# Patient Record
Sex: FEMALE | Race: WHITE | NOT HISPANIC OR LATINO | Employment: OTHER | URBAN - METROPOLITAN AREA
[De-identification: names, ages, dates, MRNs, and addresses within clinical notes are randomized per-mention and may not be internally consistent; named-entity substitution may affect disease eponyms.]

---

## 2017-01-12 ENCOUNTER — AMBULATORY - HEALTHEAST (OUTPATIENT)
Dept: VASCULAR SURGERY | Facility: CLINIC | Age: 82
End: 2017-01-12

## 2017-01-12 ENCOUNTER — RECORDS - HEALTHEAST (OUTPATIENT)
Dept: ADMINISTRATIVE | Facility: OTHER | Age: 82
End: 2017-01-12

## 2017-01-12 ENCOUNTER — RECORDS - HEALTHEAST (OUTPATIENT)
Dept: VASCULAR ULTRASOUND | Facility: CLINIC | Age: 82
End: 2017-01-12

## 2017-01-12 DIAGNOSIS — I73.9 PERIPHERAL VASCULAR DISEASE, UNSPECIFIED (H): ICD-10-CM

## 2017-01-12 DIAGNOSIS — I73.9 PAD (PERIPHERAL ARTERY DISEASE) (H): ICD-10-CM

## 2017-01-19 ENCOUNTER — COMMUNICATION - HEALTHEAST (OUTPATIENT)
Dept: INTERNAL MEDICINE | Facility: CLINIC | Age: 82
End: 2017-01-19

## 2017-01-19 DIAGNOSIS — I10 HYPERTENSION: ICD-10-CM

## 2017-02-04 ENCOUNTER — COMMUNICATION - HEALTHEAST (OUTPATIENT)
Dept: INTERNAL MEDICINE | Facility: CLINIC | Age: 82
End: 2017-02-04

## 2017-02-28 ENCOUNTER — OFFICE VISIT - HEALTHEAST (OUTPATIENT)
Dept: INTERNAL MEDICINE | Facility: CLINIC | Age: 82
End: 2017-02-28

## 2017-02-28 DIAGNOSIS — Z00.01 ENCOUNTER FOR GENERAL ADULT MEDICAL EXAMINATION WITH ABNORMAL FINDINGS: ICD-10-CM

## 2017-02-28 DIAGNOSIS — I10 ESSENTIAL HYPERTENSION WITH GOAL BLOOD PRESSURE LESS THAN 140/90: ICD-10-CM

## 2017-02-28 DIAGNOSIS — R26.81 GAIT INSTABILITY: ICD-10-CM

## 2017-02-28 DIAGNOSIS — E55.9 VITAMIN D DEFICIENCY: ICD-10-CM

## 2017-02-28 DIAGNOSIS — G25.81 RLS (RESTLESS LEGS SYNDROME): ICD-10-CM

## 2017-02-28 DIAGNOSIS — I25.10 CORONARY ATHEROSCLEROSIS: ICD-10-CM

## 2017-02-28 DIAGNOSIS — Z78.0 MENOPAUSE: ICD-10-CM

## 2017-02-28 DIAGNOSIS — L98.9 SKIN LESION: ICD-10-CM

## 2017-02-28 DIAGNOSIS — E78.2 MIXED HYPERLIPIDEMIA: ICD-10-CM

## 2017-02-28 LAB
CHOLEST SERPL-MCNC: 154 MG/DL
FASTING STATUS PATIENT QL REPORTED: NO
HDLC SERPL-MCNC: 48 MG/DL
LDLC SERPL CALC-MCNC: 85 MG/DL
TRIGL SERPL-MCNC: 105 MG/DL

## 2017-02-28 ASSESSMENT — MIFFLIN-ST. JEOR: SCORE: 1086.06

## 2017-03-01 ENCOUNTER — COMMUNICATION - HEALTHEAST (OUTPATIENT)
Dept: INTERNAL MEDICINE | Facility: CLINIC | Age: 82
End: 2017-03-01

## 2017-03-03 ENCOUNTER — RECORDS - HEALTHEAST (OUTPATIENT)
Dept: ADMINISTRATIVE | Facility: OTHER | Age: 82
End: 2017-03-03

## 2017-03-06 ENCOUNTER — RECORDS - HEALTHEAST (OUTPATIENT)
Dept: ADMINISTRATIVE | Facility: OTHER | Age: 82
End: 2017-03-06

## 2017-03-06 ENCOUNTER — RECORDS - HEALTHEAST (OUTPATIENT)
Dept: BONE DENSITY | Facility: CLINIC | Age: 82
End: 2017-03-06

## 2017-03-06 DIAGNOSIS — Z78.0 ASYMPTOMATIC MENOPAUSAL STATE: ICD-10-CM

## 2017-03-13 ENCOUNTER — COMMUNICATION - HEALTHEAST (OUTPATIENT)
Dept: INTERNAL MEDICINE | Facility: CLINIC | Age: 82
End: 2017-03-13

## 2017-03-29 ENCOUNTER — COMMUNICATION - HEALTHEAST (OUTPATIENT)
Dept: SCHEDULING | Facility: CLINIC | Age: 82
End: 2017-03-29

## 2017-03-29 DIAGNOSIS — I10 HYPERTENSION: ICD-10-CM

## 2017-03-31 ENCOUNTER — COMMUNICATION - HEALTHEAST (OUTPATIENT)
Dept: SCHEDULING | Facility: CLINIC | Age: 82
End: 2017-03-31

## 2017-03-31 DIAGNOSIS — I10 HYPERTENSION: ICD-10-CM

## 2017-05-03 ENCOUNTER — COMMUNICATION - HEALTHEAST (OUTPATIENT)
Dept: INTERNAL MEDICINE | Facility: CLINIC | Age: 82
End: 2017-05-03

## 2017-05-19 ENCOUNTER — RECORDS - HEALTHEAST (OUTPATIENT)
Dept: ADMINISTRATIVE | Facility: OTHER | Age: 82
End: 2017-05-19

## 2017-05-22 ENCOUNTER — COMMUNICATION - HEALTHEAST (OUTPATIENT)
Dept: INTERNAL MEDICINE | Facility: CLINIC | Age: 82
End: 2017-05-22

## 2017-05-25 ENCOUNTER — OFFICE VISIT - HEALTHEAST (OUTPATIENT)
Dept: INTERNAL MEDICINE | Facility: CLINIC | Age: 82
End: 2017-05-25

## 2017-05-25 DIAGNOSIS — R05.9 COUGH: ICD-10-CM

## 2017-05-25 DIAGNOSIS — J32.9 SINUSITIS: ICD-10-CM

## 2017-05-25 DIAGNOSIS — J40 BRONCHITIS: ICD-10-CM

## 2017-05-25 ASSESSMENT — MIFFLIN-ST. JEOR: SCORE: 1076.99

## 2017-10-25 ENCOUNTER — OFFICE VISIT - HEALTHEAST (OUTPATIENT)
Dept: INTERNAL MEDICINE | Facility: CLINIC | Age: 82
End: 2017-10-25

## 2017-10-25 DIAGNOSIS — I10 ESSENTIAL HYPERTENSION: ICD-10-CM

## 2017-10-25 DIAGNOSIS — I73.9 PAD (PERIPHERAL ARTERY DISEASE) (H): ICD-10-CM

## 2017-10-25 DIAGNOSIS — E78.2 MIXED HYPERLIPIDEMIA: ICD-10-CM

## 2017-10-25 DIAGNOSIS — E55.9 VITAMIN D DEFICIENCY: ICD-10-CM

## 2017-10-25 DIAGNOSIS — R10.84 GENERALIZED ABDOMINAL PAIN: ICD-10-CM

## 2017-10-26 ENCOUNTER — COMMUNICATION - HEALTHEAST (OUTPATIENT)
Dept: INTERNAL MEDICINE | Facility: CLINIC | Age: 82
End: 2017-10-26

## 2017-11-02 ENCOUNTER — COMMUNICATION - HEALTHEAST (OUTPATIENT)
Dept: INTERNAL MEDICINE | Facility: CLINIC | Age: 82
End: 2017-11-02

## 2017-11-02 ENCOUNTER — HOSPITAL ENCOUNTER (OUTPATIENT)
Dept: CT IMAGING | Facility: CLINIC | Age: 82
Discharge: HOME | End: 2017-11-02
Attending: INTERNAL MEDICINE

## 2017-11-02 DIAGNOSIS — R10.84 GENERALIZED ABDOMINAL PAIN: ICD-10-CM

## 2017-12-07 ENCOUNTER — COMMUNICATION - HEALTHEAST (OUTPATIENT)
Dept: INTERNAL MEDICINE | Facility: CLINIC | Age: 82
End: 2017-12-07

## 2018-01-02 ENCOUNTER — COMMUNICATION - HEALTHEAST (OUTPATIENT)
Dept: SCHEDULING | Facility: CLINIC | Age: 83
End: 2018-01-02

## 2018-01-02 DIAGNOSIS — I10 HYPERTENSION: ICD-10-CM

## 2018-01-09 ENCOUNTER — COMMUNICATION - HEALTHEAST (OUTPATIENT)
Dept: INTERNAL MEDICINE | Facility: CLINIC | Age: 83
End: 2018-01-09

## 2018-01-11 ENCOUNTER — RECORDS - HEALTHEAST (OUTPATIENT)
Dept: VASCULAR ULTRASOUND | Facility: CLINIC | Age: 83
End: 2018-01-11

## 2018-01-11 DIAGNOSIS — I73.9 PERIPHERAL VASCULAR DISEASE, UNSPECIFIED (H): ICD-10-CM

## 2018-01-23 ENCOUNTER — COMMUNICATION - HEALTHEAST (OUTPATIENT)
Dept: INTERNAL MEDICINE | Facility: CLINIC | Age: 83
End: 2018-01-23

## 2018-01-23 DIAGNOSIS — I10 HYPERTENSION: ICD-10-CM

## 2018-03-02 ENCOUNTER — COMMUNICATION - HEALTHEAST (OUTPATIENT)
Dept: SCHEDULING | Facility: CLINIC | Age: 83
End: 2018-03-02

## 2018-03-02 DIAGNOSIS — E78.5 HYPERLIPIDEMIA: ICD-10-CM

## 2018-04-10 ENCOUNTER — RECORDS - HEALTHEAST (OUTPATIENT)
Dept: ADMINISTRATIVE | Facility: OTHER | Age: 83
End: 2018-04-10

## 2018-04-16 ENCOUNTER — OFFICE VISIT - HEALTHEAST (OUTPATIENT)
Dept: INTERNAL MEDICINE | Facility: CLINIC | Age: 83
End: 2018-04-16

## 2018-04-16 DIAGNOSIS — I10 ESSENTIAL HYPERTENSION: ICD-10-CM

## 2018-04-16 DIAGNOSIS — E78.2 MIXED HYPERLIPIDEMIA: ICD-10-CM

## 2018-04-16 DIAGNOSIS — R00.2 PALPITATION: ICD-10-CM

## 2018-04-16 DIAGNOSIS — R31.9 HEMATURIA: ICD-10-CM

## 2018-04-16 DIAGNOSIS — E55.9 VITAMIN D DEFICIENCY: ICD-10-CM

## 2018-04-16 LAB
ALBUMIN SERPL-MCNC: 4.2 G/DL (ref 3.5–5)
ALBUMIN UR-MCNC: NEGATIVE MG/DL
ALP SERPL-CCNC: 66 U/L (ref 45–120)
ALT SERPL W P-5'-P-CCNC: 13 U/L (ref 0–45)
ANION GAP SERPL CALCULATED.3IONS-SCNC: 12 MMOL/L (ref 5–18)
APPEARANCE UR: CLEAR
AST SERPL W P-5'-P-CCNC: 18 U/L (ref 0–40)
BACTERIA #/AREA URNS HPF: ABNORMAL HPF
BILIRUB DIRECT SERPL-MCNC: 0.4 MG/DL
BILIRUB SERPL-MCNC: 1.1 MG/DL (ref 0–1)
BILIRUB UR QL STRIP: NEGATIVE
BUN SERPL-MCNC: 12 MG/DL (ref 8–28)
CALCIUM SERPL-MCNC: 10.1 MG/DL (ref 8.5–10.5)
CHLORIDE BLD-SCNC: 102 MMOL/L (ref 98–107)
CHOLEST SERPL-MCNC: 138 MG/DL
CO2 SERPL-SCNC: 26 MMOL/L (ref 22–31)
COLOR UR AUTO: YELLOW
CREAT SERPL-MCNC: 0.88 MG/DL (ref 0.6–1.1)
ERYTHROCYTE [DISTWIDTH] IN BLOOD BY AUTOMATED COUNT: 12.3 % (ref 11–14.5)
FASTING STATUS PATIENT QL REPORTED: NO
GFR SERPL CREATININE-BSD FRML MDRD: >60 ML/MIN/1.73M2
GLUCOSE BLD-MCNC: 83 MG/DL (ref 70–125)
GLUCOSE UR STRIP-MCNC: NEGATIVE MG/DL
HCT VFR BLD AUTO: 42.7 % (ref 35–47)
HDLC SERPL-MCNC: 41 MG/DL
HGB BLD-MCNC: 14.8 G/DL (ref 12–16)
HGB UR QL STRIP: ABNORMAL
HYALINE CASTS #/AREA URNS LPF: ABNORMAL LPF
KETONES UR STRIP-MCNC: NEGATIVE MG/DL
LDLC SERPL CALC-MCNC: 76 MG/DL
LEUKOCYTE ESTERASE UR QL STRIP: NEGATIVE
MCH RBC QN AUTO: 31.6 PG (ref 27–34)
MCHC RBC AUTO-ENTMCNC: 34.6 G/DL (ref 32–36)
MCV RBC AUTO: 91 FL (ref 80–100)
NITRATE UR QL: NEGATIVE
PH UR STRIP: 5.5 [PH] (ref 5–8)
PLATELET # BLD AUTO: 179 THOU/UL (ref 140–440)
PMV BLD AUTO: 7.8 FL (ref 7–10)
POTASSIUM BLD-SCNC: 4.4 MMOL/L (ref 3.5–5)
PROT SERPL-MCNC: 7.2 G/DL (ref 6–8)
RBC # BLD AUTO: 4.68 MILL/UL (ref 3.8–5.4)
RBC #/AREA URNS AUTO: ABNORMAL HPF
SODIUM SERPL-SCNC: 140 MMOL/L (ref 136–145)
SP GR UR STRIP: 1.01 (ref 1–1.03)
SQUAMOUS #/AREA URNS AUTO: ABNORMAL LPF
TRIGL SERPL-MCNC: 106 MG/DL
TSH SERPL DL<=0.005 MIU/L-ACNC: 3.61 UIU/ML (ref 0.3–5)
UROBILINOGEN UR STRIP-ACNC: ABNORMAL
WBC #/AREA URNS AUTO: ABNORMAL HPF
WBC: 8.1 THOU/UL (ref 4–11)

## 2018-04-16 ASSESSMENT — MIFFLIN-ST. JEOR: SCORE: 1072.74

## 2018-04-17 LAB — 25(OH)D3 SERPL-MCNC: 79.9 NG/ML (ref 30–80)

## 2018-04-18 ENCOUNTER — COMMUNICATION - HEALTHEAST (OUTPATIENT)
Dept: INTERNAL MEDICINE | Facility: CLINIC | Age: 83
End: 2018-04-18

## 2018-04-18 LAB
LAB AP CHARGES (HE HISTORICAL CONVERSION): NORMAL
LAB MED GENERAL PATH INTERP (HE HISTORICAL CONVERSION): NORMAL
PATH REPORT.COMMENTS IMP SPEC: NORMAL
PATH REPORT.FINAL DX SPEC: NORMAL
PATH REPORT.MICROSCOPIC SPEC OTHER STN: NORMAL
PATH REPORT.RELEVANT HX SPEC: NORMAL
SPECIMEN DESCRIPTION: NORMAL

## 2018-04-23 ENCOUNTER — HOSPITAL ENCOUNTER (OUTPATIENT)
Dept: CARDIOLOGY | Facility: CLINIC | Age: 83
Discharge: HOME | End: 2018-04-23
Attending: INTERNAL MEDICINE

## 2018-04-23 DIAGNOSIS — R00.2 PALPITATION: ICD-10-CM

## 2018-04-25 ENCOUNTER — COMMUNICATION - HEALTHEAST (OUTPATIENT)
Dept: INTERNAL MEDICINE | Facility: CLINIC | Age: 83
End: 2018-04-25

## 2018-04-25 DIAGNOSIS — I10 HYPERTENSION: ICD-10-CM

## 2018-04-30 ENCOUNTER — OFFICE VISIT - HEALTHEAST (OUTPATIENT)
Dept: INTERNAL MEDICINE | Facility: CLINIC | Age: 83
End: 2018-04-30

## 2018-04-30 DIAGNOSIS — F41.9 ANXIETY: ICD-10-CM

## 2018-04-30 DIAGNOSIS — G47.00 INSOMNIA: ICD-10-CM

## 2018-04-30 DIAGNOSIS — I10 HYPERTENSION: ICD-10-CM

## 2018-04-30 DIAGNOSIS — I48.91 ATRIAL FIBRILLATION (H): ICD-10-CM

## 2018-05-03 ENCOUNTER — HOSPITAL ENCOUNTER (OUTPATIENT)
Dept: CARDIOLOGY | Facility: CLINIC | Age: 83
Discharge: HOME | End: 2018-05-03
Attending: INTERNAL MEDICINE

## 2018-05-03 DIAGNOSIS — I48.91 ATRIAL FIBRILLATION (H): ICD-10-CM

## 2018-05-03 LAB
AORTIC ROOT: 3.3 CM
ASCENDING AORTA: 3.3 CM
BSA FOR ECHO PROCEDURE: 1.74 M2
CV BLOOD PRESSURE: NORMAL MMHG
CV ECHO HEIGHT: 66 IN
CV ECHO WEIGHT: 143 LBS
DOP CALC LVOT AREA: 3.46 CM2
DOP CALC LVOT DIAMETER: 2.1 CM
DOP CALC LVOT PEAK VEL: 105 CM/S
DOP CALC LVOT STROKE VOLUME: 82 CM3
DOP CALCLVOT PEAK VEL VTI: 23.7 CM
EJECTION FRACTION: 56 % (ref 55–75)
FRACTIONAL SHORTENING: 21.2 % (ref 28–44)
INTERVENTRICULAR SEPTUM IN END DIASTOLE: 1.7 CM (ref 0.6–0.9)
IVS/PW RATIO: 1.3
LA AREA 1: 19.9 CM2
LA AREA 2: 19.3 CM2
LEFT ATRIUM LENGTH: 5.42 CM
LEFT ATRIUM SIZE: 3.5 CM
LEFT ATRIUM TO AORTIC ROOT RATIO: 1.06 NO UNITS
LEFT ATRIUM VOLUME INDEX: 34.6 ML/M2
LEFT ATRIUM VOLUME: 60.2 ML
LEFT VENTRICLE CARDIAC INDEX: 3.1 L/MIN/M2
LEFT VENTRICLE CARDIAC OUTPUT: 5.4 L/MIN
LEFT VENTRICLE DIASTOLIC VOLUME INDEX: 42 CM3/M2 (ref 34–74)
LEFT VENTRICLE DIASTOLIC VOLUME: 73 CM3 (ref 46–106)
LEFT VENTRICLE HEART RATE: 66 BPM
LEFT VENTRICLE MASS INDEX: 102.7 G/M2
LEFT VENTRICLE SYSTOLIC VOLUME INDEX: 18.4 CM3/M2 (ref 11–31)
LEFT VENTRICLE SYSTOLIC VOLUME: 32 CM3 (ref 14–42)
LEFT VENTRICULAR INTERNAL DIMENSION IN DIASTOLE: 3.3 CM (ref 3.8–5.2)
LEFT VENTRICULAR INTERNAL DIMENSION IN SYSTOLE: 2.6 CM (ref 2.2–3.5)
LEFT VENTRICULAR MASS: 178.7 G
LEFT VENTRICULAR OUTFLOW TRACT MEAN GRADIENT: 2 MMHG
LEFT VENTRICULAR OUTFLOW TRACT MEAN VELOCITY: 74.1 CM/S
LEFT VENTRICULAR OUTFLOW TRACT PEAK GRADIENT: 4 MMHG
LEFT VENTRICULAR POSTERIOR WALL IN END DIASTOLE: 1.3 CM (ref 0.6–0.9)
LV STROKE VOLUME INDEX: 47.2 ML/M2
MITRAL REGURGITANT VELOCITY TIME INTEGRAL: 206 CM
MR FLOW: 10 CM3
MR MEAN GRADIENT: 91 MMHG
MR MEAN GRADIENT: 91 MMHG
MR MEAN VELOCITY: 445 CM/S
MR MEAN VELOCITY: 445 CM/S
MR PEAK GRADIENT: 139.7 MMHG
MR PISA EROA: 0.1 CM2
MR PISA RADIUS: 0.4 CM
MR PISA VN NYQUIST: 30.8 CM/S
MV AVERAGE E/E' RATIO: 14.7 CM/S
MV DECELERATION TIME: 201 MS
MV E'TISSUE VEL-LAT: 8.48 CM/S
MV E'TISSUE VEL-MED: 6.53 CM/S
MV LATERAL E/E' RATIO: 13
MV MEDIAL E/E' RATIO: 16.8
MV PEAK E VELOCITY: 110 CM/S
MV REGURGITANT VOLUME: 10.8 CC
NUC REST DIASTOLIC VOLUME INDEX: 2288 LBS
NUC REST SYSTOLIC VOLUME INDEX: 66 IN
PISA MR PEAK VEL: 591 CM/S
TRICUSPID REGURGITATION PEAK PRESSURE GRADIENT: 30.7 MMHG
TRICUSPID VALVE ANULAR PLANE SYSTOLIC EXCURSION: 2.5 CM
TRICUSPID VALVE PEAK REGURGITANT VELOCITY: 277 CM/S

## 2018-05-03 ASSESSMENT — MIFFLIN-ST. JEOR: SCORE: 1080.39

## 2018-05-04 ENCOUNTER — COMMUNICATION - HEALTHEAST (OUTPATIENT)
Dept: INTERNAL MEDICINE | Facility: CLINIC | Age: 83
End: 2018-05-04

## 2018-05-09 ENCOUNTER — OFFICE VISIT - HEALTHEAST (OUTPATIENT)
Dept: CARDIOLOGY | Facility: CLINIC | Age: 83
End: 2018-05-09

## 2018-05-09 DIAGNOSIS — I25.10 ATHEROSCLEROSIS OF NATIVE CORONARY ARTERY OF NATIVE HEART WITHOUT ANGINA PECTORIS: ICD-10-CM

## 2018-05-09 DIAGNOSIS — I47.10 PAROXYSMAL SVT (SUPRAVENTRICULAR TACHYCARDIA) (H): ICD-10-CM

## 2018-05-09 DIAGNOSIS — I48.19 PERSISTENT ATRIAL FIBRILLATION (H): ICD-10-CM

## 2018-05-09 DIAGNOSIS — I10 ESSENTIAL HYPERTENSION: ICD-10-CM

## 2018-05-09 ASSESSMENT — MIFFLIN-ST. JEOR: SCORE: 1062.25

## 2018-05-10 LAB
ATRIAL RATE - MUSE: 227 BPM
DIASTOLIC BLOOD PRESSURE - MUSE: NORMAL MMHG
INTERPRETATION ECG - MUSE: NORMAL
P AXIS - MUSE: NORMAL DEGREES
PR INTERVAL - MUSE: NORMAL MS
QRS DURATION - MUSE: 84 MS
QT - MUSE: 410 MS
QTC - MUSE: 429 MS
R AXIS - MUSE: -44 DEGREES
SYSTOLIC BLOOD PRESSURE - MUSE: NORMAL MMHG
T AXIS - MUSE: -4 DEGREES
VENTRICULAR RATE- MUSE: 66 BPM

## 2018-05-15 ENCOUNTER — OFFICE VISIT - HEALTHEAST (OUTPATIENT)
Dept: INTERNAL MEDICINE | Facility: CLINIC | Age: 83
End: 2018-05-15

## 2018-05-15 DIAGNOSIS — I48.19 PERSISTENT ATRIAL FIBRILLATION (H): ICD-10-CM

## 2018-05-15 DIAGNOSIS — I10 ESSENTIAL HYPERTENSION: ICD-10-CM

## 2018-05-15 DIAGNOSIS — I73.9 PAD (PERIPHERAL ARTERY DISEASE) (H): ICD-10-CM

## 2018-05-15 DIAGNOSIS — G47.00 INSOMNIA, UNSPECIFIED TYPE: ICD-10-CM

## 2018-05-16 ENCOUNTER — AMBULATORY - HEALTHEAST (OUTPATIENT)
Dept: CARDIOLOGY | Facility: CLINIC | Age: 83
End: 2018-05-16

## 2018-05-24 ENCOUNTER — ANESTHESIA - HEALTHEAST (OUTPATIENT)
Dept: CARDIOLOGY | Facility: CLINIC | Age: 83
End: 2018-05-24

## 2018-06-06 ENCOUNTER — COMMUNICATION - HEALTHEAST (OUTPATIENT)
Dept: SCHEDULING | Facility: CLINIC | Age: 83
End: 2018-06-06

## 2018-06-06 DIAGNOSIS — E78.5 HYPERLIPIDEMIA: ICD-10-CM

## 2018-06-07 ENCOUNTER — COMMUNICATION - HEALTHEAST (OUTPATIENT)
Dept: INTERNAL MEDICINE | Facility: CLINIC | Age: 83
End: 2018-06-07

## 2018-06-07 ENCOUNTER — OFFICE VISIT - HEALTHEAST (OUTPATIENT)
Dept: CARDIOLOGY | Facility: CLINIC | Age: 83
End: 2018-06-07

## 2018-06-07 DIAGNOSIS — I48.19 PERSISTENT ATRIAL FIBRILLATION (H): ICD-10-CM

## 2018-06-07 DIAGNOSIS — R42 VERTIGO: ICD-10-CM

## 2018-06-07 DIAGNOSIS — E78.5 HYPERLIPIDEMIA: ICD-10-CM

## 2018-06-07 DIAGNOSIS — I10 ESSENTIAL HYPERTENSION: ICD-10-CM

## 2018-06-07 LAB
ATRIAL RATE - MUSE: 50 BPM
DIASTOLIC BLOOD PRESSURE - MUSE: NORMAL MMHG
INTERPRETATION ECG - MUSE: NORMAL
P AXIS - MUSE: 42 DEGREES
PR INTERVAL - MUSE: 180 MS
QRS DURATION - MUSE: 80 MS
QT - MUSE: 444 MS
QTC - MUSE: 404 MS
R AXIS - MUSE: -56 DEGREES
SYSTOLIC BLOOD PRESSURE - MUSE: NORMAL MMHG
T AXIS - MUSE: 36 DEGREES
VENTRICULAR RATE- MUSE: 50 BPM

## 2018-06-07 ASSESSMENT — MIFFLIN-ST. JEOR: SCORE: 1053.18

## 2018-06-14 ENCOUNTER — HOSPITAL ENCOUNTER (OUTPATIENT)
Dept: CARDIOLOGY | Facility: CLINIC | Age: 83
Discharge: HOME | End: 2018-06-14
Attending: NURSE PRACTITIONER

## 2018-06-14 DIAGNOSIS — I48.19 PERSISTENT ATRIAL FIBRILLATION (H): ICD-10-CM

## 2018-06-24 ENCOUNTER — RECORDS - HEALTHEAST (OUTPATIENT)
Dept: ADMINISTRATIVE | Facility: OTHER | Age: 83
End: 2018-06-24

## 2018-06-24 ASSESSMENT — MIFFLIN-ST. JEOR
SCORE: 1041.84
SCORE: 1046.38
SCORE: 1046.38
SCORE: 1041.84

## 2018-06-25 ENCOUNTER — RECORDS - HEALTHEAST (OUTPATIENT)
Dept: ADMINISTRATIVE | Facility: OTHER | Age: 83
End: 2018-06-25

## 2018-06-25 ASSESSMENT — MIFFLIN-ST. JEOR
SCORE: 1046.38
SCORE: 1046.38

## 2018-06-26 ENCOUNTER — SURGERY - HEALTHEAST (OUTPATIENT)
Dept: CARDIOLOGY | Facility: CLINIC | Age: 83
End: 2018-06-26

## 2018-06-27 ENCOUNTER — SURGERY - HEALTHEAST (OUTPATIENT)
Dept: CARDIOLOGY | Facility: CLINIC | Age: 83
End: 2018-06-27

## 2018-06-27 ENCOUNTER — RECORDS - HEALTHEAST (OUTPATIENT)
Dept: ADMINISTRATIVE | Facility: OTHER | Age: 83
End: 2018-06-27

## 2018-06-27 ASSESSMENT — MIFFLIN-ST. JEOR
SCORE: 1023.25
SCORE: 1023.25

## 2018-06-28 ENCOUNTER — RECORDS - HEALTHEAST (OUTPATIENT)
Dept: ADMINISTRATIVE | Facility: OTHER | Age: 83
End: 2018-06-28

## 2018-06-28 ASSESSMENT — MIFFLIN-ST. JEOR
SCORE: 1033.23
SCORE: 1033.23

## 2018-07-12 ENCOUNTER — AMBULATORY - HEALTHEAST (OUTPATIENT)
Dept: CARDIOLOGY | Facility: CLINIC | Age: 83
End: 2018-07-12

## 2018-07-12 DIAGNOSIS — Z95.0 CARDIAC PACEMAKER IN SITU: ICD-10-CM

## 2018-07-12 LAB
HCC DEVICE COMMENTS: NORMAL
HCC DEVICE IMPLANTING PROVIDER: NORMAL
HCC DEVICE MANUFACTURE: NORMAL
HCC DEVICE MODEL: NORMAL
HCC DEVICE SERIAL NUMBER: NORMAL
HCC DEVICE TYPE: NORMAL

## 2018-07-12 ASSESSMENT — MIFFLIN-ST. JEOR: SCORE: 1028.69

## 2018-08-02 ENCOUNTER — AMBULATORY - HEALTHEAST (OUTPATIENT)
Dept: CARDIOLOGY | Facility: CLINIC | Age: 83
End: 2018-08-02

## 2018-08-02 DIAGNOSIS — Z95.0 CARDIAC PACEMAKER IN SITU: ICD-10-CM

## 2018-09-28 ENCOUNTER — AMBULATORY - HEALTHEAST (OUTPATIENT)
Dept: CARDIOLOGY | Facility: CLINIC | Age: 83
End: 2018-09-28

## 2018-09-28 DIAGNOSIS — Z95.0 CARDIAC PACEMAKER IN SITU: ICD-10-CM

## 2018-09-28 ASSESSMENT — MIFFLIN-ST. JEOR: SCORE: 1037.31

## 2018-10-24 ENCOUNTER — OFFICE VISIT - HEALTHEAST (OUTPATIENT)
Dept: INTERNAL MEDICINE | Facility: CLINIC | Age: 83
End: 2018-10-24

## 2018-10-24 DIAGNOSIS — I49.5 SSS (SICK SINUS SYNDROME) (H): ICD-10-CM

## 2018-10-24 DIAGNOSIS — E78.5 HYPERLIPIDEMIA: ICD-10-CM

## 2018-10-24 DIAGNOSIS — I10 HYPERTENSION: ICD-10-CM

## 2018-10-24 DIAGNOSIS — I48.0 PAROXYSMAL ATRIAL FIBRILLATION (H): ICD-10-CM

## 2018-12-31 ENCOUNTER — AMBULATORY - HEALTHEAST (OUTPATIENT)
Dept: CARDIOLOGY | Facility: CLINIC | Age: 83
End: 2018-12-31

## 2018-12-31 DIAGNOSIS — Z95.0 CARDIAC PACEMAKER IN SITU: ICD-10-CM

## 2019-01-25 ENCOUNTER — OFFICE VISIT - HEALTHEAST (OUTPATIENT)
Dept: INTERNAL MEDICINE | Facility: CLINIC | Age: 84
End: 2019-01-25

## 2019-01-25 DIAGNOSIS — I10 HYPERTENSION: ICD-10-CM

## 2019-01-25 DIAGNOSIS — I48.19 PERSISTENT ATRIAL FIBRILLATION (H): ICD-10-CM

## 2019-01-25 DIAGNOSIS — I10 ESSENTIAL HYPERTENSION: ICD-10-CM

## 2019-01-25 DIAGNOSIS — Z95.0 CARDIAC PACEMAKER IN SITU: ICD-10-CM

## 2019-01-25 DIAGNOSIS — I25.10 ATHEROSCLEROSIS OF NATIVE CORONARY ARTERY OF NATIVE HEART WITHOUT ANGINA PECTORIS: ICD-10-CM

## 2019-01-25 ASSESSMENT — MIFFLIN-ST. JEOR: SCORE: 1038.16

## 2019-03-26 ENCOUNTER — COMMUNICATION - HEALTHEAST (OUTPATIENT)
Dept: CARDIOLOGY | Facility: CLINIC | Age: 84
End: 2019-03-26

## 2019-04-02 ENCOUNTER — AMBULATORY - HEALTHEAST (OUTPATIENT)
Dept: CARDIOLOGY | Facility: CLINIC | Age: 84
End: 2019-04-02

## 2019-04-02 DIAGNOSIS — Z95.0 CARDIAC PACEMAKER IN SITU: ICD-10-CM

## 2019-04-25 ENCOUNTER — COMMUNICATION - HEALTHEAST (OUTPATIENT)
Dept: INTERNAL MEDICINE | Facility: CLINIC | Age: 84
End: 2019-04-25

## 2019-04-25 DIAGNOSIS — G47.00 INSOMNIA: ICD-10-CM

## 2019-06-10 ENCOUNTER — COMMUNICATION - HEALTHEAST (OUTPATIENT)
Dept: INTERNAL MEDICINE | Facility: CLINIC | Age: 84
End: 2019-06-10

## 2019-06-10 ENCOUNTER — AMBULATORY - HEALTHEAST (OUTPATIENT)
Dept: INTERNAL MEDICINE | Facility: CLINIC | Age: 84
End: 2019-06-10

## 2019-06-10 ENCOUNTER — OFFICE VISIT - HEALTHEAST (OUTPATIENT)
Dept: INTERNAL MEDICINE | Facility: CLINIC | Age: 84
End: 2019-06-10

## 2019-06-10 DIAGNOSIS — E78.5 HYPERLIPIDEMIA: ICD-10-CM

## 2019-06-10 DIAGNOSIS — R30.0 DYSURIA: ICD-10-CM

## 2019-06-10 DIAGNOSIS — K58.0 IRRITABLE BOWEL SYNDROME WITH DIARRHEA: ICD-10-CM

## 2019-06-10 LAB
ALBUMIN UR-MCNC: NEGATIVE MG/DL
APPEARANCE UR: CLEAR
BACTERIA #/AREA URNS HPF: ABNORMAL HPF
BILIRUB UR QL STRIP: NEGATIVE
COLOR UR AUTO: YELLOW
GLUCOSE UR STRIP-MCNC: NEGATIVE MG/DL
HGB UR QL STRIP: ABNORMAL
KETONES UR STRIP-MCNC: NEGATIVE MG/DL
LEUKOCYTE ESTERASE UR QL STRIP: ABNORMAL
NITRATE UR QL: NEGATIVE
PH UR STRIP: 5.5 [PH] (ref 5–8)
RBC #/AREA URNS AUTO: ABNORMAL HPF
SP GR UR STRIP: <=1.005 (ref 1–1.03)
SQUAMOUS #/AREA URNS AUTO: ABNORMAL LPF
UROBILINOGEN UR STRIP-ACNC: ABNORMAL
WBC #/AREA URNS AUTO: ABNORMAL HPF

## 2019-06-10 ASSESSMENT — MIFFLIN-ST. JEOR: SCORE: 1044.11

## 2019-06-11 LAB — BACTERIA SPEC CULT: NO GROWTH

## 2019-06-28 ENCOUNTER — RECORDS - HEALTHEAST (OUTPATIENT)
Dept: INTERNAL MEDICINE | Facility: CLINIC | Age: 84
End: 2019-06-28

## 2019-07-08 ENCOUNTER — AMBULATORY - HEALTHEAST (OUTPATIENT)
Dept: CARDIOLOGY | Facility: CLINIC | Age: 84
End: 2019-07-08

## 2019-07-08 DIAGNOSIS — Z95.0 CARDIAC PACEMAKER IN SITU: ICD-10-CM

## 2019-09-23 ENCOUNTER — COMMUNICATION - HEALTHEAST (OUTPATIENT)
Dept: INTERNAL MEDICINE | Facility: CLINIC | Age: 84
End: 2019-09-23

## 2019-09-23 DIAGNOSIS — G47.00 INSOMNIA: ICD-10-CM

## 2019-09-27 ENCOUNTER — AMBULATORY - HEALTHEAST (OUTPATIENT)
Dept: CARDIOLOGY | Facility: CLINIC | Age: 84
End: 2019-09-27

## 2019-09-27 DIAGNOSIS — Z95.0 CARDIAC PACEMAKER IN SITU: ICD-10-CM

## 2019-09-27 ASSESSMENT — MIFFLIN-ST. JEOR: SCORE: 1023.7

## 2019-10-02 ENCOUNTER — OFFICE VISIT - HEALTHEAST (OUTPATIENT)
Dept: INTERNAL MEDICINE | Facility: CLINIC | Age: 84
End: 2019-10-02

## 2019-10-02 DIAGNOSIS — R01.1 SYSTOLIC MURMUR: ICD-10-CM

## 2019-10-02 DIAGNOSIS — R06.02 SOB (SHORTNESS OF BREATH): ICD-10-CM

## 2019-10-02 DIAGNOSIS — Z79.899 MEDICATION MANAGEMENT: ICD-10-CM

## 2019-10-02 DIAGNOSIS — R53.83 FATIGUE, UNSPECIFIED TYPE: ICD-10-CM

## 2019-10-02 DIAGNOSIS — I10 ESSENTIAL HYPERTENSION: ICD-10-CM

## 2019-10-02 DIAGNOSIS — Z23 FLU VACCINE NEED: ICD-10-CM

## 2019-10-02 LAB
ANION GAP SERPL CALCULATED.3IONS-SCNC: 7 MMOL/L (ref 5–18)
BUN SERPL-MCNC: 8 MG/DL (ref 8–28)
CALCIUM SERPL-MCNC: 9.4 MG/DL (ref 8.5–10.5)
CHLORIDE BLD-SCNC: 105 MMOL/L (ref 98–107)
CO2 SERPL-SCNC: 29 MMOL/L (ref 22–31)
CREAT SERPL-MCNC: 0.84 MG/DL (ref 0.6–1.1)
ERYTHROCYTE [DISTWIDTH] IN BLOOD BY AUTOMATED COUNT: 10.5 % (ref 11–14.5)
GFR SERPL CREATININE-BSD FRML MDRD: >60 ML/MIN/1.73M2
GLUCOSE BLD-MCNC: 96 MG/DL (ref 70–125)
HCT VFR BLD AUTO: 39.2 % (ref 35–47)
HGB BLD-MCNC: 13.2 G/DL (ref 12–16)
MCH RBC QN AUTO: 32.3 PG (ref 27–34)
MCHC RBC AUTO-ENTMCNC: 33.8 G/DL (ref 32–36)
MCV RBC AUTO: 96 FL (ref 80–100)
PLATELET # BLD AUTO: 194 THOU/UL (ref 140–440)
PMV BLD AUTO: 7.7 FL (ref 7–10)
POTASSIUM BLD-SCNC: 4 MMOL/L (ref 3.5–5)
RBC # BLD AUTO: 4.1 MILL/UL (ref 3.8–5.4)
SODIUM SERPL-SCNC: 141 MMOL/L (ref 136–145)
TSH SERPL DL<=0.005 MIU/L-ACNC: 1.84 UIU/ML (ref 0.3–5)
WBC: 8.3 THOU/UL (ref 4–11)

## 2019-10-02 ASSESSMENT — MIFFLIN-ST. JEOR: SCORE: 1033.34

## 2019-10-03 ENCOUNTER — COMMUNICATION - HEALTHEAST (OUTPATIENT)
Dept: INTERNAL MEDICINE | Facility: CLINIC | Age: 84
End: 2019-10-03

## 2019-10-03 LAB
ATRIAL RATE - MUSE: 63 BPM
DIASTOLIC BLOOD PRESSURE - MUSE: NORMAL
INTERPRETATION ECG - MUSE: NORMAL
P AXIS - MUSE: NORMAL
PR INTERVAL - MUSE: NORMAL
QRS DURATION - MUSE: 86 MS
QT - MUSE: 420 MS
QTC - MUSE: 429 MS
R AXIS - MUSE: -51 DEGREES
SYSTOLIC BLOOD PRESSURE - MUSE: NORMAL
T AXIS - MUSE: 57 DEGREES
VENTRICULAR RATE- MUSE: 63 BPM

## 2019-10-09 ENCOUNTER — AMBULATORY - HEALTHEAST (OUTPATIENT)
Dept: CARDIOLOGY | Facility: CLINIC | Age: 84
End: 2019-10-09

## 2019-10-14 ENCOUNTER — HOSPITAL ENCOUNTER (OUTPATIENT)
Dept: CARDIOLOGY | Facility: CLINIC | Age: 84
Discharge: HOME | End: 2019-10-14
Attending: INTERNAL MEDICINE

## 2019-10-14 DIAGNOSIS — R01.1 SYSTOLIC MURMUR: ICD-10-CM

## 2019-10-14 LAB
AORTIC ROOT: 3.4 CM
AORTIC VALVE MEAN VELOCITY: 168 CM/S
ASCENDING AORTA: 3.4 CM
AV DIMENSIONLESS INDEX VTI: 0.5
AV MEAN GRADIENT: 12 MMHG
AV PEAK GRADIENT: 22.7 MMHG
AV VALVE AREA: 1.6 CM2
AV VELOCITY RATIO: 0.5
BSA FOR ECHO PROCEDURE: 1.68 M2
CV BLOOD PRESSURE: ABNORMAL %
CV ECHO HEIGHT: 65 IN
CV ECHO WEIGHT: 136 LBS
DOP CALC AO PEAK VEL: 238 CM/S
DOP CALC AO VTI: 55.9 CM
DOP CALC LVOT AREA: 3.46 CM2
DOP CALC LVOT DIAMETER: 2.1 CM
DOP CALC LVOT PEAK VEL: 109 CM/S
DOP CALC LVOT STROKE VOLUME: 88.3 CM3
DOP CALC MV VTI: 35.3 CM
DOP CALCLVOT PEAK VEL VTI: 25.5 CM
EJECTION FRACTION: 59 % (ref 55–75)
FRACTIONAL SHORTENING: 46.2 % (ref 28–44)
INTERVENTRICULAR SEPTUM IN END DIASTOLE: 1.4 CM (ref 0.6–0.9)
IVS/PW RATIO: 1.2
LA AREA 1: 17.3 CM2
LA AREA 2: 21.3 CM2
LEFT ATRIUM LENGTH: 5.02 CM
LEFT ATRIUM SIZE: 3.1 CM
LEFT ATRIUM TO AORTIC ROOT RATIO: 0.91 NO UNITS
LEFT ATRIUM VOLUME INDEX: 37.1 ML/M2
LEFT ATRIUM VOLUME: 62.4 ML
LEFT VENTRICLE CARDIAC INDEX: 3.2 L/MIN/M2
LEFT VENTRICLE CARDIAC OUTPUT: 5.3 L/MIN
LEFT VENTRICLE DIASTOLIC VOLUME INDEX: 32.1 CM3/M2 (ref 29–61)
LEFT VENTRICLE DIASTOLIC VOLUME: 54 CM3 (ref 46–106)
LEFT VENTRICLE HEART RATE: 60 BPM
LEFT VENTRICLE MASS INDEX: 107 G/M2
LEFT VENTRICLE SYSTOLIC VOLUME INDEX: 13.1 CM3/M2 (ref 8–24)
LEFT VENTRICLE SYSTOLIC VOLUME: 22 CM3 (ref 14–42)
LEFT VENTRICULAR INTERNAL DIMENSION IN DIASTOLE: 3.9 CM (ref 3.8–5.2)
LEFT VENTRICULAR INTERNAL DIMENSION IN SYSTOLE: 2.1 CM (ref 2.2–3.5)
LEFT VENTRICULAR MASS: 179.7 G
LEFT VENTRICULAR OUTFLOW TRACT MEAN GRADIENT: 2 MMHG
LEFT VENTRICULAR OUTFLOW TRACT MEAN VELOCITY: 70.4 CM/S
LEFT VENTRICULAR OUTFLOW TRACT PEAK GRADIENT: 5 MMHG
LEFT VENTRICULAR POSTERIOR WALL IN END DIASTOLE: 1.2 CM (ref 0.6–0.9)
LV STROKE VOLUME INDEX: 52.5 ML/M2
MITRAL VALVE E/A RATIO: 1
MITRAL VALVE MEAN INFLOW VELOCITY: 63.5 CM/S
MITRAL VALVE PEAK VELOCITY: 119 CM/S
MV AREA VTI: 2.5 CM2
MV AVERAGE E/E' RATIO: 12.3 CM/S
MV DECELERATION TIME: 238 MS
MV E'TISSUE VEL-LAT: 8.91 CM/S
MV E'TISSUE VEL-MED: 6.81 CM/S
MV LATERAL E/E' RATIO: 10.9
MV MEAN GRADIENT: 2 MMHG
MV MEDIAL E/E' RATIO: 14.2
MV PEAK A VELOCITY: 102 CM/S
MV PEAK E VELOCITY: 96.9 CM/S
MV PEAK GRADIENT: 5.7 MMHG
MV VALVE AREA BY CONTINUITY EQUATION: 2.5 CM2
NUC REST DIASTOLIC VOLUME INDEX: 2178 LBS
NUC REST SYSTOLIC VOLUME INDEX: 65 IN
TRICUSPID REGURGITATION PEAK PRESSURE GRADIENT: 25.2 MMHG
TRICUSPID VALVE ANULAR PLANE SYSTOLIC EXCURSION: 2.3 CM
TRICUSPID VALVE PEAK REGURGITANT VELOCITY: 251 CM/S

## 2019-10-14 ASSESSMENT — MIFFLIN-ST. JEOR: SCORE: 1033.34

## 2019-10-16 ENCOUNTER — COMMUNICATION - HEALTHEAST (OUTPATIENT)
Dept: INTERNAL MEDICINE | Facility: CLINIC | Age: 84
End: 2019-10-16

## 2019-10-16 ENCOUNTER — COMMUNICATION - HEALTHEAST (OUTPATIENT)
Dept: FAMILY MEDICINE | Facility: CLINIC | Age: 84
End: 2019-10-16

## 2019-12-06 ENCOUNTER — AMBULATORY - HEALTHEAST (OUTPATIENT)
Dept: CARDIOLOGY | Facility: CLINIC | Age: 84
End: 2019-12-06

## 2019-12-06 DIAGNOSIS — I10 ESSENTIAL HYPERTENSION: ICD-10-CM

## 2019-12-06 DIAGNOSIS — I47.10 PAROXYSMAL SVT (SUPRAVENTRICULAR TACHYCARDIA) (H): ICD-10-CM

## 2019-12-06 DIAGNOSIS — I25.10 ATHEROSCLEROSIS OF NATIVE CORONARY ARTERY OF NATIVE HEART WITHOUT ANGINA PECTORIS: ICD-10-CM

## 2019-12-06 DIAGNOSIS — I48.19 PERSISTENT ATRIAL FIBRILLATION (H): ICD-10-CM

## 2019-12-06 DIAGNOSIS — Z95.0 CARDIAC PACEMAKER IN SITU: ICD-10-CM

## 2019-12-06 DIAGNOSIS — I49.5 SICK SINUS SYNDROME (H): ICD-10-CM

## 2019-12-06 DIAGNOSIS — I49.5 SSS (SICK SINUS SYNDROME) (H): ICD-10-CM

## 2019-12-06 ASSESSMENT — MIFFLIN-ST. JEOR: SCORE: 1037.76

## 2019-12-27 ENCOUNTER — COMMUNICATION - HEALTHEAST (OUTPATIENT)
Dept: INTERNAL MEDICINE | Facility: CLINIC | Age: 84
End: 2019-12-27

## 2019-12-27 DIAGNOSIS — I47.10 PAROXYSMAL SVT (SUPRAVENTRICULAR TACHYCARDIA) (H): ICD-10-CM

## 2020-01-06 ENCOUNTER — COMMUNICATION - HEALTHEAST (OUTPATIENT)
Dept: INTERNAL MEDICINE | Facility: CLINIC | Age: 85
End: 2020-01-06

## 2020-01-06 ENCOUNTER — COMMUNICATION - HEALTHEAST (OUTPATIENT)
Dept: SCHEDULING | Facility: CLINIC | Age: 85
End: 2020-01-06

## 2020-01-06 DIAGNOSIS — I48.91 ATRIAL FIBRILLATION, UNSPECIFIED TYPE (H): ICD-10-CM

## 2020-01-09 ENCOUNTER — OFFICE VISIT - HEALTHEAST (OUTPATIENT)
Dept: INTERNAL MEDICINE | Facility: CLINIC | Age: 85
End: 2020-01-09

## 2020-01-09 DIAGNOSIS — Z95.0 CARDIAC PACEMAKER IN SITU: ICD-10-CM

## 2020-01-09 DIAGNOSIS — G47.00 INSOMNIA: ICD-10-CM

## 2020-01-09 DIAGNOSIS — R14.3 FLATULENCE, ERUCTATION AND GAS PAIN: ICD-10-CM

## 2020-01-09 DIAGNOSIS — I10 ESSENTIAL HYPERTENSION: ICD-10-CM

## 2020-01-09 DIAGNOSIS — E78.5 HYPERLIPIDEMIA: ICD-10-CM

## 2020-01-09 DIAGNOSIS — I35.0 NONRHEUMATIC AORTIC VALVE STENOSIS: ICD-10-CM

## 2020-01-09 DIAGNOSIS — L29.9 ITCHING: ICD-10-CM

## 2020-01-09 DIAGNOSIS — R14.2 FLATULENCE, ERUCTATION AND GAS PAIN: ICD-10-CM

## 2020-01-09 DIAGNOSIS — I47.10 PAROXYSMAL SVT (SUPRAVENTRICULAR TACHYCARDIA) (H): ICD-10-CM

## 2020-01-09 DIAGNOSIS — R14.1 FLATULENCE, ERUCTATION AND GAS PAIN: ICD-10-CM

## 2020-01-09 DIAGNOSIS — I10 HYPERTENSION: ICD-10-CM

## 2020-01-09 DIAGNOSIS — I48.91 ATRIAL FIBRILLATION, UNSPECIFIED TYPE (H): ICD-10-CM

## 2020-01-09 ASSESSMENT — MIFFLIN-ST. JEOR: SCORE: 1034.47

## 2020-01-22 ENCOUNTER — COMMUNICATION - HEALTHEAST (OUTPATIENT)
Dept: GERIATRICS | Facility: CLINIC | Age: 85
End: 2020-01-22

## 2020-01-22 ENCOUNTER — OFFICE VISIT - HEALTHEAST (OUTPATIENT)
Dept: GERIATRICS | Facility: CLINIC | Age: 85
End: 2020-01-22

## 2020-01-22 DIAGNOSIS — G47.00 INSOMNIA, UNSPECIFIED TYPE: ICD-10-CM

## 2020-01-22 DIAGNOSIS — G47.00 INSOMNIA: ICD-10-CM

## 2020-01-22 DIAGNOSIS — F51.01 PRIMARY INSOMNIA: ICD-10-CM

## 2020-01-22 DIAGNOSIS — R53.81 PHYSICAL DECONDITIONING: ICD-10-CM

## 2020-01-22 DIAGNOSIS — D50.0 IRON DEFICIENCY ANEMIA DUE TO CHRONIC BLOOD LOSS: ICD-10-CM

## 2020-01-22 DIAGNOSIS — M25.552 LEFT HIP PAIN: ICD-10-CM

## 2020-01-22 DIAGNOSIS — K68.3 RETROPERITONEAL HEMATOMA: ICD-10-CM

## 2020-01-24 ENCOUNTER — OFFICE VISIT - HEALTHEAST (OUTPATIENT)
Dept: GERIATRICS | Facility: CLINIC | Age: 85
End: 2020-01-24

## 2020-01-24 ENCOUNTER — RECORDS - HEALTHEAST (OUTPATIENT)
Dept: LAB | Facility: CLINIC | Age: 85
End: 2020-01-24

## 2020-01-24 DIAGNOSIS — D62 ACUTE BLOOD LOSS ANEMIA: ICD-10-CM

## 2020-01-24 DIAGNOSIS — I73.9 PAD (PERIPHERAL ARTERY DISEASE) (H): ICD-10-CM

## 2020-01-24 DIAGNOSIS — K68.3 RETROPERITONEAL HEMATOMA: ICD-10-CM

## 2020-01-24 DIAGNOSIS — I25.10 ATHEROSCLEROSIS OF NATIVE CORONARY ARTERY OF NATIVE HEART WITHOUT ANGINA PECTORIS: ICD-10-CM

## 2020-01-24 DIAGNOSIS — I35.0 NONRHEUMATIC AORTIC VALVE STENOSIS: ICD-10-CM

## 2020-01-24 DIAGNOSIS — I48.19 PERSISTENT ATRIAL FIBRILLATION (H): ICD-10-CM

## 2020-01-24 DIAGNOSIS — I10 ESSENTIAL HYPERTENSION: ICD-10-CM

## 2020-01-24 LAB
ANION GAP SERPL CALCULATED.3IONS-SCNC: 10 MMOL/L (ref 5–18)
BUN SERPL-MCNC: 21 MG/DL (ref 8–28)
CALCIUM SERPL-MCNC: 8.8 MG/DL (ref 8.5–10.5)
CHLORIDE BLD-SCNC: 109 MMOL/L (ref 98–107)
CO2 SERPL-SCNC: 23 MMOL/L (ref 22–31)
CREAT SERPL-MCNC: 0.86 MG/DL (ref 0.6–1.1)
ERYTHROCYTE [DISTWIDTH] IN BLOOD BY AUTOMATED COUNT: 13.5 % (ref 11–14.5)
GFR SERPL CREATININE-BSD FRML MDRD: >60 ML/MIN/1.73M2
GLUCOSE BLD-MCNC: 76 MG/DL (ref 70–125)
HCT VFR BLD AUTO: 28.6 % (ref 35–47)
HGB BLD-MCNC: 9.1 G/DL (ref 12–16)
MCH RBC QN AUTO: 30.5 PG (ref 27–34)
MCHC RBC AUTO-ENTMCNC: 31.8 G/DL (ref 32–36)
MCV RBC AUTO: 96 FL (ref 80–100)
PLATELET # BLD AUTO: 183 THOU/UL (ref 140–440)
PMV BLD AUTO: 11.3 FL (ref 8.5–12.5)
POTASSIUM BLD-SCNC: 3.4 MMOL/L (ref 3.5–5)
RBC # BLD AUTO: 2.98 MILL/UL (ref 3.8–5.4)
SODIUM SERPL-SCNC: 142 MMOL/L (ref 136–145)
WBC: 9.2 THOU/UL (ref 4–11)

## 2020-01-27 ENCOUNTER — OFFICE VISIT - HEALTHEAST (OUTPATIENT)
Dept: GERIATRICS | Facility: CLINIC | Age: 85
End: 2020-01-27

## 2020-01-27 DIAGNOSIS — Z95.0 CARDIAC PACEMAKER IN SITU: ICD-10-CM

## 2020-01-27 DIAGNOSIS — I10 ESSENTIAL HYPERTENSION: ICD-10-CM

## 2020-01-27 DIAGNOSIS — E78.00 PURE HYPERCHOLESTEROLEMIA: ICD-10-CM

## 2020-01-27 DIAGNOSIS — K68.3 RETROPERITONEAL HEMATOMA: ICD-10-CM

## 2020-01-27 DIAGNOSIS — I48.19 PERSISTENT ATRIAL FIBRILLATION (H): ICD-10-CM

## 2020-01-27 DIAGNOSIS — I25.10 ATHEROSCLEROSIS OF NATIVE CORONARY ARTERY OF NATIVE HEART WITHOUT ANGINA PECTORIS: ICD-10-CM

## 2020-01-27 DIAGNOSIS — I49.5 SSS (SICK SINUS SYNDROME) (H): ICD-10-CM

## 2020-01-27 DIAGNOSIS — F51.01 PRIMARY INSOMNIA: ICD-10-CM

## 2020-01-27 ASSESSMENT — MIFFLIN-ST. JEOR: SCORE: 1062.71

## 2020-01-28 ENCOUNTER — COMMUNICATION - HEALTHEAST (OUTPATIENT)
Dept: CARDIOLOGY | Facility: CLINIC | Age: 85
End: 2020-01-28

## 2020-01-28 ENCOUNTER — AMBULATORY - HEALTHEAST (OUTPATIENT)
Dept: CARDIOLOGY | Facility: CLINIC | Age: 85
End: 2020-01-28

## 2020-01-28 DIAGNOSIS — Z95.0 CARDIAC PACEMAKER IN SITU: ICD-10-CM

## 2020-01-28 DIAGNOSIS — I49.5 SICK SINUS SYNDROME (H): ICD-10-CM

## 2020-01-29 ENCOUNTER — COMMUNICATION - HEALTHEAST (OUTPATIENT)
Dept: GERIATRICS | Facility: CLINIC | Age: 85
End: 2020-01-29

## 2020-01-29 ENCOUNTER — AMBULATORY - HEALTHEAST (OUTPATIENT)
Dept: GERIATRICS | Facility: CLINIC | Age: 85
End: 2020-01-29

## 2020-01-31 ENCOUNTER — AMBULATORY - HEALTHEAST (OUTPATIENT)
Dept: CARDIOLOGY | Facility: CLINIC | Age: 85
End: 2020-01-31

## 2020-01-31 ENCOUNTER — OFFICE VISIT - HEALTHEAST (OUTPATIENT)
Dept: CARDIOLOGY | Facility: CLINIC | Age: 85
End: 2020-01-31

## 2020-01-31 DIAGNOSIS — I47.10 PAROXYSMAL SVT (SUPRAVENTRICULAR TACHYCARDIA) (H): ICD-10-CM

## 2020-01-31 DIAGNOSIS — I49.5 SICK SINUS SYNDROME (H): ICD-10-CM

## 2020-01-31 DIAGNOSIS — E78.2 MIXED HYPERLIPIDEMIA: ICD-10-CM

## 2020-01-31 DIAGNOSIS — Z95.0 CARDIAC PACEMAKER IN SITU: ICD-10-CM

## 2020-01-31 DIAGNOSIS — I49.5 SSS (SICK SINUS SYNDROME) (H): ICD-10-CM

## 2020-01-31 DIAGNOSIS — I25.10 ATHEROSCLEROSIS OF NATIVE CORONARY ARTERY OF NATIVE HEART WITHOUT ANGINA PECTORIS: ICD-10-CM

## 2020-01-31 DIAGNOSIS — I10 ESSENTIAL HYPERTENSION: ICD-10-CM

## 2020-01-31 DIAGNOSIS — E78.00 PURE HYPERCHOLESTEROLEMIA: ICD-10-CM

## 2020-01-31 DIAGNOSIS — R60.9 EDEMA, UNSPECIFIED TYPE: ICD-10-CM

## 2020-01-31 DIAGNOSIS — I73.9 PAD (PERIPHERAL ARTERY DISEASE) (H): ICD-10-CM

## 2020-01-31 ASSESSMENT — MIFFLIN-ST. JEOR: SCORE: 1050.92

## 2020-02-01 ENCOUNTER — AMBULATORY - HEALTHEAST (OUTPATIENT)
Dept: CARDIOLOGY | Facility: CLINIC | Age: 85
End: 2020-02-01

## 2020-02-01 DIAGNOSIS — I49.5 SICK SINUS SYNDROME (H): ICD-10-CM

## 2020-02-01 DIAGNOSIS — Z95.0 CARDIAC PACEMAKER IN SITU: ICD-10-CM

## 2020-02-04 ENCOUNTER — OFFICE VISIT - HEALTHEAST (OUTPATIENT)
Dept: INTERNAL MEDICINE | Facility: CLINIC | Age: 85
End: 2020-02-04

## 2020-02-04 DIAGNOSIS — D62 ANEMIA DUE TO BLOOD LOSS, ACUTE: ICD-10-CM

## 2020-02-04 DIAGNOSIS — K68.3 RETROPERITONEAL HEMATOMA: ICD-10-CM

## 2020-02-04 DIAGNOSIS — I10 ESSENTIAL HYPERTENSION: ICD-10-CM

## 2020-02-04 DIAGNOSIS — I48.19 PERSISTENT ATRIAL FIBRILLATION (H): ICD-10-CM

## 2020-02-04 LAB
ERYTHROCYTE [DISTWIDTH] IN BLOOD BY AUTOMATED COUNT: 12.3 % (ref 11–14.5)
HCC DEVICE COMMENTS: NORMAL
HCC DEVICE COMMENTS: NORMAL
HCC DEVICE IMPLANTING PROVIDER: NORMAL
HCC DEVICE IMPLANTING PROVIDER: NORMAL
HCC DEVICE MANUFACTURE: NORMAL
HCC DEVICE MANUFACTURE: NORMAL
HCC DEVICE MODEL: NORMAL
HCC DEVICE MODEL: NORMAL
HCC DEVICE SERIAL NUMBER: NORMAL
HCC DEVICE SERIAL NUMBER: NORMAL
HCC DEVICE TYPE: NORMAL
HCC DEVICE TYPE: NORMAL
HCT VFR BLD AUTO: 29.9 % (ref 35–47)
HGB BLD-MCNC: 10 G/DL (ref 12–16)
MCH RBC QN AUTO: 31.1 PG (ref 27–34)
MCHC RBC AUTO-ENTMCNC: 33.3 G/DL (ref 32–36)
MCV RBC AUTO: 93 FL (ref 80–100)
PLATELET # BLD AUTO: 288 THOU/UL (ref 140–440)
PMV BLD AUTO: 7 FL (ref 7–10)
RBC # BLD AUTO: 3.2 MILL/UL (ref 3.8–5.4)
WBC: 10 THOU/UL (ref 4–11)

## 2020-02-04 ASSESSMENT — MIFFLIN-ST. JEOR: SCORE: 1048.93

## 2020-02-05 ENCOUNTER — COMMUNICATION - HEALTHEAST (OUTPATIENT)
Dept: INTERNAL MEDICINE | Facility: CLINIC | Age: 85
End: 2020-02-05

## 2020-02-08 ENCOUNTER — AMBULATORY - HEALTHEAST (OUTPATIENT)
Dept: CARDIOLOGY | Facility: CLINIC | Age: 85
End: 2020-02-08

## 2020-02-08 DIAGNOSIS — I49.5 SICK SINUS SYNDROME (H): ICD-10-CM

## 2020-02-08 DIAGNOSIS — Z95.0 CARDIAC PACEMAKER IN SITU: ICD-10-CM

## 2020-03-02 ENCOUNTER — OFFICE VISIT - HEALTHEAST (OUTPATIENT)
Dept: INTERNAL MEDICINE | Facility: CLINIC | Age: 85
End: 2020-03-02

## 2020-03-02 DIAGNOSIS — K68.3 RETROPERITONEAL HEMATOMA: ICD-10-CM

## 2020-03-02 DIAGNOSIS — I48.19 PERSISTENT ATRIAL FIBRILLATION (H): ICD-10-CM

## 2020-03-02 DIAGNOSIS — M54.50 ACUTE BILATERAL LOW BACK PAIN WITHOUT SCIATICA: ICD-10-CM

## 2020-03-02 ASSESSMENT — MIFFLIN-ST. JEOR: SCORE: 1029.94

## 2020-03-12 ENCOUNTER — OFFICE VISIT - HEALTHEAST (OUTPATIENT)
Dept: PHYSICAL THERAPY | Facility: REHABILITATION | Age: 85
End: 2020-03-12

## 2020-03-12 DIAGNOSIS — M53.86 DECREASED ROM OF LUMBAR SPINE: ICD-10-CM

## 2020-03-12 DIAGNOSIS — R19.8 ABDOMINAL WEAKNESS: ICD-10-CM

## 2020-03-12 DIAGNOSIS — M54.50 ACUTE MIDLINE LOW BACK PAIN WITHOUT SCIATICA: ICD-10-CM

## 2020-03-27 ENCOUNTER — COMMUNICATION - HEALTHEAST (OUTPATIENT)
Dept: CARDIOLOGY | Facility: CLINIC | Age: 85
End: 2020-03-27

## 2020-03-27 DIAGNOSIS — R60.9 EDEMA, UNSPECIFIED TYPE: ICD-10-CM

## 2020-04-09 ENCOUNTER — COMMUNICATION - HEALTHEAST (OUTPATIENT)
Dept: PHYSICAL THERAPY | Facility: REHABILITATION | Age: 85
End: 2020-04-09

## 2020-04-29 ENCOUNTER — COMMUNICATION - HEALTHEAST (OUTPATIENT)
Dept: PHYSICAL THERAPY | Facility: REHABILITATION | Age: 85
End: 2020-04-29

## 2020-04-30 ENCOUNTER — COMMUNICATION - HEALTHEAST (OUTPATIENT)
Dept: CARDIOLOGY | Facility: CLINIC | Age: 85
End: 2020-04-30

## 2020-04-30 ENCOUNTER — AMBULATORY - HEALTHEAST (OUTPATIENT)
Dept: CARDIOLOGY | Facility: CLINIC | Age: 85
End: 2020-04-30

## 2020-04-30 DIAGNOSIS — Z95.0 CARDIAC PACEMAKER IN SITU: ICD-10-CM

## 2020-04-30 DIAGNOSIS — I49.5 SICK SINUS SYNDROME (H): ICD-10-CM

## 2020-06-01 ENCOUNTER — AMBULATORY - HEALTHEAST (OUTPATIENT)
Dept: CARDIOLOGY | Facility: CLINIC | Age: 85
End: 2020-06-01

## 2020-06-01 DIAGNOSIS — I49.5 SICK SINUS SYNDROME (H): ICD-10-CM

## 2020-06-01 DIAGNOSIS — Z95.0 CARDIAC PACEMAKER IN SITU: ICD-10-CM

## 2020-07-13 ENCOUNTER — COMMUNICATION - HEALTHEAST (OUTPATIENT)
Dept: INTERNAL MEDICINE | Facility: CLINIC | Age: 85
End: 2020-07-13

## 2020-07-13 DIAGNOSIS — G47.00 INSOMNIA, UNSPECIFIED TYPE: ICD-10-CM

## 2020-08-04 ENCOUNTER — AMBULATORY - HEALTHEAST (OUTPATIENT)
Dept: CARDIOLOGY | Facility: CLINIC | Age: 85
End: 2020-08-04

## 2020-08-04 DIAGNOSIS — I49.5 SICK SINUS SYNDROME (H): ICD-10-CM

## 2020-08-04 DIAGNOSIS — Z95.0 CARDIAC PACEMAKER IN SITU: ICD-10-CM

## 2020-08-25 ENCOUNTER — OFFICE VISIT - HEALTHEAST (OUTPATIENT)
Dept: INTERNAL MEDICINE | Facility: CLINIC | Age: 85
End: 2020-08-25

## 2020-08-25 DIAGNOSIS — D64.9 ANEMIA, UNSPECIFIED TYPE: ICD-10-CM

## 2020-08-25 DIAGNOSIS — R63.4 WEIGHT LOSS: ICD-10-CM

## 2020-08-25 DIAGNOSIS — R10.9 ABDOMINAL DISCOMFORT: ICD-10-CM

## 2020-08-25 DIAGNOSIS — E78.5 HYPERLIPIDEMIA: ICD-10-CM

## 2020-08-25 DIAGNOSIS — I48.91 ATRIAL FIBRILLATION, UNSPECIFIED TYPE (H): ICD-10-CM

## 2020-08-25 DIAGNOSIS — I10 HYPERTENSION: ICD-10-CM

## 2020-08-25 DIAGNOSIS — R60.9 EDEMA, UNSPECIFIED TYPE: ICD-10-CM

## 2020-08-25 DIAGNOSIS — G47.00 INSOMNIA, UNSPECIFIED TYPE: ICD-10-CM

## 2020-08-25 DIAGNOSIS — K63.89 MASS OF CECUM: ICD-10-CM

## 2020-08-25 LAB
ALBUMIN SERPL-MCNC: 4.2 G/DL (ref 3.5–5)
ALP SERPL-CCNC: 53 U/L (ref 45–120)
ALT SERPL W P-5'-P-CCNC: 11 U/L (ref 0–45)
ANION GAP SERPL CALCULATED.3IONS-SCNC: 11 MMOL/L (ref 5–18)
AST SERPL W P-5'-P-CCNC: 16 U/L (ref 0–40)
BILIRUB SERPL-MCNC: 0.7 MG/DL (ref 0–1)
BUN SERPL-MCNC: 16 MG/DL (ref 8–28)
CALCIUM SERPL-MCNC: 9.4 MG/DL (ref 8.5–10.5)
CHLORIDE BLD-SCNC: 106 MMOL/L (ref 98–107)
CO2 SERPL-SCNC: 23 MMOL/L (ref 22–31)
CREAT SERPL-MCNC: 1.09 MG/DL (ref 0.6–1.1)
ERYTHROCYTE [DISTWIDTH] IN BLOOD BY AUTOMATED COUNT: 13.6 % (ref 11–14.5)
FERRITIN SERPL-MCNC: 9 NG/ML (ref 10–130)
GFR SERPL CREATININE-BSD FRML MDRD: 47 ML/MIN/1.73M2
GLUCOSE BLD-MCNC: 99 MG/DL (ref 70–125)
HCT VFR BLD AUTO: 30.5 % (ref 35–47)
HGB BLD-MCNC: 9.8 G/DL (ref 12–16)
MCH RBC QN AUTO: 27.6 PG (ref 27–34)
MCHC RBC AUTO-ENTMCNC: 32.3 G/DL (ref 32–36)
MCV RBC AUTO: 85 FL (ref 80–100)
PLATELET # BLD AUTO: 218 THOU/UL (ref 140–440)
PMV BLD AUTO: 7.9 FL (ref 7–10)
POTASSIUM BLD-SCNC: 4 MMOL/L (ref 3.5–5)
PROT SERPL-MCNC: 7 G/DL (ref 6–8)
RBC # BLD AUTO: 3.57 MILL/UL (ref 3.8–5.4)
SODIUM SERPL-SCNC: 140 MMOL/L (ref 136–145)
TSH SERPL DL<=0.005 MIU/L-ACNC: 2.2 UIU/ML (ref 0.3–5)
VIT B12 SERPL-MCNC: 547 PG/ML (ref 213–816)
WBC: 7.8 THOU/UL (ref 4–11)

## 2020-08-25 ASSESSMENT — MIFFLIN-ST. JEOR: SCORE: 1005.56

## 2020-09-04 ENCOUNTER — HOSPITAL ENCOUNTER (OUTPATIENT)
Dept: CT IMAGING | Facility: CLINIC | Age: 85
Discharge: HOME | End: 2020-09-04

## 2020-09-04 DIAGNOSIS — R63.4 WEIGHT LOSS: ICD-10-CM

## 2020-09-04 DIAGNOSIS — D64.9 ANEMIA, UNSPECIFIED TYPE: ICD-10-CM

## 2020-09-04 DIAGNOSIS — R10.9 ABDOMINAL DISCOMFORT: ICD-10-CM

## 2020-09-14 ENCOUNTER — RECORDS - HEALTHEAST (OUTPATIENT)
Dept: ADMINISTRATIVE | Facility: OTHER | Age: 85
End: 2020-09-14

## 2020-09-15 ENCOUNTER — AMBULATORY - HEALTHEAST (OUTPATIENT)
Dept: CARDIOLOGY | Facility: CLINIC | Age: 85
End: 2020-09-15

## 2020-09-15 DIAGNOSIS — I49.5 SICK SINUS SYNDROME (H): ICD-10-CM

## 2020-09-15 DIAGNOSIS — Z95.0 CARDIAC PACEMAKER IN SITU: ICD-10-CM

## 2020-09-29 ENCOUNTER — COMMUNICATION - HEALTHEAST (OUTPATIENT)
Dept: NURSING | Facility: CLINIC | Age: 85
End: 2020-09-29

## 2020-09-29 ENCOUNTER — OFFICE VISIT - HEALTHEAST (OUTPATIENT)
Dept: INTERNAL MEDICINE | Facility: CLINIC | Age: 85
End: 2020-09-29

## 2020-09-29 DIAGNOSIS — D50.0 IRON DEFICIENCY ANEMIA DUE TO CHRONIC BLOOD LOSS: ICD-10-CM

## 2020-09-29 DIAGNOSIS — R63.4 WEIGHT LOSS: ICD-10-CM

## 2020-09-29 DIAGNOSIS — C18.9 MALIGNANT NEOPLASM OF COLON, UNSPECIFIED PART OF COLON (H): ICD-10-CM

## 2020-09-29 ASSESSMENT — MIFFLIN-ST. JEOR: SCORE: 996.48

## 2020-10-06 ENCOUNTER — COMMUNICATION - HEALTHEAST (OUTPATIENT)
Dept: CARDIOLOGY | Facility: CLINIC | Age: 85
End: 2020-10-06

## 2020-10-06 DIAGNOSIS — R60.9 EDEMA, UNSPECIFIED TYPE: ICD-10-CM

## 2020-10-09 ENCOUNTER — COMMUNICATION - HEALTHEAST (OUTPATIENT)
Dept: NURSING | Facility: CLINIC | Age: 85
End: 2020-10-09

## 2020-10-09 ASSESSMENT — ACTIVITIES OF DAILY LIVING (ADL): DEPENDENT_IADLS:: CLEANING;MONEY MANAGEMENT;TRANSPORTATION

## 2020-10-16 ENCOUNTER — COMMUNICATION - HEALTHEAST (OUTPATIENT)
Dept: NURSING | Facility: CLINIC | Age: 85
End: 2020-10-16

## 2020-10-16 DIAGNOSIS — C18.9 MALIGNANT NEOPLASM OF COLON, UNSPECIFIED PART OF COLON (H): ICD-10-CM

## 2020-10-16 ASSESSMENT — ACTIVITIES OF DAILY LIVING (ADL): DEPENDENT_IADLS:: CLEANING;MONEY MANAGEMENT;TRANSPORTATION

## 2020-10-19 ENCOUNTER — COMMUNICATION - HEALTHEAST (OUTPATIENT)
Dept: ONCOLOGY | Facility: HOSPITAL | Age: 85
End: 2020-10-19

## 2020-10-26 ENCOUNTER — COMMUNICATION - HEALTHEAST (OUTPATIENT)
Dept: CARE COORDINATION | Facility: CLINIC | Age: 85
End: 2020-10-26

## 2020-10-27 ENCOUNTER — COMMUNICATION - HEALTHEAST (OUTPATIENT)
Dept: INTERNAL MEDICINE | Facility: CLINIC | Age: 85
End: 2020-10-27

## 2020-10-27 DIAGNOSIS — E78.5 HYPERLIPIDEMIA: ICD-10-CM

## 2020-10-30 ENCOUNTER — OFFICE VISIT - HEALTHEAST (OUTPATIENT)
Dept: ONCOLOGY | Facility: CLINIC | Age: 85
End: 2020-10-30

## 2020-10-30 DIAGNOSIS — R93.3 ABNORMAL COMPUTED TOMOGRAPHY OF CECUM AND TERMINAL ILEUM: ICD-10-CM

## 2020-10-30 ASSESSMENT — MIFFLIN-ST. JEOR: SCORE: 1016.44

## 2020-11-03 ENCOUNTER — COMMUNICATION - HEALTHEAST (OUTPATIENT)
Dept: INTERNAL MEDICINE | Facility: CLINIC | Age: 85
End: 2020-11-03

## 2020-11-03 DIAGNOSIS — I10 HYPERTENSION: ICD-10-CM

## 2020-11-12 ENCOUNTER — COMMUNICATION - HEALTHEAST (OUTPATIENT)
Dept: INTERNAL MEDICINE | Facility: CLINIC | Age: 85
End: 2020-11-12

## 2020-11-12 ENCOUNTER — COMMUNICATION - HEALTHEAST (OUTPATIENT)
Dept: NURSING | Facility: CLINIC | Age: 85
End: 2020-11-12

## 2020-11-12 DIAGNOSIS — I10 HYPERTENSION: ICD-10-CM

## 2020-11-13 ENCOUNTER — OFFICE VISIT - HEALTHEAST (OUTPATIENT)
Dept: INTERNAL MEDICINE | Facility: CLINIC | Age: 85
End: 2020-11-13

## 2020-11-13 DIAGNOSIS — Z95.0 CARDIAC PACEMAKER IN SITU: ICD-10-CM

## 2020-11-13 DIAGNOSIS — R93.3 ABNORMAL COMPUTED TOMOGRAPHY OF CECUM AND TERMINAL ILEUM: ICD-10-CM

## 2020-11-13 DIAGNOSIS — D50.9 IRON DEFICIENCY ANEMIA, UNSPECIFIED IRON DEFICIENCY ANEMIA TYPE: ICD-10-CM

## 2020-11-13 LAB
ERYTHROCYTE [DISTWIDTH] IN BLOOD BY AUTOMATED COUNT: 16.2 % (ref 11–14.5)
FERRITIN SERPL-MCNC: 13 NG/ML (ref 10–130)
HCT VFR BLD AUTO: 31.2 % (ref 35–47)
HGB BLD-MCNC: 10.1 G/DL (ref 12–16)
MCH RBC QN AUTO: 27.4 PG (ref 27–34)
MCHC RBC AUTO-ENTMCNC: 32.2 G/DL (ref 32–36)
MCV RBC AUTO: 85 FL (ref 80–100)
PLATELET # BLD AUTO: 210 THOU/UL (ref 140–440)
PMV BLD AUTO: 7.7 FL (ref 7–10)
RBC # BLD AUTO: 3.67 MILL/UL (ref 3.8–5.4)
WBC: 7.5 THOU/UL (ref 4–11)

## 2020-11-13 ASSESSMENT — MIFFLIN-ST. JEOR: SCORE: 1025.96

## 2020-11-17 ENCOUNTER — RECORDS - HEALTHEAST (OUTPATIENT)
Dept: ADMINISTRATIVE | Facility: OTHER | Age: 85
End: 2020-11-17

## 2020-11-17 ENCOUNTER — AMBULATORY - HEALTHEAST (OUTPATIENT)
Dept: SURGERY | Facility: CLINIC | Age: 85
End: 2020-11-17

## 2020-11-17 DIAGNOSIS — Z11.59 ENCOUNTER FOR SCREENING FOR OTHER VIRAL DISEASES: ICD-10-CM

## 2020-12-03 ENCOUNTER — COMMUNICATION - HEALTHEAST (OUTPATIENT)
Dept: NURSING | Facility: CLINIC | Age: 85
End: 2020-12-03

## 2020-12-04 ENCOUNTER — COMMUNICATION - HEALTHEAST (OUTPATIENT)
Dept: CARE COORDINATION | Facility: CLINIC | Age: 85
End: 2020-12-04

## 2020-12-11 ENCOUNTER — COMMUNICATION - HEALTHEAST (OUTPATIENT)
Dept: CARE COORDINATION | Facility: CLINIC | Age: 85
End: 2020-12-11

## 2020-12-11 ASSESSMENT — ACTIVITIES OF DAILY LIVING (ADL): DEPENDENT_IADLS:: CLEANING;MONEY MANAGEMENT;TRANSPORTATION

## 2020-12-16 ENCOUNTER — COMMUNICATION - HEALTHEAST (OUTPATIENT)
Dept: NURSING | Facility: CLINIC | Age: 85
End: 2020-12-16

## 2020-12-16 ENCOUNTER — COMMUNICATION - HEALTHEAST (OUTPATIENT)
Dept: INTERNAL MEDICINE | Facility: CLINIC | Age: 85
End: 2020-12-16

## 2020-12-28 ENCOUNTER — COMMUNICATION - HEALTHEAST (OUTPATIENT)
Dept: NURSING | Facility: CLINIC | Age: 85
End: 2020-12-28

## 2021-01-07 ENCOUNTER — SURGERY - HEALTHEAST (OUTPATIENT)
Dept: SURGERY | Facility: CLINIC | Age: 86
End: 2021-01-07
Payer: MEDICARE

## 2021-01-12 ENCOUNTER — AMBULATORY - HEALTHEAST (OUTPATIENT)
Dept: CARDIOLOGY | Facility: CLINIC | Age: 86
End: 2021-01-12

## 2021-01-12 ENCOUNTER — COMMUNICATION - HEALTHEAST (OUTPATIENT)
Dept: CARDIOLOGY | Facility: CLINIC | Age: 86
End: 2021-01-12

## 2021-01-12 DIAGNOSIS — Z95.0 CARDIAC PACEMAKER IN SITU: ICD-10-CM

## 2021-01-12 DIAGNOSIS — I49.5 SICK SINUS SYNDROME (H): ICD-10-CM

## 2021-01-18 ENCOUNTER — COMMUNICATION - HEALTHEAST (OUTPATIENT)
Dept: INTERNAL MEDICINE | Facility: CLINIC | Age: 86
End: 2021-01-18

## 2021-01-18 DIAGNOSIS — G47.00 INSOMNIA, UNSPECIFIED TYPE: ICD-10-CM

## 2021-01-20 ENCOUNTER — COMMUNICATION - HEALTHEAST (OUTPATIENT)
Dept: NURSING | Facility: CLINIC | Age: 86
End: 2021-01-20

## 2021-01-21 ENCOUNTER — COMMUNICATION - HEALTHEAST (OUTPATIENT)
Dept: CARE COORDINATION | Facility: CLINIC | Age: 86
End: 2021-01-21

## 2021-01-21 ASSESSMENT — ACTIVITIES OF DAILY LIVING (ADL): DEPENDENT_IADLS:: CLEANING;MONEY MANAGEMENT;TRANSPORTATION

## 2021-01-27 ENCOUNTER — COMMUNICATION - HEALTHEAST (OUTPATIENT)
Dept: SCHEDULING | Facility: CLINIC | Age: 86
End: 2021-01-27

## 2021-01-27 DIAGNOSIS — Z20.822 SUSPECTED COVID-19 VIRUS INFECTION: ICD-10-CM

## 2021-01-28 ENCOUNTER — COMMUNICATION - HEALTHEAST (OUTPATIENT)
Dept: INTERNAL MEDICINE | Facility: CLINIC | Age: 86
End: 2021-01-28

## 2021-01-28 ENCOUNTER — TRANSFERRED RECORDS (OUTPATIENT)
Dept: HEALTH INFORMATION MANAGEMENT | Facility: CLINIC | Age: 86
End: 2021-01-28

## 2021-01-28 ENCOUNTER — AMBULATORY - HEALTHEAST (OUTPATIENT)
Dept: FAMILY MEDICINE | Facility: CLINIC | Age: 86
End: 2021-01-28

## 2021-01-28 ENCOUNTER — AMBULATORY - HEALTHEAST (OUTPATIENT)
Dept: CARDIOLOGY | Facility: CLINIC | Age: 86
End: 2021-01-28

## 2021-01-28 ENCOUNTER — COMMUNICATION - HEALTHEAST (OUTPATIENT)
Dept: SCHEDULING | Facility: CLINIC | Age: 86
End: 2021-01-28

## 2021-01-28 DIAGNOSIS — Z20.822 SUSPECTED COVID-19 VIRUS INFECTION: ICD-10-CM

## 2021-01-28 DIAGNOSIS — Z95.0 CARDIAC PACEMAKER IN SITU: ICD-10-CM

## 2021-01-28 DIAGNOSIS — I10 ESSENTIAL HYPERTENSION: ICD-10-CM

## 2021-01-28 LAB
HCC DEVICE COMMENTS: NORMAL
HCC DEVICE IMPLANTING PROVIDER: NORMAL
HCC DEVICE MANUFACTURE: NORMAL
HCC DEVICE MODEL: NORMAL
HCC DEVICE SERIAL NUMBER: NORMAL
HCC DEVICE TYPE: NORMAL
TSH SERPL-ACNC: 2.06 UIU/ML (ref 0.3–5)

## 2021-01-29 ENCOUNTER — COMMUNICATION - HEALTHEAST (OUTPATIENT)
Dept: NURSING | Facility: CLINIC | Age: 86
End: 2021-01-29

## 2021-01-30 LAB — INR PPP: 1.23 (ref 0.9–1.1)

## 2021-02-01 ENCOUNTER — NURSING HOME VISIT (OUTPATIENT)
Dept: GERIATRICS | Facility: CLINIC | Age: 86
End: 2021-02-01
Payer: MEDICARE

## 2021-02-01 VITALS
SYSTOLIC BLOOD PRESSURE: 162 MMHG | BODY MASS INDEX: 21.83 KG/M2 | HEART RATE: 82 BPM | TEMPERATURE: 98.7 F | RESPIRATION RATE: 16 BRPM | DIASTOLIC BLOOD PRESSURE: 84 MMHG | OXYGEN SATURATION: 93 % | WEIGHT: 127.87 LBS | HEIGHT: 64 IN

## 2021-02-01 DIAGNOSIS — I48.20 CHRONIC ATRIAL FIBRILLATION (H): ICD-10-CM

## 2021-02-01 DIAGNOSIS — R53.81 DEBILITY: ICD-10-CM

## 2021-02-01 DIAGNOSIS — R55 SYNCOPE, UNSPECIFIED SYNCOPE TYPE: ICD-10-CM

## 2021-02-01 DIAGNOSIS — I10 ESSENTIAL HYPERTENSION: ICD-10-CM

## 2021-02-01 DIAGNOSIS — G47.00 INSOMNIA, UNSPECIFIED TYPE: ICD-10-CM

## 2021-02-01 DIAGNOSIS — U07.1 2019 NOVEL CORONAVIRUS DISEASE (COVID-19): Primary | ICD-10-CM

## 2021-02-01 DIAGNOSIS — F41.1 GAD (GENERALIZED ANXIETY DISORDER): ICD-10-CM

## 2021-02-01 DIAGNOSIS — Z95.0 CARDIAC PACEMAKER IN SITU: ICD-10-CM

## 2021-02-01 DIAGNOSIS — I35.0 AORTIC VALVE STENOSIS, ETIOLOGY OF CARDIAC VALVE DISEASE UNSPECIFIED: ICD-10-CM

## 2021-02-01 DIAGNOSIS — J47.9 BRONCHIECTASIS WITHOUT COMPLICATION (H): ICD-10-CM

## 2021-02-01 DIAGNOSIS — I95.1 ORTHOSTATIC HYPOTENSION: ICD-10-CM

## 2021-02-01 DIAGNOSIS — G25.81 RESTLESS LEGS SYNDROME (RLS): ICD-10-CM

## 2021-02-01 DIAGNOSIS — K52.9 CHRONIC DIARRHEA: ICD-10-CM

## 2021-02-01 PROBLEM — T14.8XXA HEMATOMA: Status: ACTIVE | Noted: 2021-02-01

## 2021-02-01 PROCEDURE — 99309 SBSQ NF CARE MODERATE MDM 30: CPT | Performed by: NURSE PRACTITIONER

## 2021-02-01 RX ORDER — ZOLPIDEM TARTRATE 10 MG/1
10 TABLET ORAL
COMMUNITY
End: 2021-10-08

## 2021-02-01 RX ORDER — METOPROLOL SUCCINATE 25 MG/1
25 TABLET, EXTENDED RELEASE ORAL DAILY
COMMUNITY
End: 2021-09-10

## 2021-02-01 RX ORDER — ATORVASTATIN CALCIUM 20 MG/1
20 TABLET, FILM COATED ORAL AT BEDTIME
COMMUNITY
End: 2021-09-10

## 2021-02-01 RX ORDER — MECLIZINE HCL 12.5 MG 12.5 MG/1
25 TABLET ORAL 3 TIMES DAILY PRN
Status: ON HOLD | COMMUNITY
End: 2021-02-05

## 2021-02-01 RX ORDER — ACETAMINOPHEN 500 MG
1000 TABLET ORAL EVERY 8 HOURS PRN
COMMUNITY

## 2021-02-01 RX ORDER — DOXEPIN HYDROCHLORIDE 75 MG/1
75 CAPSULE ORAL AT BEDTIME
COMMUNITY
End: 2021-02-01

## 2021-02-01 RX ORDER — MULTIVITAMIN WITH IRON
1 TABLET ORAL DAILY
COMMUNITY
End: 2022-11-01

## 2021-02-01 RX ORDER — NITROGLYCERIN 0.3 MG/1
0.3 TABLET SUBLINGUAL EVERY 5 MIN PRN
COMMUNITY
End: 2021-10-08

## 2021-02-01 ASSESSMENT — MIFFLIN-ST. JEOR: SCORE: 985

## 2021-02-01 NOTE — LETTER
2/1/2021        RE: Deloris Larson  [address] [place] [address]        [facility]  PRIMARY CARE PROVIDER AND CLINIC:  Physician No Ref-Primary, No address on file  Chief Complaint   Patient presents with     [facility] F/U     [place] Medical Record Number:  [id number]  Place of Service where encounter took place:  [facility] ([facility]) [[id number]]    Deloris Larson  is a 89 year old  (3/24/1930), admitted to the above facility from  [facility] . Hospital stay 1/28/21 through 1/30/21..  Admitted to this facility for  rehab, medical management and nursing care.    HPI:    HPI information obtained from: facility chart records, facility staff, patient report and [facility] chart review.     Brief Summary of Hospital Course: Mrs. Larson is a young 89 y/o living independently with family support with a h/o HTN, A-fib, SSS-PPM, mild aortic stenosis, chronic diarrhea, SHARON, insomnia and distant retroperitoneal hematoma/bleed whom had a syncope episode, fell on her Left hip and was presented to hospital.  In hospital found to have JANES with COVID positive 1/28.  Was started on PRN Meclizine and now has transitioned to the TCU for ongoing cares and PT/OT.     Of note: She doctor's with [facility], thus EMR under care everywhere.  She is at the TCU mostly for debility and due to most of the family/support is also sick and getting tested for COVID, thus no support for several days.     Updates on Status Since Skilled nursing Admission: In meeting Mrs. Larson today for admission, she reports she feels well, admits she does not think she needs to be here.  She does still endorse some intermittent mild light headedness with activity and bending down, endorses ongoing Left hip/buttocks pain but mild, ongoing non-productive cough but no sputum.  Also thinks she is more tired than usual, but not much.  She has no other complaints.     CODE  STATUS/ADVANCE DIRECTIVES DISCUSSION: Full  Patient's living condition: lives alone with family support    ALLERGIES: Diphenhydramine  PAST MEDICAL HISTORY:  has no past medical history on file.  PAST SURGICAL HISTORY:   has no past surgical history on file.  FAMILY HISTORY: family history is not on file.  SOCIAL HISTORY:       Post Discharge Medication Reconciliation Status: discharge medications reconciled and changed, per note/orders    Current Outpatient Medications   Medication Sig Dispense Refill     acetaminophen (TYLENOL) 500 MG tablet Take 1,000 mg by mouth 3 times daily       alum hydroxide-mag carbonate (GENACTON)  MG/15ML SUSP suspension Take 15 mLs by mouth 4 times daily as needed for heartburn        apixaban ANTICOAGULANT (ELIQUIS) 2.5 MG tablet Take 2.5 mg by mouth 2 times daily       atorvastatin (LIPITOR) 20 MG tablet Take 20 mg by mouth daily       Iron Carbonyl-Vitamin C-FOS 30-10-25 MG CHEW Take 125 mg by mouth daily       meclizine (ANTIVERT) 12.5 MG tablet Take 25 mg by mouth 3 times daily as needed for dizziness       metoprolol succinate ER (TOPROL-XL) 50 MG 24 hr tablet Take 50 mg by mouth daily       nitroGLYcerin (NITROSTAT) 0.3 MG sublingual tablet Place 0.3 mg under the tongue every 5 minutes as needed for chest pain For chest pain place 1 tablet under the tongue every 5 minutes for 3 doses. If symptoms persist 5 minutes after 1st dose call 911.       Probiotic Product (PROBIOTIC PEARLS ADVANTAGE PO) Take 15 mg by mouth 2 times daily        vitamin (B COMPLEX-C) tablet Take 1 tablet by mouth daily       vitamin D3 (CHOLECALCIFEROL) 1.25 MG (31081 UT) capsule Take 50,000 Units by mouth every 7 days       zolpidem (AMBIEN) 10 MG tablet Take 10 mg by mouth nightly as needed for sleep Holding Ambien while at TCU.        ROS:  7 point ROS done including, light headedness/dizziness, fever/chills, pain, Resp, CV, GI, and  and is negative other than noted in HPI.      Vitals:  BP (!)  "162/84   Pulse 82   Temp 98.7  F (37.1  C)   Resp 16   Ht 1.626 m (5' 4\")   Wt 58 kg (127 lb 13.9 oz)   SpO2 93%   BMI 21.95 kg/m       Exam:  EXAM LIMITED DUE TO [facility] social distancing recommendations:  GENERAL APPEARANCE:  Elderly thin female sitting up in chair.  NAD, non-toxic.  ENT:  Mouth and oropharynx normal, moist mucous membranes.   RESP:  Lungs CTA.  Regular relaxed breathing effort.  No cough.   CV: 3/6 systolic murmur heard thought out/S2.   Regular rhythm and rate.  No generalized edema.   ABDOMEN:   Flat, soft, non-tender with active bowel sounds.  No guarding, rigidity, or rebound tenderness.  EXTREMITIES:  No lower extremity edema, no calf tenderness.   PSYCH: Alert and orientated, pleasant and cooperative.  Suspect mild memory impairment.   JOINTS: No bruising, swelling or s/s of trauma on Left hip/buttocks area.     Lab/Diagnostic data:  I have personally reviewed labs, which are in facility or EMR chart.     ASSESSMENT/PLAN:  2019 novel coronavirus disease (COVID-19)  Syncope, unspecified syncope type  Orthostatic hypotension  Bronchiectasis without complication (H)  COVID positive 1/28, suspect etiology of syncope/orthostatic hypotension, was noted to have JANES in hospital, resolved.  Afebrile, no respiratory SOB/WHITE, but does note ongoing non-productive cough.  Feels she may be more fatigued than usual, but slight.    Is already up and ambulating with a walker.   Reports her sons/daughters are all ill as well, all getting tested today, thus nobody to help her at home.   -We switched PRN Meclizine to 25 mg's TID PRN for now.   -See HTN below.   --No changes, continue to clinically monitor.     Essential hypertension  Chronic atrial fibrillation (H)  Cardiac pacemaker in situ  Aortic valve stenosis, etiology of cardiac valve disease unspecified, mild  SBP's are erratic, ranging 116-162, HR 70-80, does endorse mild light headedness at times, especially bending down.   No current s/s of " "clinical CHF.   -Furosemide and Ramipril were dc'd in hospital, we continue to hold.   -Continues Toprol and Statin.   -Continues BID Apixaban.     --Will get repeat Orthostatic's in a few days.     Chronic diarrhea  Reports stools as loose and baseline, endorses intermittent mild cramping that comes/goes, also baseline.   -No changes, continue to clinically monitor.     Restless legs syndrome (RLS)  SHARON (generalized anxiety disorder)  Insomnia, unspecified type  Endorses bad insomnia, reports she took something a year ago and had hallucinations, does not remember what it was (we see Benadryl on allergy list) query if it was that.  Reports she has been taking Ambien for some time with good results.  Reports she knows she should not be on it, reports she has to \"Argue\" with the doctors every time she needs a refill.    -We will hold Ambien while at TCU.   -We DC'd newly started Doxepin.     --She wants to try nothing for now, if has insomnia issues, will first try mod dose of Melatonin, and if that does not work, low dose Trazodone.     Debility  -PT/OT to eval and treat.     Orders written by provider at facility  -As noted above.     Electronically signed by:  NIVIA Grimaldo CNP                     Sincerely,        NIVIA Grimaldo CNP    "

## 2021-02-01 NOTE — PROGRESS NOTES
[facility]  PRIMARY CARE PROVIDER AND CLINIC:  Physician No Ref-Primary, No address on file  Chief Complaint   Patient presents with     [facility] F/U     [place] Medical Record Number:  [id number]  Place of Service where encounter took place:  [facility] ([facility]) [[id number]]    Deloris Larson  is a 89 year old  (3/24/1930), admitted to the above facility from  [facility] . Hospital stay 1/28/21 through 1/30/21..  Admitted to this facility for  rehab, medical management and nursing care.    HPI:    HPI information obtained from: facility chart records, facility staff, patient report and [facility] chart review.     Brief Summary of Hospital Course: Mrs. Larson is a young 89 y/o living independently with family support with a h/o HTN, A-fib, SSS-PPM, mild aortic stenosis, chronic diarrhea, SHARON, insomnia and distant retroperitoneal hematoma/bleed whom had a syncope episode, fell on her Left hip and was presented to hospital.  In hospital found to have JANES with COVID positive 1/28.  Was started on PRN Meclizine and now has transitioned to the TCU for ongoing cares and PT/OT.     Of note: She doctor's with [facility], thus EMR under care everywhere.  She is at the TCU mostly for debility and due to most of the family/support is also sick and getting tested for COVID, thus no support for several days.     Updates on Status Since Skilled nursing Admission: In meeting Mrs. Larson today for admission, she reports she feels well, admits she does not think she needs to be here.  She does still endorse some intermittent mild light headedness with activity and bending down, endorses ongoing Left hip/buttocks pain but mild, ongoing non-productive cough but no sputum.  Also thinks she is more tired than usual, but not much.  She has no other complaints.     CODE STATUS/ADVANCE DIRECTIVES DISCUSSION: Full  Patient's living condition: lives alone  "with family support    ALLERGIES: Diphenhydramine  PAST MEDICAL HISTORY:  has no past medical history on file.  PAST SURGICAL HISTORY:   has no past surgical history on file.  FAMILY HISTORY: family history is not on file.  SOCIAL HISTORY:       Post Discharge Medication Reconciliation Status: discharge medications reconciled and changed, per note/orders    Current Outpatient Medications   Medication Sig Dispense Refill     acetaminophen (TYLENOL) 500 MG tablet Take 1,000 mg by mouth 3 times daily       alum hydroxide-mag carbonate (GENACTON)  MG/15ML SUSP suspension Take 15 mLs by mouth 4 times daily as needed for heartburn        apixaban ANTICOAGULANT (ELIQUIS) 2.5 MG tablet Take 2.5 mg by mouth 2 times daily       atorvastatin (LIPITOR) 20 MG tablet Take 20 mg by mouth daily       Iron Carbonyl-Vitamin C-FOS 30-10-25 MG CHEW Take 125 mg by mouth daily       meclizine (ANTIVERT) 12.5 MG tablet Take 25 mg by mouth 3 times daily as needed for dizziness       metoprolol succinate ER (TOPROL-XL) 50 MG 24 hr tablet Take 50 mg by mouth daily       nitroGLYcerin (NITROSTAT) 0.3 MG sublingual tablet Place 0.3 mg under the tongue every 5 minutes as needed for chest pain For chest pain place 1 tablet under the tongue every 5 minutes for 3 doses. If symptoms persist 5 minutes after 1st dose call 911.       Probiotic Product (PROBIOTIC PEARLS ADVANTAGE PO) Take 15 mg by mouth 2 times daily        vitamin (B COMPLEX-C) tablet Take 1 tablet by mouth daily       vitamin D3 (CHOLECALCIFEROL) 1.25 MG (28746 UT) capsule Take 50,000 Units by mouth every 7 days       zolpidem (AMBIEN) 10 MG tablet Take 10 mg by mouth nightly as needed for sleep Holding Ambien while at U.        ROS:  7 point ROS done including, light headedness/dizziness, fever/chills, pain, Resp, CV, GI, and  and is negative other than noted in HPI.      Vitals:  BP (!) 162/84   Pulse 82   Temp 98.7  F (37.1  C)   Resp 16   Ht 1.626 m (5' 4\")   Wt 58 " kg (127 lb 13.9 oz)   SpO2 93%   BMI 21.95 kg/m       Exam:  EXAM LIMITED DUE TO [facility] social distancing recommendations:  GENERAL APPEARANCE:  Elderly thin female sitting up in chair.  NAD, non-toxic.  ENT:  Mouth and oropharynx normal, moist mucous membranes.   RESP:  Lungs CTA.  Regular relaxed breathing effort.  No cough.   CV: 3/6 systolic murmur heard thought out/S2.   Regular rhythm and rate.  No generalized edema.   ABDOMEN:   Flat, soft, non-tender with active bowel sounds.  No guarding, rigidity, or rebound tenderness.  EXTREMITIES:  No lower extremity edema, no calf tenderness.   PSYCH: Alert and orientated, pleasant and cooperative.  Suspect mild memory impairment.   JOINTS: No bruising, swelling or s/s of trauma on Left hip/buttocks area.     Lab/Diagnostic data:  I have personally reviewed labs, which are in facility or EMR chart.     ASSESSMENT/PLAN:  2019 novel coronavirus disease (COVID-19)  Syncope, unspecified syncope type  Orthostatic hypotension  Bronchiectasis without complication (H)  COVID positive 1/28, suspect etiology of syncope/orthostatic hypotension, was noted to have JANES in hospital, resolved.  Afebrile, no respiratory SOB/WHITE, but does note ongoing non-productive cough.  Feels she may be more fatigued than usual, but slight.    Is already up and ambulating with a walker.   Reports her sons/daughters are all ill as well, all getting tested today, thus nobody to help her at home.   -We switched PRN Meclizine to 25 mg's TID PRN for now.   -See HTN below.   --No changes, continue to clinically monitor.     Essential hypertension  Chronic atrial fibrillation (H)  Cardiac pacemaker in situ  Aortic valve stenosis, etiology of cardiac valve disease unspecified, mild  SBP's are erratic, ranging 116-162, HR 70-80, does endorse mild light headedness at times, especially bending down.   No current s/s of clinical CHF.   -Furosemide and Ramipril were dc'd in hospital, we continue to hold.  "  -Continues Toprol and Statin.   -Continues BID Apixaban.     --Will get repeat Orthostatic's in a few days.     Chronic diarrhea  Reports stools as loose and baseline, endorses intermittent mild cramping that comes/goes, also baseline.   -No changes, continue to clinically monitor.     Restless legs syndrome (RLS)  SHARON (generalized anxiety disorder)  Insomnia, unspecified type  Endorses bad insomnia, reports she took something a year ago and had hallucinations, does not remember what it was (we see Benadryl on allergy list) query if it was that.  Reports she has been taking Ambien for some time with good results.  Reports she knows she should not be on it, reports she has to \"Argue\" with the doctors every time she needs a refill.    -We will hold Ambien while at TCU.   -We DC'd newly started Doxepin.     --She wants to try nothing for now, if has insomnia issues, will first try mod dose of Melatonin, and if that does not work, low dose Trazodone.     Debility  -PT/OT to eval and treat.     Orders written by provider at facility  -As noted above.     Electronically signed by:  NIVIA Grimaldo CNP               "

## 2021-02-02 ENCOUNTER — COMMUNICATION - HEALTHEAST (OUTPATIENT)
Dept: CARE COORDINATION | Facility: CLINIC | Age: 86
End: 2021-02-02

## 2021-02-02 ASSESSMENT — ACTIVITIES OF DAILY LIVING (ADL): DEPENDENT_IADLS:: CLEANING;MONEY MANAGEMENT;TRANSPORTATION

## 2021-02-03 ENCOUNTER — RECORDS - HEALTHEAST (OUTPATIENT)
Dept: LAB | Facility: CLINIC | Age: 86
End: 2021-02-03

## 2021-02-04 ENCOUNTER — NURSING HOME VISIT (OUTPATIENT)
Dept: GERIATRICS | Facility: CLINIC | Age: 86
End: 2021-02-04
Payer: MEDICARE

## 2021-02-04 ENCOUNTER — TRANSFERRED RECORDS (OUTPATIENT)
Dept: HEALTH INFORMATION MANAGEMENT | Facility: CLINIC | Age: 86
End: 2021-02-04

## 2021-02-04 ENCOUNTER — RECORDS - HEALTHEAST (OUTPATIENT)
Dept: LAB | Facility: CLINIC | Age: 86
End: 2021-02-04

## 2021-02-04 VITALS
BODY MASS INDEX: 21.83 KG/M2 | HEART RATE: 100 BPM | OXYGEN SATURATION: 93 % | RESPIRATION RATE: 18 BRPM | TEMPERATURE: 102.5 F | HEIGHT: 64 IN | WEIGHT: 127.87 LBS | SYSTOLIC BLOOD PRESSURE: 139 MMHG | DIASTOLIC BLOOD PRESSURE: 70 MMHG

## 2021-02-04 DIAGNOSIS — R53.81 DEBILITY: ICD-10-CM

## 2021-02-04 DIAGNOSIS — J18.9 PNEUMONIA OF RIGHT LOWER LOBE DUE TO INFECTIOUS ORGANISM: ICD-10-CM

## 2021-02-04 DIAGNOSIS — Z95.0 CARDIAC PACEMAKER IN SITU: ICD-10-CM

## 2021-02-04 DIAGNOSIS — I48.20 CHRONIC ATRIAL FIBRILLATION (H): ICD-10-CM

## 2021-02-04 DIAGNOSIS — I35.0 AORTIC VALVE STENOSIS, ETIOLOGY OF CARDIAC VALVE DISEASE UNSPECIFIED: ICD-10-CM

## 2021-02-04 DIAGNOSIS — F41.1 GAD (GENERALIZED ANXIETY DISORDER): ICD-10-CM

## 2021-02-04 DIAGNOSIS — R53.83 OTHER FATIGUE: ICD-10-CM

## 2021-02-04 DIAGNOSIS — G47.00 INSOMNIA, UNSPECIFIED TYPE: ICD-10-CM

## 2021-02-04 DIAGNOSIS — I10 ESSENTIAL HYPERTENSION: ICD-10-CM

## 2021-02-04 DIAGNOSIS — R42 LIGHT HEADED: ICD-10-CM

## 2021-02-04 DIAGNOSIS — U07.1 2019 NOVEL CORONAVIRUS DISEASE (COVID-19): ICD-10-CM

## 2021-02-04 DIAGNOSIS — R50.9 FEVER, UNSPECIFIED FEVER CAUSE: Primary | ICD-10-CM

## 2021-02-04 LAB
ALBUMIN UR-MCNC: ABNORMAL MG/DL
APPEARANCE UR: CLEAR
BACTERIA #/AREA URNS HPF: ABNORMAL HPF
BILIRUB UR QL STRIP: NEGATIVE
COLOR UR AUTO: YELLOW
GLUCOSE UR STRIP-MCNC: NEGATIVE MG/DL
HGB UR QL STRIP: NEGATIVE
KETONES UR STRIP-MCNC: NEGATIVE MG/DL
LEUKOCYTE ESTERASE UR QL STRIP: NEGATIVE
NITRATE UR QL: NEGATIVE
PH UR STRIP: 6.5 [PH] (ref 4.5–8)
RBC #/AREA URNS AUTO: ABNORMAL HPF
SP GR UR STRIP: 1.01 (ref 1–1.03)
SQUAMOUS #/AREA URNS AUTO: ABNORMAL LPF
TRANS CELLS #/AREA URNS HPF: ABNORMAL LPF
UROBILINOGEN UR STRIP-ACNC: ABNORMAL
WBC #/AREA URNS AUTO: ABNORMAL HPF

## 2021-02-04 PROCEDURE — 99309 SBSQ NF CARE MODERATE MDM 30: CPT | Performed by: NURSE PRACTITIONER

## 2021-02-04 ASSESSMENT — MIFFLIN-ST. JEOR: SCORE: 985.01

## 2021-02-04 NOTE — LETTER
"    2/4/2021        RE: Deloris Larson  [address] [place] [address]        [facility]  [place] Medical Record Number:  [id number]  Place of Service where encounter took place:  [facility] ([facility] [[id number]]  Chief Complaint   Patient presents with     Nursing Home Acute     HPI:    Deloris Larson  is a 89 year old (3/24/1930), who is being seen today for an episodic care visit.  HPI information obtained from: facility chart records, facility staff, patient report and [facility] chart review.    Past Medical and Surgical History reviewed in [facility] today.    Called to Mrs. Larson's room today due to RN reports ongoing fevers.  In meeting Mrs. Larson she reports yesterday she felt \"Awful\" noting excessive fatigue, no appetite, hard to stay awake and unwell.  Today she notes the fevers, but thinks she is actually feeling better.  She is up in WC, non-toxic, awake.  She does note cough with sputum, reports she has not looked at the sputum.  Denies any SOB/WHITE, still endorses some intermittent light headedness.     MEDICATIONS:  Current Outpatient Medications   Medication Sig Dispense Refill     amoxicillin-clavulanate (AUGMENTIN) 875-125 MG tablet Take 1 tablet by mouth 2 times daily for 5 days 10 tablet 0     acetaminophen (TYLENOL) 500 MG tablet Take 1,000 mg by mouth 3 times daily       alum hydroxide-mag carbonate (GENACTON)  MG/15ML SUSP suspension Take 15 mLs by mouth 4 times daily as needed for heartburn        apixaban ANTICOAGULANT (ELIQUIS) 2.5 MG tablet Take 2.5 mg by mouth 2 times daily       atorvastatin (LIPITOR) 20 MG tablet Take 20 mg by mouth daily       Iron Carbonyl-Vitamin C-FOS 30-10-25 MG CHEW Take 125 mg by mouth daily       meclizine (ANTIVERT) 12.5 MG tablet Take 25 mg by mouth 3 times daily as needed for dizziness       metoprolol succinate ER (TOPROL-XL) 50 MG 24 hr tablet Take 50 mg by mouth daily       nitroGLYcerin (NITROSTAT) " "0.3 MG sublingual tablet Place 0.3 mg under the tongue every 5 minutes as needed for chest pain For chest pain place 1 tablet under the tongue every 5 minutes for 3 doses. If symptoms persist 5 minutes after 1st dose call 911.       Probiotic Product (PROBIOTIC PEARLS ADVANTAGE PO) Take 15 mg by mouth 2 times daily        vitamin (B COMPLEX-C) tablet Take 1 tablet by mouth daily       vitamin D3 (CHOLECALCIFEROL) 1.25 MG (89264 UT) capsule Take 50,000 Units by mouth every 7 days       zolpidem (AMBIEN) 10 MG tablet Take 10 mg by mouth nightly as needed for sleep Holding Ambien while at TCU.        REVIEW OF SYSTEMS:  7 point ROS done including, light headedness/dizziness, fever/chills, pain, Resp, CV, GI, and  and is negative other than noted in HPI.      Objective:  /70   Pulse 100   Temp 102.5  F (39.2  C)   Resp 18   Ht 1.626 m (5' 4\")   Wt 58 kg (127 lb 13.9 oz)   SpO2 93%   BMI 21.95 kg/m       Exam:  EXAM LIMITED DUE TO [facility] social distancing recommendations:  GENERAL APPEARANCE:  Elderly thin female sitting up in chair.  NAD, non-toxic.  ENT:  Mouth and oropharynx normal, moist mucous membranes.   RESP:  Lungs mild/scant coarseness in RLL otherwise CTA.  Regular relaxed breathing effort.  No cough.   CV: 3/6 systolic murmur heard thought out/S2.   Regular rhythm and rate.  No generalized edema.   ABDOMEN:   Flat, soft, non-tender with active bowel sounds.  No guarding, rigidity, or rebound tenderness.  EXTREMITIES:  No lower extremity edema, no calf tenderness.   PSYCH: Alert and orientated, pleasant and cooperative.  Suspect mild memory impairment.  Stable/unchanged.    Labs:   I have personally reviewed images, which are in facility or EMR chart.     ASSESSMENT/PLAN:  Fever, unspecified fever cause  2019 novel coronavirus disease (COVID-19)  Pneumonia of right lower lobe due to infectious organism  Light headed  Other fatigue  Mrs. Larson was found to be COVID positive 1/28 after " syncope/fall.  Came to LTC and endorsed some mild light headedness/dizziness, but had no complaints, afebrile, doing well on RA.     Starting yesterday 2/3 she reports significant fatigue, could not stay awake, no appetite, decreased PO intake, felt unwell.  Today reports feeling much better, but now having Fevers which just started today 2/4 with a Tmax of 102.5 F, hemodynamically stable.  Appears wake/alert today.      She endorses new cough, productive, has not looked at sputum to describe.  Mild/scant coarseness on RLL on exam.   CXR noted minimal infiltrate on RLL.   On self read, I do see minimal infiltrate, which I would not expect to cause 102+ fevers, but as COVID test was positive 6-7 days ago, why now fevers is unclear.   Due to exam and reports of sputum, will elect to treat as Pneumonia.      -Also getting a UA/UC for completeness.   -CBC/BMP tomorrow 2/5.   -Starting Augmentin 875 BID x 7 days.     Essential hypertension  Chronic atrial fibrillation (H)  Cardiac pacemaker in situ  Aortic valve stenosis, etiology of cardiac valve disease unspecified, mild  No current s/s of clinical CHF.   SBP's still erratic, ranging 130-160's.   -We continue to hold PTA Ramipril and Furosemide.   -Continues Toprol.   -Continues BID Apixaban.     SHARON (generalized anxiety disorder)  Insomnia, unspecified type  Doing well.   -We continue to hold PTA Ambien.     Debility  -Continues with PT/OT.     Orders written by provider at facility  -As noted above.     Electronically signed by:  NIVIA Grimaldo CNP           Sincerely,        NIVIA Grimaldo CNP

## 2021-02-04 NOTE — PROGRESS NOTES
"[facility]  [facility] Medical Record Number:  [id number]  Place of Service where encounter took place:  [facility] ([facility]) [[id number]]  Chief Complaint   Patient presents with     Nursing Home Acute     HPI:    Deloris Larson  is a 89 year old (3/24/1930), who is being seen today for an episodic care visit.  HPI information obtained from: facility chart records, facility staff, patient report and [facility] chart review.    Past Medical and Surgical History reviewed in [facility] today.    Called to Mrs. Larson's room today due to RN reports ongoing fevers.  In meeting Mrs. Larson she reports yesterday she felt \"Awful\" noting excessive fatigue, no appetite, hard to stay awake and unwell.  Today she notes the fevers, but thinks she is actually feeling better.  She is up in WC, non-toxic, awake.  She does note cough with sputum, reports she has not looked at the sputum.  Denies any SOB/WHITE, still endorses some intermittent light headedness.     MEDICATIONS:  Current Outpatient Medications   Medication Sig Dispense Refill     amoxicillin-clavulanate (AUGMENTIN) 875-125 MG tablet Take 1 tablet by mouth 2 times daily for 5 days 10 tablet 0     acetaminophen (TYLENOL) 500 MG tablet Take 1,000 mg by mouth 3 times daily       alum hydroxide-mag carbonate (GENACTON)  MG/15ML SUSP suspension Take 15 mLs by mouth 4 times daily as needed for heartburn        apixaban ANTICOAGULANT (ELIQUIS) 2.5 MG tablet Take 2.5 mg by mouth 2 times daily       atorvastatin (LIPITOR) 20 MG tablet Take 20 mg by mouth daily       Iron Carbonyl-Vitamin C-FOS 30-10-25 MG CHEW Take 125 mg by mouth daily       meclizine (ANTIVERT) 12.5 MG tablet Take 25 mg by mouth 3 times daily as needed for dizziness       metoprolol succinate ER (TOPROL-XL) 50 MG 24 hr tablet Take 50 mg by mouth daily       nitroGLYcerin (NITROSTAT) 0.3 MG sublingual tablet Place 0.3 mg under the tongue every 5 minutes as needed for " "chest pain For chest pain place 1 tablet under the tongue every 5 minutes for 3 doses. If symptoms persist 5 minutes after 1st dose call 911.       Probiotic Product (PROBIOTIC PEARLS ADVANTAGE PO) Take 15 mg by mouth 2 times daily        vitamin (B COMPLEX-C) tablet Take 1 tablet by mouth daily       vitamin D3 (CHOLECALCIFEROL) 1.25 MG (94468 UT) capsule Take 50,000 Units by mouth every 7 days       zolpidem (AMBIEN) 10 MG tablet Take 10 mg by mouth nightly as needed for sleep Holding Ambien while at TCU.        REVIEW OF SYSTEMS:  7 point ROS done including, light headedness/dizziness, fever/chills, pain, Resp, CV, GI, and  and is negative other than noted in HPI.      Objective:  /70   Pulse 100   Temp 102.5  F (39.2  C)   Resp 18   Ht 1.626 m (5' 4\")   Wt 58 kg (127 lb 13.9 oz)   SpO2 93%   BMI 21.95 kg/m       Exam:  EXAM LIMITED DUE TO [facility] social distancing recommendations:  GENERAL APPEARANCE:  Elderly thin female sitting up in chair.  NAD, non-toxic.  ENT:  Mouth and oropharynx normal, moist mucous membranes.   RESP:  Lungs mild/scant coarseness in RLL otherwise CTA.  Regular relaxed breathing effort.  No cough.   CV: 3/6 systolic murmur heard thought out/S2.   Regular rhythm and rate.  No generalized edema.   ABDOMEN:   Flat, soft, non-tender with active bowel sounds.  No guarding, rigidity, or rebound tenderness.  EXTREMITIES:  No lower extremity edema, no calf tenderness.   PSYCH: Alert and orientated, pleasant and cooperative.  Suspect mild memory impairment.  Stable/unchanged.    Labs:   I have personally reviewed images, which are in facility or EMR chart.     ASSESSMENT/PLAN:  Fever, unspecified fever cause  2019 novel coronavirus disease (COVID-19)  Pneumonia of right lower lobe due to infectious organism  Light headed  Other fatigue  Mrs. Larson was found to be COVID positive 1/28 after syncope/fall.  Came to LTC and endorsed some mild light headedness/dizziness, but had no " complaints, afebrile, doing well on RA.     Starting yesterday 2/3 she reports significant fatigue, could not stay awake, no appetite, decreased PO intake, felt unwell.  Today reports feeling much better, but now having Fevers which just started today 2/4 with a Tmax of 102.5 F, hemodynamically stable.  Appears wake/alert today.      She endorses new cough, productive, has not looked at sputum to describe.  Mild/scant coarseness on RLL on exam.   CXR noted minimal infiltrate on RLL.   On self read, I do see minimal infiltrate, which I would not expect to cause 102+ fevers, but as COVID test was positive 6-7 days ago, why now fevers is unclear.   Due to exam and reports of sputum, will elect to treat as Pneumonia.      -Also getting a UA/UC for completeness.   -CBC/BMP tomorrow 2/5.   -Starting Augmentin 875 BID x 7 days.     Essential hypertension  Chronic atrial fibrillation (H)  Cardiac pacemaker in situ  Aortic valve stenosis, etiology of cardiac valve disease unspecified, mild  No current s/s of clinical CHF.   SBP's still erratic, ranging 130-160's.   -We continue to hold PTA Ramipril and Furosemide.   -Continues Toprol.   -Continues BID Apixaban.     SHARON (generalized anxiety disorder)  Insomnia, unspecified type  Doing well.   -We continue to hold PTA Ambien.     Debility  -Continues with PT/OT.     Orders written by provider at facility  -As noted above.     Electronically signed by:  NIVIA Grimaldo CNP

## 2021-02-05 ENCOUNTER — APPOINTMENT (OUTPATIENT)
Dept: GENERAL RADIOLOGY | Facility: CLINIC | Age: 86
End: 2021-02-05
Attending: EMERGENCY MEDICINE
Payer: MEDICARE

## 2021-02-05 ENCOUNTER — NURSING HOME VISIT (OUTPATIENT)
Dept: GERIATRICS | Facility: CLINIC | Age: 86
End: 2021-02-05
Payer: MEDICARE

## 2021-02-05 ENCOUNTER — HOSPITAL ENCOUNTER (OUTPATIENT)
Facility: CLINIC | Age: 86
Setting detail: OBSERVATION
Discharge: ANOTHER HOSPITAL | End: 2021-02-06
Attending: EMERGENCY MEDICINE | Admitting: HOSPITALIST
Payer: MEDICARE

## 2021-02-05 VITALS
RESPIRATION RATE: 21 BRPM | OXYGEN SATURATION: 95 % | DIASTOLIC BLOOD PRESSURE: 54 MMHG | WEIGHT: 127.87 LBS | HEART RATE: 88 BPM | HEIGHT: 64 IN | SYSTOLIC BLOOD PRESSURE: 89 MMHG | BODY MASS INDEX: 21.83 KG/M2 | TEMPERATURE: 97.8 F

## 2021-02-05 DIAGNOSIS — R42 LIGHT HEADED: ICD-10-CM

## 2021-02-05 DIAGNOSIS — R53.81 DEBILITY: ICD-10-CM

## 2021-02-05 DIAGNOSIS — R53.83 OTHER FATIGUE: ICD-10-CM

## 2021-02-05 DIAGNOSIS — Z95.0 CARDIAC PACEMAKER IN SITU: ICD-10-CM

## 2021-02-05 DIAGNOSIS — R50.9 FEVER, UNSPECIFIED FEVER CAUSE: ICD-10-CM

## 2021-02-05 DIAGNOSIS — I10 ESSENTIAL HYPERTENSION: ICD-10-CM

## 2021-02-05 DIAGNOSIS — U07.1 2019 NOVEL CORONAVIRUS DISEASE (COVID-19): ICD-10-CM

## 2021-02-05 DIAGNOSIS — I95.9 HYPOTENSION, UNSPECIFIED HYPOTENSION TYPE: ICD-10-CM

## 2021-02-05 DIAGNOSIS — I35.0 AORTIC VALVE STENOSIS, ETIOLOGY OF CARDIAC VALVE DISEASE UNSPECIFIED: ICD-10-CM

## 2021-02-05 DIAGNOSIS — J96.01 ACUTE RESPIRATORY FAILURE WITH HYPOXIA (H): ICD-10-CM

## 2021-02-05 DIAGNOSIS — J18.9 PNEUMONIA OF RIGHT LOWER LOBE DUE TO INFECTIOUS ORGANISM: ICD-10-CM

## 2021-02-05 DIAGNOSIS — F41.1 GAD (GENERALIZED ANXIETY DISORDER): ICD-10-CM

## 2021-02-05 DIAGNOSIS — G47.00 INSOMNIA, UNSPECIFIED TYPE: ICD-10-CM

## 2021-02-05 DIAGNOSIS — R79.89 ELEVATED TROPONIN: ICD-10-CM

## 2021-02-05 DIAGNOSIS — R55 SYNCOPE, UNSPECIFIED SYNCOPE TYPE: ICD-10-CM

## 2021-02-05 DIAGNOSIS — I48.20 CHRONIC ATRIAL FIBRILLATION (H): ICD-10-CM

## 2021-02-05 LAB
ALBUMIN SERPL-MCNC: 2.7 G/DL (ref 3.4–5)
ALP SERPL-CCNC: 52 U/L (ref 40–150)
ALT SERPL W P-5'-P-CCNC: 37 U/L (ref 0–50)
ANION GAP SERPL CALCULATED.3IONS-SCNC: 6 MMOL/L (ref 5–18)
ANION GAP SERPL CALCULATED.3IONS-SCNC: 7 MMOL/L (ref 3–14)
AST SERPL W P-5'-P-CCNC: 44 U/L (ref 0–45)
BASE EXCESS BLDV CALC-SCNC: 2 MMOL/L
BASOPHILS # BLD AUTO: 0 10E9/L (ref 0–0.2)
BASOPHILS NFR BLD AUTO: 0.2 %
BILIRUB SERPL-MCNC: 0.5 MG/DL (ref 0.2–1.3)
BUN SERPL-MCNC: 15 MG/DL (ref 8–28)
BUN SERPL-MCNC: 17 MG/DL (ref 7–30)
CALCIUM SERPL-MCNC: 8.2 MG/DL (ref 8.5–10.5)
CALCIUM SERPL-MCNC: 8.4 MG/DL (ref 8.5–10.1)
CHLORIDE BLD-SCNC: 106 MMOL/L (ref 98–107)
CHLORIDE SERPL-SCNC: 105 MMOL/L (ref 94–109)
CO2 SERPL-SCNC: 24 MMOL/L (ref 20–32)
CO2 SERPL-SCNC: 24 MMOL/L (ref 22–31)
CREAT SERPL-MCNC: 1.01 MG/DL (ref 0.6–1.1)
CREAT SERPL-MCNC: 1.01 MG/DL (ref 0.6–1.1)
CREAT SERPL-MCNC: 1.09 MG/DL (ref 0.52–1.04)
DIFFERENTIAL METHOD BLD: ABNORMAL
EOSINOPHIL # BLD AUTO: 0 10E9/L (ref 0–0.7)
EOSINOPHIL NFR BLD AUTO: 0 %
ERYTHROCYTE [DISTWIDTH] IN BLOOD BY AUTOMATED COUNT: 13.9 % (ref 10–15)
ERYTHROCYTE [DISTWIDTH] IN BLOOD BY AUTOMATED COUNT: 13.9 % (ref 11–14.5)
GFR SERPL CREATININE-BSD FRML MDRD: 44 ML/MIN/{1.73_M2}
GFR SERPL CREATININE-BSD FRML MDRD: 51 ML/MIN/1.73M2
GFR SERPL CREATININE-BSD FRML MDRD: 51 ML/MIN/1.73M2
GLUCOSE BLD-MCNC: 94 MG/DL (ref 70–125)
GLUCOSE SERPL-MCNC: 93 MG/DL (ref 70–99)
GLUCOSE SERPL-MCNC: 94 MG/DL (ref 70–125)
HCO3 BLDV-SCNC: 26 MMOL/L (ref 21–28)
HCT VFR BLD AUTO: 25.7 % (ref 35–47)
HCT VFR BLD AUTO: 25.8 % (ref 35–47)
HGB BLD-MCNC: 8.2 G/DL (ref 11.7–15.7)
HGB BLD-MCNC: 8.4 G/DL (ref 12–16)
IMM GRANULOCYTES # BLD: 0 10E9/L (ref 0–0.4)
IMM GRANULOCYTES NFR BLD: 0.6 %
LACTATE BLD-SCNC: 0.9 MMOL/L (ref 0.7–2)
LYMPHOCYTES # BLD AUTO: 0.6 10E9/L (ref 0.8–5.3)
LYMPHOCYTES NFR BLD AUTO: 12.1 %
MAGNESIUM SERPL-MCNC: 2.1 MG/DL (ref 1.6–2.3)
MCH RBC QN AUTO: 29.6 PG (ref 27–34)
MCH RBC QN AUTO: 30.1 PG (ref 26.5–33)
MCHC RBC AUTO-ENTMCNC: 31.9 G/DL (ref 31.5–36.5)
MCHC RBC AUTO-ENTMCNC: 32.6 G/DL (ref 32–36)
MCV RBC AUTO: 91 FL (ref 80–100)
MCV RBC AUTO: 95 FL (ref 78–100)
MONOCYTES # BLD AUTO: 0.4 10E9/L (ref 0–1.3)
MONOCYTES NFR BLD AUTO: 7.3 %
NEUTROPHILS # BLD AUTO: 4.2 10E9/L (ref 1.6–8.3)
NEUTROPHILS NFR BLD AUTO: 79.8 %
NRBC # BLD AUTO: 0 10*3/UL
NRBC BLD AUTO-RTO: 0 /100
O2/TOTAL GAS SETTING VFR VENT: ABNORMAL %
PCO2 BLDV: 38 MM HG (ref 40–50)
PH BLDV: 7.45 PH (ref 7.32–7.43)
PLATELET # BLD AUTO: 128 THOU/UL (ref 140–440)
PLATELET # BLD AUTO: 134 10E9/L (ref 150–450)
PMV BLD AUTO: 12.1 FL (ref 8.5–12.5)
PO2 BLDV: 31 MM HG (ref 25–47)
POTASSIUM BLD-SCNC: 4.2 MMOL/L (ref 3.5–5)
POTASSIUM SERPL-SCNC: 3.7 MMOL/L (ref 3.4–5.3)
POTASSIUM SERPL-SCNC: 4.2 MMOL/L (ref 3.5–5)
PROCALCITONIN SERPL-MCNC: 0.13 NG/ML
PROT SERPL-MCNC: 6.3 G/DL (ref 6.8–8.8)
RBC # BLD AUTO: 2.72 10E12/L (ref 3.8–5.2)
RBC # BLD AUTO: 2.84 MILL/UL (ref 3.8–5.4)
SODIUM SERPL-SCNC: 136 MMOL/L (ref 133–144)
SODIUM SERPL-SCNC: 136 MMOL/L (ref 136–145)
TROPONIN I SERPL-MCNC: 0.06 UG/L (ref 0–0.04)
WBC # BLD AUTO: 5.3 10E9/L (ref 4–11)
WBC: 5.2 THOU/UL (ref 4–11)

## 2021-02-05 PROCEDURE — 99310 SBSQ NF CARE HIGH MDM 45: CPT | Performed by: NURSE PRACTITIONER

## 2021-02-05 PROCEDURE — 258N000003 HC RX IP 258 OP 636: Performed by: PHYSICIAN ASSISTANT

## 2021-02-05 PROCEDURE — G0378 HOSPITAL OBSERVATION PER HR: HCPCS

## 2021-02-05 PROCEDURE — 82803 BLOOD GASES ANY COMBINATION: CPT | Performed by: EMERGENCY MEDICINE

## 2021-02-05 PROCEDURE — 84484 ASSAY OF TROPONIN QUANT: CPT | Performed by: EMERGENCY MEDICINE

## 2021-02-05 PROCEDURE — 83735 ASSAY OF MAGNESIUM: CPT | Performed by: EMERGENCY MEDICINE

## 2021-02-05 PROCEDURE — 85025 COMPLETE CBC W/AUTO DIFF WBC: CPT | Performed by: EMERGENCY MEDICINE

## 2021-02-05 PROCEDURE — 36415 COLL VENOUS BLD VENIPUNCTURE: CPT | Performed by: EMERGENCY MEDICINE

## 2021-02-05 PROCEDURE — 80053 COMPREHEN METABOLIC PANEL: CPT | Performed by: EMERGENCY MEDICINE

## 2021-02-05 PROCEDURE — 83605 ASSAY OF LACTIC ACID: CPT | Performed by: EMERGENCY MEDICINE

## 2021-02-05 PROCEDURE — 99284 EMERGENCY DEPT VISIT MOD MDM: CPT

## 2021-02-05 PROCEDURE — 250N000013 HC RX MED GY IP 250 OP 250 PS 637: Performed by: PHYSICIAN ASSISTANT

## 2021-02-05 PROCEDURE — 71045 X-RAY EXAM CHEST 1 VIEW: CPT

## 2021-02-05 PROCEDURE — 87040 BLOOD CULTURE FOR BACTERIA: CPT | Performed by: EMERGENCY MEDICINE

## 2021-02-05 PROCEDURE — 99220 PR INITIAL OBSERVATION CARE,LEVEL III: CPT | Performed by: PHYSICIAN ASSISTANT

## 2021-02-05 PROCEDURE — 84145 PROCALCITONIN (PCT): CPT | Performed by: PHYSICIAN ASSISTANT

## 2021-02-05 RX ORDER — SODIUM CHLORIDE, SODIUM LACTATE, POTASSIUM CHLORIDE, CALCIUM CHLORIDE 600; 310; 30; 20 MG/100ML; MG/100ML; MG/100ML; MG/100ML
INJECTION, SOLUTION INTRAVENOUS ONCE
Status: COMPLETED | OUTPATIENT
Start: 2021-02-05 | End: 2021-02-06

## 2021-02-05 RX ORDER — ACETAMINOPHEN 325 MG/1
650 TABLET ORAL EVERY 4 HOURS PRN
Status: DISCONTINUED | OUTPATIENT
Start: 2021-02-05 | End: 2021-02-06 | Stop reason: HOSPADM

## 2021-02-05 RX ORDER — NALOXONE HYDROCHLORIDE 0.4 MG/ML
0.4 INJECTION, SOLUTION INTRAMUSCULAR; INTRAVENOUS; SUBCUTANEOUS
Status: DISCONTINUED | OUTPATIENT
Start: 2021-02-05 | End: 2021-02-06 | Stop reason: HOSPADM

## 2021-02-05 RX ORDER — LIDOCAINE 4 G/G
3 PATCH TOPICAL
Status: DISCONTINUED | OUTPATIENT
Start: 2021-02-05 | End: 2021-02-06 | Stop reason: HOSPADM

## 2021-02-05 RX ORDER — GUAIFENESIN/DEXTROMETHORPHAN 100-10MG/5
5 SYRUP ORAL EVERY 4 HOURS PRN
Status: DISCONTINUED | OUTPATIENT
Start: 2021-02-05 | End: 2021-02-06 | Stop reason: HOSPADM

## 2021-02-05 RX ORDER — NALOXONE HYDROCHLORIDE 0.4 MG/ML
0.2 INJECTION, SOLUTION INTRAMUSCULAR; INTRAVENOUS; SUBCUTANEOUS
Status: DISCONTINUED | OUTPATIENT
Start: 2021-02-05 | End: 2021-02-06 | Stop reason: HOSPADM

## 2021-02-05 RX ORDER — MECLIZINE HCL 25MG 25 MG/1
25 TABLET, CHEWABLE ORAL 3 TIMES DAILY PRN
COMMUNITY
End: 2022-08-17

## 2021-02-05 RX ORDER — ACETAMINOPHEN 650 MG/1
650 SUPPOSITORY RECTAL EVERY 4 HOURS PRN
Status: DISCONTINUED | OUTPATIENT
Start: 2021-02-05 | End: 2021-02-06 | Stop reason: HOSPADM

## 2021-02-05 RX ORDER — ONDANSETRON 4 MG/1
4 TABLET, ORALLY DISINTEGRATING ORAL EVERY 6 HOURS PRN
Status: DISCONTINUED | OUTPATIENT
Start: 2021-02-05 | End: 2021-02-06 | Stop reason: HOSPADM

## 2021-02-05 RX ORDER — ONDANSETRON 2 MG/ML
4 INJECTION INTRAMUSCULAR; INTRAVENOUS EVERY 6 HOURS PRN
Status: DISCONTINUED | OUTPATIENT
Start: 2021-02-05 | End: 2021-02-06 | Stop reason: HOSPADM

## 2021-02-05 RX ADMIN — LIDOCAINE 3 PATCH: 560 PATCH PERCUTANEOUS; TOPICAL; TRANSDERMAL at 18:32

## 2021-02-05 RX ADMIN — SODIUM CHLORIDE, POTASSIUM CHLORIDE, SODIUM LACTATE AND CALCIUM CHLORIDE: 600; 310; 30; 20 INJECTION, SOLUTION INTRAVENOUS at 18:32

## 2021-02-05 RX ADMIN — ACETAMINOPHEN 650 MG: 325 TABLET, FILM COATED ORAL at 20:10

## 2021-02-05 ASSESSMENT — ENCOUNTER SYMPTOMS: COUGH: 1

## 2021-02-05 ASSESSMENT — MIFFLIN-ST. JEOR: SCORE: 985.01

## 2021-02-05 NOTE — ED PROVIDER NOTES
History     Chief Complaint:  Hypoxia and Hypotension      HPI   Deloris Larson is a 89 year old female with history of hypertension, hyperlipidemia, atrial fibrillation, Coronary Artery Disease, SSS, COVID-19, unstable angina, amongst others, anticoagulated on Eliquis, who presents for evaluation of hypoxia and hypotension via EMS. Per chart review, the patient admitted to [facility] on 1/28 after a fall with syncope and subsequently tested positive for COVID-19 at time of admission. She had been found to be experiencing orthostatic hypotension and syncope secondary to decreased PO intake. She had an intermittent cough but was vitally stable on room air without need for oxygen supplementation. The patient was prescribed doxepin 75 mg nightly and meclizine as needed, with discharge on 1/30 to a transitional care unit. The patient had been formally independent living prior to her hospital admission. Last night, she was found to be again experiencing hypotension as well as hypoxia which prompted her evaluation today.     Review of Systems   Respiratory: Positive for cough.    All other systems reviewed and are negative.      Allergies:  Diphenhydramine    Medications:  alum hydroxide-mag carbonate   amoxicillin-clavulanate   Eliquis   atorvastatin  meclizine   metoprolol succinate ER  Nitroglycerin    Ambien     Past Medical History:    Acute respiratory failure with hypoxia  Orthostatic hypotension  Pneumonia   Debility   2019 novel coronavirus disease (COVID-19)  Syncope   Skin cancer   Bronchiectasis   Hypertension  Chronic atrial fibrillation   Aortic valve stenosis  Hematoma, Left iliopsoas muscle  Chronic diarrhea   Restless legs syndrome  Anxiety   Hyperlipidemia    peripheral artery disease   Intermittent claudication  Coronary Artery Disease   SSS  Chronic diarrhea   Lumbar spondylosis   Diverticulosis   SVT  Vertigo   Sinus bradycardia   retroperitoneal hematoma  Migraine headaches    Syncope   Hematuria   Unstable angina   Poliomyelitis   insomnia     Past Surgical History:    PTCA  Choleystectomy  Tubal ligation   Appendectomy  Hysterectomy   Breast augmentation  Colonoscopy  Coronary angioplasty   Cardioversion   Hernia repair   Capsulotomy   Mastectomy   Oophorectomy   Varicose vein surgery   cataracts    Family History:    Father: heart disease  Maternal grandmother: PE  Mother: PE  Son: CABG  Grandson: stomach cancer    Social History:  The patient was accompanied to the ED by EMS.  Patient has a daughter.     Physical Exam     Patient Vitals for the past 24 hrs:   BP Temp Temp src Pulse Resp SpO2   02/05/21 1345 125/62 -- -- 81 13 98 %   02/05/21 1330 129/75 -- -- 73 19 100 %   02/05/21 1315 127/67 -- -- 75 16 99 %   02/05/21 1300 130/71 -- -- 80 19 98 %   02/05/21 1255 -- -- -- -- -- 98 %   02/05/21 1245 120/73 -- -- 81 23 92 %   02/05/21 1230 123/70 -- -- 80 20 93 %   02/05/21 1215 116/64 -- -- 81 17 92 %   02/05/21 1200 121/69 -- -- 86 17 94 %   02/05/21 1145 113/59 -- -- 85 16 91 %   02/05/21 1130 110/64 -- -- 85 15 99 %   02/05/21 1115 108/65 -- -- 84 13 91 %   02/05/21 1105 117/70 98.5  F (36.9  C) Oral 81 16 94 %     Physical Exam  General: Patient is alert and cooperative.  HENT:  Normal appearance.   Eyes: EOMI. Normal conjunctiva.  Neck:  Normal range of motion and appearance.   Cardiovascular:  Normal rate, regular rhythm and normal heart sounds.   Pulmonary/Chest:  Effort normal. No wheezing or crackles.  Abdominal: Soft. No distension or tenderness.     Musculoskeletal: Normal range of motion. No edema or tenderness.   Neurological: oriented, normal strength, sensation, and coordination.   Skin: Warm and dry. No rash or bruising.   Psychiatric: Normal mood and affect. Normal behavior and judgement.      Emergency Department Course     Imaging:    XR Chest Port 1 View  Mild interval improvement of the previously seen right upper   lobar airspace disease. Improved aeration  within the right lung base   is also noted. The left lung is grossly clear. No definite pleural   effusion. Stable heart size. Dual-lead left-sided cardiac conduction   device.   ROMERO OROPEZA MD  Reading per radiology      Laboratory:    CBC: WBC 5.3, HGB 8.2 (L),  (L)  CMP: GFR 44 (L), Calcium 8.4 (L), Albumin 2.7 (L), Protein Total 6.3 (L), o/w WNL (Creatinine 1.09 (H))    Troponin (Collected 1209): 0.057 (H)     Lactic Acid (Resulted: 1217): 0.9     Magnesium: 2.1     Blood Gas (Collected 1209): pH 7.45 (H), PCO2 38 (L), PO2 31, Bicarbonate 26, Base Excess Venous 2.0, FIO2 RMA      Blood Culture x2: Pending     Emergency Department Course:    Reviewed:    I reviewed the patient's nursing notes, vitals, past medical records, Care Everywhere.     Assessments:    1125 I performed an exam of the patient as documented above.     Consults:     1306 I spoke with Dr. Turner of the hospitalist service from [facility] regarding patient's presentation, findings, and plan of care.     1335 I spoke with Dr. Turner of the hospitalist service from [facility] regarding patient's presentation, findings, and plan of care. Recommended admission to [facility] as patient is not hypoxic.     1347 I spoke with SHIN Burgess PA-C of the hospitalist service from [facility] regarding patient's presentation, findings, and plan of care.     1401 I spoke with LUZ ELENA Stewart PA-C under Dr. Adame of the hospitalist service from [facility] regarding patient's presentation, findings, and plan of care.     Disposition:  The patient was admitted under the care of LUZ ELENA Stewart PA-C under Dr. Adame.    Impression & Plan      Covid-19  Deloris Larson was evaluated during a global COVID-19 pandemic, which necessitated consideration that the patient might be at risk for infection with the SARS-CoV-2 virus that causes COVID-19.   Applicable protocols for evaluation were followed during the patient's care.   COVID-19 was  considered as part of the patient's evaluation. The plan for testing is:  a test was obtained at a previous visit and reviewed & considered today.    Medical Decision Making:  Deloris Larson is a 89 year old female who presents to the emergency department today for evaluation of hypoxia and hypotension from transitional care.  Recent medical history is notable for diagnosis with COVID-19 at time of admission to [facility] on January 28 due to a fall.  She had no symptoms at that time.  See HPI for further details.  She does admit to feeling some generalized fatigue and occasional cough.  She is afebrile and hemodynamically stable on arrival.  Oxygen saturations are 90% when sleeping.  Evaluation has included chest x-ray showing questionable mild right upper lobar airspace disease.  She has a normal white blood cell count.  Troponin is minimally elevated 0.057.  Blood cultures were drawn and test results were reviewed with her.  Given her age reported history of hypoxia and hypotension and elevated troponin which is of uncertain clinical significance, I believe admission for serial cardiac biomarkers and close hemodynamic and oxygen saturation monitoring is prudent.  I did speak with the admission service at [facility].  Based upon her current clinical condition, they do not advise transfer there as she is not a current candidate for IV remdesivir or other aggressive interventions.  She is going to be admitted to our hospital service.    Diagnosis:    ICD-10-CM    1. 2019 novel coronavirus disease (COVID-19)  U07.1 Blood culture     Blood culture ONE site   2. Elevated troponin  R77.8      Scribe Disclosure:  I, Orla Severson, am serving as a scribe at 11:15 AM on 2/5/2021 to document services personally performed by Win Krueger MD based on my observations and the provider's statements to me.     [facility] EMERGENCY DEPT         Win Krueger MD  02/05/21 [id number]

## 2021-02-05 NOTE — LETTER
2/5/2021        RE: Deloris Larson  [address]  [place] [address]        [facility]  [facility] Record Number:  [id number]  Place of Service where encounter took place:  [facility] ([facility] [[id number]]  Chief Complaint   Patient presents with     Nursing Home Acute       HPI:    Deloris Larson  is a 89 year old (3/24/1930), who is being seen today for an episodic care visit.  HPI information obtained from: facility chart records, facility staff, patient report and [facility] chart review.     Past Medical and Surgical History reviewed in [facility] today.    Met with Mrs. Larson urgently this AM due to RN reporting she is requiring new oxygen and SBP 74/40.    Mrs. Larson was recently in hospital due to syncope and fall with Left side pain and L iliopsoas muscle hematoma, was found to be COVID positive.  EMR notes did not require steroids or Remdisivir but did go to [facility] then came to use at the TCU here.     Initially Mrs. Larson did well, afebrile, no respiratory issues, energetic, but in the last 3-4 days she has declined significantly.  Fevers with T max of 102.5 yesterday 2/4.  Last night per the RN she became hypoxic 85% on RA, now 90's on 2L's and now hypotensive 74/40 and 89/54.  Is eating/drinking some.     I did get a UA/UC which was negative, Did get a CXR that noted minimal RLL infiltrate possible; not appreciated on my review, but we did start Augmentin empirically and she has only received 2 doses.     In meeting her today, she endorses she is feeling worse, fatigued, no appetite, light headed/dizzy.  She notes a newer low back pain which she did not note since the fall.  Did have a dark stool yesterday but is on Iron and noted dark stools as common for her.  She reports she does not note SOB/WHITE specifically but still has a cough with some sputum.        MEDICATIONS:  Current Outpatient Medications   Medication Sig Dispense Refill  "    acetaminophen (TYLENOL) 500 MG tablet Take 1,000 mg by mouth 3 times daily       alum hydroxide-mag carbonate (GENACTON)  MG/15ML SUSP suspension Take 15 mLs by mouth 4 times daily as needed for heartburn        amoxicillin-clavulanate (AUGMENTIN) 875-125 MG tablet Take 1 tablet by mouth 2 times daily for 5 days 10 tablet 0     apixaban ANTICOAGULANT (ELIQUIS) 2.5 MG tablet Take 2.5 mg by mouth 2 times daily       atorvastatin (LIPITOR) 20 MG tablet Take 20 mg by mouth daily       Iron Carbonyl-Vitamin C-FOS 30-10-25 MG CHEW Take 125 mg by mouth daily       meclizine (ANTIVERT) 12.5 MG tablet Take 25 mg by mouth 3 times daily as needed for dizziness       metoprolol succinate ER (TOPROL-XL) 50 MG 24 hr tablet Take 50 mg by mouth daily       nitroGLYcerin (NITROSTAT) 0.3 MG sublingual tablet Place 0.3 mg under the tongue every 5 minutes as needed for chest pain For chest pain place 1 tablet under the tongue every 5 minutes for 3 doses. If symptoms persist 5 minutes after 1st dose call 911.       Probiotic Product (PROBIOTIC PEARLS ADVANTAGE PO) Take 15 mg by mouth 2 times daily        vitamin (B COMPLEX-C) tablet Take 1 tablet by mouth daily       vitamin D3 (CHOLECALCIFEROL) 1.25 MG (15092 UT) capsule Take 50,000 Units by mouth every 7 days       zolpidem (AMBIEN) 10 MG tablet Take 10 mg by mouth nightly as needed for sleep Holding Ambien while at TCU.        REVIEW OF SYSTEMS:  7 point ROS done including, light headedness/dizziness, fever/chills, pain, Resp, CV, GI, and  and is negative other than noted in HPI.      Objective:  BP (!) 89/54   Pulse 88   Temp 97.8  F (36.6  C)   Resp 21   Ht 1.626 m (5' 4\")   Wt 58 kg (127 lb 13.9 oz)   SpO2 95%   BMI 21.95 kg/m     Repeat BP: 74/40, HR 78    Exam:  EXAM LIMITED DUE TO [facility] social distancing recommendations:  GENERAL APPEARANCE:  Elderly thin female resting in bed.  NAD, non-toxic.  Appears fatigued.   ENT:  Mouth and oropharynx normal, slightly " dry, mucous membranes.   RESP:  Lungs diminished more now in RLL, otherwise CTA.  Regular relaxed breathing effort.  No cough.   CV: 3/6 systolic murmur heard thought out/S2.   Regular rhythm and rate.  No generalized edema.   ABDOMEN:   Flat, soft, non-tender with active bowel sounds.  No guarding, rigidity, or rebound tenderness.  EXTREMITIES:  No lower extremity edema, no calf tenderness.   PSYCH: Alert and orientated, pleasant and cooperative.  Suspect mild memory impairment.  Stable/unchanged.    Labs:   Labs done while at [facility] done by [facility] lab.   I have personally reviewed labs, which are in facility or EMR chart.   I have personally reviewed images, which are in facility or EMR chart.     ASSESSMENT/PLAN:  2019 novel coronavirus disease (COVID-19)  Fever, unspecified fever cause  Acute respiratory failure with hypoxia (H)  Pneumonia of right lower lobe due to infectious organism  Hypotension, unspecified hypotension type  Light headed  Other fatigue  COVID positive 1/28.      Mrs. Larson was recently in hospital due to syncope and fall with Left side pain and L iliopsoas muscle hematoma, was found to be COVID positive.  EMR notes did not require steroids or Remdisivir but did go to [facility] then came to use at the TCU here on 1/31.     Initially Mrs. Larson did well, afebrile, no respiratory issues, energetic, but in the last 24-48 hours she has declined significantly.  Fevers with T max of 102.5 yesterday 2/4.  Last night per the RN she became hypoxic 85% on RA, now 90's on 2L's and now hypotensive 74/40 and 89/54.  Is eating/drinking some.     We are empirically treating a possible RLL pneumonia with Augmentin, has received two doses, but this is a soft call.   UA/UC negative.     Other concerns:  Newer low back pain in setting of recent syncope fall.   RN's report a large dark stool yesterday, soft.    Unclear if this is significant or not.   Unclear if this is just all  COVID or if there is another etiology.     Due to worsening COVID symptoms with hypoxia, fatigue and now hypotension and ongoing fevers, query if she would benefit from Remdesivir and possible steroids now.   She is on PTA Apixaban.     Plan:  -Send to [facility] for eval and treat.      Syncope, unspecified syncope type  Essential hypertension  Chronic atrial fibrillation (H)  Cardiac pacemaker in situ  Aortic valve stenosis, etiology of cardiac valve disease unspecified, mild  Initial hospital was due to syncope and fall.   Was found to have mild aortic stenosis.    Has been normotensive until last 24 hours.   -Plan as noted above.     SHARON (generalized anxiety disorder)  Insomnia, unspecified type  Doing well.   -We have been holding her PTA Ambien and her newly started Doxepin.     Debility  -Was working with PT/OT.     Orders written by provider at facility  -Sent to [facility] for eval and treat.     High MDM, unstable acute illness requiring urgent intervention.     Electronically signed by:  NIVIA Grimaldo CNP             Sincerely,        NIVIA Grimaldo CNP

## 2021-02-05 NOTE — PLAN OF CARE
ROOM # 226    Living Situation (if not independent, order SW consult): Lives in a home by herself. Son lives in basement, but doesn't come upstairs.   Facility name:   : [place] (daughter) [phone]    Activity level at baseline: walker and cane   Activity level on admit: 1 assist with walker and gait belt       Patient registered to observation; given Patient Bill of Rights; given the opportunity to ask questions about observation status and their plan of care.  Patient has been oriented to the observation room, bathroom and call light is in place.    Discussed discharge goals and expectations with patient/family.

## 2021-02-05 NOTE — ED NOTES
Bed: ED12  Expected date: 2/5/21  Expected time: 10:55 AM  Means of arrival: Ambulance  Comments:  A [phone]

## 2021-02-05 NOTE — ED NOTES
[facility]  ED Nurse Handoff Report    Deloris Larson is a 89 year old female   ED Chief complaint: Covid Concern  . ED Diagnosis:   Final diagnoses:   2019 novel coronavirus disease (COVID-19)   Elevated troponin     Allergies:   Allergies   Allergen Reactions     Diphenhydramine        Code Status: Full Code  Activity level - Baseline/Home:  Independent. Activity Level - Current:   Assist X 1. Lift room needed: No. Bariatric: No   Needed: No   Isolation: Yes. Infection: Not Applicable  COVID r/o and special precautions.     Vital Signs:   Vitals:    02/05/21 1255 02/05/21 1300 02/05/21 1315 02/05/21 1330   BP:  130/71 127/67 129/75   Pulse:  80 75 73   Resp:  19 16 19   Temp:       TempSrc:       SpO2: 98% 98% 99% 100%       Cardiac Rhythm:  ,      Pain level:    Patient confused: No. Patient Falls Risk: Yes.   Elimination Status: Has voided   Patient Report - Initial Complaint: Hypoxia. Focused Assessment: O2 saturations above 90% on RA, on 1 L for comfort   Tests Performed: EKG, blood work, blood cultures, chest xray. Abnormal Results:   Labs Ordered and Resulted from Time of ED Arrival Up to the Time of Departure from the ED   CBC WITH PLATELETS DIFFERENTIAL - Abnormal; Notable for the following components:       Result Value    RBC Count 2.72 (*)     Hemoglobin 8.2 (*)     Hematocrit 25.7 (*)     Platelet Count 134 (*)     Absolute Lymphocytes 0.6 (*)     All other components within normal limits   COMPREHENSIVE METABOLIC PANEL - Abnormal; Notable for the following components:    Creatinine 1.09 (*)     GFR Estimate 44 (*)     GFR Estimate If Black 51 (*)     Calcium 8.4 (*)     Albumin 2.7 (*)     Protein Total 6.3 (*)     All other components within normal limits   BLOOD GAS VENOUS - Abnormal; Notable for the following components:    Ph Venous 7.45 (*)     PCO2 Venous 38 (*)     All other components within normal limits   TROPONIN I - Abnormal; Notable for the following components:     Troponin I ES 0.057 (*)     All other components within normal limits   MAGNESIUM   LACTIC ACID WHOLE BLOOD   VITAL SIGNS   PULSE OXIMETRY NURSING   CARDIAC CONTINUOUS MONITORING   PERIPHERAL IV CATHETER   NOTIFY PHYSICIAN   BLOOD CULTURE   BLOOD CULTURE     XR Chest Port 1 View   Final Result        .   Treatments provided: Supplemental O2  Family Comments: NA  OBS brochure/video discussed/provided to patient:  Yes  ED Medications:   Medications   sodium chloride (PF) 0.9% PF flush 3 mL (has no administration in time range)   sodium chloride (PF) 0.9% PF flush 3 mL (has no administration in time range)     Drips infusing:  No  For the majority of the shift, the patient's behavior Green. Interventions performed were NA.    Sepsis treatment initiated: No     Patient tested for COVID 19 prior to admission: NO- known covid positive    ED Nurse Name/Phone Number: Elsie Neil RN,   1:47 PM    RECEIVING UNIT ED HANDOFF REVIEW    Above ED Nurse Handoff Report was reviewed: Yes  Reviewed by: Maidson Gandhi RN on February 5, 2021 at 4:42 PM

## 2021-02-05 NOTE — ED NOTES
ECG:  ECG taken at 1516, ECG read at 1525  Atrial fibrillation with occasional ventricular paced complexes  Left anterior fascicular block   Nonspecific ST abnormality  Abnormal ECG  Rate 86 bpm. NY interval * ms. QRS duration 84 ms. QT/QTc 378/452 ms. P-R-T axes * -57 -51.

## 2021-02-05 NOTE — H&P
[facility] Hospitalist Admission Note  Name: Deloris Larson    MRN: [id number]  YOB: 1930    Age: 89 year old  Date of admission: 2/5/2021  [facility]      Date of Admission:  2/5/2021    Assessment & Plan   Deloris Larson is a 89 year old female with PMHx of atrial fibrillation, SSS s/p PPM, paroxysmal SVT, hyperlipidemia, hypertension, PAD, CAD s/p RIC, lumbar spondylosis, recent admission at [facility] for syncopal event found to test positive for COVID 19 infection, who brought to [facility] ED for evaluation from skilled nursing facility regarding concerns for decompensated vital signs.     In the ED patient presented vitally stable, no hypoxia.  Lab work was notable for renal insufficiency, VBG on RA with pH 7.45/pCO2 38. CBC with anemia, thrombocytopenia. Tn I 0.057. CXR noted right upper lobar airspace disease, seen on prior but noted to be mildly improved. Blood cultures were drawn.    Discussed with Dr. Krueger in the ED, full chart review including lab work, imaging, and vital signs were reviewed. She was admitted to observation for further monitoring     Active Hospital Problems     #Presyncopal Episodes  Sent to ED from SNF given reports of new hypoxia, hypotension.   Patient reports ongoing presyncopal episodes with activity on further interview. No associated CP or neurologic symptoms.  Evaluated at [facility] for syncopal episode 1/28 thought to be related to dehydration, orthostatic hypot.  -check amb O2 may be desaturating, qualify for home O2 until recovered from viral infection  -if not associated with hypoxia, could be related to orthostatic BP drop, hypoperfusion or anemia  -Hold PTA MP for now  -TTE updated 2019, mild aortic stenosis. will hold off on repeating for now  -her PO intake has been continuously low since ~1/28 and she appears dehydrated on physical exam. Will give 1 Liter LR.     #COVID-19 infection  #Viral Pneumonia secondary to  COVID-19 infection  Presented from SNF with reports of fever, hypoxia, hypotension. No dyspnea or hypoxia on admission, slight cough.   Per documentation intially tested positive via screening test 1/28 during admission at outside hospital for syncope, fall.   Initial chest imaging - X-ray- showed minimal RLL infiltrate. CT chest/ab/pelvis without contrast (1/28) revealed pul HTN, bronchiectasis, bronchial wall thickening, and mucous plugging.  D dimer 0.53 on prior admission on 1/29. No PE study pursued.   There is not evidence of concurrent bacterial superinfection. No evidence of bacterial superinfection, ARDS, or myocarditis.     PLAN:  - continue care on obs floor with continuous pulse oximetry under contact and droplet precautions, minimized lab draws, and care consolidation  - Oxygen support with nasal cannula at as needed, titrate to keep SPO2 > 90% and no higher than 96%  - Avoid nebulizers in favor of MDIs; no inhalers needed or wheezing.  - antivirals, steroids, are currently not indicated.    - procal ordered, monitor fever curve and CBC. Hold off on abx for now  - She was started on empiric Augmentin 2/4 at [facility]. Will not continue at this time  - Age adjusted prior D dimer with VTE unlikely in the absence of new symptoms or hypoxia would hold off on formal CT PE study. Continue previous Eliquis for DVT ppx  - remains systemically weak and will likely again require ongoing SNF care at discharge. Currently using WC and walker at times    #Abnormal CT findings of terminal ileum, cecum  #Anemia   No recent concerns of hematochezia, albeit has been having darker stools since started iron supplementation.  -She had a CT in September with imaging findings suspicious for colonic malignancy.   Colonoscopy recommended by GI in September 2020 after abnormal imaging findings and ELBA, weight loss.    Patient reluctant to proceed with further evaluation. Had been referred to Oncology by PCP.   -Plan currently per  patient is to monitor her symptoms and continue to follow with PCP.  -May need palliative involvement if remains with no plan for further work up or treatment  -Hgb 8.9 on admission, recent baseline near 9-10  -she is dehydrated appearing on admission so suspected dilutional drop with rehydration   -repeat hgb with am labs  -monitor for bleeding    #Left Hip pain  Improving.   S/p syncope, fall on 1/28 diagnosed with Left side pain and L iliopsoas muscle hematoma.  Prior imaging negative for fracture. Ambulating PTA with walker and WC use.  -continue multimodal pain control, prn low dose oxy available  -topical therapies with lidoderm patch  -patient would not like scheduled APAP, ordered Prn at her preference    #Detectable Tn I  No angina or ischemic ECG changes. Suspect type II or demand related in acute illness. No further work up at this time.    Chronic Medical Problems:    #Atrial Fibrillation  #Paroxysmal SVT  #SSS s/p PPM   Rate controlled. Appears dehydrated on exam.  - PPM interrogated 1/28, burden remains 100%, ventricular rates controlled. No ventricular arrhythmias detected  - hold PTA MP for now given reported hypotension     #Renal Insufficiency  Baseline Cr appears to be 0.8-1.1.   Prior ramipril, lasix held at discharge from prior hospitalization due to JANES and dehydration.      DVT Prophylaxis: resume prior eliquis for DVT ppx   Code Status: DNR / DNI   Expected discharge: Tomorrow, recommended to back to SNF/[facility]. SW consulted.     Crista Stewart PA-C    Primary Care Physician   Dr. Salas, [facility]    Chief Complaint   Fever, hypoxia, hypotension    History is obtained from the patient, chart review.    History of Present Illness   Deloris Larson is a 89 year old female who presents from skilled nursing facility with staff reports of fever, hypoxia, low blood pressure.    Mrs. Larson was recently discharged from the hospital to SNF (1/30/2021) were she was evaluated for syncope,  noted to be covid positive. Her syncope was thought to be related to dehydration and decrease PO intake secondary to infection, as well as orthostatic hypotension. She was discharged to SNF.    On interview, since discharge from the hospital to the SNF she had ongoing malaise and low appetite. She continued to have episodes of lightheadedness similar to her prior syncopal event but without LOC. These are noticed with activity such as going to the bathroom, and resolved with rest. They come on suddenly and she describes them as attacks. Not associated with vision changes, nausea, vomiting, or headache.    She was seen by the provider at the transitional care facility prior to referral to the ED on 2/5 for concern regarding persistent fever, new hypoxia and hypotension occurring the evening of 2/4. It is unclear whether her blood pressure medications were appropriately held for these concerns.  Ms. Larson reported that she was unaware of these concerns regarding changes in her vital signs and that has been quite fatigued, sleeping most the day. She feels her fevers have been improving but still experiences chills. Notes cough has been non productive. Denies dyspnea, orthopnea, weight gain. No recent falls or new additions to medications. Her lasix and Ramipril remain on hold.   Prior hospitalization, SNF, patient was living independently. She is hopeful to return home as soon as she is able.    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      Prior to Admission Medications   Prior to Admission Medications   Prescriptions Last Dose Informant Patient Reported? Taking?   Iron Carbonyl-Vitamin C-FOS 30-10-25 MG CHEW   Yes No   Sig: Take 125 mg by mouth daily   Probiotic Product (PROBIOTIC PEARLS ADVANTAGE PO)   Yes No   Sig: Take 15 mg by mouth 2 times daily    acetaminophen (TYLENOL) 500 MG tablet   Yes No   Sig: Take 1,000 mg by mouth 3 times daily   alum hydroxide-mag carbonate (GENACTON)  MG/15ML SUSP suspension   Yes No   Sig: Take 15 mLs by mouth 4 times daily as needed for heartburn    amoxicillin-clavulanate (AUGMENTIN) 875-125 MG tablet   No No   Sig: Take 1 tablet by mouth 2 times daily for 5 days   apixaban ANTICOAGULANT (ELIQUIS) 2.5 MG tablet   Yes No   Sig: Take 2.5 mg by mouth 2 times daily   atorvastatin (LIPITOR) 20 MG tablet   Yes No   Sig: Take 20 mg by mouth daily   meclizine (ANTIVERT) 12.5 MG tablet   Yes No   Sig: Take 25 mg by mouth 3 times daily as needed for dizziness   metoprolol succinate ER (TOPROL-XL) 50 MG 24 hr tablet   Yes No   Sig: Take 50 mg by mouth daily   nitroGLYcerin (NITROSTAT) 0.3 MG sublingual tablet   Yes No   Sig: Place 0.3 mg under the tongue every 5 minutes as needed for chest pain For chest pain place 1 tablet under the tongue every 5 minutes for 3 doses. If symptoms persist 5 minutes after 1st dose call 911.   vitamin (B COMPLEX-C) tablet   Yes No   Sig: Take 1 tablet by mouth daily   vitamin D3 (CHOLECALCIFEROL) 1.25 MG (68658 UT) capsule   Yes No   Sig: Take 50,000 Units by mouth every 7 days   zolpidem (AMBIEN) 10 MG tablet   Yes No   Sig: Take 10 mg by mouth nightly as needed for sleep Holding Ambien while at  TCU.       Facility-Administered Medications: None     Allergies   Allergies   Allergen Reactions     Diphenhydramine        Social History   Prior to hospitalization resided independently in home. She is .  Former smoker, she is not smoked for 45 years. She drinks 1 glass of wine each evening.       Family History   Heart disease - Father.  PE, mother and maternal grandmother.     Review of Systems   The 10 point Review of Systems is negative other than noted in the HPI or here.     Physical Exam   Temp: 98.5  F (36.9  C) Temp src: Oral BP: 122/64 Pulse: 81   Resp: 23 SpO2: 98 % O2 Device: Nasal cannula Oxygen Delivery: 1 LPM  Vital Signs with Ranges  Temp:  [97.8  F (36.6  C)-98.5  F (36.9  C)] 98.5  F (36.9  C)  Pulse:  [73-88] 81  Resp:  [13-23] 23  BP: ()/(54-75) 122/64  SpO2:  [91 %-100 %] 98 %  0 lbs 0 oz    Constitutional: Awake, alert,  no apparent distress.  Eyes: Conjunctiva and pupils examined and normal.  HEENT: Dry mucous membranes, normal dentition.  Respiratory: No abnormal work of breathing. Clear to auscultation bilaterally, no crackles or wheezing.  Cardiovascular: Regular rate and irregular rhythm, normal S1 and S2, and no murmur noted.  GI: Soft, non-distended, non-tender, bowel sounds present. No rebound tenderness or guarding.  Lymph/Hematologic: No anterior cervical or supraclavicular adenopathy.  Skin: No rashes, no cyanosis, no edema.  Musculoskeletal: No deformities noted.  No erythema or tenderness. Moving all extremities.   Neurologic: No focal deficits noted. Speech is clear. Coordination and strength grossly normal.   Psychiatric: Appropriate affect.    Data   Data reviewed today:      EKG: atrial fibrillation with occasional V paced complexes. Ventricular rate 86.  Imaging:   Recent Results (from the past 24 hour(s))   XR Chest Port 1 View    Narrative    XR CHEST PORT 1 VW 2/5/2021 11:54 AM    HISTORY: Dyspnea/SOB; covid    COMPARISON: Chest x-ray 02/04/2021    FINDINGS:  Mild interval improvement of the previously seen right upper  lobar airspace disease. Improved aeration within the right lung base  is also noted. The left lung is grossly clear. No definite pleural  effusion. Stable heart size. Dual-lead left-sided cardiac conduction  device.    ROMERO OROPEZA MD       Recent Labs   Lab 02/05/21  1209   WBC 5.3   HGB 8.2*   MCV 95   *      POTASSIUM 3.7   CHLORIDE 105   CO2 24   BUN 17   CR 1.09*   ANIONGAP 7   RAMA 8.4*   GLC 93   ALBUMIN 2.7*   PROTTOTAL 6.3*   BILITOTAL 0.5   ALKPHOS 52   ALT 37   AST 44   TROPI 0.057*       Crista Stewart PA-C on 2/5/2021 at 2:19 PM

## 2021-02-05 NOTE — PROGRESS NOTES
[facility]  [place] Medical Record Number:  [id number]  Place of Service where encounter took place:  [facility] [[id number]]  Chief Complaint   Patient presents with     Nursing Home Acute       HPI:    Deloris Larson  is a 89 year old (3/24/1930), who is being seen today for an episodic care visit.  HPI information obtained from: facility chart records, facility staff, patient report and [facility] chart review.     Past Medical and Surgical History reviewed in [facility] today.    Met with Mrs. Larson urgently this AM due to RN reporting she is requiring new oxygen and SBP 74/40.    Mrs. Larson was recently in hospital due to syncope and fall with Left side pain and L iliopsoas muscle hematoma, was found to be COVID positive.  EMR notes did not require steroids or Remdisivir but did go to [facility] then came to use at the TCU here.     Initially Mrs. Larson did well, afebrile, no respiratory issues, energetic, but in the last 3-4 days she has declined significantly.  Fevers with T max of 102.5 yesterday 2/4.  Last night per the RN she became hypoxic 85% on RA, now 90's on 2L's and now hypotensive 74/40 and 89/54.  Is eating/drinking some.     I did get a UA/UC which was negative, Did get a CXR that noted minimal RLL infiltrate possible; not appreciated on my review, but we did start Augmentin empirically and she has only received 2 doses.     In meeting her today, she endorses she is feeling worse, fatigued, no appetite, light headed/dizzy.  She notes a newer low back pain which she did not note since the fall.  Did have a dark stool yesterday but is on Iron and noted dark stools as common for her.  She reports she does not note SOB/WHITE specifically but still has a cough with some sputum.        MEDICATIONS:  Current Outpatient Medications   Medication Sig Dispense Refill     acetaminophen (TYLENOL) 500 MG tablet Take 1,000 mg by mouth 3 times daily        "alum hydroxide-mag carbonate (GENACTON)  MG/15ML SUSP suspension Take 15 mLs by mouth 4 times daily as needed for heartburn        amoxicillin-clavulanate (AUGMENTIN) 875-125 MG tablet Take 1 tablet by mouth 2 times daily for 5 days 10 tablet 0     apixaban ANTICOAGULANT (ELIQUIS) 2.5 MG tablet Take 2.5 mg by mouth 2 times daily       atorvastatin (LIPITOR) 20 MG tablet Take 20 mg by mouth daily       Iron Carbonyl-Vitamin C-FOS 30-10-25 MG CHEW Take 125 mg by mouth daily       meclizine (ANTIVERT) 12.5 MG tablet Take 25 mg by mouth 3 times daily as needed for dizziness       metoprolol succinate ER (TOPROL-XL) 50 MG 24 hr tablet Take 50 mg by mouth daily       nitroGLYcerin (NITROSTAT) 0.3 MG sublingual tablet Place 0.3 mg under the tongue every 5 minutes as needed for chest pain For chest pain place 1 tablet under the tongue every 5 minutes for 3 doses. If symptoms persist 5 minutes after 1st dose call 911.       Probiotic Product (PROBIOTIC PEARLS ADVANTAGE PO) Take 15 mg by mouth 2 times daily        vitamin (B COMPLEX-C) tablet Take 1 tablet by mouth daily       vitamin D3 (CHOLECALCIFEROL) 1.25 MG (46237 UT) capsule Take 50,000 Units by mouth every 7 days       zolpidem (AMBIEN) 10 MG tablet Take 10 mg by mouth nightly as needed for sleep Holding Ambien while at TCU.        REVIEW OF SYSTEMS:  7 point ROS done including, light headedness/dizziness, fever/chills, pain, Resp, CV, GI, and  and is negative other than noted in HPI.      Objective:  BP (!) 89/54   Pulse 88   Temp 97.8  F (36.6  C)   Resp 21   Ht 1.626 m (5' 4\")   Wt 58 kg (127 lb 13.9 oz)   SpO2 95%   BMI 21.95 kg/m     Repeat BP: 74/40, HR 78    Exam:  EXAM LIMITED DUE TO [facility] social distancing recommendations:  GENERAL APPEARANCE:  Elderly thin female resting in bed.  NAD, non-toxic.  Appears fatigued.   ENT:  Mouth and oropharynx normal, slightly dry, mucous membranes.   RESP:  Lungs diminished more now in RLL, otherwise CTA. "  Regular relaxed breathing effort.  No cough.   CV: 3/6 systolic murmur heard thought out/S2.   Regular rhythm and rate.  No generalized edema.   ABDOMEN:   Flat, soft, non-tender with active bowel sounds.  No guarding, rigidity, or rebound tenderness.  EXTREMITIES:  No lower extremity edema, no calf tenderness.   PSYCH: Alert and orientated, pleasant and cooperative.  Suspect mild memory impairment.  Stable/unchanged.    Labs:   Labs done while at [facility] done by [facility] lab.   I have personally reviewed labs, which are in facility or EMR chart.   I have personally reviewed images, which are in facility or EMR chart.     ASSESSMENT/PLAN:  2019 novel coronavirus disease (COVID-19)  Fever, unspecified fever cause  Acute respiratory failure with hypoxia (H)  Pneumonia of right lower lobe due to infectious organism  Hypotension, unspecified hypotension type  Light headed  Other fatigue  COVID positive 1/28.      Mrs. Larson was recently in hospital due to syncope and fall with Left side pain and L iliopsoas muscle hematoma, was found to be COVID positive.  EMR notes did not require steroids or Remdisivir but did go to [facility] then came to use at the TCU here on 1/31.     Initially Mrs. Larson did well, afebrile, no respiratory issues, energetic, but in the last 24-48 hours she has declined significantly.  Fevers with T max of 102.5 yesterday 2/4.  Last night per the RN she became hypoxic 85% on RA, now 90's on 2L's and now hypotensive 74/40 and 89/54.  Is eating/drinking some.     We are empirically treating a possible RLL pneumonia with Augmentin, has received two doses, but this is a soft call.   UA/UC negative.     Other concerns:  Newer low back pain in setting of recent syncope fall.   RN's report a large dark stool yesterday, soft.    Unclear if this is significant or not.   Unclear if this is just all COVID or if there is another etiology.     Due to worsening COVID symptoms with  hypoxia, fatigue and now hypotension and ongoing fevers, query if she would benefit from Remdesivir and possible steroids now.   She is on PTA Apixaban.     Plan:  -Send to [facility] for eval and treat.      Syncope, unspecified syncope type  Essential hypertension  Chronic atrial fibrillation (H)  Cardiac pacemaker in situ  Aortic valve stenosis, etiology of cardiac valve disease unspecified, mild  Initial hospital was due to syncope and fall.   Was found to have mild aortic stenosis.    Has been normotensive until last 24 hours.   -Plan as noted above.     SHARON (generalized anxiety disorder)  Insomnia, unspecified type  Doing well.   -We have been holding her PTA Ambien and her newly started Doxepin.     Debility  -Was working with PT/OT.     Orders written by provider at facility  -Sent to [facility] for eval and treat.     High MDM, unstable acute illness requiring urgent intervention.     Electronically signed by:  NIVIA Grimaldo CNP

## 2021-02-05 NOTE — ED TRIAGE NOTES
Patient tested positive for Covid on January 28th. Per EMS nursing staff state that last night patient was hypoxic and hypotensive. EMS state that patient has has been vitally stable. Patient has no complaints. ABC intact without need for intervention at this time.

## 2021-02-06 VITALS
RESPIRATION RATE: 14 BRPM | BODY MASS INDEX: 21.51 KG/M2 | HEART RATE: 90 BPM | TEMPERATURE: 99.2 F | DIASTOLIC BLOOD PRESSURE: 58 MMHG | SYSTOLIC BLOOD PRESSURE: 101 MMHG | WEIGHT: 126 LBS | HEIGHT: 64 IN | OXYGEN SATURATION: 92 %

## 2021-02-06 LAB
ANION GAP SERPL CALCULATED.3IONS-SCNC: 7 MMOL/L (ref 3–14)
BUN SERPL-MCNC: 18 MG/DL (ref 7–30)
CALCIUM SERPL-MCNC: 8 MG/DL (ref 8.5–10.1)
CHLORIDE SERPL-SCNC: 105 MMOL/L (ref 94–109)
CO2 SERPL-SCNC: 26 MMOL/L (ref 20–32)
CREAT SERPL-MCNC: 0.96 MG/DL (ref 0.52–1.04)
ERYTHROCYTE [DISTWIDTH] IN BLOOD BY AUTOMATED COUNT: 13.7 % (ref 10–15)
GFR SERPL CREATININE-BSD FRML MDRD: 52 ML/MIN/{1.73_M2}
GLUCOSE SERPL-MCNC: 78 MG/DL (ref 70–99)
HCT VFR BLD AUTO: 26.7 % (ref 35–47)
HGB BLD-MCNC: 8.4 G/DL (ref 11.7–15.7)
MCH RBC QN AUTO: 30 PG (ref 26.5–33)
MCHC RBC AUTO-ENTMCNC: 31.5 G/DL (ref 31.5–36.5)
MCV RBC AUTO: 95 FL (ref 78–100)
PLATELET # BLD AUTO: 138 10E9/L (ref 150–450)
POTASSIUM SERPL-SCNC: 3.7 MMOL/L (ref 3.4–5.3)
RBC # BLD AUTO: 2.8 10E12/L (ref 3.8–5.2)
SODIUM SERPL-SCNC: 138 MMOL/L (ref 133–144)
WBC # BLD AUTO: 4.9 10E9/L (ref 4–11)

## 2021-02-06 PROCEDURE — 99217 PR OBSERVATION CARE DISCHARGE: CPT | Performed by: PHYSICIAN ASSISTANT

## 2021-02-06 PROCEDURE — 36415 COLL VENOUS BLD VENIPUNCTURE: CPT | Performed by: PHYSICIAN ASSISTANT

## 2021-02-06 PROCEDURE — G0378 HOSPITAL OBSERVATION PER HR: HCPCS

## 2021-02-06 PROCEDURE — 80048 BASIC METABOLIC PNL TOTAL CA: CPT | Performed by: PHYSICIAN ASSISTANT

## 2021-02-06 PROCEDURE — 85027 COMPLETE CBC AUTOMATED: CPT | Performed by: PHYSICIAN ASSISTANT

## 2021-02-06 PROCEDURE — 250N000013 HC RX MED GY IP 250 OP 250 PS 637: Performed by: PHYSICIAN ASSISTANT

## 2021-02-06 RX ORDER — GUAIFENESIN/DEXTROMETHORPHAN 100-10MG/5
5 SYRUP ORAL EVERY 4 HOURS PRN
DISCHARGE
Start: 2021-02-06 | End: 2021-10-08

## 2021-02-06 RX ADMIN — ACETAMINOPHEN 650 MG: 325 TABLET, FILM COATED ORAL at 09:18

## 2021-02-06 NOTE — PROGRESS NOTES
"Patient's After Visit Summary was reviewed with patient.   Patient verbalized understanding of After Visit Summary, recommended follow up and was given an opportunity to ask questions.   Discharge medications sent home with patient/family: YES, No, All orders sent to TCU   Discharged with HE via stretcher. Patient medically cleared to discharge. All belongings sent with patient. HE will transport patient back to the TCU.   /58 (BP Location: Left arm)   Pulse 90   Temp 99.2  F (37.3  C) (Oral)   Resp 14   Ht 1.626 m (5' 4\")   Wt 57.2 kg (126 lb)   SpO2 92%   BMI 21.63 kg/m    OBSERVATION patient END time: 1315       "

## 2021-02-06 NOTE — DISCHARGE SUMMARY
[facility] Outpatient / Observation Unit  Discharge Summary        Deloris Larson MRN# [id number]   YOB: 1930 Age: 89 year old     Date of Admission:  2/5/2021  Date of Discharge:  2/6/2021  Admitting Physician:  Michael Adame MD  Discharge Physician: Lyndsey Ng PA-C, MAKSIM  Discharging Service: Hospitalist      Primary Provider: Kassidy Salas  Primary Care Physician Phone Number: [phone]         Primary Discharge Diagnoses:    Deloris Larson is a 89 year old female with PMHx of atrial fibrillation, SSS s/p PPM, paroxysmal SVT, hyperlipidemia, hypertension, PAD, CAD s/p RIC, lumbar spondylosis, recent admission at [facility] for syncopal event found to test positive for COVID 19 infection on 1/28/21, who was brought to [facility] ED for evaluation from [facility]) regarding concerns for decompensated vital signs. In the ED patient presented vitally stable, no hypoxia. Lab work was notable for renal insufficiency, VBG on RA with pH 7.45/pCO2 38. CBC with anemia, thrombocytopenia. Tn I 0.057. CXR noted right upper lobar airspace disease, seen on prior but noted to be mildly improved. Procalcitonin was 0.13. Blood cultures were drawn and have remained negative thus far. IV steroids and antivirals not indicated this admission. The patient was given gentle IVFs overnight with improvement of her symptoms. Orthostatic hypotension was resolved by time of discharge and patient was able to maintain her oxygen saturation on RA.      Active Hospital Problems      #Presyncopal Episodes  Patient reports ongoing presyncopal episodes with activity on further interview. No associated CP or neurologic symptoms. Evaluated at [facility] for syncopal episode 1/28 thought to be related to dehydration, orthostatic hypotension due to her current COVID-19 infection. Symptomatically improved with IVF this admission and improvement her orthostatic vitals.   -patient encouraged to  continue PO hydration while recovering from her COVID infection.      #COVID-19 infection  #Viral Pneumonia secondary to COVID-19 infection  Presented from SNF with reports of fever, hypoxia, hypotension. No dyspnea or hypoxia on admission, slight cough.   Per documentation intially tested positive via screening test 1/28 during admission at [facility] for syncope, fall.   Initial chest imaging - X-ray- showed minimal RLL infiltrate. CT chest/ab/pelvis without contrast (1/28) revealed pul HTN, bronchiectasis, bronchial wall thickening, and mucous plugging.  D dimer 0.53 on prior admission on 1/29. No PE study pursued.   There is not evidence of concurrent bacterial superinfection. No evidence of bacterial superinfection, ARDS, or myocarditis. Augmentin discontinued. Antivirals and steroids not currently indicated. Continue to encourage IS, and continue Mucinex to help clear secretions.   - Age adjusted prior D dimer with VTE unlikely in the absence of new symptoms or hypoxia would hold off on formal CT PE study. Continue previous Eliquis for DVT ppx  - remains systemically weak and will benefit from continued therapy at TCU prior to discharge back home (lives in a house with her son)    #Abnormal CT findings of terminal ileum, cecum  #Anemia   -She had a CT in September with imaging findings concerning for possible for colonic malignancy.   Colonoscopy recommended by GI in September 2020 after abnormal imaging findings and ELBA, weight loss. Patient also saw Oncology who recommended c-scope but patient ultimately declined.   Repeat abdominal CT on 1/28/21 at [facility] did not report any continued abnormality noted at the TI/cecum. Possible it was artifact or inflammation noted in September.   - rec patient follow up with PCP to discuss and consider repeat CT as needed     #Left Hip pain  Improving.   S/p syncope, fall on 1/28 diagnosed with Left side pain and L iliopsoas muscle hematoma.  Prior imaging  negative for fracture. Ambulating PTA with walker and WC use.  -continue multimodal pain control, prn low dose oxy available     #Detectable Tn I  No angina or ischemic ECG changes. Suspect type II or demand related in acute illness. No further work up at this time.    Chronic Medical Problems:     #Atrial Fibrillation  #Paroxysmal SVT  #SSS s/p PPM   Rate controlled. Appears dehydrated on exam.  - PPM interrogated 1/28, burden remains 100%, ventricular rates controlled. No ventricular arrhythmias detected  - hold PTA MP for now given reported hypotension      #Renal Insufficiency  Baseline Cr appears to be 0.8-1.1.   Prior ramipril, lasix held at discharge from prior hospitalization due to JANES and dehydration.        Secondary Discharge Diagnoses:   No past medical history on file.           Code Status:      DNR / DNI        Brief Hospital Summary:        Reason for your hospital stay      You were registered to the observation unit for hypoxia and hypotension   due to current COVID-19 infection. You were monitored overnight and given   IV fluids with improvement of your symptoms. By time of discharge your   oxygen saturation remained >90% on RA and you were clinically starting to   feel improvement. You were deemed safe for return back to your TCU.         Please refer to initial admission history and physical for further details.   Briefly, Deloris Larson was admitted on 2/5/2021 for concerns of worsening COVID-19 symptoms with hypoxia and hypotension noted at her TCU. Pt was registered to the Observation Unit for continued supportive therapy.    Pt was resuscitated with IV fluids and continued on supportive measures including anti-emetics and pain control as needed. Labs were reviewed and significant results addressed.  On the day of discharge, symptoms were resolving and pt was able to tolerate PO intake. Vitals were stable, without evidence of orthostasis. Medications were reviewed and adjustments made as  necessary. Pt is instructed to follow up as below.            Significant Lab During Hospitalization:      Recent Labs   Lab 02/05/21  1209   PHV 7.45*   PO2V 31   PCO2V 38*   HCO3V 26     Recent Labs   Lab 02/06/21  0702 02/05/21  1209   WBC 4.9 5.3   HGB 8.4* 8.2*   HCT 26.7* 25.7*   MCV 95 95   * 134*          Lab Results   Component Value Date     02/06/2021     02/05/2021    Lab Results   Component Value Date    CHLORIDE 105 02/06/2021    CHLORIDE 105 02/05/2021    Lab Results   Component Value Date    BUN 18 02/06/2021    BUN 17 02/05/2021      Lab Results   Component Value Date    POTASSIUM 3.7 02/06/2021    POTASSIUM 3.7 02/05/2021    Lab Results   Component Value Date    CO2 26 02/06/2021    CO2 24 02/05/2021    Lab Results   Component Value Date    CR 0.96 02/06/2021    CR 1.09 02/05/2021        Recent Labs   Lab 02/05/21  1254 02/05/21  1206   CULT No growth after 14 hours No growth after 14 hours     Recent Labs   Lab 02/06/21  0702 02/05/21  1209    136   POTASSIUM 3.7 3.7   CHLORIDE 105 105   CO2 26 24   ANIONGAP 7 7   GLC 78 93   BUN 18 17   CR 0.96 1.09*   GFRESTIMATED 52* 44*   GFRESTBLACK 60* 51*   RAMA 8.0* 8.4*   MAG  --  2.1   PROTTOTAL  --  6.3*   ALBUMIN  --  2.7*   BILITOTAL  --  0.5   ALKPHOS  --  52   AST  --  44   ALT  --  37     No results for input(s): NTBNPI, NTBNP in the last 168 hours.  No results for input(s): DD in the last 168 hours.  No results for input(s): SED, CRP in the last 168 hours.  No results for input(s): INR in the last 168 hours.  Recent Labs   Lab 02/05/21  1209   LACT 0.9     No results for input(s): TSH in the last 168 hours.  Recent Labs   Lab 02/05/21  1209   TROPI 0.057*     No results for input(s): COLOR, APPEARANCE, URINEGLC, URINEBILI, URINEKETONE, SG, UBLD, URINEPH, PROTEIN, UROBILINOGEN, NITRITE, LEUKEST, RBCU, WBCU in the last 168 hours.             Significant Imaging During Hospitalization:      Recent Results (from the past 48  hour(s))   XR Chest Port 1 View    Narrative    XR CHEST PORT 1 VW 2/5/2021 11:54 AM    HISTORY: Dyspnea/SOB; covid    COMPARISON: Chest x-ray 02/04/2021    FINDINGS: Mild interval improvement of the previously seen right upper  lobar airspace disease. Improved aeration within the right lung base  is also noted. The left lung is grossly clear. No definite pleural  effusion. Stable heart size. Dual-lead left-sided cardiac conduction  device.    ROMERO OROPEZA MD              Pending Results:        Unresulted Labs Ordered in the Past 30 Days of this Admission     Date and Time Order Name Status Description    2/5/2021 1300 Blood culture ONE site Preliminary     2/5/2021 1120 Blood culture Preliminary               Consultations This Hospital Stay:      No consultations were requested during this admission         Discharge Instructions and Follow-Up:      Follow-up Appointments     Follow Up and recommended labs and tests      Follow up with PCP within 1-2 weeks           Pt instructed to follow up with PCP in 7 days and with Consultant if indicated.         Discharge Disposition:      Discharged to home         Discharge Medications:        Current Discharge Medication List      START taking these medications    Details   guaiFENesin-dextromethorphan (ROBITUSSIN DM) 100-10 MG/5ML syrup Take 5 mLs by mouth every 4 hours as needed for cough  Qty:      Associated Diagnoses: 2019 novel coronavirus disease (COVID-19)         CONTINUE these medications which have NOT CHANGED    Details   acetaminophen (TYLENOL) 500 MG tablet Take 1,000 mg by mouth 3 times daily      alum hydroxide-mag carbonate (GENACTON)  MG/15ML SUSP suspension Take 30 mLs by mouth 4 times daily as needed for heartburn       apixaban ANTICOAGULANT (ELIQUIS) 2.5 MG tablet Take 2.5 mg by mouth 2 times daily      atorvastatin (LIPITOR) 20 MG tablet Take 20 mg by mouth daily      cholecalciferol (VITAMIN D3) 125 mcg (5000 units) capsule Take 125  "mcg by mouth four times a week Mon, Wed, Fri, Sat      ferrous fumarate 65 mg, [place]. FE,-Vitamin C 125 mg (VITRON C)  MG TABS tablet Take 1 tablet by mouth daily      meclizine 25 MG CHEW Take 25 mg by mouth 3 times daily as needed for dizziness      metoprolol succinate ER (TOPROL-XL) 50 MG 24 hr tablet Take 50 mg by mouth daily      nitroGLYcerin (NITROSTAT) 0.3 MG sublingual tablet Place 0.3 mg under the tongue every 5 minutes as needed for chest pain For chest pain place 1 tablet under the tongue every 5 minutes for 3 doses. If symptoms persist 5 minutes after 1st dose call 911.      Probiotic Product (PROBIOTIC ACIDOPHILUS BEADS) CAPS Take 1 capsule by mouth 2 times daily      vitamin (B COMPLEX-C) tablet Take 1 tablet by mouth daily      zolpidem (AMBIEN) 10 MG tablet Take 10 mg by mouth nightly as needed for sleep Holding Ambien while at TCU.          STOP taking these medications       amoxicillin-clavulanate (AUGMENTIN) 875-125 MG tablet Comments:   Reason for Stopping:                   Allergies:         Allergies   Allergen Reactions     Diphenhydramine            Condition and Physical on Discharge:      Discharge condition: Stable   Vitals: Blood pressure 135/72, pulse 87, temperature 100.9  F (38.3  C), temperature source Oral, resp. rate 18, height 1.626 m (5' 4\"), weight 57.2 kg (126 lb), SpO2 93 %.  126 lbs 0 oz      GENERAL:  Comfortable.  PSYCH: pleasant, oriented, No acute distress.  HEENT:  PERRLA. Normal conjunctiva, normal hearing, nasal mucosa and Oropharynx are normal.  NECK:  Supple, no neck vein distention, adenopathy or bruits, normal thyroid.  HEART:  Normal S1, S2 with no murmur, no pericardial rub, gallops or S3 or S4.  LUNGS:  Clear to auscultation, normal Respiratory effort. No wheezing, rales or ronchi.  ABDOMEN:  Soft, no hepatosplenomegaly, normal bowel sounds. Non-tender, non distended.   EXTREMITIES:  No pedal edema, +2 pulses bilateral and equal.  SKIN:  Dry to touch, No " rash, wound or ulcerations.  NEUROLOGIC:  CN 2-12 intact, BL 5/5 symmetric upper and lower extremity strength, sensation is intact with no focal deficits.

## 2021-02-06 NOTE — PLAN OF CARE
"PRIMARY DIAGNOSIS: SYNCOPE/COVID 19  OUTPATIENT/OBSERVATION GOALS TO BE MET BEFORE DISCHARGE:  1. Orthostatic performed: Yes:          Lying Orthostatic BP: 140/73         Sitting Orthostatic BP: 153/83         Standing Orthostatic BP: 118/63     2. Diagnostic testing complete & at baseline neurologic testing: Yes    3. Cleared by consultants (if involved): N/A    4. Tolerating adequate PO diet and medications: Yes    5. Return to near baseline physical activity or neurologic status: Yes    BP (!) 142/68 (BP Location: Left arm)   Pulse 69   Temp 99  F (37.2  C) (Oral)   Resp 20   Ht 1.626 m (5' 4\")   Wt 57.2 kg (126 lb)   SpO2 98%   BMI 21.63 kg/m      Discharge Planner Nurse   Safe discharge environment identified: Yes  Barriers to discharge: Yes       Entered by: Shwetha Kennedy 02/06/2021 6:42 AM     Please review provider order for any additional goals.   Nurse to notify provider when observation goals have been met and patient is ready for discharge.    Patient alert and oriented x4. Minimal pain in lower back and bilateral hips, scheduled lidocaine patches x3 - patient states patches helped pain significantly and would like to continue using them upon discharge. Unable to wean to room air - 1 L O2 via NC on and off throughout night; no reports of shortness of breath. Denies lightheadedness, dizziness, or chest pain. Up with assist of 1, gait belt, and walker. Tolerating regular diet. IV SL, 1 bag LR infused. T max 101.4 F, PRN tylenol administered with results. Purewick in place overnight d/t patient's orthostatic hypotension. SW consulted. Will continue with plan of care.   "

## 2021-02-06 NOTE — CONSULTS
Patient comes from [facility] TCU. SW called to confirm bed hold. [facility] and arranged for [facility] stretcher ride at 1pm. SW filled out PCA form and gave to floor. SW updated TCU and bedside nursing.             TERRIE Pascual, [facility]  [phone]  [address]   [facility]. [id number]  [facility]

## 2021-02-06 NOTE — PROVIDER NOTIFICATION
MARIE Logan, notified of patient's orthostatic blood pressure results. No new orders at this time.

## 2021-02-06 NOTE — PHARMACY-ADMISSION MEDICATION HISTORY
Admission medication history interview status for this patient is complete. See [facility] admission navigator for allergy information, prior to admission medications and immunization status.     Medication history interview done via telephone during Covid-19 pandemic, indicate source(s): paper MAR from [facility]    Changes made to PTA medication list:  Added: none  Deleted: none  Changed: Vitamin D, Meclizine prn formulation, Probiotic formulation    Prior to Admission medications    Medication Sig Last Dose Taking? Auth Provider   acetaminophen (TYLENOL) 500 MG tablet Take 1,000 mg by mouth 3 times daily 2/5/2021 at am Yes Reported, Patient   alum hydroxide-mag carbonate (GENACTON)  MG/15ML SUSP suspension Take 30 mLs by mouth 4 times daily as needed for heartburn   Yes Reported, Patient   amoxicillin-clavulanate (AUGMENTIN) 875-125 MG tablet Take 1 tablet by mouth 2 times daily for 5 days 2/5/2021 at am Yes Roberto Carlos Yeboah, APRN CNP   apixaban ANTICOAGULANT (ELIQUIS) 2.5 MG tablet Take 2.5 mg by mouth 2 times daily 2/5/2021 at am Yes Reported, Patient   atorvastatin (LIPITOR) 20 MG tablet Take 20 mg by mouth daily 2/5/2021 at am Yes Reported, Patient   cholecalciferol (VITAMIN D3) 125 mcg (5000 units) capsule Take 125 mcg by mouth four times a week Mon, Wed, Fri, Sat 2/5/2021 at am Yes Unknown, Entered By History   ferrous fumarate 65 mg, [place]. FE,-Vitamin C 125 mg (VITRON C)  MG TABS tablet Take 1 tablet by mouth daily 2/5/2021 at am Yes Unknown, Entered By History   meclizine 25 MG CHEW Take 25 mg by mouth 3 times daily as needed for dizziness  Yes Unknown, Entered By History   metoprolol succinate ER (TOPROL-XL) 50 MG 24 hr tablet Take 50 mg by mouth daily 2/5/2021 at am Yes Reported, Patient   nitroGLYcerin (NITROSTAT) 0.3 MG sublingual tablet Place 0.3 mg under the tongue every 5 minutes as needed for chest pain For chest pain place 1 tablet under the tongue every 5  minutes for 3 doses. If symptoms persist 5 minutes after 1st dose call 911.  Yes Reported, Patient   Probiotic Product (PROBIOTIC ACIDOPHILUS BEADS) CAPS Take 1 capsule by mouth 2 times daily 2/5/2021 at am Yes Unknown, Entered By History   vitamin (B COMPLEX-C) tablet Take 1 tablet by mouth daily 2/5/2021 at am Yes Reported, Patient   zolpidem (AMBIEN) 10 MG tablet Take 10 mg by mouth nightly as needed for sleep Holding Ambien while at [facility].   Yes Reported, Patient

## 2021-02-06 NOTE — PROGRESS NOTES
"Patient is alert and oriented x4. Patient did have a temp this morning (Tylenol given) Temp at discharge was 99.2.  New set of orthostatics obtained and are now negative. Lung sounds clear in all lobes and patient is on RA. Patient does have a wet productive cough. Denies SOB. Active bowel sounds with LBM yesterday.  Denies any pain, urgency, and frequency when voiding. SL. SBA with a walker and gait belt when up. Patient tolerating diet. Patient complaining of some (L) thigh/hip pain and was being treated with Tylenol and Lidocaine patches. Plan: Discharge back to TCU.     /58 (BP Location: Left arm)   Pulse 90   Temp 99.2  F (37.3  C) (Oral)   Resp 14   Ht 1.626 m (5' 4\")   Wt 57.2 kg (126 lb)   SpO2 92%   BMI 21.63 kg/m      "

## 2021-02-06 NOTE — PLAN OF CARE
"PRIMARY DIAGNOSIS: SYNCOPE/COVID 19  OUTPATIENT/OBSERVATION GOALS TO BE MET BEFORE DISCHARGE:  1. Orthostatic performed: Yes:          Lying Orthostatic BP: 140/73         Sitting Orthostatic BP: 153/83         Standing Orthostatic BP: 118/63     2. Diagnostic testing complete & at baseline neurologic testing: Yes    3. Cleared by consultants (if involved): N/A    4. Tolerating adequate PO diet and medications: Yes    5. Return to near baseline physical activity or neurologic status: Yes    BP (!) 154/87 (BP Location: Left arm)   Pulse 92   Temp 101.4  F (38.6  C) (Oral)   Resp 18   Ht 1.626 m (5' 4\")   Wt 57.2 kg (126 lb)   SpO2 93%   BMI 21.63 kg/m      Discharge Planner Nurse   Safe discharge environment identified: Yes  Barriers to discharge: Yes       Entered by: Shwetha Kennedy 02/05/2021 8:20 PM     Please review provider order for any additional goals.   Nurse to notify provider when observation goals have been met and patient is ready for discharge.    Patient alert and oriented x4. Minimal pain in lower back and bilateral hips, lidocaine patches x3 in place. Weaned to room air, no reports of shortness of breath. Denies lightheadedness, dizziness, or chest pain. Up with assist of 1, gait belt, and walker. Tolerating regular diet. IVF infusing at 100 mL/hr. T max 101.4 F, PRN tylenol administered. SW consulted. Will continue with plan of care.   "

## 2021-02-06 NOTE — PLAN OF CARE
"PRIMARY DIAGNOSIS: SYNCOPE/COVID 19  OUTPATIENT/OBSERVATION GOALS TO BE MET BEFORE DISCHARGE:  1. Orthostatic performed: Yes:          Lying Orthostatic BP: 140/73         Sitting Orthostatic BP: 153/83         Standing Orthostatic BP: 118/63     2. Diagnostic testing complete & at baseline neurologic testing: Yes    3. Cleared by consultants (if involved): N/A    4. Tolerating adequate PO diet and medications: Yes    5. Return to near baseline physical activity or neurologic status: Yes    /56 (BP Location: Left arm)   Pulse 79   Temp 97.5  F (36.4  C) (Oral)   Resp 18   Ht 1.626 m (5' 4\")   Wt 57.2 kg (126 lb)   SpO2 96%   BMI 21.63 kg/m      Discharge Planner Nurse   Safe discharge environment identified: Yes  Barriers to discharge: Yes       Entered by: Shwetha Kennedy 02/06/2021 12:39 AM     Please review provider order for any additional goals.   Nurse to notify provider when observation goals have been met and patient is ready for discharge.    Patient alert and oriented x4. Minimal pain in lower back and bilateral hips, lidocaine patches x3 in place. Unable to wean to room air - 1 L O2 via NC; no reports of shortness of breath. Denies lightheadedness, dizziness, or chest pain. Up with assist of 1, gait belt, and walker. Tolerating regular diet. IVF infusing at 100 mL/hr. T max 101.4 F, PRN tylenol administered and decreased to 99.4. SW consulted. Will continue with plan of care.   "

## 2021-02-08 ENCOUNTER — RECORDS - HEALTHEAST (OUTPATIENT)
Dept: LAB | Facility: CLINIC | Age: 86
End: 2021-02-08

## 2021-02-08 ENCOUNTER — NURSING HOME VISIT (OUTPATIENT)
Dept: GERIATRICS | Facility: CLINIC | Age: 86
End: 2021-02-08
Payer: MEDICARE

## 2021-02-08 ENCOUNTER — COMMUNICATION - HEALTHEAST (OUTPATIENT)
Dept: CARE COORDINATION | Facility: CLINIC | Age: 86
End: 2021-02-08

## 2021-02-08 VITALS
SYSTOLIC BLOOD PRESSURE: 112 MMHG | RESPIRATION RATE: 18 BRPM | HEART RATE: 72 BPM | DIASTOLIC BLOOD PRESSURE: 70 MMHG | OXYGEN SATURATION: 98 % | WEIGHT: 126 LBS | HEIGHT: 64 IN | BODY MASS INDEX: 21.51 KG/M2 | TEMPERATURE: 98.1 F

## 2021-02-08 DIAGNOSIS — I35.0 AORTIC VALVE STENOSIS, ETIOLOGY OF CARDIAC VALVE DISEASE UNSPECIFIED: ICD-10-CM

## 2021-02-08 DIAGNOSIS — U07.1 2019 NOVEL CORONAVIRUS DISEASE (COVID-19): Primary | ICD-10-CM

## 2021-02-08 DIAGNOSIS — J12.9 VIRAL PNEUMONIA: ICD-10-CM

## 2021-02-08 DIAGNOSIS — I95.1 ORTHOSTATIC HYPOTENSION: ICD-10-CM

## 2021-02-08 DIAGNOSIS — K52.9 CHRONIC DIARRHEA: ICD-10-CM

## 2021-02-08 DIAGNOSIS — Z95.0 CARDIAC PACEMAKER IN SITU: ICD-10-CM

## 2021-02-08 DIAGNOSIS — R53.81 DEBILITY: ICD-10-CM

## 2021-02-08 DIAGNOSIS — R50.9 FEVER, UNSPECIFIED FEVER CAUSE: ICD-10-CM

## 2021-02-08 DIAGNOSIS — I10 ESSENTIAL HYPERTENSION: ICD-10-CM

## 2021-02-08 DIAGNOSIS — I24.89 DEMAND ISCHEMIA (H): ICD-10-CM

## 2021-02-08 DIAGNOSIS — I48.20 CHRONIC ATRIAL FIBRILLATION (H): ICD-10-CM

## 2021-02-08 DIAGNOSIS — J96.01 ACUTE RESPIRATORY FAILURE WITH HYPOXIA (H): ICD-10-CM

## 2021-02-08 DIAGNOSIS — E86.0 DEHYDRATION: ICD-10-CM

## 2021-02-08 LAB — INTERPRETATION ECG - MUSE: NORMAL

## 2021-02-08 PROCEDURE — 99309 SBSQ NF CARE MODERATE MDM 30: CPT | Performed by: NURSE PRACTITIONER

## 2021-02-08 ASSESSMENT — MIFFLIN-ST. JEOR: SCORE: 976.53

## 2021-02-08 NOTE — PROGRESS NOTES
[facility]  [place] Medical Record Number:  [id number]  Place of Service where encounter took place:  [facility] ([facility]) [[id number]]  Chief Complaint   Patient presents with     Nursing Home Acute     HPI:    Deloris Larson  is a 89 year old (3/24/1930), who is being seen today for an episodic care visit.  HPI information obtained from: facility chart records, facility staff, patient report and [facility] chart review.     Past Medical and Surgical History reviewed in [facility] today.    Mrs. Larson was admitted last week due to being found to have COVID on 1/28 at [facility], could not stay there, thus came to our TCU for management.  Initially she was asymptomatic, but then she developed fevers, hypoxia and lethargy and PO intake declined.  She became lightheaded, hypotensive and started requiring Oxygen.  Thus she was sent out to the ER for eval and treat and consideration of steroids/Remdesivir.      In hospital they noted she was dehydrated, given IVF's; they noted increased Troponin, demand ischemia.  They noted the RLL pneumonia was likely viral, dc'd the Augmentin.  Did not get steroids/Remdesivir.  Due to improvement, she now returns to the TCU for ongoing cares and PT/OT.     In meeting Mrs. Larson she reports she feels better, and she looks clinically better than when she left, but does not look as good as she did on initial TCU admission.  She reports she still feels tired, still mild intermittent light headedness, she is not sure if she is SOB or not, does endorse ongoing productive cough.  No other complaints.     MEDICATIONS:  Current Outpatient Medications   Medication Sig Dispense Refill     acetaminophen (TYLENOL) 500 MG tablet Take 1,000 mg by mouth 3 times daily       alum hydroxide-mag carbonate (GENACTON)  MG/15ML SUSP suspension Take 30 mLs by mouth 4 times daily as needed for heartburn        apixaban ANTICOAGULANT (ELIQUIS) 2.5 MG tablet Take 2.5  "mg by mouth 2 times daily       atorvastatin (LIPITOR) 20 MG tablet Take 20 mg by mouth daily       cholecalciferol (VITAMIN D3) 125 mcg (5000 units) capsule Take 125 mcg by mouth four times a week Mon, Wed, Fri, Sat       ferrous fumarate 65 mg, [place]. FE,-Vitamin C 125 mg (VITRON C)  MG TABS tablet Take 1 tablet by mouth daily       guaiFENesin-dextromethorphan (ROBITUSSIN DM) 100-10 MG/5ML syrup Take 5 mLs by mouth every 4 hours as needed for cough       meclizine 25 MG CHEW Take 25 mg by mouth 3 times daily as needed for dizziness       metoprolol succinate ER (TOPROL-XL) 50 MG 24 hr tablet Take 25 mg by mouth daily       nitroGLYcerin (NITROSTAT) 0.3 MG sublingual tablet Place 0.3 mg under the tongue every 5 minutes as needed for chest pain For chest pain place 1 tablet under the tongue every 5 minutes for 3 doses. If symptoms persist 5 minutes after 1st dose call 911.       Probiotic Product (PROBIOTIC ACIDOPHILUS BEADS) CAPS Take 1 capsule by mouth 2 times daily       vitamin (B COMPLEX-C) tablet Take 1 tablet by mouth daily       zolpidem (AMBIEN) 10 MG tablet Take 10 mg by mouth nightly as needed for sleep Holding Ambien while at U.        REVIEW OF SYSTEMS:  7 point ROS done including, light headedness/dizziness, fever/chills, pain, Resp, CV, GI, and  and is negative other than noted in HPI.      Objective:  /70   Pulse 72   Temp 98.1  F (36.7  C)   Resp 18   Ht 1.626 m (5' 4\")   Wt 57.2 kg (126 lb)   SpO2 98%   BMI 21.63 kg/m       Exam:  EXAM LIMITED DUE TO [facility] social distancing recommendations:  GENERAL APPEARANCE:  Elderly thin female resting in bed.  NAD, non-toxic.  Appears fatigued.   ENT:  Mouth and oropharynx normal, slightly dry, mucous membranes.   RESP:  Lungs diminished in RLL, otherwise CTA.  Regular relaxed breathing effort.  No cough.   CV: 3/6 systolic murmur heard thought out/S2.   Regular rhythm and rate.  No generalized edema.    ABDOMEN:   Flat, soft, non-tender " with active bowel sounds.  No guarding, rigidity, or rebound tenderness.  EXTREMITIES:  No lower extremity edema, no calf tenderness.   PSYCH: Alert and orientated, pleasant and cooperative.  Suspect mild memory impairment.  Stable/unchanged.    Labs:   I have personally reviewed labs, which are in facility or EMR chart.     ASSESSMENT/PLAN:  2019 novel coronavirus disease (COVID-19)  Fever, unspecified fever cause  Acute respiratory failure with hypoxia (H)  Viral pneumonia  Dehydration  Demand ischemia (H)  Mrs. Larson came to us from [place] with COVID, initially was asymptomatic; started out asymptomatic, but then after a few days developed fevers, hypotension, lethargy and hypoxemia.  Sent to hospital and now returned, they gave IVF's, no steroids, Remdesivir.  They noted she did well on RA but when she returned to TCU on 2/6 they noted SaO2 of 82% on RA?  Thus she was started on 2L NC.  She does not know if she had/has SOB issues, but does note ongoing productive cough.  Does have RLL diminished lungs, hospital noted viral pneumonia in setting of COVID, started Robitussin DM.    -Oxygen 1-2 L NC to keep SaO2 >90%.   -Wean off if able.   -Continues Robitussin DM PRN.   -Continues on PTA Apixaban.     Essential hypertension  Chronic atrial fibrillation (H)  Cardiac pacemaker in situ  Aortic valve stenosis, etiology of cardiac valve disease unspecified, mild  Noted SBP's are still soft, she did have increased troponin in hospital; does have newer aortic stenosis, mild.  We have been holding her PTA Furosemide and Ramipril.  SBP's still ranging , HR 70-90.   -Continue to hold PTA Furosemide and Ramipril.   -Will lower Toprol down to 25 mg's q daily.   -Continues BID Apixaban.   -Continues Statin.      Chronic diarrhea  Reports no changes.   -No changes, continue to clinically monitor.     Debility  -Continues with PT/OT.     Orders written by provider at facility  -As noted above.     Electronically signed  by:  NIVIA Grimaldo CNP

## 2021-02-08 NOTE — LETTER
2/8/2021        RE: Deloris Larson  [address] [place] [address]        [facility]  [place] Medical Record Number:  [id number]  Place of Service where encounter took place:  [facility] [[id number]]  Chief Complaint   Patient presents with     Nursing Home Acute     HPI:    Deloris Larson  is a 89 year old (3/24/1930), who is being seen today for an episodic care visit.  HPI information obtained from: facility chart records, facility staff, patient report and [facility] chart review.     Past Medical and Surgical History reviewed in [facility] today.    Mrs. Larson was admitted last week due to being found to have COVID on 1/28 at [facility], could not stay there, thus came to our TCU for management.  Initially she was asymptomatic, but then she developed fevers, hypoxia and lethargy and PO intake declined.  She became lightheaded, hypotensive and started requiring Oxygen.  Thus she was sent out to the ER for eval and treat and consideration of steroids/Remdesivir.      In hospital they noted she was dehydrated, given IVF's; they noted increased Troponin, demand ischemia.  They noted the RLL pneumonia was likely viral, dc'd the Augmentin.  Did not get steroids/Remdesivir.  Due to improvement, she now returns to the TCU for ongoing cares and PT/OT.     In meeting Mrs. Larson she reports she feels better, and she looks clinically better than when she left, but does not look as good as she did on initial TCU admission.  She reports she still feels tired, still mild intermittent light headedness, she is not sure if she is SOB or not, does endorse ongoing productive cough.  No other complaints.     MEDICATIONS:  Current Outpatient Medications   Medication Sig Dispense Refill     acetaminophen (TYLENOL) 500 MG tablet Take 1,000 mg by mouth 3 times daily       alum hydroxide-mag carbonate (GENACTON)  MG/15ML SUSP suspension Take 30 mLs by mouth 4 times daily as  "needed for heartburn        apixaban ANTICOAGULANT (ELIQUIS) 2.5 MG tablet Take 2.5 mg by mouth 2 times daily       atorvastatin (LIPITOR) 20 MG tablet Take 20 mg by mouth daily       cholecalciferol (VITAMIN D3) 125 mcg (5000 units) capsule Take 125 mcg by mouth four times a week Mon, Wed, Fri, Sat       ferrous fumarate 65 mg, [place]. FE,-Vitamin C 125 mg (VITRON C)  MG TABS tablet Take 1 tablet by mouth daily       guaiFENesin-dextromethorphan (ROBITUSSIN DM) 100-10 MG/5ML syrup Take 5 mLs by mouth every 4 hours as needed for cough       meclizine 25 MG CHEW Take 25 mg by mouth 3 times daily as needed for dizziness       metoprolol succinate ER (TOPROL-XL) 50 MG 24 hr tablet Take 25 mg by mouth daily       nitroGLYcerin (NITROSTAT) 0.3 MG sublingual tablet Place 0.3 mg under the tongue every 5 minutes as needed for chest pain For chest pain place 1 tablet under the tongue every 5 minutes for 3 doses. If symptoms persist 5 minutes after 1st dose call 911.       Probiotic Product (PROBIOTIC ACIDOPHILUS BEADS) CAPS Take 1 capsule by mouth 2 times daily       vitamin (B COMPLEX-C) tablet Take 1 tablet by mouth daily       zolpidem (AMBIEN) 10 MG tablet Take 10 mg by mouth nightly as needed for sleep Holding Ambien while at TCU.        REVIEW OF SYSTEMS:  7 point ROS done including, light headedness/dizziness, fever/chills, pain, Resp, CV, GI, and  and is negative other than noted in HPI.      Objective:  /70   Pulse 72   Temp 98.1  F (36.7  C)   Resp 18   Ht 1.626 m (5' 4\")   Wt 57.2 kg (126 lb)   SpO2 98%   BMI 21.63 kg/m       Exam:  EXAM LIMITED DUE TO [facility] social distancing recommendations:  GENERAL APPEARANCE:  Elderly thin female resting in bed.  NAD, non-toxic.  Appears fatigued.   ENT:  Mouth and oropharynx normal, slightly dry, mucous membranes.   RESP:  Lungs diminished in RLL, otherwise CTA.  Regular relaxed breathing effort.  No cough.   CV: 3/6 systolic murmur heard thought " out/S2.   Regular rhythm and rate.  No generalized edema.    ABDOMEN:   Flat, soft, non-tender with active bowel sounds.  No guarding, rigidity, or rebound tenderness.  EXTREMITIES:  No lower extremity edema, no calf tenderness.   PSYCH: Alert and orientated, pleasant and cooperative.  Suspect mild memory impairment.  Stable/unchanged.    Labs:   I have personally reviewed labs, which are in facility or EMR chart.     ASSESSMENT/PLAN:  2019 novel coronavirus disease (COVID-19)  Fever, unspecified fever cause  Acute respiratory failure with hypoxia (H)  Viral pneumonia  Dehydration  Demand ischemia (H)  Mrs. Larson came to us from [facility] with COVID, initially was asymptomatic; started out asymptomatic, but then after a few days developed fevers, hypotension, lethargy and hypoxemia.  Sent to hospital and now returned, they gave IVF's, no steroids, Remdesivir.  They noted she did well on RA but when she returned to TCU on 2/6 they noted SaO2 of 82% on RA?  Thus she was started on 2L NC.  She does not know if she had/has SOB issues, but does note ongoing productive cough.  Does have RLL diminished lungs, hospital noted viral pneumonia in setting of COVID, started Robitussin DM.    -Oxygen 1-2 L NC to keep SaO2 >90%.   -Wean off if able.   -Continues Robitussin DM PRN.   -Continues on PTA Apixaban.     Essential hypertension  Chronic atrial fibrillation (H)  Cardiac pacemaker in situ  Aortic valve stenosis, etiology of cardiac valve disease unspecified, mild  Noted SBP's are still soft, she did have increased troponin in hospital; does have newer aortic stenosis, mild.  We have been holding her PTA Furosemide and Ramipril.  SBP's still ranging , HR 70-90.   -Continue to hold PTA Furosemide and Ramipril.   -Will lower Toprol down to 25 mg's q daily.   -Continues BID Apixaban.   -Continues Statin.      Chronic diarrhea  Reports no changes.   -No changes, continue to clinically monitor.     Debility  -Continues with  PT/OT.     Orders written by provider at facility  -As noted above.     Electronically signed by:  NIVIA Grimaldo CNP           Sincerely,        NIVIA Grimaldo CNP

## 2021-02-09 ENCOUNTER — AMBULATORY - HEALTHEAST (OUTPATIENT)
Dept: OTHER | Facility: CLINIC | Age: 86
End: 2021-02-09

## 2021-02-09 ENCOUNTER — DOCUMENTATION ONLY (OUTPATIENT)
Dept: OTHER | Facility: CLINIC | Age: 86
End: 2021-02-09

## 2021-02-09 ENCOUNTER — NURSING HOME VISIT (OUTPATIENT)
Dept: GERIATRICS | Facility: CLINIC | Age: 86
End: 2021-02-09
Payer: MEDICARE

## 2021-02-09 VITALS
BODY MASS INDEX: 22.09 KG/M2 | TEMPERATURE: 98.7 F | RESPIRATION RATE: 16 BRPM | HEIGHT: 64 IN | DIASTOLIC BLOOD PRESSURE: 57 MMHG | WEIGHT: 129.4 LBS | SYSTOLIC BLOOD PRESSURE: 97 MMHG | HEART RATE: 63 BPM | OXYGEN SATURATION: 92 %

## 2021-02-09 DIAGNOSIS — J96.01 ACUTE RESPIRATORY FAILURE WITH HYPOXIA (H): ICD-10-CM

## 2021-02-09 DIAGNOSIS — G47.00 INSOMNIA, UNSPECIFIED TYPE: ICD-10-CM

## 2021-02-09 DIAGNOSIS — I95.1 ORTHOSTATIC HYPOTENSION: ICD-10-CM

## 2021-02-09 DIAGNOSIS — D64.9 ANEMIA, UNSPECIFIED TYPE: ICD-10-CM

## 2021-02-09 DIAGNOSIS — I10 ESSENTIAL HYPERTENSION: ICD-10-CM

## 2021-02-09 DIAGNOSIS — I25.10 CORONARY ARTERY DISEASE INVOLVING NATIVE CORONARY ARTERY OF NATIVE HEART WITHOUT ANGINA PECTORIS: ICD-10-CM

## 2021-02-09 DIAGNOSIS — D69.6 THROMBOCYTOPENIA (H): ICD-10-CM

## 2021-02-09 DIAGNOSIS — I48.20 CHRONIC ATRIAL FIBRILLATION (H): ICD-10-CM

## 2021-02-09 DIAGNOSIS — K86.89 PANCREATIC MASS: ICD-10-CM

## 2021-02-09 DIAGNOSIS — U07.1 2019 NOVEL CORONAVIRUS DISEASE (COVID-19): Primary | ICD-10-CM

## 2021-02-09 DIAGNOSIS — R91.8 PULMONARY NODULES: ICD-10-CM

## 2021-02-09 DIAGNOSIS — Z95.0 CARDIAC PACEMAKER IN SITU: ICD-10-CM

## 2021-02-09 PROCEDURE — 99305 1ST NF CARE MODERATE MDM 35: CPT | Performed by: INTERNAL MEDICINE

## 2021-02-09 ASSESSMENT — MIFFLIN-ST. JEOR: SCORE: 991.95

## 2021-02-09 NOTE — LETTER
2/9/2021        RE: Deloris Larson  [address] [place] [address]        [facility]  PHYSICIAN NOTE    PRIMARY CARE PROVIDER AND CLINIC:  Kassidy Salas MD, [facility] [address] [facility] [place]    Chief Complaint   Patient presents with     Hospital F/U     [place] Medical Record Number:  [id number]  Place of Service where encounter took place:  [facility] ([facility]) [[id number]]    Deloris Larson is a 89 year old (3/24/1930), admitted to the above facility from [facility] 1/28/21-1/30/21 and then [facility] 2/5/21-2/6/21. Admitted to this facility for  rehab, medical management and nursing care.     HPI:    HPI information obtained from: facility chart records, facility staff, patient report and [facility] chart review.     Brief summary of hospital course: Mrs. Deloris Larson has h/o afib on Eliquis, CAD, pacemaker for SSS, anemia and insomnia admitted from home with a fall and weakness/dehydration with orthostasis and found to have COVID-19 infection. Pacemaker interrogation unremarkable per notes. She did have mild cough but was stable on room air and as such did not get treatment with Remdesivir or steroids. She had mild JANES and was hydrated with holding of her Ramipril and Lasix. Her Ambien was also switched to Doxepin for insomnia and given orders for PRN Meclizine. [facility] However, while at [facility], seemed to get weaker and also become hypotensive, hypoxic and Tmax 102.5 and was transferred back to the hospital. However, while there she seemed to do ok, supposedly didn't need O2 and thus came back the following day to [facility] again after some gentle IVF.     Updates on status since skilled nursing admission: Though not needing O2 per hospital notes, she continues to need it here at [facility]. Deloris Freeman is seen in her room today and welcomes the visit. Feels she is still fatigued but  "definitely better than she was a week ago. Reflects, \"I was really sick last week\". Feels while she is still needing O2, breathing and cough improving. Eating better as well. Working with therapy. They did try to wean O2 last night but sats dropped into 80s on RA. Has insight to ask about future COVID-19 vaccine series and how to obtain it. No longer feels hips/legs sore from initial fall at home. No acute nursing concerns; they plan to try and give her a bath tonight if she feels up for it.    CODE STATUS/ADVANCE DIRECTIVES DISCUSSION:   DNR / DNI  Patient's living condition: lives alone    ALLERGIES: Diphenhydramine    Past Medical History:   Diagnosis Date     Anemia      Atrial fibrillation (H)      CAD (coronary artery disease)      Clinical diagnosis of COVID-19     2021     Former smoker      History of skin cancer      Insomnia      Restless legs syndrome (RLS)      SSS (sick sinus syndrome) (H)     S/p pacemaker      Past Surgical History:   Procedure Laterality Date     APPENDECTOMY       CHOLECYSTECTOMY       COLONOSCOPY  2012     HERNIA REPAIR Right      HYSTERECTOMY       IMPLANT PACEMAKER       TUBAL LIGATION       Family History   Problem Relation Age of Onset     Pulmonary Embolism Mother      Heart Disease Father      CABG Son      Social History     Tobacco Use     Smoking status: Former Smoker     Packs/day: 1.50     Years: 25.00     Pack years: 37.50     Start date: 1949     Quit date: 1974     Years since quittin.1     Smokeless tobacco: Never Used   Substance Use Topics     Alcohol use: Yes     Drug use: None        Post-discharge medication reconciliation status: Reviewed and updated in [facility] according to facility MAR    Current Outpatient Medications   Medication Sig Dispense Refill     acetaminophen (TYLENOL) 500 MG tablet Take 1,000 mg by mouth 3 times daily       alum hydroxide-mag carbonate (GENACTON)  MG/15ML SUSP suspension Take 30 mLs by mouth 4 times daily as " "needed for heartburn        apixaban ANTICOAGULANT (ELIQUIS) 2.5 MG tablet Take 2.5 mg by mouth 2 times daily       atorvastatin (LIPITOR) 20 MG tablet Take 20 mg by mouth daily       cholecalciferol (VITAMIN D3) 125 mcg (5000 units) capsule Take 125 mcg by mouth four times a week Mon, Wed, Fri, Sat       ferrous fumarate 65 mg, [place]. FE,-Vitamin C 125 mg (VITRON C)  MG TABS tablet Take 1 tablet by mouth daily       guaiFENesin-dextromethorphan (ROBITUSSIN DM) 100-10 MG/5ML syrup Take 5 mLs by mouth every 4 hours as needed for cough       meclizine 25 MG CHEW Take 25 mg by mouth 3 times daily as needed for dizziness       metoprolol succinate ER (TOPROL-XL) 50 MG 24 hr tablet Take 25 mg by mouth daily       nitroGLYcerin (NITROSTAT) 0.3 MG sublingual tablet Place 0.3 mg under the tongue every 5 minutes as needed for chest pain For chest pain place 1 tablet under the tongue every 5 minutes for 3 doses. If symptoms persist 5 minutes after 1st dose call 911.       Probiotic Product (PROBIOTIC ACIDOPHILUS BEADS) CAPS Take 1 capsule by mouth 2 times daily       vitamin (B COMPLEX-C) tablet Take 1 tablet by mouth daily       zolpidem (AMBIEN) 10 MG tablet Take 10 mg by mouth nightly as needed for sleep Holding Ambien while at [facility].          ROS: 10 point ROS neg other than the symptoms noted above in the HPI.    Exam:  BP 97/57   Pulse 63   Temp 98.7  F (37.1  C)   Resp 16   Ht 1.626 m (5' 4\")   Wt 58.7 kg (129 lb 6.4 oz)   SpO2 92%   BMI 22.21 kg/m    Alert, pleasant, casually dressed, appears robust for age  No scleral icterus  Moist oral mucosa  Breathing non-labored on nasal canula O2, no cough  Abdomen thin  No lower extremity edema  Normal speech, no tremor, asks good questions  Mood euthymic  Skin not pale    Lab/Diagnostic data:  TB gold negative at [facility]    Lab Results   Component Value Date    WBC 4.9 02/06/2021     Lab Results   Component Value Date    HGB 8.4 02/06/2021     Lab Results "   Component Value Date    HCT 26.7 02/06/2021     Lab Results   Component Value Date    MCV 95 02/06/2021     Lab Results   Component Value Date     02/06/2021       Last Comprehensive Metabolic Panel:  Sodium   Date Value Ref Range Status   02/06/2021 138 133 - 144 mmol/L Final     Potassium   Date Value Ref Range Status   02/06/2021 3.7 3.4 - 5.3 mmol/L Final     Chloride   Date Value Ref Range Status   02/06/2021 105 94 - 109 mmol/L Final     Carbon Dioxide   Date Value Ref Range Status   02/06/2021 26 20 - 32 mmol/L Final     Anion Gap   Date Value Ref Range Status   02/06/2021 7 3 - 14 mmol/L Final     Glucose   Date Value Ref Range Status   02/06/2021 78 70 - 99 mg/dL Final     Urea Nitrogen   Date Value Ref Range Status   02/06/2021 18 7 - 30 mg/dL Final     Creatinine   Date Value Ref Range Status   02/06/2021 0.96 0.52 - 1.04 mg/dL Final     GFR Estimate   Date Value Ref Range Status   02/06/2021 52 (L) >60 mL/min/[1.73_m2] Final     Comment:     Non  GFR Calc  Starting 12/18/2018, serum creatinine based estimated GFR (eGFR) will be   calculated using the Chronic Kidney Disease Epidemiology Collaboration   (CKD-EPI) equation.       Calcium   Date Value Ref Range Status   02/06/2021 8.0 (L) 8.5 - 10.1 mg/dL Final     Lab Results   Component Value Date    AST 44 02/05/2021     Lab Results   Component Value Date    ALT 37 02/05/2021     Lab Results   Component Value Date    BILITOTAL 0.5 02/05/2021     Lab Results   Component Value Date    ALBUMIN 2.7 02/05/2021     Lab Results   Component Value Date    PROTTOTAL 6.3 02/05/2021      Lab Results   Component Value Date    ALKPHOS 52 02/05/2021       CT at [facility]:  1. Trace free fluid in the pelvis. 2. Multiple pulmonary nodules measuring up to 0.4 cm. See guidelines below. 3. Mild diffuse bronchiectasis with bronchial wall thickening and small foci of mucus plugging. 4. Pulmonary artery hypertension. 5. Severe coronary artery  calcification. 6. A stable 0.6 cm cyst in the pancreas. See guidelines below. REFERENCE: Guidelines for Management of Incidental Pulmonary Nodules Detected on CT Images: From the Fleischner Society 2017. Guidelines apply to incidental nodules in patients who are 35 years or older. Guidelines do not apply to lung cancer screening, patients with immunosuppression, or patients with known primary cancer. MULTIPLE NODULES Nodule size <6 mm Low-risk patients: No follow-up needed. High-risk patients: Optional follow-up at 12 months. REFERENCE: International Consensus Guidelines for Management of IPMN and MCN of the Pancreas. Pancreatology 12 (2012) 183-197. Asymptomatic patient without high-risk stigmata of malignancy (obstructive jaundice with cystic lesion in head of pancreas, enhancing solid component within cyst, or main pancreatic duct greater than 10 mm), and without worrisome features (cyst greater than 3 cm, thickened/enhancing cyst walls, main pancreatic duct 5-9 mm, mural nodule, or abrupt change in caliber of pancreatic duct with distal pancreatic atrophy). Size of largest cyst: Less than 1 cm: CT or MRI with contrast in 2-3 years. Consider Gastroenterology consultation for all categories of pancreatic cysts. BOWEL: Severe diverticulosis of the colon. No acute inflammatory change. No obstruction.      ASSESSMENT/PLAN:  2019 novel coronavirus disease (COVID-19)  Acute respiratory failure with hypoxia (H)  Improving slowly, still needing nasal canula O2 - wean as able  Therapies as able to build strength  She did not get treatment with Remdesivir or steroids during either hospital stays  Given trend towards improvement, will not initiate steroids today either  Note, is chronically anticoagulated (see below) d/t afib    Orthostatic hypotension  Essential hypertension  Had orthostasis initially first hospital stay and also hypotensive for second one  Her home doses of Ramipril and Lasix continue to remain on hold  and BP acceptable without hypertension developing  May have some underlying CKD3 too based on labs; had mild JANES each hospital stay improving with IVF  Continue to hold Ramipril and Lasix at this time  Only once has used the PRN Meclizine; suspect would be able to discontinue by TCU discharge if continues to be the case    Chronic atrial fibrillation (H)  Cardiac pacemaker in situ  Coronary artery disease involving native coronary artery of native heart without angina pectoris  Rate controlled and anticoagulated with Eliquis  Has h/o CAD and mild troponin bump in setting of acute illness while hospitalized but had no signs/sx of ACS  Remains on Lipitor    Anemia, unspecified type  Thrombocytopenia (H)  Acute on chronic; no signs/sx of blood loss  Is on iron supplement  May be d/t acute illness and phlebotomy  Had been seen by GI a few months ago as outpatient for area in colon on CT concerning for possible malignancy but she was hesitant to undergo colonoscopy; see report of recent CT above that thankfully does not show anything in the colon acutely worrisome but rather has significant diverticulosis   Follow CBC as needed clinically    Insomnia, unspecified type  Chronic; her PRN Ambien is on hold here at TCU given age/frailty and she does not c/o insomnia  Agree to hold off on Doxepin as well given age/frailty  Melatonin or Trazodone could be an option if needed plus sleep hygiene  F/u with PCP    Pulmonary nodules  Pancreatic nodule  All can be followed up non-emergently with serial imaging based on goals of care as an outpatient especially as is former smoker       Electronically signed by:  Gi Yeboah DO        Sincerely,        Gi Yeboah DO

## 2021-02-10 NOTE — PROGRESS NOTES
"[place] [facility]  PHYSICIAN NOTE    PRIMARY CARE PROVIDER AND CLINIC:  Kassidy Salas MD, [facility] [address] [facility] / [place], [place]    Chief Complaint   Patient presents with     Hospital F/U     [place] Medical Record Number:  [id number]  Place of Service where encounter took place:  [facility] ([facility]) [[id number]]    Deloris Larson is a 89 year old (3/24/1930), admitted to the above facility from [facility] 1/28/21-1/30/21 and then [facility] 2/5/21-2/6/21. Admitted to this facility for  rehab, medical management and nursing care.     HPI:    HPI information obtained from: facility chart records, facility staff, patient report and [facility] chart review.     Brief summary of hospital course: Mrs. Deloris Larson has h/o afib on Eliquis, CAD, pacemaker for SSS, anemia and insomnia admitted from home with a fall and weakness/dehydration with orthostasis and found to have COVID-19 infection. Pacemaker interrogation unremarkable per notes. She did have mild cough but was stable on room air and as such did not get treatment with Remdesivir or steroids. She had mild JANES and was hydrated with holding of her Ramipril and Lasix. Her Ambien was also switched to Doxepin for insomnia and given orders for PRN Meclizine. Transitioned to [facility] TCU. However, while at [facility], seemed to get weaker and also become hypotensive, hypoxic and Tmax 102.5 and was transferred back to the hospital. However, while there she seemed to do ok, supposedly didn't need O2 and thus came back the following day to [facility] again after some gentle IVF.     Updates on status since skilled nursing admission: Though not needing O2 per hospital notes, she continues to need it here at TCU. Deloris Freeman is seen in her room today and welcomes the visit. Feels she is still fatigued but definitely better than she was a week ago. Reflects, \"I was really sick last week\". Feels " while she is still needing O2, breathing and cough improving. Eating better as well. Working with therapy. They did try to wean O2 last night but sats dropped into 80s on RA. Has insight to ask about future COVID-19 vaccine series and how to obtain it. No longer feels hips/legs sore from initial fall at home. No acute nursing concerns; they plan to try and give her a bath tonight if she feels up for it.    CODE STATUS/ADVANCE DIRECTIVES DISCUSSION:   DNR / DNI  Patient's living condition: lives alone    ALLERGIES: Diphenhydramine    Past Medical History:   Diagnosis Date     Anemia      Atrial fibrillation (H)      CAD (coronary artery disease)      Clinical diagnosis of COVID-19     2021     Former smoker      History of skin cancer      Insomnia      Restless legs syndrome (RLS)      SSS (sick sinus syndrome) (H)     S/p pacemaker      Past Surgical History:   Procedure Laterality Date     APPENDECTOMY       CHOLECYSTECTOMY       COLONOSCOPY  2012     HERNIA REPAIR Right      HYSTERECTOMY       IMPLANT PACEMAKER       TUBAL LIGATION       Family History   Problem Relation Age of Onset     Pulmonary Embolism Mother      Heart Disease Father      CABG Son      Social History     Tobacco Use     Smoking status: Former Smoker     Packs/day: 1.50     Years: 25.00     Pack years: 37.50     Start date: 1949     Quit date: 1974     Years since quittin.1     Smokeless tobacco: Never Used   Substance Use Topics     Alcohol use: Yes     Drug use: None        Post-discharge medication reconciliation status: Reviewed and updated in [facility] according to facility MAR    Current Outpatient Medications   Medication Sig Dispense Refill     acetaminophen (TYLENOL) 500 MG tablet Take 1,000 mg by mouth 3 times daily       alum hydroxide-mag carbonate (GENACTON)  MG/15ML SUSP suspension Take 30 mLs by mouth 4 times daily as needed for heartburn        apixaban ANTICOAGULANT (ELIQUIS) 2.5 MG tablet Take 2.5 mg by  "mouth 2 times daily       atorvastatin (LIPITOR) 20 MG tablet Take 20 mg by mouth daily       cholecalciferol (VITAMIN D3) 125 mcg (5000 units) capsule Take 125 mcg by mouth four times a week Mon, Wed, Fri, Sat       ferrous fumarate 65 mg, [place]. FE,-Vitamin C 125 mg (VITRON C)  MG TABS tablet Take 1 tablet by mouth daily       guaiFENesin-dextromethorphan (ROBITUSSIN DM) 100-10 MG/5ML syrup Take 5 mLs by mouth every 4 hours as needed for cough       meclizine 25 MG CHEW Take 25 mg by mouth 3 times daily as needed for dizziness       metoprolol succinate ER (TOPROL-XL) 50 MG 24 hr tablet Take 25 mg by mouth daily       nitroGLYcerin (NITROSTAT) 0.3 MG sublingual tablet Place 0.3 mg under the tongue every 5 minutes as needed for chest pain For chest pain place 1 tablet under the tongue every 5 minutes for 3 doses. If symptoms persist 5 minutes after 1st dose call 911.       Probiotic Product (PROBIOTIC ACIDOPHILUS BEADS) CAPS Take 1 capsule by mouth 2 times daily       vitamin (B COMPLEX-C) tablet Take 1 tablet by mouth daily       zolpidem (AMBIEN) 10 MG tablet Take 10 mg by mouth nightly as needed for sleep Holding Ambien while at [facility].          ROS: 10 point ROS neg other than the symptoms noted above in the HPI.    Exam:  BP 97/57   Pulse 63   Temp 98.7  F (37.1  C)   Resp 16   Ht 1.626 m (5' 4\")   Wt 58.7 kg (129 lb 6.4 oz)   SpO2 92%   BMI 22.21 kg/m    Alert, pleasant, casually dressed, appears robust for age  No scleral icterus  Moist oral mucosa  Breathing non-labored on nasal canula O2, no cough  Abdomen thin  No lower extremity edema  Normal speech, no tremor, asks good questions  Mood euthymic  Skin not pale    Lab/Diagnostic data:  TB gold negative at [facility]    Lab Results   Component Value Date    WBC 4.9 02/06/2021     Lab Results   Component Value Date    HGB 8.4 02/06/2021     Lab Results   Component Value Date    HCT 26.7 02/06/2021     Lab Results   Component Value Date    MCV 95 " 02/06/2021     Lab Results   Component Value Date     02/06/2021       Last Comprehensive Metabolic Panel:  Sodium   Date Value Ref Range Status   02/06/2021 138 133 - 144 mmol/L Final     Potassium   Date Value Ref Range Status   02/06/2021 3.7 3.4 - 5.3 mmol/L Final     Chloride   Date Value Ref Range Status   02/06/2021 105 94 - 109 mmol/L Final     Carbon Dioxide   Date Value Ref Range Status   02/06/2021 26 20 - 32 mmol/L Final     Anion Gap   Date Value Ref Range Status   02/06/2021 7 3 - 14 mmol/L Final     Glucose   Date Value Ref Range Status   02/06/2021 78 70 - 99 mg/dL Final     Urea Nitrogen   Date Value Ref Range Status   02/06/2021 18 7 - 30 mg/dL Final     Creatinine   Date Value Ref Range Status   02/06/2021 0.96 0.52 - 1.04 mg/dL Final     GFR Estimate   Date Value Ref Range Status   02/06/2021 52 (L) >60 mL/min/[1.73_m2] Final     Comment:     Non  GFR Calc  Starting 12/18/2018, serum creatinine based estimated GFR (eGFR) will be   calculated using the Chronic Kidney Disease Epidemiology Collaboration   (CKD-EPI) equation.       Calcium   Date Value Ref Range Status   02/06/2021 8.0 (L) 8.5 - 10.1 mg/dL Final     Lab Results   Component Value Date    AST 44 02/05/2021     Lab Results   Component Value Date    ALT 37 02/05/2021     Lab Results   Component Value Date    BILITOTAL 0.5 02/05/2021     Lab Results   Component Value Date    ALBUMIN 2.7 02/05/2021     Lab Results   Component Value Date    PROTTOTAL 6.3 02/05/2021      Lab Results   Component Value Date    ALKPHOS 52 02/05/2021       CT at [facility]:  1. Trace free fluid in the pelvis. 2. Multiple pulmonary nodules measuring up to 0.4 cm. See guidelines below. 3. Mild diffuse bronchiectasis with bronchial wall thickening and small foci of mucus plugging. 4. Pulmonary artery hypertension. 5. Severe coronary artery calcification. 6. A stable 0.6 cm cyst in the pancreas. See guidelines below. REFERENCE: Guidelines for  Management of Incidental Pulmonary Nodules Detected on CT Images: From the Fleischner Society 2017. Guidelines apply to incidental nodules in patients who are 35 years or older. Guidelines do not apply to lung cancer screening, patients with immunosuppression, or patients with known primary cancer. MULTIPLE NODULES Nodule size <6 mm Low-risk patients: No follow-up needed. High-risk patients: Optional follow-up at 12 months. REFERENCE: International Consensus Guidelines for Management of IPMN and MCN of the Pancreas. Pancreatology 12 (2012) 183-197. Asymptomatic patient without high-risk stigmata of malignancy (obstructive jaundice with cystic lesion in head of pancreas, enhancing solid component within cyst, or main pancreatic duct greater than 10 mm), and without worrisome features (cyst greater than 3 cm, thickened/enhancing cyst walls, main pancreatic duct 5-9 mm, mural nodule, or abrupt change in caliber of pancreatic duct with distal pancreatic atrophy). Size of largest cyst: Less than 1 cm: CT or MRI with contrast in 2-3 years. Consider Gastroenterology consultation for all categories of pancreatic cysts. BOWEL: Severe diverticulosis of the colon. No acute inflammatory change. No obstruction.      ASSESSMENT/PLAN:  2019 novel coronavirus disease (COVID-19)  Acute respiratory failure with hypoxia (H)  Improving slowly, still needing nasal canula O2 - wean as able  Therapies as able to build strength  She did not get treatment with Remdesivir or steroids during either hospital stays  Given trend towards improvement, will not initiate steroids today either  Note, is chronically anticoagulated (see below) d/t afib    Orthostatic hypotension  Essential hypertension  Had orthostasis initially first hospital stay and also hypotensive for second one  Her home doses of Ramipril and Lasix continue to remain on hold and BP acceptable without hypertension developing  May have some underlying CKD3 too based on labs; had  mild JANES each hospital stay improving with IVF  Continue to hold Ramipril and Lasix at this time  Only once has used the PRN Meclizine; suspect would be able to discontinue by TCU discharge if continues to be the case    Chronic atrial fibrillation (H)  Cardiac pacemaker in situ  Coronary artery disease involving native coronary artery of native heart without angina pectoris  Rate controlled and anticoagulated with Eliquis  Has h/o CAD and mild troponin bump in setting of acute illness while hospitalized but had no signs/sx of ACS  Remains on Lipitor    Anemia, unspecified type  Thrombocytopenia (H)  Acute on chronic; no signs/sx of blood loss  Is on iron supplement  May be d/t acute illness and phlebotomy  Had been seen by GI a few months ago as outpatient for area in colon on CT concerning for possible malignancy but she was hesitant to undergo colonoscopy; see report of recent CT above that thankfully does not show anything in the colon acutely worrisome but rather has significant diverticulosis   Follow CBC as needed clinically    Insomnia, unspecified type  Chronic; her PRN Ambien is on hold here at TCU given age/frailty and she does not c/o insomnia  Agree to hold off on Doxepin as well given age/frailty  Melatonin or Trazodone could be an option if needed plus sleep hygiene  F/u with PCP    Pulmonary nodules  Pancreatic nodule  All can be followed up non-emergently with serial imaging based on goals of care as an outpatient especially as is former smoker       Electronically signed by:  Gi Yeboah DO

## 2021-02-11 LAB
BACTERIA SPEC CULT: NO GROWTH
BACTERIA SPEC CULT: NO GROWTH
SPECIMEN SOURCE: NORMAL
SPECIMEN SOURCE: NORMAL

## 2021-02-12 LAB
GAMMA INTERFERON BACKGROUND BLD IA-ACNC: 0.06 IU/ML
M TB IFN-G BLD-IMP: NEGATIVE
MITOGEN IGNF BCKGRD COR BLD-ACNC: -0.01 IU/ML
MITOGEN IGNF BCKGRD COR BLD-ACNC: 0.01 IU/ML
QTF INTERPRETATION: NORMAL
QTF MITOGEN - NIL: 1.61 IU/ML

## 2021-02-13 SDOH — HEALTH STABILITY: MENTAL HEALTH: HOW OFTEN DO YOU HAVE 6 OR MORE DRINKS ON ONE OCCASION?: NOT ASKED

## 2021-02-13 SDOH — HEALTH STABILITY: MENTAL HEALTH: HOW MANY STANDARD DRINKS CONTAINING ALCOHOL DO YOU HAVE ON A TYPICAL DAY?: NOT ASKED

## 2021-02-13 SDOH — HEALTH STABILITY: MENTAL HEALTH: HOW OFTEN DO YOU HAVE A DRINK CONTAINING ALCOHOL?: NOT ASKED

## 2021-02-14 PROBLEM — T14.8XXA HEMATOMA: Status: RESOLVED | Noted: 2021-02-01 | Resolved: 2021-02-14

## 2021-02-14 PROBLEM — R91.8 PULMONARY NODULES: Status: ACTIVE | Noted: 2021-02-14

## 2021-02-14 PROBLEM — R53.83 OTHER FATIGUE: Status: RESOLVED | Noted: 2021-02-04 | Resolved: 2021-02-14

## 2021-02-14 PROBLEM — E86.0 DEHYDRATION: Status: RESOLVED | Noted: 2021-02-08 | Resolved: 2021-02-14

## 2021-02-14 PROBLEM — K86.89 PANCREATIC MASS: Status: ACTIVE | Noted: 2021-02-14

## 2021-02-14 PROBLEM — J18.9 PNEUMONIA OF RIGHT LOWER LOBE DUE TO INFECTIOUS ORGANISM: Status: RESOLVED | Noted: 2021-02-04 | Resolved: 2021-02-14

## 2021-02-14 PROBLEM — R50.9 FEVER, UNSPECIFIED FEVER CAUSE: Status: RESOLVED | Noted: 2021-02-04 | Resolved: 2021-02-14

## 2021-02-14 PROBLEM — J12.9 VIRAL PNEUMONIA: Status: RESOLVED | Noted: 2021-02-08 | Resolved: 2021-02-14

## 2021-02-14 PROBLEM — R42 LIGHT HEADED: Status: RESOLVED | Noted: 2021-02-04 | Resolved: 2021-02-14

## 2021-02-15 ENCOUNTER — NURSING HOME VISIT (OUTPATIENT)
Dept: GERIATRICS | Facility: CLINIC | Age: 86
End: 2021-02-15
Payer: MEDICARE

## 2021-02-15 VITALS
WEIGHT: 129.4 LBS | TEMPERATURE: 98.5 F | OXYGEN SATURATION: 97 % | SYSTOLIC BLOOD PRESSURE: 154 MMHG | DIASTOLIC BLOOD PRESSURE: 80 MMHG | HEART RATE: 67 BPM | BODY MASS INDEX: 22.09 KG/M2 | HEIGHT: 64 IN

## 2021-02-15 DIAGNOSIS — I35.0 AORTIC VALVE STENOSIS, ETIOLOGY OF CARDIAC VALVE DISEASE UNSPECIFIED: ICD-10-CM

## 2021-02-15 DIAGNOSIS — I48.20 CHRONIC ATRIAL FIBRILLATION (H): Primary | ICD-10-CM

## 2021-02-15 DIAGNOSIS — I10 ESSENTIAL HYPERTENSION: ICD-10-CM

## 2021-02-15 DIAGNOSIS — Z95.0 CARDIAC PACEMAKER IN SITU: ICD-10-CM

## 2021-02-15 DIAGNOSIS — U07.1 2019 NOVEL CORONAVIRUS DISEASE (COVID-19): ICD-10-CM

## 2021-02-15 DIAGNOSIS — G47.00 INSOMNIA, UNSPECIFIED TYPE: ICD-10-CM

## 2021-02-15 DIAGNOSIS — R53.83 FATIGUE, UNSPECIFIED TYPE: ICD-10-CM

## 2021-02-15 DIAGNOSIS — R53.81 DEBILITY: ICD-10-CM

## 2021-02-15 PROCEDURE — 99309 SBSQ NF CARE MODERATE MDM 30: CPT | Performed by: NURSE PRACTITIONER

## 2021-02-15 ASSESSMENT — MIFFLIN-ST. JEOR: SCORE: 991.95

## 2021-02-15 NOTE — PROGRESS NOTES
[facility] [facility]  [place] Medical Record Number:  [id number]  Place of Service where encounter took place:  [facility] ([facility]) [[id number]]  Chief Complaint   Patient presents with     Nursing Home Acute     HPI:    Deloris Larson  is a 89 year old (3/24/1930), who is being seen today for an episodic care visit.  HPI information obtained from: facility chart records, facility staff, patient report and [facility] chart review.     Past Medical and Surgical History reviewed in [facility] today.    Met with Mrs. Larson today for follow up.  Mrs. Larson reports she is feeling much better.  Still endorses some mild fatigue but less than before but not baseline/gone completely.  Does endorse better appetite and eating/drinking better.  Denies any SOB/WHITE, no other complaints.     MEDICATIONS:  Current Outpatient Medications   Medication Sig Dispense Refill     acetaminophen (TYLENOL) 500 MG tablet Take 1,000 mg by mouth 3 times daily       alum hydroxide-mag carbonate (GENACTON)  MG/15ML SUSP suspension Take 30 mLs by mouth 4 times daily as needed for heartburn        apixaban ANTICOAGULANT (ELIQUIS) 2.5 MG tablet Take 2.5 mg by mouth 2 times daily       atorvastatin (LIPITOR) 20 MG tablet Take 20 mg by mouth At Bedtime        cholecalciferol (VITAMIN D3) 125 mcg (5000 units) capsule Take 125 mcg by mouth four times a week Mon, Wed, Fri, Sat       ferrous fumarate 65 mg, [place]. FE,-Vitamin C 125 mg (VITRON C)  MG TABS tablet Take 1 tablet by mouth daily       guaiFENesin-dextromethorphan (ROBITUSSIN DM) 100-10 MG/5ML syrup Take 5 mLs by mouth every 4 hours as needed for cough       meclizine 25 MG CHEW Take 25 mg by mouth 3 times daily as needed for dizziness       metoprolol succinate ER (TOPROL-XL) 25 MG 24 hr tablet Take 25 mg by mouth daily        nitroGLYcerin (NITROSTAT) 0.3 MG sublingual tablet Place 0.3 mg under the tongue every 5 minutes as needed for chest pain  "For chest pain place 1 tablet under the tongue every 5 minutes for 3 doses. If symptoms persist 5 minutes after 1st dose call 911.       Probiotic Product (PROBIOTIC ACIDOPHILUS BEADS) CAPS Take 1 capsule by mouth 2 times daily       vitamin (B COMPLEX-C) tablet Take 1 tablet by mouth daily       zolpidem (AMBIEN) 10 MG tablet Take 10 mg by mouth nightly as needed for sleep Holding Ambien while at TCU.        REVIEW OF SYSTEMS:  7 point ROS done including, light headedness/dizziness, fever/chills, pain, Resp, CV, GI, and  and is negative other than noted in HPI.      Objective:  BP (!) 154/80   Pulse 67   Temp 98.5  F (36.9  C)   Ht 1.626 m (5' 4\")   Wt 58.7 kg (129 lb 6.4 oz)   SpO2 97%   BMI 22.21 kg/m       Exam:  EXAM LIMITED DUE TO [facility] social distancing recommendations:  GENERAL APPEARANCE:  Elderly thin female resting in bed.  NAD, non-toxic.  Appears much improved.   ENT:  Mouth and oropharynx normal, moist, mucous membranes.   RESP:  Lungs are CTA today. Regular relaxed breathing effort.  No cough.   CV: 3/6 systolic murmur heard thought out/S2.   Regular rhythm and rate.  No generalized edema.    ABDOMEN:   Flat, soft, non-tender with active bowel sounds.  No guarding, rigidity, or rebound tenderness.  EXTREMITIES:  No lower extremity edema, no calf tenderness.   PSYCH: Alert and orientated, pleasant and cooperative.  Suspect mild memory impairment.  Stable/unchanged.    Labs:   None recent.     ASSESSMENT/PLAN:  2019 novel coronavirus disease (COVID-19)  Fatigue, unspecified type  Was COVID positive 1/28, initially did well but then declined, went to hospital.  Treated with IVF's and returned, did not get steroids/Remdesivir.    Was requiring oxygen.   Now she is much improved, less fatigue but not fully resolved; is eating/drinking much better; reports feeling better.   Has not been requiring oxygen, denies any SOB/WHITE.   -Continues chronic Apixaban.   -DC'd Oxygen.     Essential " hypertension  Chronic atrial fibrillation (H)  Cardiac pacemaker in situ  Aortic valve stenosis, etiology of cardiac valve disease unspecified, mild  Was on Furosemide and Ramipril in past, have been on hold due to COVID and decreased PO intake lately.   SBP's ranging 122-161, HR's 67-93.   -Continues on reduced Toprol at 25 mg's q daily.   -Continue to hold Furosemide/Ramipril.   --May need to change as PO intake/overall status improves.     -Continues Apixaban 2.5 mg's BID.   -Continues Statin.     Insomnia, unspecified type  Reports she is still sleeping well most nights.   -We continue to hold home Ambien.     Debility  -Continues with PT/OT.     Orders written by provider at facility  -DC'd Oxygen.     Electronically signed by:  NIVIA Grimaldo CNP

## 2021-02-15 NOTE — LETTER
2/15/2021        RE: Deloris Larson  [address]  [place] [address]        [place] [facility]  [place] Medical Record Number:  [id number]  Place of Service where encounter took place:  [facility] ([facility]) [[id number]]  Chief Complaint   Patient presents with     Nursing Home Acute     HPI:    Deloris Larson  is a 89 year old (3/24/1930), who is being seen today for an episodic care visit.  HPI information obtained from: facility chart records, facility staff, patient report and [facility] chart review.     Past Medical and Surgical History reviewed in [facility] today.    Met with Mrs. Larson today for follow up.  Mrs. Larson reports she is feeling much better.  Still endorses some mild fatigue but less than before but not baseline/gone completely.  Does endorse better appetite and eating/drinking better.  Denies any SOB/WHITE, no other complaints.     MEDICATIONS:  Current Outpatient Medications   Medication Sig Dispense Refill     acetaminophen (TYLENOL) 500 MG tablet Take 1,000 mg by mouth 3 times daily       alum hydroxide-mag carbonate (GENACTON)  MG/15ML SUSP suspension Take 30 mLs by mouth 4 times daily as needed for heartburn        apixaban ANTICOAGULANT (ELIQUIS) 2.5 MG tablet Take 2.5 mg by mouth 2 times daily       atorvastatin (LIPITOR) 20 MG tablet Take 20 mg by mouth At Bedtime        cholecalciferol (VITAMIN D3) 125 mcg (5000 units) capsule Take 125 mcg by mouth four times a week Mon, Wed, Fri, Sat       ferrous fumarate 65 mg, [place]. FE,-Vitamin C 125 mg (VITRON C)  MG TABS tablet Take 1 tablet by mouth daily       guaiFENesin-dextromethorphan (ROBITUSSIN DM) 100-10 MG/5ML syrup Take 5 mLs by mouth every 4 hours as needed for cough       meclizine 25 MG CHEW Take 25 mg by mouth 3 times daily as needed for dizziness       metoprolol succinate ER (TOPROL-XL) 25 MG 24 hr tablet Take 25 mg by mouth daily        nitroGLYcerin (NITROSTAT) 0.3 MG  "sublingual tablet Place 0.3 mg under the tongue every 5 minutes as needed for chest pain For chest pain place 1 tablet under the tongue every 5 minutes for 3 doses. If symptoms persist 5 minutes after 1st dose call 911.       Probiotic Product (PROBIOTIC ACIDOPHILUS BEADS) CAPS Take 1 capsule by mouth 2 times daily       vitamin (B COMPLEX-C) tablet Take 1 tablet by mouth daily       zolpidem (AMBIEN) 10 MG tablet Take 10 mg by mouth nightly as needed for sleep Holding Ambien while at TCU.        REVIEW OF SYSTEMS:  7 point ROS done including, light headedness/dizziness, fever/chills, pain, Resp, CV, GI, and  and is negative other than noted in HPI.      Objective:  BP (!) 154/80   Pulse 67   Temp 98.5  F (36.9  C)   Ht 1.626 m (5' 4\")   Wt 58.7 kg (129 lb 6.4 oz)   SpO2 97%   BMI 22.21 kg/m       Exam:  EXAM LIMITED DUE TO [facility] social distancing recommendations:  GENERAL APPEARANCE:  Elderly thin female resting in bed.  NAD, non-toxic.  Appears much improved.   ENT:  Mouth and oropharynx normal, moist, mucous membranes.   RESP:  Lungs are CTA today. Regular relaxed breathing effort.  No cough.   CV: 3/6 systolic murmur heard thought out/S2.   Regular rhythm and rate.  No generalized edema.    ABDOMEN:   Flat, soft, non-tender with active bowel sounds.  No guarding, rigidity, or rebound tenderness.  EXTREMITIES:  No lower extremity edema, no calf tenderness.   PSYCH: Alert and orientated, pleasant and cooperative.  Suspect mild memory impairment.  Stable/unchanged.    Labs:   None recent.     ASSESSMENT/PLAN:  2019 novel coronavirus disease (COVID-19)  Fatigue, unspecified type  Was COVID positive 1/28, initially did well but then declined, went to hospital.  Treated with IVF's and returned, did not get steroids/Remdesivir.    Was requiring oxygen.   Now she is much improved, less fatigue but not fully resolved; is eating/drinking much better; reports feeling better.   Has not been requiring oxygen, denies " any SOB/WHITE.   -Continues chronic Apixaban.   -DC'd Oxygen.     Essential hypertension  Chronic atrial fibrillation (H)  Cardiac pacemaker in situ  Aortic valve stenosis, etiology of cardiac valve disease unspecified, mild  Was on Furosemide and Ramipril in past, have been on hold due to COVID and decreased PO intake lately.   SBP's ranging 122-161, HR's 67-93.   -Continues on reduced Toprol at 25 mg's q daily.   -Continue to hold Furosemide/Ramipril.   --May need to change as PO intake/overall status improves.     -Continues Apixaban 2.5 mg's BID.   -Continues Statin.     Insomnia, unspecified type  Reports she is still sleeping well most nights.   -We continue to hold home Ambien.     Debility  -Continues with PT/OT.     Orders written by provider at facility  -DC'd Oxygen.     Electronically signed by:  NIVIA Grimaldo CNP             Sincerely,        NIVIA Grimaldo CNP

## 2021-02-16 ENCOUNTER — HOME CARE/HOSPICE - HEALTHEAST (OUTPATIENT)
Dept: HOME HEALTH SERVICES | Facility: HOME HEALTH | Age: 86
End: 2021-02-16

## 2021-02-17 ENCOUNTER — COMMUNICATION - HEALTHEAST (OUTPATIENT)
Dept: CARE COORDINATION | Facility: CLINIC | Age: 86
End: 2021-02-17

## 2021-02-18 ENCOUNTER — DISCHARGE SUMMARY NURSING HOME (OUTPATIENT)
Dept: GERIATRICS | Facility: CLINIC | Age: 86
End: 2021-02-18
Payer: MEDICARE

## 2021-02-18 VITALS
SYSTOLIC BLOOD PRESSURE: 140 MMHG | OXYGEN SATURATION: 93 % | HEART RATE: 64 BPM | RESPIRATION RATE: 18 BRPM | DIASTOLIC BLOOD PRESSURE: 82 MMHG | BODY MASS INDEX: 21.99 KG/M2 | HEIGHT: 64 IN | TEMPERATURE: 98.5 F | WEIGHT: 128.8 LBS

## 2021-02-18 DIAGNOSIS — I95.1 ORTHOSTATIC HYPOTENSION: ICD-10-CM

## 2021-02-18 DIAGNOSIS — U07.1 2019 NOVEL CORONAVIRUS DISEASE (COVID-19): Primary | ICD-10-CM

## 2021-02-18 DIAGNOSIS — J96.01 ACUTE RESPIRATORY FAILURE WITH HYPOXIA (H): ICD-10-CM
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
## 2021-02-18 DIAGNOSIS — I48.20 CHRONIC ATRIAL FIBRILLATION (H): ICD-10-CM

## 2021-02-18 DIAGNOSIS — Z95.0 CARDIAC PACEMAKER IN SITU: ICD-10-CM

## 2021-02-18 DIAGNOSIS — G47.00 INSOMNIA, UNSPECIFIED TYPE: ICD-10-CM

## 2021-02-18 DIAGNOSIS — I10 ESSENTIAL HYPERTENSION: ICD-10-CM

## 2021-02-18 DIAGNOSIS — E63.9 POOR NUTRITION: ICD-10-CM

## 2021-02-18 DIAGNOSIS — R55 SYNCOPE, UNSPECIFIED SYNCOPE TYPE: ICD-10-CM

## 2021-02-18 DIAGNOSIS — R53.83 FATIGUE, UNSPECIFIED TYPE: ICD-10-CM

## 2021-02-18 DIAGNOSIS — R53.81 DEBILITY: ICD-10-CM

## 2021-02-18 DIAGNOSIS — I35.0 AORTIC VALVE STENOSIS, ETIOLOGY OF CARDIAC VALVE DISEASE UNSPECIFIED: ICD-10-CM

## 2021-02-18 PROCEDURE — 99316 NF DSCHRG MGMT 30 MIN+: CPT | Performed by: NURSE PRACTITIONER

## 2021-02-18 ASSESSMENT — MIFFLIN-ST. JEOR: SCORE: 989.23

## 2021-02-18 NOTE — PROGRESS NOTES
[place] [facility] DISCHARGE SUMMARY  PATIENT'S NAME: Deloris Larson  YOB: 1930  MEDICAL RECORD NUMBER:  [id number]  Place of Service where encounter took place:  [facility] ([facility]) [[id number]]    PRIMARY CARE PROVIDER AND CLINIC RESPONSIBLE AFTER TRANSFER:   Kassidy Salas MD, [facility] [address] [facility] [address]     Transferring providers: NIVIA Grimaldo CNP, Gi Yeboah MD  Recent Hospitalization/ED:  [facility]  stay 1/28/21 to 1/30/21.  Date of SNF Admission: January/30/2021  Date of SNF (anticipated) Discharge: February/19/2021  Discharged to: previous independent home with family    Cognitive Scores: SLUMS: 20/30  Physical Function: 150 ft with 2ww independently.   DME: Walker    CODE STATUS/ADVANCE DIRECTIVES DISCUSSION:  DNR / DNI   ALLERGIES: Diphenhydramine    DISCHARGE DIAGNOSIS/NURSING FACILITY COURSE:   Mrs. Larson is a young 89 y/o living independently with family support with a h/o HTN, A-fib, SSS-PPM, mild aortic stenosis, chronic diarrhea, SHARON, insomnia and distant retroperitoneal hematoma/bleed whom had a syncope episode, fell on her Left hip and was presented to hospital.  In hospital found to have JANES with COVID positive 1/28.  Was started on PRN Meclizine and transitioned to the TCU for ongoing cares and PT/OT.  One of the reasons she came to the TCU rather than home is other family members were positive and ill.     Initially at the TCU Mrs. Larson did well but after a couple days she had fevers, more lethargy and minimal PO intake, declined and started requiring Oxygen.  We elected to send her to the hospital to see if they would consider Remdesivir and possible steroids.  Hospital gave her IVF's and she returned without Remdesivir/steroids.  After returning she did appear improved and has recovered since with every day looking better.  She has been weaned off oxygen, does still have some mild  fatigue, but eating/drinking well and back to being ambulatory and doing well.     Thus Mrs. Larson will return home with family support on 19 Feb.  In meeting Mrs. Larson today for discharge she endorses the above, still some more fatigue than usual, but much improved.  No other complaints.  Reports the prior Left sided pain from her fall is much better.  Is ready for home.      2019 novel coronavirus disease (COVID-19)  Acute respiratory failure with hypoxia (H)  Fatigue, unspecified type  Poor nutrition  Syncope, unspecified syncope type  Orthostatic hypotension  Fall, subsequent encounter  Was in hospital twice for her COVID which was positive 1/28.  First hospital due to JANES, dehydration, orthostatic with a fall.  Came to TCU improved but then sent back to hospital due to fevers, hypoxia and decline requiring oxygen.  Was given IVF's and returned to TCU.  Has improved greatly since.   -Plan as noted below.   -Does continue PRN Meclizine but has noted used lately.   -Continue to clinically monitor.     Essential hypertension  Chronic atrial fibrillation (H)  Aortic valve stenosis, etiology of cardiac valve disease unspecified, mild  Cardiac pacemaker in situ  SBP's are ranging 130-140 lately, were lower prior; HR 60-80's.   No current s/s of clinical CHF.   -Hospital stopped her prior Furosemide, Ramipril due to above issues, we have continued to hold those; resuming left to PCP discretion.   -We continue her on a lower dose of Toprol as well, 25 mg's q daily, increasing left to PCP discretion.    -Does continue Apixaban and Statin.     Insomnia, unspecified type  Hospital stopped her Ambien and started Doxepin.   We stopped both due to age and falls.   She had fatigue here thus avoided sleep med's.   -DC'd Doxepin.   -We held and recommended against her Ambien, will leave resuming to PCP discretion.      Debility  -Will DC with home care.   -See below for F2F.     Past Medical History:  has a past medical  history of Anemia, Atrial fibrillation (H), CAD (coronary artery disease), Clinical diagnosis of COVID-19, Former smoker, History of skin cancer, Insomnia, Restless legs syndrome (RLS), and SSS (sick sinus syndrome) (H).    Discharge Medications:  Current Outpatient Medications   Medication Sig Dispense Refill     acetaminophen (TYLENOL) 500 MG tablet Take 1,000 mg by mouth every 8 hours as needed       alum hydroxide-mag carbonate (GENACTON)  MG/15ML SUSP suspension Take 30 mLs by mouth 4 times daily as needed for heartburn        apixaban ANTICOAGULANT (ELIQUIS) 2.5 MG tablet Take 2.5 mg by mouth 2 times daily       atorvastatin (LIPITOR) 20 MG tablet Take 20 mg by mouth At Bedtime        cholecalciferol (VITAMIN D3) 125 mcg (5000 units) capsule Take 125 mcg by mouth four times a week Mon, Wed, Fri, Sat       ferrous fumarate 65 mg, [place]. FE,-Vitamin C 125 mg (VITRON C)  MG TABS tablet Take 1 tablet by mouth daily       guaiFENesin-dextromethorphan (ROBITUSSIN DM) 100-10 MG/5ML syrup Take 5 mLs by mouth every 4 hours as needed for cough       meclizine 25 MG CHEW Take 25 mg by mouth 3 times daily as needed for dizziness       metoprolol succinate ER (TOPROL-XL) 25 MG 24 hr tablet Take 25 mg by mouth daily        nitroGLYcerin (NITROSTAT) 0.3 MG sublingual tablet Place 0.3 mg under the tongue every 5 minutes as needed for chest pain For chest pain place 1 tablet under the tongue every 5 minutes for 3 doses. If symptoms persist 5 minutes after 1st dose call 911.       Probiotic Product (PROBIOTIC ACIDOPHILUS BEADS) CAPS Take 1 capsule by mouth 2 times daily       vitamin (B COMPLEX-C) tablet Take 1 tablet by mouth daily       zolpidem (AMBIEN) 10 MG tablet Take 10 mg by mouth nightly as needed for sleep Holding Ambien while at [facility].        Medication Changes/Rationale:   -As noted above.     Controlled medications sent with patient:   not applicable/none     ROS:   7 point ROS done including, light  "headedness/dizziness, fever/chills, pain, Resp, CV, GI, and  and is negative other than noted in HPI.      Physical Exam:   Vitals: BP (!) 140/82   Pulse 64   Temp 98.5  F (36.9  C)   Resp 18   Ht 1.626 m (5' 4\")   Wt 58.4 kg (128 lb 12.8 oz)   SpO2 93%   BMI 22.11 kg/m    BMI= Body mass index is 22.11 kg/m .     EXAM LIMITED DUE TO [facility] social distancing recommendations:  GENERAL APPEARANCE:  Elderly thin female sitting on side of bed.  NAD, non-toxic.  Overall much improved.   ENT:  Mouth and oropharynx normal, moist, mucous membranes.   RESP:  Lungs are CTA.  Regular relaxed breathing effort.  No cough.   CV: 3/6 systolic murmur heard thought out/S2.   Regular rhythm and rate.  No generalized edema.    ABDOMEN:   Flat, soft, non-tender with active bowel sounds.  No guarding, rigidity, or rebound tenderness.  EXTREMITIES:  No lower extremity edema, no calf tenderness.   PSYCH: Alert and orientated, pleasant and cooperative.  Suspect mild memory impairment.  Stable/unchanged.      SNF labs: Labs done while at Skilled Nursing Facility done by [facility] lab.     DISCHARGE PLAN:    Follow up labs: No labs orders/due    Medical Follow Up:      Follow up with primary care provider in 1-2 weeks      Discharge Services: Home Care:  Occupational Therapy, Physical Therapy, Registered Nurse, Home Health Aide and From:  [facility] care.     Discharge Instructions Verbalized to Patient at Discharge:     Instructed patient and daughter to arrange/attend above appointments.     TOTAL DISCHARGE TIME:   Greater than 30 minutes  Electronically signed by:  NIVIA Grimaldo CNP       Documentation of Face to Face and Certification for Home Health Services    I certify that patient: Deloris Larson is under my care and that I, or a nurse practitioner or physician's assistant working with me, had a face-to-face encounter that meets the physician face-to-face encounter requirements with this patient on: 18 Feb " 21.    This encounter with the patient was in whole, or in part, for the following medical condition, which is the primary reason for home health care: Recent COVID with dehydration/syncope and falls, debility.    I certify that, based on my findings, the following services are medically necessary home health services: Nursing, Occupational Therapy, Physical Therapy and HHA. .    My clinical findings support the need for the above services because: Nurse is needed: To provide assessment and oversight required in the home to assure adherence to the medical plan due to: Recent COVID with dehydration/syncope and falls, debility..., Occupational Therapy Services are needed to assess and treat cognitive ability and address ADL safety due to impairment in Recent COVID with dehydration/syncope and falls, debility.. and Physical Therapy Services are needed to assess and treat the following functional impairments: Recent COVID with dehydration/syncope and falls, debility..    Further, I certify that my clinical findings support that this patient is homebound (i.e. absences from home require considerable and taxing effort and are for medical reasons or Spiritism services or infrequently or of short duration when for other reasons) because: Requires assistance of another person or specialized equipment to access medical services because patient: Requires supervision of another for safe transfer...    Based on the above findings. I certify that this patient is confined to the home and needs intermittent skilled nursing care, physical therapy and/or speech therapy.  The patient is under my care, and I have initiated the establishment of the plan of care.  This patient will be followed by a physician who will periodically review the plan of care.  Physician/Provider to provide follow up care: Kassidy Salas    Attending hospital physician (the Medicare certified PONCHO provider):   Electronically signed by  NIVIA Dorsey,  CNP  [facility]    Physician Signature: See electronic signature associated with these discharge orders.  Date: 2/18/2021

## 2021-02-18 NOTE — LETTER
2/18/2021        RE: Deloris Larson  [address] [place] [address]        [place] [facility] DISCHARGE SUMMARY  PATIENT'S NAME: Deloris Larson  YOB: 1930  MEDICAL RECORD NUMBER:  [id number]  Place of Service where encounter took place:  [facility] [[id number]]    PRIMARY CARE PROVIDER AND CLINIC RESPONSIBLE AFTER TRANSFER:   Kassidy Salas MD, [facility] CL[facility]IC [address] [address] [facility] [address]      Transferring providers: NIVIA Grimaldo CNP, Gi Yeboah MD  Recent Hospitalization/ED:  [facility]  stay 1/28/21 to 1/30/21.  Date of SNF Admission: January/30/2021  Date of SNF (anticipated) Discharge: February/19/2021  Discharged to: previous independent home with family    Cognitive Scores: SLUMS: 20/30  Physical Function: 150 ft with 2ww independently.   DME: Walker    CODE STATUS/ADVANCE DIRECTIVES DISCUSSION:  DNR / DNI   ALLERGIES: Diphenhydramine    DISCHARGE DIAGNOSIS/NURSING FACILITY COURSE:   Mrs. Larson is a young 89 y/o living independently with family support with a h/o HTN, A-fib, SSS-PPM, mild aortic stenosis, chronic diarrhea, SHARON, insomnia and distant retroperitoneal hematoma/bleed whom had a syncope episode, fell on her Left hip and was presented to hospital.  In hospital found to have JANES with COVID positive 1/28.  Was started on PRN Meclizine and transitioned to the TCU for ongoing cares and PT/OT.  One of the reasons she came to the TCU rather than home is other family members were positive and ill.     Initially at the TCU Mrs. Larson did well but after a couple days she had fevers, more lethargy and minimal PO intake, declined and started requiring Oxygen.  We elected to send her to the hospital to see if they would consider Remdesivir and possible steroids.  Hospital gave her IVF's and she returned without Remdesivir/steroids.  After returning she did appear improved and has recovered since with  every day looking better.  She has been weaned off oxygen, does still have some mild fatigue, but eating/drinking well and back to being ambulatory and doing well.     Thus Mrs. Larson will return home with family support on 19 Feb.  In meeting Mrs. Larson today for discharge she endorses the above, still some more fatigue than usual, but much improved.  No other complaints.  Reports the prior Left sided pain from her fall is much better.  Is ready for home.      2019 novel coronavirus disease (COVID-19)  Acute respiratory failure with hypoxia (H)  Fatigue, unspecified type  Poor nutrition  Syncope, unspecified syncope type  Orthostatic hypotension  Fall, subsequent encounter  Was in hospital twice for her COVID which was positive 1/28.  First hospital due to JANES, dehydration, orthostatic with a fall.  Came to TCU improved but then sent back to hospital due to fevers, hypoxia and decline requiring oxygen.  Was given IVF's and returned to TCU.  Has improved greatly since.   -Plan as noted below.   -Does continue PRN Meclizine but has noted used lately.   -Continue to clinically monitor.     Essential hypertension  Chronic atrial fibrillation (H)  Aortic valve stenosis, etiology of cardiac valve disease unspecified, mild  Cardiac pacemaker in situ  SBP's are ranging 130-140 lately, were lower prior; HR 60-80's.   No current s/s of clinical CHF.   -Hospital stopped her prior Furosemide, Ramipril due to above issues, we have continued to hold those; resuming left to PCP discretion.   -We continue her on a lower dose of Toprol as well, 25 mg's q daily, increasing left to PCP discretion.    -Does continue Apixaban and Statin.     Insomnia, unspecified type  Hospital stopped her Ambien and started Doxepin.   We stopped both due to age and falls.   She had fatigue here thus avoided sleep med's.   -DC'd Doxepin.   -We held and recommended against her Ambien, will leave resuming to PCP discretion.      Debility  -Will DC  with home care.   -See below for F2F.     Past Medical History:  has a past medical history of Anemia, Atrial fibrillation (H), CAD (coronary artery disease), Clinical diagnosis of COVID-19, Former smoker, History of skin cancer, Insomnia, Restless legs syndrome (RLS), and SSS (sick sinus syndrome) (H).    Discharge Medications:  Current Outpatient Medications   Medication Sig Dispense Refill     acetaminophen (TYLENOL) 500 MG tablet Take 1,000 mg by mouth every 8 hours as needed       alum hydroxide-mag carbonate (GENACTON)  MG/15ML SUSP suspension Take 30 mLs by mouth 4 times daily as needed for heartburn        apixaban ANTICOAGULANT (ELIQUIS) 2.5 MG tablet Take 2.5 mg by mouth 2 times daily       atorvastatin (LIPITOR) 20 MG tablet Take 20 mg by mouth At Bedtime        cholecalciferol (VITAMIN D3) 125 mcg (5000 units) capsule Take 125 mcg by mouth four times a week Mon, Wed, Fri, Sat       ferrous fumarate 65 mg, [place]. FE,-Vitamin C 125 mg (VITRON C)  MG TABS tablet Take 1 tablet by mouth daily       guaiFENesin-dextromethorphan (ROBITUSSIN DM) 100-10 MG/5ML syrup Take 5 mLs by mouth every 4 hours as needed for cough       meclizine 25 MG CHEW Take 25 mg by mouth 3 times daily as needed for dizziness       metoprolol succinate ER (TOPROL-XL) 25 MG 24 hr tablet Take 25 mg by mouth daily        nitroGLYcerin (NITROSTAT) 0.3 MG sublingual tablet Place 0.3 mg under the tongue every 5 minutes as needed for chest pain For chest pain place 1 tablet under the tongue every 5 minutes for 3 doses. If symptoms persist 5 minutes after 1st dose call 911.       Probiotic Product (PROBIOTIC ACIDOPHILUS BEADS) CAPS Take 1 capsule by mouth 2 times daily       vitamin (B COMPLEX-C) tablet Take 1 tablet by mouth daily       zolpidem (AMBIEN) 10 MG tablet Take 10 mg by mouth nightly as needed for sleep Holding Ambien while at [facility].        Medication Changes/Rationale:   -As noted above.     Controlled medications sent  "with patient:   not applicable/none     ROS:   7 point ROS done including, light headedness/dizziness, fever/chills, pain, Resp, CV, GI, and  and is negative other than noted in HPI.      Physical Exam:   Vitals: BP (!) 140/82   Pulse 64   Temp 98.5  F (36.9  C)   Resp 18   Ht 1.626 m (5' 4\")   Wt 58.4 kg (128 lb 12.8 oz)   SpO2 93%   BMI 22.11 kg/m    BMI= Body mass index is 22.11 kg/m .     EXAM LIMITED DUE TO [facility] social distancing recommendations:  GENERAL APPEARANCE:  Elderly thin female sitting on side of bed.  NAD, non-toxic.  Overall much improved.   ENT:  Mouth and oropharynx normal, moist, mucous membranes.   RESP:  Lungs are CTA.  Regular relaxed breathing effort.  No cough.   CV: 3/6 systolic murmur heard thought out/S2.   Regular rhythm and rate.  No generalized edema.    ABDOMEN:   Flat, soft, non-tender with active bowel sounds.  No guarding, rigidity, or rebound tenderness.  EXTREMITIES:  No lower extremity edema, no calf tenderness.   PSYCH: Alert and orientated, pleasant and cooperative.  Suspect mild memory impairment.  Stable/unchanged.      SNF labs: Labs done while at Skilled Nursing Facility done by [facility] lab.     DISCHARGE PLAN:    Follow up labs: No labs orders/due    Medical Follow Up:      Follow up with primary care provider in 1-2 weeks      Discharge Services: Home Care:  Occupational Therapy, Physical Therapy, Registered Nurse, Home Health Aide and From:  [facility].     Discharge Instructions Verbalized to Patient at Discharge:     Instructed patient and daughter to arrange/attend above appointments.     TOTAL DISCHARGE TIME:   Greater than 30 minutes  Electronically signed by:  NIVIA Grimaldo CNP       Documentation of Face to Face and Certification for Home Health Services    I certify that patient: Deloris Larson is under my care and that I, or a nurse practitioner or physician's assistant working with me, had a face-to-face encounter that meets " the physician face-to-face encounter requirements with this patient on: 18 Feb 21.    This encounter with the patient was in whole, or in part, for the following medical condition, which is the primary reason for home health care: Recent COVID with dehydration/syncope and falls, debility.    I certify that, based on my findings, the following services are medically necessary home health services: Nursing, Occupational Therapy, Physical Therapy and HHA. .    My clinical findings support the need for the above services because: Nurse is needed: To provide assessment and oversight required in the home to assure adherence to the medical plan due to: Recent COVID with dehydration/syncope and falls, debility..., Occupational Therapy Services are needed to assess and treat cognitive ability and address ADL safety due to impairment in Recent COVID with dehydration/syncope and falls, debility.. and Physical Therapy Services are needed to assess and treat the following functional impairments: Recent COVID with dehydration/syncope and falls, debility..    Further, I certify that my clinical findings support that this patient is homebound (i.e. absences from home require considerable and taxing effort and are for medical reasons or Caodaism services or infrequently or of short duration when for other reasons) because: Requires assistance of another person or specialized equipment to access medical services because patient: Requires supervision of another for safe transfer...    Based on the above findings. I certify that this patient is confined to the home and needs intermittent skilled nursing care, physical therapy and/or speech therapy.  The patient is under my care, and I have initiated the establishment of the plan of care.  This patient will be followed by a physician who will periodically review the plan of care.  Physician/Provider to provide follow up care: Kassidy Salas    Attending hospital physician (the  Medicare certified PECOS provider):   Electronically signed by  NIVIA Dorsey, CNP  [facility]    Physician Signature: See electronic signature associated with these discharge orders.  Date: 2/18/2021        Sincerely,        NIVIA Grimaldo CNP

## 2021-02-23 ENCOUNTER — COMMUNICATION - HEALTHEAST (OUTPATIENT)
Dept: NURSING | Facility: CLINIC | Age: 86
End: 2021-02-23

## 2021-02-24 ENCOUNTER — COMMUNICATION - HEALTHEAST (OUTPATIENT)
Dept: INTERNAL MEDICINE | Facility: CLINIC | Age: 86
End: 2021-02-24

## 2021-02-25 ENCOUNTER — OFFICE VISIT - HEALTHEAST (OUTPATIENT)
Dept: INTERNAL MEDICINE | Facility: CLINIC | Age: 86
End: 2021-02-25

## 2021-02-25 ENCOUNTER — AMBULATORY - HEALTHEAST (OUTPATIENT)
Dept: NURSING | Facility: CLINIC | Age: 86
End: 2021-02-25

## 2021-02-25 DIAGNOSIS — Z09 HOSPITAL DISCHARGE FOLLOW-UP: ICD-10-CM

## 2021-02-25 DIAGNOSIS — M62.81 GENERALIZED MUSCLE WEAKNESS: ICD-10-CM

## 2021-02-25 DIAGNOSIS — E55.9 VITAMIN D DEFICIENCY: ICD-10-CM

## 2021-02-25 DIAGNOSIS — I10 ESSENTIAL HYPERTENSION: ICD-10-CM

## 2021-02-25 DIAGNOSIS — F51.01 PRIMARY INSOMNIA: ICD-10-CM

## 2021-02-25 DIAGNOSIS — D64.9 ANEMIA, UNSPECIFIED TYPE: ICD-10-CM

## 2021-02-25 LAB
ALBUMIN SERPL-MCNC: 3.6 G/DL (ref 3.5–5)
ALP SERPL-CCNC: 69 U/L (ref 45–120)
ALT SERPL W P-5'-P-CCNC: 14 U/L (ref 0–45)
ANION GAP SERPL CALCULATED.3IONS-SCNC: 10 MMOL/L (ref 5–18)
AST SERPL W P-5'-P-CCNC: 16 U/L (ref 0–40)
BILIRUB SERPL-MCNC: 0.5 MG/DL (ref 0–1)
BUN SERPL-MCNC: 20 MG/DL (ref 8–28)
CALCIUM SERPL-MCNC: 9.2 MG/DL (ref 8.5–10.5)
CHLORIDE BLD-SCNC: 109 MMOL/L (ref 98–107)
CO2 SERPL-SCNC: 22 MMOL/L (ref 22–31)
CREAT SERPL-MCNC: 1.38 MG/DL (ref 0.6–1.1)
ERYTHROCYTE [DISTWIDTH] IN BLOOD BY AUTOMATED COUNT: 15.4 % (ref 11–14.5)
FERRITIN SERPL-MCNC: 34 NG/ML (ref 10–130)
GFR SERPL CREATININE-BSD FRML MDRD: 36 ML/MIN/1.73M2
GLUCOSE BLD-MCNC: 93 MG/DL (ref 70–125)
HCT VFR BLD AUTO: 30.3 % (ref 35–47)
HGB BLD-MCNC: 9.4 G/DL (ref 12–16)
MCH RBC QN AUTO: 29.8 PG (ref 27–34)
MCHC RBC AUTO-ENTMCNC: 31 G/DL (ref 32–36)
MCV RBC AUTO: 96 FL (ref 80–100)
PLATELET # BLD AUTO: 201 THOU/UL (ref 140–440)
PMV BLD AUTO: 10.1 FL (ref 7–10)
POTASSIUM BLD-SCNC: 5.3 MMOL/L (ref 3.5–5)
PROT SERPL-MCNC: 6.8 G/DL (ref 6–8)
RBC # BLD AUTO: 3.15 MILL/UL (ref 3.8–5.4)
SODIUM SERPL-SCNC: 141 MMOL/L (ref 136–145)
VIT B12 SERPL-MCNC: 1204 PG/ML (ref 213–816)
WBC: 8.4 THOU/UL (ref 4–11)

## 2021-02-25 ASSESSMENT — MIFFLIN-ST. JEOR: SCORE: 973.23

## 2021-02-26 ENCOUNTER — COMMUNICATION - HEALTHEAST (OUTPATIENT)
Dept: CARE COORDINATION | Facility: CLINIC | Age: 86
End: 2021-02-26

## 2021-02-26 ENCOUNTER — COMMUNICATION - HEALTHEAST (OUTPATIENT)
Dept: INTERNAL MEDICINE | Facility: CLINIC | Age: 86
End: 2021-02-26

## 2021-02-26 LAB — 25(OH)D3 SERPL-MCNC: 76 NG/ML (ref 30–80)

## 2021-02-26 ASSESSMENT — ACTIVITIES OF DAILY LIVING (ADL): DEPENDENT_IADLS:: CLEANING;MONEY MANAGEMENT;TRANSPORTATION

## 2021-03-04 ENCOUNTER — COMMUNICATION - HEALTHEAST (OUTPATIENT)
Dept: NURSING | Facility: CLINIC | Age: 86
End: 2021-03-04

## 2021-03-08 ENCOUNTER — COMMUNICATION - HEALTHEAST (OUTPATIENT)
Dept: CARE COORDINATION | Facility: CLINIC | Age: 86
End: 2021-03-08

## 2021-03-08 ASSESSMENT — ACTIVITIES OF DAILY LIVING (ADL): DEPENDENT_IADLS:: CLEANING;MONEY MANAGEMENT;TRANSPORTATION

## 2021-03-10 ENCOUNTER — COMMUNICATION - HEALTHEAST (OUTPATIENT)
Dept: INTERNAL MEDICINE | Facility: CLINIC | Age: 86
End: 2021-03-10

## 2021-03-11 ENCOUNTER — COMMUNICATION - HEALTHEAST (OUTPATIENT)
Dept: NURSING | Facility: CLINIC | Age: 86
End: 2021-03-11

## 2021-03-12 ENCOUNTER — OFFICE VISIT - HEALTHEAST (OUTPATIENT)
Dept: INTERNAL MEDICINE | Facility: CLINIC | Age: 86
End: 2021-03-12

## 2021-03-12 DIAGNOSIS — K68.3 RETROPERITONEAL HEMATOMA: ICD-10-CM

## 2021-03-12 DIAGNOSIS — I10 HYPERTENSION: ICD-10-CM

## 2021-03-12 ASSESSMENT — MIFFLIN-ST. JEOR: SCORE: 991.66

## 2021-03-18 ENCOUNTER — AMBULATORY - HEALTHEAST (OUTPATIENT)
Dept: NURSING | Facility: CLINIC | Age: 86
End: 2021-03-18

## 2021-04-06 ENCOUNTER — COMMUNICATION - HEALTHEAST (OUTPATIENT)
Dept: NURSING | Facility: CLINIC | Age: 86
End: 2021-04-06

## 2021-04-14 ENCOUNTER — COMMUNICATION - HEALTHEAST (OUTPATIENT)
Dept: NURSING | Facility: CLINIC | Age: 86
End: 2021-04-14

## 2021-04-20 ENCOUNTER — COMMUNICATION - HEALTHEAST (OUTPATIENT)
Dept: CARE COORDINATION | Facility: CLINIC | Age: 86
End: 2021-04-20

## 2021-04-27 ENCOUNTER — AMBULATORY - HEALTHEAST (OUTPATIENT)
Dept: CARDIOLOGY | Facility: CLINIC | Age: 86
End: 2021-04-27

## 2021-04-27 DIAGNOSIS — I49.5 SSS (SICK SINUS SYNDROME) (H): ICD-10-CM

## 2021-04-27 DIAGNOSIS — Z95.0 CARDIAC PACEMAKER IN SITU: ICD-10-CM

## 2021-05-05 ENCOUNTER — RECORDS - HEALTHEAST (OUTPATIENT)
Dept: ADMINISTRATIVE | Facility: OTHER | Age: 86
End: 2021-05-05

## 2021-05-05 ENCOUNTER — AMBULATORY - HEALTHEAST (OUTPATIENT)
Dept: CARDIOLOGY | Facility: CLINIC | Age: 86
End: 2021-05-05

## 2021-05-07 ENCOUNTER — AMBULATORY - HEALTHEAST (OUTPATIENT)
Dept: CARDIOLOGY | Facility: CLINIC | Age: 86
End: 2021-05-07

## 2021-05-07 DIAGNOSIS — I48.20 CHRONIC ATRIAL FIBRILLATION (H): ICD-10-CM

## 2021-05-07 DIAGNOSIS — I48.19 PERSISTENT ATRIAL FIBRILLATION (H): ICD-10-CM

## 2021-05-07 DIAGNOSIS — U07.1 2019 NOVEL CORONAVIRUS DISEASE (COVID-19): ICD-10-CM

## 2021-05-07 DIAGNOSIS — I25.10 ATHEROSCLEROSIS OF NATIVE CORONARY ARTERY OF NATIVE HEART WITHOUT ANGINA PECTORIS: ICD-10-CM

## 2021-05-07 DIAGNOSIS — Z95.0 CARDIAC PACEMAKER IN SITU: ICD-10-CM

## 2021-05-07 DIAGNOSIS — I49.5 SSS (SICK SINUS SYNDROME) (H): ICD-10-CM

## 2021-05-07 DIAGNOSIS — I10 ESSENTIAL HYPERTENSION: ICD-10-CM

## 2021-05-07 DIAGNOSIS — R55 SYNCOPE, UNSPECIFIED SYNCOPE TYPE: ICD-10-CM

## 2021-05-07 ASSESSMENT — MIFFLIN-ST. JEOR: SCORE: 994.21

## 2021-05-12 ENCOUNTER — COMMUNICATION - HEALTHEAST (OUTPATIENT)
Dept: NURSING | Facility: CLINIC | Age: 86
End: 2021-05-12

## 2021-05-20 ENCOUNTER — RECORDS - HEALTHEAST (OUTPATIENT)
Dept: ADMINISTRATIVE | Facility: OTHER | Age: 86
End: 2021-05-20

## 2021-05-20 ENCOUNTER — COMMUNICATION - HEALTHEAST (OUTPATIENT)
Dept: CARE COORDINATION | Facility: CLINIC | Age: 86
End: 2021-05-20

## 2021-05-24 ENCOUNTER — OFFICE VISIT - HEALTHEAST (OUTPATIENT)
Dept: INTERNAL MEDICINE | Facility: CLINIC | Age: 86
End: 2021-05-24

## 2021-05-24 DIAGNOSIS — R79.9 ABNORMAL FINDING OF BLOOD CHEMISTRY, UNSPECIFIED: ICD-10-CM

## 2021-05-24 DIAGNOSIS — I35.0 NONRHEUMATIC AORTIC VALVE STENOSIS: ICD-10-CM

## 2021-05-24 DIAGNOSIS — E55.9 VITAMIN D DEFICIENCY: ICD-10-CM

## 2021-05-24 DIAGNOSIS — U07.1 CLINICAL DIAGNOSIS OF COVID-19: ICD-10-CM

## 2021-05-24 DIAGNOSIS — Z95.0 CARDIAC PACEMAKER IN SITU: ICD-10-CM

## 2021-05-24 DIAGNOSIS — G25.81 RLS (RESTLESS LEGS SYNDROME): ICD-10-CM

## 2021-05-24 DIAGNOSIS — L85.3 DRY SKIN: ICD-10-CM

## 2021-05-24 DIAGNOSIS — I10 ESSENTIAL HYPERTENSION: ICD-10-CM

## 2021-05-24 DIAGNOSIS — L29.9 PRURITIC DISORDER: ICD-10-CM

## 2021-05-24 DIAGNOSIS — I48.19 PERSISTENT ATRIAL FIBRILLATION (H): ICD-10-CM

## 2021-05-24 DIAGNOSIS — D64.89 ANEMIA DUE TO OTHER CAUSE, NOT CLASSIFIED: ICD-10-CM

## 2021-05-24 DIAGNOSIS — G47.09 OTHER INSOMNIA: ICD-10-CM

## 2021-05-24 LAB
ALBUMIN SERPL-MCNC: 4 G/DL (ref 3.5–5)
ALP SERPL-CCNC: 62 U/L (ref 45–120)
ALT SERPL W P-5'-P-CCNC: 9 U/L (ref 0–45)
ANION GAP SERPL CALCULATED.3IONS-SCNC: 5 MMOL/L (ref 5–18)
AST SERPL W P-5'-P-CCNC: 16 U/L (ref 0–40)
BASOPHILS # BLD AUTO: 0.1 THOU/UL (ref 0–0.2)
BASOPHILS NFR BLD AUTO: 1 % (ref 0–2)
BILIRUB DIRECT SERPL-MCNC: 0.2 MG/DL
BILIRUB SERPL-MCNC: 0.5 MG/DL (ref 0–1)
BUN SERPL-MCNC: 16 MG/DL (ref 8–28)
CALCIUM SERPL-MCNC: 9.4 MG/DL (ref 8.5–10.5)
CHLORIDE BLD-SCNC: 102 MMOL/L (ref 98–107)
CO2 SERPL-SCNC: 26 MMOL/L (ref 22–31)
CREAT SERPL-MCNC: 0.97 MG/DL (ref 0.6–1.1)
EOSINOPHIL # BLD AUTO: 0.2 THOU/UL (ref 0–0.4)
EOSINOPHIL NFR BLD AUTO: 3 % (ref 0–6)
ERYTHROCYTE [DISTWIDTH] IN BLOOD BY AUTOMATED COUNT: 13.3 % (ref 11–14.5)
FERRITIN SERPL-MCNC: 24 NG/ML (ref 10–130)
GFR SERPL CREATININE-BSD FRML MDRD: 54 ML/MIN/1.73M2
GLUCOSE BLD-MCNC: 101 MG/DL (ref 70–125)
HCT VFR BLD AUTO: 32.4 % (ref 35–47)
HGB BLD-MCNC: 10.2 G/DL (ref 12–16)
IMM GRANULOCYTES # BLD: 0 THOU/UL
IMM GRANULOCYTES NFR BLD: 0 %
IRON SATN MFR SERPL: 66 % (ref 20–50)
IRON SERPL-MCNC: 221 UG/DL (ref 42–175)
LYMPHOCYTES # BLD AUTO: 1.4 THOU/UL (ref 0.8–4.4)
LYMPHOCYTES NFR BLD AUTO: 19 % (ref 20–40)
MCH RBC QN AUTO: 30.5 PG (ref 27–34)
MCHC RBC AUTO-ENTMCNC: 31.5 G/DL (ref 32–36)
MCV RBC AUTO: 97 FL (ref 80–100)
MONOCYTES # BLD AUTO: 0.6 THOU/UL (ref 0–0.9)
MONOCYTES NFR BLD AUTO: 8 % (ref 2–10)
NEUTROPHILS # BLD AUTO: 5.1 THOU/UL (ref 2–7.7)
NEUTROPHILS NFR BLD AUTO: 69 % (ref 50–70)
PLATELET # BLD AUTO: 177 THOU/UL (ref 140–440)
PMV BLD AUTO: 9.7 FL (ref 7–10)
POTASSIUM BLD-SCNC: 3.6 MMOL/L (ref 3.5–5)
PROT SERPL-MCNC: 6.3 G/DL (ref 6–8)
RBC # BLD AUTO: 3.34 MILL/UL (ref 3.8–5.4)
SODIUM SERPL-SCNC: 133 MMOL/L (ref 136–145)
TIBC SERPL-MCNC: 335 UG/DL (ref 313–563)
TRANSFERRIN SERPL-MCNC: 268 MG/DL (ref 212–360)
WBC: 7.4 THOU/UL (ref 4–11)

## 2021-05-24 ASSESSMENT — MIFFLIN-ST. JEOR: SCORE: 1003.28

## 2021-05-25 ENCOUNTER — RECORDS - HEALTHEAST (OUTPATIENT)
Dept: ADMINISTRATIVE | Facility: CLINIC | Age: 86
End: 2021-05-25

## 2021-05-26 ENCOUNTER — RECORDS - HEALTHEAST (OUTPATIENT)
Dept: ADMINISTRATIVE | Facility: CLINIC | Age: 86
End: 2021-05-26

## 2021-05-26 ENCOUNTER — COMMUNICATION - HEALTHEAST (OUTPATIENT)
Dept: NURSING | Facility: CLINIC | Age: 86
End: 2021-05-26

## 2021-05-27 ENCOUNTER — RECORDS - HEALTHEAST (OUTPATIENT)
Dept: ADMINISTRATIVE | Facility: CLINIC | Age: 86
End: 2021-05-27

## 2021-05-27 NOTE — TELEPHONE ENCOUNTER
" Patient called device clinic with concerns that she could feel/see the \"wires\" from her pacemaker and wants someone to look at it.     After further questioning, the patient describes feeling where the leads enter the header at top of generator under the skin. Skin is intact, no drainage, no redness, no swelling. Reviewed with patient that this is a normal finding, especially on thinner patients and that as long as there is no skin breakdown, pain, or signs of infection that she does not need to be concerns. She is relieved to hear this and advised on what s/s at the site to call for as mentioned above. Denies any other questions.     Neetu Quijano RN    "

## 2021-05-28 ENCOUNTER — RECORDS - HEALTHEAST (OUTPATIENT)
Dept: ADMINISTRATIVE | Facility: CLINIC | Age: 86
End: 2021-05-28

## 2021-05-29 ENCOUNTER — RECORDS - HEALTHEAST (OUTPATIENT)
Dept: ADMINISTRATIVE | Facility: CLINIC | Age: 86
End: 2021-05-29

## 2021-05-29 NOTE — TELEPHONE ENCOUNTER
----- Message from Win Moreno, DO sent at 6/10/2019  4:38 PM CDT -----  Please call Deloris Freeman and let her know there are no signs of bladder infection, but will await confirmation which should be back in 1-2 days.  I will let her know if anything grows on the urine culture.   There were a few red blood cells on the test.  I don't think there is anything we need to do about that now, but if she still has discomfort with urination, we can look further at what could be causing this.

## 2021-05-29 NOTE — PATIENT INSTRUCTIONS - HE
Bowel problems-- would recommend cutting atorvastatin in half for 2 weeks to see if any better (if not, then resume full dose)    Consider adding citrucil daily to help with bowel regulation      Check urine test

## 2021-05-29 NOTE — PROGRESS NOTES
[facility] Clinic Note    Deloris Larson   89 y.o. female    Date of Visit: 6/10/2019  Chief Complaint   Patient presents with     Abdominal Pain       ASSESSMENT/PLAN  1. Dysuria  Urinalysis-UC if Indicated    Culture, Urine   2. Hyperlipidemia  atorvastatin (LIPITOR) 40 MG tablet   3. Irritable bowel syndrome with diarrhea  L.acidophilus-Bifido.longum (PROBIOTIC PEARLS) 15 mg (1 billion cell) CpDR     ---------------------------------------------    1. Check UA-- negative.  She does have microscopic hematuria and distant tobacco use history.  Tenderness is more on the R pubic bone than pelvic area.  Await culture result- treat if positive    2. Trial cutting in half to see if bowel symptoms improve.  Still strong indication for this medication otherwise    3. I think she has IBS-- recommended cutting atorvastatin as above.  Add citrucil.  Reassurance provided that since she does not have red flags that there is unlikely to be anything serious causing this. She offers she has been under a lot of stress since her Yazdanism folded.  She now is with [facility] in about 1 month (around 7/10/2019) for Recheck.      SUBJECTIVE    Deloris Larson is here for abdominal pain.     She has a history of hernia surgery several years ago.     She has RLQ pain, recalls history of hernia pain.  In the las few weeks it has involved the groin and vaginal area.     She has been having some explosive bowel movements, mixed stool and liquid.  This usually happens in the morning after the first thing she eats.  She wonders if it could be related to the medication.  This may have started since the pacemaker.     She usually only has the one BM in the morning.  No sign of blood or melena.  BMs don't wake her up from sleep.     Her stomach is sensitive to some foods.      Atovastatin is new since the pacemaker (around the time symptoms started).  She takes probioptics for the stomach ache.        ROS:   Per HPI,  all other systems negative     Medications, allergies, and problem list were reviewed and updated    Patient Active Problem List   Diagnosis     Mixed hyperlipidemia     Essential hypertension     Insomnia     Migraine Headache     Coronary Artery Disease     Paroxysmal SVT (supraventricular tachycardia) (H)     Intermittent claudication (H)     Malignant neoplasm of skin     PAD (peripheral artery disease) (H)     Vitamin D deficiency     RLS (restless legs syndrome)     Persistent atrial fibrillation (H)     Sinus bradycardia     Vertigo     Anxiety     SSS (sick sinus syndrome) (H)     Cardiac pacemaker in situ, dual chamber     Past Medical History:   Diagnosis Date     Coronary artery disease      Hyperlipidemia      Hypertension      PAD (peripheral artery disease) (H) 10/19/2015     Paroxysmal SVT (supraventricular tachycardia) (H)     Created by Conversion      Persistent atrial fibrillation (H) 5/9/2018    New diagnosis April 16 18th and found incidentally on physical exam during yearly exam in primary clinic [id number] score of 5-new Eliquis start     RLS (restless legs syndrome) 2/28/2017     Current Outpatient Medications   Medication Sig Dispense Refill     apixaban (ELIQUIS) 2.5 mg Tab tablet Take 1 tablet (2.5 mg total) by mouth 2 (two) times a day. 180 tablet 5     atorvastatin (LIPITOR) 40 MG tablet Take 0.5 tablets (20 mg total) by mouth daily. 90 tablet 3     b complex vitamins tablet Take 1 tablet by mouth daily.       cholecalciferol, vitamin D3, 5,000 unit Tab Take 1 tablet by mouth. Take 4 days a week. MON, Wed, Fri, Sat       ibuprofen (ADVIL,MOTRIN) 200 MG tablet Take 200 mg by mouth every 6 (six) hours as needed for pain.       meclizine (ANTIVERT) 12.5 mg tablet Take 1 tablet (12.5 mg total) by mouth 3 (three) times a day as needed (take 1-2 tablets three times daily as needed).  12     metoprolol succinate (TOPROL-XL) 50 MG 24 hr tablet Take 1 tablet (50 mg total) by mouth daily. 90  "tablet 5     nitroglycerin (NITROSTAT) 0.3 MG SL tablet Place 1 tablet (0.3 mg total) under the tongue every 5 (five) minutes as needed for chest pain. 90 tablet 12     ramipril (ALTACE) 10 MG capsule TAKE 1 CAPSULE BY MOUTH TWICE DAILY (EVERY 12 HOURS) 180 capsule 3     simethicone (MYLICON) 125 MG chewable tablet Chew 125 mg every 6 (six) hours as needed for flatulence.       triamcinolone (KENALOG) 0.1 % cream Apply topically 2 (two) times a day as needed.  2     zolpidem (AMBIEN) 10 mg tablet Take 1 tablet (10 mg total) by mouth at bedtime as needed for sleep. 30 tablet 3     L.acidophilus-Bifido.longum (PROBIOTIC PEARLS) 15 mg (1 billion cell) CpDR Take 2 capsules by mouth as needed.  0     No current facility-administered medications for this visit.      Allergies   Allergen Reactions     Sleeping [Diphenhydramine Hcl]      halluactions        EXAM  Vitals:    06/10/19 1513   BP: 116/80   Patient Site: Left Arm   Patient Position: Sitting   Cuff Size: Adult Regular   Pulse: 79   SpO2: 98%   Weight: 138 lb 8 oz (62.8 kg)   Height: 5' 5\" (1.651 m)         GEN: alert no distress  Abd: soft, no masses, only tenderness was bordering R upper pubic bone.  No low abdomen or groin bulge when standing and valsalva maneuver performed.    GYN: exam not performed.     This visit lasted a total of 25 minutes.  Over 50% of the time was spent counseling regarding he management of abdominal pain.         Win Moreno,   Internal Medicine  [facility]    "

## 2021-05-30 ENCOUNTER — RECORDS - HEALTHEAST (OUTPATIENT)
Dept: ADMINISTRATIVE | Facility: CLINIC | Age: 86
End: 2021-05-30

## 2021-05-30 VITALS — HEIGHT: 66 IN | BODY MASS INDEX: 23.46 KG/M2 | WEIGHT: 146 LBS

## 2021-05-31 ENCOUNTER — RECORDS - HEALTHEAST (OUTPATIENT)
Dept: ADMINISTRATIVE | Facility: CLINIC | Age: 86
End: 2021-05-31

## 2021-05-31 VITALS — BODY MASS INDEX: 23.21 KG/M2 | WEIGHT: 141.6 LBS

## 2021-05-31 VITALS — BODY MASS INDEX: 23.14 KG/M2 | HEIGHT: 66 IN | WEIGHT: 144 LBS

## 2021-06-01 ENCOUNTER — RECORDS - HEALTHEAST (OUTPATIENT)
Dept: ADMINISTRATIVE | Facility: CLINIC | Age: 86
End: 2021-06-01

## 2021-06-01 VITALS — BODY MASS INDEX: 22.28 KG/M2 | WEIGHT: 133.9 LBS

## 2021-06-01 VITALS — HEIGHT: 66 IN | BODY MASS INDEX: 22.98 KG/M2 | WEIGHT: 143 LBS

## 2021-06-01 VITALS
BODY MASS INDEX: 22.65 KG/M2 | BODY MASS INDEX: 22.67 KG/M2 | HEIGHT: 65 IN | WEIGHT: 136.1 LBS | WEIGHT: 136.1 LBS | WEIGHT: 136.1 LBS | HEIGHT: 65 IN | WEIGHT: 136.1 LBS | BODY MASS INDEX: 22.65 KG/M2 | WEIGHT: 136.1 LBS | WEIGHT: 136.1 LBS | BODY MASS INDEX: 22.65 KG/M2 | BODY MASS INDEX: 22.65 KG/M2 | BODY MASS INDEX: 22.67 KG/M2

## 2021-06-01 VITALS
BODY MASS INDEX: 22.8 KG/M2 | BODY MASS INDEX: 22.8 KG/M2 | BODY MASS INDEX: 22.8 KG/M2 | BODY MASS INDEX: 22.96 KG/M2 | BODY MASS INDEX: 22.8 KG/M2 | BODY MASS INDEX: 22.96 KG/M2 | WEIGHT: 138 LBS | WEIGHT: 139 LBS | HEIGHT: 65 IN | BODY MASS INDEX: 22.8 KG/M2 | WEIGHT: 138 LBS | BODY MASS INDEX: 23.13 KG/M2 | HEIGHT: 65 IN | WEIGHT: 138 LBS | BODY MASS INDEX: 22.8 KG/M2 | WEIGHT: 138 LBS | HEIGHT: 65 IN | BODY MASS INDEX: 23.13 KG/M2 | HEIGHT: 65 IN | WEIGHT: 139 LBS | HEIGHT: 65 IN | BODY MASS INDEX: 23.13 KG/M2 | BODY MASS INDEX: 22.8 KG/M2 | BODY MASS INDEX: 22.8 KG/M2 | BODY MASS INDEX: 22.96 KG/M2 | BODY MASS INDEX: 23.13 KG/M2 | HEIGHT: 65 IN | WEIGHT: 139 LBS | HEIGHT: 65 IN | BODY MASS INDEX: 22.96 KG/M2 | HEIGHT: 65 IN | WEIGHT: 139 LBS

## 2021-06-01 VITALS — HEIGHT: 66 IN | WEIGHT: 141.31 LBS | BODY MASS INDEX: 22.71 KG/M2

## 2021-06-01 VITALS
WEIGHT: 139 LBS | WEIGHT: 139 LBS | BODY MASS INDEX: 23.13 KG/M2 | WEIGHT: 139 LBS | WEIGHT: 139 LBS | BODY MASS INDEX: 23.13 KG/M2 | BODY MASS INDEX: 23.13 KG/M2 | BODY MASS INDEX: 23.13 KG/M2

## 2021-06-01 VITALS — BODY MASS INDEX: 22.27 KG/M2 | WEIGHT: 138 LBS

## 2021-06-01 VITALS — BODY MASS INDEX: 22.34 KG/M2 | HEIGHT: 66 IN | WEIGHT: 139 LBS

## 2021-06-01 VITALS — BODY MASS INDEX: 22.51 KG/M2 | WEIGHT: 135.1 LBS | HEIGHT: 65 IN

## 2021-06-01 VITALS — BODY MASS INDEX: 22.02 KG/M2 | WEIGHT: 137 LBS | HEIGHT: 66 IN

## 2021-06-01 VITALS
WEIGHT: 133.9 LBS | BODY MASS INDEX: 22.28 KG/M2 | WEIGHT: 133.9 LBS | BODY MASS INDEX: 22.28 KG/M2 | WEIGHT: 133.9 LBS | BODY MASS INDEX: 22.28 KG/M2

## 2021-06-01 VITALS — BODY MASS INDEX: 23.08 KG/M2 | WEIGHT: 143 LBS

## 2021-06-01 NOTE — TELEPHONE ENCOUNTER
Controlled Substance Refill Request  Medication:   Requested Prescriptions     Pending Prescriptions Disp Refills     zolpidem (AMBIEN) 10 mg tablet [Pharmacy Med Name: ZOLPIDEM TARTRATE 10MG**## 10 TAB] 30 tablet 3     Sig: TAKE 1 TABLET (10 MG TOTAL) BY MOUTH AT BEDTIME AS NEEDED FOR SLEEP.     Date Last Fill: 4/29/19  [facility]: [facility]   Submit electronically to pharmacy  Controlled Substance Agreement on File:   Encounter-Level CSA Scan Date - 05/15/2018:    Scan on 5/18/2018  9:58 AM           Encounter-Level CSA Scan Date - 02/28/2017:    Scan on 3/2/2017  7:57 AM       Last office visit: Last office visit pertaining to requested medication was 6/10/19.

## 2021-06-01 NOTE — PROGRESS NOTES
[facility] Clinic Note    Deloris Larson   89 y.o. female    Date of Visit: 10/2/2019  Chief Complaint   Patient presents with     Follow-up     meds, pacemaker adjusted 2 beats 9/27/19, since then she has been dizzy and pressure on the chest       ASSESSMENT/PLAN  1. SOB (shortness of breath)  HM2(CBC w/o Differential)    Basic Metabolic Panel    CANCELED: XR Chest 2 Views   2. Systolic murmur  Echo Complete   3. Fatigue, unspecified type  Thyroid Stimulating Hormone (TSH)    Electrocardiogram Perform - Clinic   4. Essential hypertension  amLODIPine (NORVASC) 5 MG tablet   5. Medication management  CANCELED: Drug Abuse 1+, Urine     ---------------------------------------------    Deloris Larson is an 89-year-old woman with 1 year history of increasing fatigue, and more acute onset of palpitations, lightheadedness and shortness of breath with exertion, which started shortly after she had adjustment of her pacemaker 5 days ago.  I will alert Dr. Alvarez and team about this.  I am not sure I fully understand what adjustment was made, or how likely this would be to cause the present symptoms.  They seem fairly consistent in the sense that they occur with exertion, not with rest.  She does not have signs or symptoms of congestive heart failure.    Due to the presence of a murmur which has not been documented in the past, which is midsystolic and at the left lower sternal border, I recommended repeat echocardiogram.    We will check TSH related to the fatigue.    Because of elevated blood pressure, I am adding on amlodipine 5 mg daily.  She is at the maximum dose of her ACE inhibitor at present time.    She does take a sleeping medication in the form of zolpidem, I do not think that this requires a urine drug screen as part of the controlled substance agreement.    Return in about 2 weeks (around 10/16/2019) for Recheck.      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    She has had low energy coming on over the last year.  She wonders if she should just attribute this to old age.  She was then to the cardiology clinic 5 days ago, mention the low energy, and they made some setting change on her pacemaker.  As a side note, she mentions that she has a skin condition, and was told that she should have the skin over her pacemaker pocket examined at each visit.    The setting change did not help with fatigue, from what she can tell, but since then, when she goes up the stairs she has been getting short of breath, lightheaded (has not fainted), and palpitations (describes as \"a lot of action\" but no pain).  Other than not being able to walk up the stairs more than 2-3 stairs at a time, she has been unable to mop or use the vacuum  because it is too strenuous for her.    She had a coronary angiogram in June 2018, this was in the context of a hospitalization, and had patent stents in the mid and distal RCA, 50% lesion in the LAD.  No interventions performed at that time.    She lives with her son but lives upstairs in an apartment.         ROS:   Per HPI, all other systems negative     Medications, allergies, and problem list were reviewed and updated    Patient Active Problem List   Diagnosis     Mixed hyperlipidemia     Essential hypertension     Insomnia     Migraine Headache     Coronary Artery Disease     Paroxysmal SVT (supraventricular tachycardia) (H)     Intermittent claudication (H)     Malignant neoplasm of skin     PAD (peripheral artery disease) (H)     Vitamin D deficiency     RLS (restless legs syndrome)     Persistent atrial fibrillation     Sinus bradycardia     Vertigo     Anxiety     SSS (sick sinus syndrome) (H)     Cardiac pacemaker in situ, dual chamber     Past Medical " History:   Diagnosis Date     Coronary artery disease      Hyperlipidemia      Hypertension      PAD (peripheral artery disease) (H) 10/19/2015     Paroxysmal SVT (supraventricular tachycardia) (H)     Created by Conversion      Persistent atrial fibrillation 5/9/2018    New diagnosis April 16 18th and found incidentally on physical exam during yearly exam in primary clinic [id number] score of 5-new Eliquis start     RLS (restless legs syndrome) 2/28/2017     Current Outpatient Medications   Medication Sig Dispense Refill     apixaban (ELIQUIS) 2.5 mg Tab tablet Take 1 tablet (2.5 mg total) by mouth 2 (two) times a day. 180 tablet 5     atorvastatin (LIPITOR) 40 MG tablet Take 0.5 tablets (20 mg total) by mouth daily. 90 tablet 3     b complex vitamins tablet Take 1 tablet by mouth daily.       cholecalciferol, vitamin D3, 5,000 unit Tab Take 1 tablet by mouth. Take 4 days a week. MON, Wed, Fri, Sat       ibuprofen (ADVIL,MOTRIN) 200 MG tablet Take 200 mg by mouth every 6 (six) hours as needed for pain.       L.acidophilus-Bifido.longum (PROBIOTIC PEARLS) 15 mg (1 billion cell) CpDR Take 2 capsules by mouth as needed.  0     meclizine (ANTIVERT) 12.5 mg tablet Take 1 tablet (12.5 mg total) by mouth 3 (three) times a day as needed (take 1-2 tablets three times daily as needed).  12     metoprolol succinate (TOPROL-XL) 50 MG 24 hr tablet Take 1 tablet (50 mg total) by mouth daily. 90 tablet 5     nitroglycerin (NITROSTAT) 0.3 MG SL tablet Place 1 tablet (0.3 mg total) under the tongue every 5 (five) minutes as needed for chest pain. 90 tablet 12     ramipril (ALTACE) 10 MG capsule TAKE 1 CAPSULE BY MOUTH TWICE DAILY (EVERY 12 HOURS) 180 capsule 3     simethicone (MYLICON) 125 MG chewable tablet Chew 125 mg every 6 (six) hours as needed for flatulence.       triamcinolone (KENALOG) 0.1 % cream Apply topically 2 (two) times a day as needed.  2     zolpidem (AMBIEN) 10 mg tablet TAKE 1 TABLET (10 MG TOTAL) BY MOUTH AT  "BEDTIME AS NEEDED FOR SLEEP. 30 tablet 3     amLODIPine (NORVASC) 5 MG tablet Take 1 tablet (5 mg total) by mouth daily. 30 tablet 11     No current facility-administered medications for this visit.      Allergies   Allergen Reactions     Sleeping [Diphenhydramine Hcl]      halluactions        EXAM  Vitals:    10/02/19 1056 10/02/19 1101   BP: (!) 156/104 170/82   Patient Site: Left Arm    Patient Position: Sitting    Cuff Size: Adult Regular    Pulse: 64    SpO2: 98%    Weight: 136 lb 2 oz (61.7 kg)    Height: 5' 5\" (1.651 m)          General: Alert, no distress  ENT: Sclera nonicteric, oral mucosa moist  Psychiatric: Pleasant affect  Heart: Regular rate and rhythm, there is a mid peaking systolic murmur heard best at the left lower sternal border and apex, with squeaking character  Lungs: Clear to auscultation bilaterally  Skin: No rash over pacemaker site  Neurologic: No gross abnormalities, she needs a little help getting down from the table, mildly forgetful with some word finding difficulty  Endocrine: No goiter    Results reviewed: Labs, echocardiogram ordered today, discharge summary reviewed from June hospitalization, treated with pacemaker for tachycardia/bradycardia syndrome, angiogram also reviewed, this was summarized above.    Data points: 5    Win Moreno, DO  Internal Medicine  [facility]    "

## 2021-06-01 NOTE — PROGRESS NOTES
Phone call to patient with Dr. Alvarez's plan. Will have  call back to schedule 3 month f/u with [facility].    Yessenia Chao RN BSN  [facility]

## 2021-06-01 NOTE — PROGRESS NOTES
Device Report:  Type: In office annual pacemaker check.  Presenting Rhythm: Atrial paced, ventricular sensed (intermittent AS-VS, NSR).  Lead/Battery status: Stable.  Arrhythmias: 7 mode switches, all <1min, short runs of AT. 4 high ventricular arrhythmias; on 9/4/19 at 13:16 she had a 26bt run of NSVT up to 300ms, she denies symptoms. Last ECHO 5/3/18 EF 56%, history of CAD. One other EGM and previous events per notes are all SVT.   Comments: Normal device function. MV increased from 9 to 10 to assist with heart rate variability with activity. Routing NSVT to Dr. Alvarez for review. JML        Cor angio with only mild disease  Preserved EF  Lots of GI distress and weight loss  Plan  Follow up with me < 3 months in device clinic

## 2021-06-02 ENCOUNTER — RECORDS - HEALTHEAST (OUTPATIENT)
Dept: ADMINISTRATIVE | Facility: CLINIC | Age: 86
End: 2021-06-02

## 2021-06-02 VITALS — WEIGHT: 137 LBS | BODY MASS INDEX: 22.82 KG/M2 | HEIGHT: 65 IN

## 2021-06-02 VITALS — BODY MASS INDEX: 22.86 KG/M2 | HEIGHT: 65 IN | WEIGHT: 137.19 LBS

## 2021-06-02 VITALS — BODY MASS INDEX: 22.7 KG/M2 | WEIGHT: 136.44 LBS

## 2021-06-02 NOTE — TELEPHONE ENCOUNTER
----- Message from Win Moreno DO sent at 10/3/2019  6:59 AM CDT -----  Please call Deloris and let her know the tests we did in clinic were normal.  I'd recommend she call Dr. Alvarez's office Friday if she does not hear from them by the end of Thursday.

## 2021-06-02 NOTE — TELEPHONE ENCOUNTER
----- Message from Win Moreno, DO sent at 10/16/2019  6:53 AM CDT -----  Please call Deloris to let her know I will send her the full report of the echocardiogram, but her heart is in good working order based on the echocardiogram.  I sent the information we discussed on to Dr. Alvarez's team.

## 2021-06-02 NOTE — PROGRESS NOTES
---------------------------------------------     Deloris Larson is an 89-year-old woman with 1 year history of increasing fatigue, and more acute onset of palpitations, lightheadedness and shortness of breath with exertion, which started shortly after she had adjustment of her pacemaker 5 days ago.  I will alert Dr. Alvarez and team about this.  I am not sure I fully understand what adjustment was made, or how likely this would be to cause the present symptoms.  They seem fairly consistent in the sense that they occur with exertion, not with rest.  She does not have signs or symptoms of congestive heart failure.    Her comprehensive set of blood work was normal October 2  Schedule her to see me in pacemaker clinic within 2 months

## 2021-06-02 NOTE — TELEPHONE ENCOUNTER
Patient Returning Call  Reason for call:  Test results  Information relayed to patient:  Yes, relayed the message from Dr Moreno  Patient has additional questions:  No  If YES, what are your questions/concerns:    Okay to leave a detailed message?: No call back needed

## 2021-06-03 VITALS
SYSTOLIC BLOOD PRESSURE: 170 MMHG | DIASTOLIC BLOOD PRESSURE: 82 MMHG | OXYGEN SATURATION: 98 % | BODY MASS INDEX: 22.68 KG/M2 | HEIGHT: 65 IN | HEART RATE: 64 BPM | WEIGHT: 136.13 LBS

## 2021-06-03 VITALS — BODY MASS INDEX: 22.68 KG/M2 | WEIGHT: 136.13 LBS | HEIGHT: 65 IN

## 2021-06-03 VITALS
RESPIRATION RATE: 16 BRPM | HEIGHT: 65 IN | BODY MASS INDEX: 22.33 KG/M2 | HEART RATE: 66 BPM | SYSTOLIC BLOOD PRESSURE: 148 MMHG | DIASTOLIC BLOOD PRESSURE: 80 MMHG | WEIGHT: 134 LBS

## 2021-06-03 VITALS — BODY MASS INDEX: 23.07 KG/M2 | WEIGHT: 138.5 LBS | HEIGHT: 65 IN

## 2021-06-04 VITALS
DIASTOLIC BLOOD PRESSURE: 72 MMHG | SYSTOLIC BLOOD PRESSURE: 144 MMHG | BODY MASS INDEX: 22.84 KG/M2 | OXYGEN SATURATION: 98 % | WEIGHT: 137.1 LBS | HEIGHT: 65 IN | RESPIRATION RATE: 16 BRPM | HEART RATE: 60 BPM

## 2021-06-04 NOTE — PROGRESS NOTES
In clinic device check with Device RN and Dr. Alvarez.  Please see link for full device report.  Patient was informed of results and next follow up during today's visit.

## 2021-06-04 NOTE — TELEPHONE ENCOUNTER
Medication Question or Clarification  Who is calling: Patient is out of supply.  She is in agreement to talk to scheduling to make medication check as per charting in 10/1p per provider.  Please advise  What medication are you calling about? (include dose and sig)   metoprolol succinate (TOPROL-XL) 50 MG 24 hr tablet  50 mg, Oral, DAILY  EditCancel Reorder       Summary: Take 1 tablet (50 mg total) by mouth daily., Starting 10/24/2018, Until         Who prescribed the medication?: Win Moreno, DO  What is your question/concern?: Need supply now please.   Pharmacy: [facility]  Okay to leave a detailed message?: Yes [phone]    Site CMT - Please call the pharmacy to obtain any additional needed information.

## 2021-06-04 NOTE — PROGRESS NOTES
[facility] Heart Care Note    Assessment:    1.  Tachybradycardia syndrome  Tachycardias manifest as atrial fibrillation with elevated ventricular response  SVT  Bradycardia with sick sinus syndrome, sinus pauses  [employer] dual-chamber pacemaker June 27, 2018  Preserved left ventricular function; echocardiogram October 16, 2019 ejection fraction 59%  Chronic anticoagulation with Eliquis   coronary angiography 6-; no significant/severeCAD    Plan:    Your clinical evaluation today is excellent  Echocardiogram October 16, 2019 showed your heart was strong   Comprehensive laboratory evaluation October 2, 2019 was good including normal thyroid, kidney and CBC  The pacemaker  evaluation today shows the heart rhythm has been well controlled with the pacemaker  Lead function satisfactory  Battery should last another 7.5 years  Continue to have device check through transtelephonic monitoring every 3 months  Return in 1 year for comprehensive cardiac arrhythmia device assessment          Subjective:    I had the opportunity to see.Deloris Larson , who is a 89 y.o. female with a known history of tachybradycardia syndrome and pacemaker  She complains of fatigue just seems tired  No chest pain  No excessive breathlessness no orthopnea PND or pedal edema  Balance is not very good  Lives in a private home her son lives in the basement apartment and is quite helpful  No particular discomfort  at the pacemaker site  Previously, she did have a lizette of nonsustained ventricular tachycardia-26 beats at about 200 bpm.  But current interrogation shows no recurrence of ventricular arrhythmia          Problem List:  Patient Active Problem List   Diagnosis     Mixed hyperlipidemia     Essential hypertension     Insomnia     Migraine Headache     Coronary Artery Disease     Paroxysmal SVT (supraventricular tachycardia) (H)     Intermittent claudication (H)     Malignant neoplasm of skin     PAD (peripheral artery disease)  (H)     Vitamin D deficiency     RLS (restless legs syndrome)     Persistent atrial fibrillation     Sinus bradycardia     Vertigo     Anxiety     SSS (sick sinus syndrome) (H)     Cardiac pacemaker in situ, dual chamber     Medical History:  Past Medical History:   Diagnosis Date     Coronary artery disease      Hyperlipidemia      Hypertension      PAD (peripheral artery disease) (H) 10/19/2015     Paroxysmal SVT (supraventricular tachycardia) (H)     Created by Conversion      Persistent atrial fibrillation 5/9/2018    New diagnosis April 16 18th and found incidentally on physical exam during yearly exam in primary clinic [id number] score of 5-new Eliquis start     RLS (restless legs syndrome) 2/28/2017     Surgical History:  Past Surgical History:   Procedure Laterality Date     CARDIOVERSION  05/24/2018     CHOLECYSTECTOMY       CORONARY ANGIOPLASTY       CV CORONARY ANGIOGRAM N/A 6/26/2018    Procedure: Coronary Angiogram;  Surgeon: Joby Vargas MD;  Location: [facility] Cath Lab;  Service:      EP PACEMAKER INSERT N/A 6/27/2018    Procedure: EP Pacemaker Insertion;  Surgeon: Osito Alvarez MD;  Location: [facility] Cath Lab;  Service:      INGUINAL HERNIA REPAIR Right     Indirect- Dr. Pedersen     TX APPENDECTOMY      Description: Appendectomy;  Recorded: 11/13/2008;     TX MASTECTOMY, SIMPLE, COMPLETE      Description: Breast Surgery Simple Mastectomy;  Recorded: 08/25/2009;     TX REMOVAL OF OVARY(S)      Description: Oophorectomy;  Recorded: 08/25/2009;     TX REVISE SECONDARY VARICOSITY      Description: Varicose Vein Ligation;  Recorded: 11/13/2008;     TX TOTAL ABDOM HYSTERECTOMY      Description: Hysterectomy;  Recorded: 11/13/2008;     TUBAL LIGATION       Social History:  Social History     Socioeconomic History     Marital status: Single     Spouse name: Not on file     Number of children: Not on file     Years of education: Not on file     Highest education level: Not on file    Occupational History     Not on file   Social Needs     Financial resource strain: Not on file     Food insecurity:     Worry: Not on file     Inability: Not on file     Transportation needs:     Medical: Not on file     Non-medical: Not on file   Tobacco Use     Smoking status: Former Smoker     Packs/day: 1.50     Years: 25.00     Pack years: 37.50     Last attempt to quit: 1974     Years since quittin.9     Smokeless tobacco: Never Used   Substance and Sexual Activity     Alcohol use: No     Drug use: No     Sexual activity: Not on file   Lifestyle     Physical activity:     Days per week: Not on file     Minutes per session: Not on file     Stress: Not on file   Relationships     Social connections:     Talks on phone: Not on file     Gets together: Not on file     Attends Mormonism service: Not on file     Active member of club or organization: Not on file     Attends meetings of clubs or organizations: Not on file     Relationship status: Not on file     Intimate partner violence:     Fear of current or ex partner: Not on file     Emotionally abused: Not on file     Physically abused: Not on file     Forced sexual activity: Not on file   Other Topics Concern     Not on file   Social History Narrative    She lives alone in a house. She has 3 children and 8 grandchildren and multiple great grandchildren. She is retired. 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 She does not smoke cigarettes and rarely drinks alcohol.       Review of Systems:      General: WNL  Eyes: WNL  Ears/Nose/Throat: WNL  Lungs: WNL  Heart: Irregular Heartbeat  Stomach: WNL  Bladder: WNL  Muscle/Joints: WNL  Skin: WNL  Nervous System: Dizziness  Mental Health: WNL     Blood: WNL        Family History:  Family History   Adopted: Yes   Family history unknown: Yes         Allergies:  Allergies   Allergen Reactions     Sleeping [Diphenhydramine Hcl]      halluactions         Medications:  Current Outpatient Medications   Medication Sig Dispense Refill     apixaban (ELIQUIS) 2.5 mg Tab tablet Take 1 tablet (2.5 mg total) by mouth 2 (two) times a day. 180 tablet 5     atorvastatin (LIPITOR) 40 MG tablet Take 0.5 tablets (20 mg total) by mouth daily. 90 tablet 3     b complex vitamins tablet Take 1 tablet by mouth daily.       cholecalciferol, vitamin D3, 5,000 unit Tab Take 1 tablet by mouth. Take 4 days a week. MON, Wed, Fri, Sat       ibuprofen (ADVIL,MOTRIN) 200 MG tablet Take 200 mg by mouth every 6 (six) hours as needed for pain.       L.acidophilus-Bifido.longum (PROBIOTIC PEARLS) 15 mg (1 billion cell) CpDR Take 2 capsules by mouth as needed.  0     metoprolol succinate (TOPROL-XL) 50 MG 24 hr tablet Take 1 tablet (50 mg total) by mouth daily. 90 tablet 5     nitroglycerin (NITROSTAT) 0.3 MG SL tablet Place 1 tablet (0.3 mg total) under the tongue every 5 (five) minutes as needed for chest pain. 90 tablet 12     ramipril (ALTACE) 10 MG capsule TAKE 1 CAPSULE BY MOUTH TWICE DAILY (EVERY 12 HOURS) 180 capsule 3     simethicone (MYLICON) 125 MG chewable tablet Chew 125 mg every 6 (six) hours as needed for flatulence.       triamcinolone (KENALOG) 0.1 % cream Apply topically 2 (two) times a day as needed.  2     zolpidem (AMBIEN) 10 mg tablet TAKE 1 TABLET (10 MG TOTAL) BY MOUTH AT BEDTIME AS NEEDED FOR SLEEP. 30 tablet 3     amLODIPine (NORVASC) 5 MG tablet Take 1 tablet (5 mg total) by mouth daily. 30 tablet 11     meclizine (ANTIVERT) 12.5 mg tablet Take 1 tablet (12.5 mg total) by mouth 3 (three) times a day as needed (take 1-2 tablets three times daily as needed).  12     No current facility-administered medications for this visit.          Surgical site  Pacemaker is well-healed to left subclavicular site; not much tissue overlies the device    Device interrogation  Intrinsic rhythm is sinus bradycardia in the 50s  87% atrial pacing  9% ventricular pacing  Minimal atrial  "arrhythmias none lasting more than 10 seconds  Episode of nonsustained ventricular tachycardia September 4, 2019-26 beats at 200 bpm  Lead function satisfactory  Battery good for another 7.5 years    Objective:   Vital signs:  /72 (Patient Site: Left Arm, Patient Position: Sitting, Cuff Size: Adult Regular)   Pulse 60   Resp 16   Ht 5' 5\" (1.651 m)   Wt 137 lb 1.6 oz (62.2 kg)   SpO2 98%   BMI 22.81 kg/m    Pressure 142 sitting, 1/1/1940 standing  Heart rate is 70 and regular without ectopy    Physical Exam:      GENERAL APPEARANCE: Alert, cooperative and in no acute distress.  HEENT: No scleral icterus. No Xanthelasma. Oral mucous membranes pink and moist.  NECK: JVP normal cm. No Hepatojugular reflux. Thyroid not  Palpable  CHEST: clear to auscultation and percussion  CARDIOVASCULAR: S1, S2 soft JAKE    Brachial, radial  pulses are intact and symmetric.   No carotid bruits noted.  ABDOMEN: Non tender. BS+. No bruits.  EXTREMITIES: No cyanosis, clubbing or edema.    Lab Results:  LIPIDS:  Lab Results   Component Value Date    CHOL 138 04/16/2018    CHOL 154 02/28/2017    CHOL 147 10/19/2015     Lab Results   Component Value Date    HDL 41 (L) 04/16/2018    HDL 48 (L) 02/28/2017    HDL 57 10/19/2015     Lab Results   Component Value Date    LDLCALC 76 04/16/2018    LDLCALC 85 02/28/2017    LDLCALC 74 10/19/2015     Lab Results   Component Value Date    TRIG 106 04/16/2018    TRIG 105 02/28/2017    TRIG 79 10/19/2015     No components found for: CHOLHDL    BMP:  Lab Results   Component Value Date    CREATININE 0.84 10/02/2019    BUN 8 10/02/2019     10/02/2019    K 4.0 10/02/2019     10/02/2019    CO2 29 10/02/2019         This note has been dictated using voice recognition software. Any grammatical or context distortions are unintentional and inherent to the software.  Osito Alvarez MD  [facility] HEART [facility]  [phone]            "

## 2021-06-04 NOTE — TELEPHONE ENCOUNTER
Who is calling:    Patient  Reason for Call:    Patient is needing a 12 day supply of below medication to last until her appointment on 1/9/19 with PCP.  Date of last appointment with primary care: 10/2/19  Okay to leave a detailed message: Yes

## 2021-06-04 NOTE — PATIENT INSTRUCTIONS - HE
Deloris Larson    Thank you for coming to the [facility] today for a cardiac evaluation  It was my pleasure to see you today  A good contact for any questions would be Carrie Vegas  RN @ [phone]    Your clinical evaluation today is excellent  Echocardiogram October 16, 2019 showed your heart was strong   Comprehensive laboratory evaluation October 2, 2019 was good including normal thyroid, kidney and CBC  The pacemaker  evaluation today shows the heart rhythm has been well controlled with the pacemaker  Lead function satisfactory  Battery should last another 7.5 years  Continue to have device check through transtelephonic monitoring every 3 months  Return in 1 year for comprehensive cardiac arrhythmia device assessment

## 2021-06-05 NOTE — PROGRESS NOTES
[facility] Clinic Note    Deloris Freeman [facility]   89 y.o. female    Date of Visit: 1/9/2020  Chief Complaint   Patient presents with     Follow-up     meds and refills, new CSA for Zolpidem       ASSESSMENT/PLAN  1. Hypertension  ramipril (ALTACE) 10 MG capsule   2. Essential hypertension  amLODIPine (NORVASC) 5 MG tablet   3. Atrial fibrillation, unspecified type (H)  apixaban (ELIQUIS) 2.5 mg Tab tablet   4. Paroxysmal SVT (supraventricular tachycardia) (H)  metoprolol succinate (TOPROL-XL) 50 MG 24 hr tablet   5. Cardiac pacemaker in situ, dual chamber     6. Hyperlipidemia  atorvastatin (LIPITOR) 20 MG tablet   7. Itching  triamcinolone (KENALOG) 0.1 % cream   8. Flatulence, eructation and gas pain  simethicone (MYLICON) 125 MG chewable tablet   9. Insomnia  zolpidem (AMBIEN) 10 mg tablet   10. Nonrheumatic aortic valve stenosis       ---------------------------------------------    1-2.  Blood pressure slightly elevated, when I checked it it was 142/64, which is slightly above the goal.  She is on maximum dose ramipril, amlodipine was added at the last visit which she seems to tolerate.  I suggested getting some ambulatory blood pressures and check back with us.  If persistently high, consider further increase in metoprolol (she has a pacemaker) or amlodipine    3.  Atrial fibrillation, on metoprolol, Eliquis    4.  See above, rate controlled    5.  Pacemaker functioning properly, visited Dr. Alvarez last month    6.  Refill atorvastatin, she was taking half of a 40 mg tablet, so I lowered this to 20 mg tablet.    7.  Refill triamcinolone with larger bottle.    8.  Simethicone is purchased over-the-counter, prescription was not sent in.    9.  Refill Ambien for insomnia, CSA updated.    10.  New diagnosis of mild aortic valve stenosis on echocardiogram    Return in about 4 months (around 5/9/2020) for Recheck, Blood pressure.      SUBJECTIVE    Deloris Freeman is an 89-year-old woman with history of pacemaker  placement for tachybradycardia syndrome who presents for follow-up from October visit.  At that time, she was experiencing increasing fatigue, palpitations, lightheadedness, and dyspnea with exertion shortly after an adjustment was made to her pacemaker.  I alerted Dr. Alvarez of this.  She met with Dr. Alvarez, and the pacemaker is functioning well.  Echocardiogram was ordered because of murmur which was midsystolic and otherwise uncharacterized by echocardiogram.  She has mild aortic stenosis with mild regurgitation, accounting for the murmur.  The mild aortic stenosis is new.  Ejection fraction was preserved.    Because of elevated blood pressure, added amlodipine 5 mg daily last visit.    She is here mainly to avoid prescription medicine crises.  I think she had 2 refills where the medicine was transmitted without refills, so she ran into the same problem twice where the pharmacy had to contact us, and there were delays.  She would like to find a way to prevent this issue from happening.    ROS:   Per HPI, all other systems negative     Medications, allergies, and problem list were reviewed and updated    Patient Active Problem List   Diagnosis     Mixed hyperlipidemia     Essential hypertension     Insomnia     Migraine Headache     Coronary Artery Disease     Paroxysmal SVT (supraventricular tachycardia) (H)     Intermittent claudication (H)     Malignant neoplasm of skin     PAD (peripheral artery disease) (H)     Vitamin D deficiency     RLS (restless legs syndrome)     Persistent atrial fibrillation     Sinus bradycardia     Vertigo     Anxiety     SSS (sick sinus syndrome) (H)     Cardiac pacemaker in situ, dual chamber     Nonrheumatic aortic valve stenosis     Past Medical History:   Diagnosis Date     Coronary artery disease      Hyperlipidemia      Hypertension      PAD (peripheral artery disease) (H) 10/19/2015     Paroxysmal SVT (supraventricular tachycardia) (H)     Created by Conversion       Persistent atrial fibrillation 5/9/2018    New diagnosis April 16 18th and found incidentally on physical exam during yearly exam in primary clinic [id number] score of 5-new Eliquis start     RLS (restless legs syndrome) 2/28/2017     Current Outpatient Medications   Medication Sig Dispense Refill     amLODIPine (NORVASC) 5 MG tablet Take 1 tablet (5 mg total) by mouth daily. 90 tablet 3     apixaban (ELIQUIS) 2.5 mg Tab tablet Take 1 tablet (2.5 mg total) by mouth 2 (two) times a day. 180 tablet 3     atorvastatin (LIPITOR) 20 MG tablet Take 1 tablet (20 mg total) by mouth daily. 90 tablet 3     b complex vitamins tablet Take 1 tablet by mouth daily.       cholecalciferol, vitamin D3, 5,000 unit Tab Take 1 tablet by mouth. Take 4 days a week. MON, Wed, Fri, Sat       ibuprofen (ADVIL,MOTRIN) 200 MG tablet Take 200 mg by mouth every 6 (six) hours as needed for pain.       L.acidophilus-Bifido.longum (PROBIOTIC PEARLS) 15 mg (1 billion cell) CpDR Take 2 capsules by mouth as needed.  0     metoprolol succinate (TOPROL-XL) 50 MG 24 hr tablet Take 1 tablet (50 mg total) by mouth daily. 90 tablet 3     nitroglycerin (NITROSTAT) 0.3 MG SL tablet Place 1 tablet (0.3 mg total) under the tongue every 5 (five) minutes as needed for chest pain. 90 tablet 12     ramipril (ALTACE) 10 MG capsule TAKE 1 CAPSULE BY MOUTH TWICE DAILY (EVERY 12 HOURS) 180 capsule 3     simethicone (MYLICON) 125 MG chewable tablet Chew 1 tablet (125 mg total) every 6 (six) hours as needed for flatulence.  0     triamcinolone (KENALOG) 0.1 % cream Apply topically 2 (two) times a day as needed. 80 g 5     zolpidem (AMBIEN) 10 mg tablet Take 1 tablet (10 mg total) by mouth at bedtime as needed for sleep. 90 tablet 3     No current facility-administered medications for this visit.      Allergies   Allergen Reactions     Sleeping [Diphenhydramine Hcl]      halluactions        EXAM  Vitals:    01/09/20 1201 01/09/20 1205   BP: 156/72 142/68   Patient Site:  "Left Arm    Patient Position: Sitting    Cuff Size: Adult Regular    Pulse: 60    SpO2: 98%    Weight: 136 lb 6 oz (61.9 kg)    Height: 5' 5\" (1.651 m)          General: Alert, pleasant, no distress  Psychiatric: Pleasant affect    Results reviewed: Reviewed pacemaker check from early December.  Device was working appropriately.        Win Moreno,   Internal Medicine  [facility]    "

## 2021-06-05 NOTE — PROGRESS NOTES
[facility]    Facility:   [facility] [[id number]]   Code Status: POLST AVAILABLE      CHIEF COMPLAINT/REASON FOR VISIT:  Chief Complaint   Patient presents with     Problem Visit     retroperitoneal bleed       HISTORY:      HPI: Deloris Freeman is a 89 y.o. female who was admitted to [facility] from 1/19/20-1/21/20 for retroperitoneal hematoma.  She presented to the ED with left flank pain and was found to has a spontaneous retroperitoneal bleed taking Eliquis for chronic anticoagulation for management of atrial fibrillation.  She had an acute drop in her hemoglobin to 9.1 during her hospitalization with baseline hemoglobin 13.  Her Eliquis was held for three days and her pain was treated effectively with lidocaine patch, Tylenol, and meloxicam 7-day course.  Deloris Freeman  has a past medical history of Coronary artery disease, Hyperlipidemia, Hypertension, PAD (peripheral artery disease) (H) (10/19/2015), Paroxysmal SVT (supraventricular tachycardia) (H), Persistent atrial fibrillation (5/9/2018), and RLS (restless legs syndrome) (2/28/2017).  She is currently at [facility] s/p hospitalization for acute rehabilitation.      Today Ms. Larson is being evaluated for retroperitoneal bleed.  She denied any concerns at this visit.  She said that her left flank pain has resolved with lidocaine patch and meloxicam for pain management.  Her Eliquis was resumed yesterday without any concerns at this time.  She was agreeable to continued monitoring of hemoglobin to monitor anemia related to acute blood loss.  The patient denied changes in mood or appetite, sleep disturbances, lightheadedness, dizziness, breathing difficulty, chest pain, palpitations, constipation, urinary symptoms, numbness or tingling in extremities, and pain.  Her two daughters were present at this visit and denied any concerns at this time.        Past Medical History:   Diagnosis Date     Coronary  artery disease      Hyperlipidemia      Hypertension      PAD (peripheral artery disease) (H) 10/19/2015     Paroxysmal SVT (supraventricular tachycardia) (H)     Created by Conversion      Persistent atrial fibrillation 2018    New diagnosis  and found incidentally on physical exam during yearly exam in primary clinic [id number] score of 5-new Eliquis start     RLS (restless legs syndrome) 2017             Family History   Adopted: Yes   Family history unknown: Yes     Social History     Socioeconomic History     Marital status: Single     Spouse name: None     Number of children: None     Years of education: None     Highest education level: None   Occupational History     None   Social Needs     Financial resource strain: None     Food insecurity:     Worry: None     Inability: None     Transportation needs:     Medical: None     Non-medical: None   Tobacco Use     Smoking status: Former Smoker     Packs/day: 1.50     Years: 25.00     Pack years: 37.50     Last attempt to quit: 1974     Years since quittin.1     Smokeless tobacco: Never Used   Substance and Sexual Activity     Alcohol use: No     Drug use: No     Sexual activity: None   Lifestyle     Physical activity:     Days per week: None     Minutes per session: None     Stress: None   Relationships     Social connections:     Talks on phone: None     Gets together: None     Attends Evangelical service: None     Active member of club or organization: None     Attends meetings of clubs or organizations: None     Relationship status: None     Intimate partner violence:     Fear of current or ex partner: None     Emotionally abused: None     Physically abused: None     Forced sexual activity: None   Other Topics Concern     None   Social History Narrative    She lives alone in a house. She has 3 children and 8 grandchildren and multiple great grandchildren. She is retired. 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 She does not smoke cigarettes and rarely drinks alcohol.       REVIEW OF SYSTEM:  Pertinent items are noted in HPI.  A 12 point comprehensive review of systems was negative except as noted.    PHYSICAL EXAM:     General Appearance:    Alert, cooperative, no distress, appears stated age   Head:    Normocephalic, without obvious abnormality, atraumatic   Eyes:    PERRL, conjunctiva/corneas clear, both eyes   Ears:    Normal external ear canals, both ears   Nose:   Nares normal, septum midline, mucosa normal, no drainage    or sinus tenderness   Throat:   Lips, mucosa, and tongue normal; teeth and gums normal   Neck:   Supple, symmetrical, trachea midline, no adenopathy;     thyroid:  no enlargement/tenderness/nodules; no carotid    bruit or JVD   Back:     Symmetric, no curvature, ROM normal, no CVA tenderness   Lungs:     Clear to auscultation bilaterally, respirations unlabored   Chest Wall:    No tenderness or deformity    Heart:    Regular rate and rhythm, S1 and S2 normal, no murmur, rub   or gallop   Abdomen:     Soft, non-tender, bowel sounds active all four quadrants,     no masses, no organomegaly   Extremities:   Extremities normal, atraumatic, no cyanosis or trace BLE nonpitting edema   Pulses:   2+ and symmetric all extremities   Skin:   Skin color, texture, turgor normal, no rashes or lesions   Neurologic:   Oriented to person, place, time, and situation; exhibits logical thought processes; generalized weakness; ambulatory with walker           LABS:     Check CBC and BMP on 1/24/20.    ASSESSMENT:      ICD-10-CM    1. Retroperitoneal hematoma K66.1    2. Iron deficiency anemia due to chronic blood loss D50.0    3. Left hip pain M25.552    4. Physical deconditioning R53.81    5. Primary insomnia F51.01        PLAN:      Check CBC and BMP on 1/24/20 to continue to monitor acute blood loss with retroperitoneal bleed.  Re-ordered Ambien for primary insomnia through next week.  Plan for  acute rehabilitation for one week per patient report.  Otherwise continue current care plan for all other chronic medical conditions, as they are stable. Encouraged patient to engage in healthy lifestyle behaviors such as engaging in social activities, exercising (PT/OT), eating well, and following care plan. Follow up for routine check-up, or sooner if needed. Will continue to monitor patient and work with nursing staff collaboratively to work toward positive patient outcomes.     Electronically signed by: Yessenia Cazares CNP

## 2021-06-05 NOTE — PROGRESS NOTES
"[facility]      Facility:    [facility] [[id number]]    Code Status: DNR   [facility] 1/19 through 1/21/2020      Chief Complaint/Reason for Visit:  Chief Complaint   Patient presents with     H & P     Retroperitoneal hematoma: Left       HPI:   Deloris Freeman is a 89 y.o. female with known hypertension, persistent atrial fibrillation, coronary artery disease, restless leg syndrome, peripheral vascular disease, sick sinus syndrome (s/p pacemaker), aortic stenosis, migraine, insomnia.    On the day of admission, she got up out of bed to go to the bathroom.  She had sudden onset of left hip pain, incredibly severe between 9-10/10.  She felt like \"2 bones were rubbing against each other.  She could not move or weight-bear, as any movement caused increased pain.  She called EMS and was brought to [facility].    CT of the left hip: Negative for fracture, but probable left psoas hematoma, owing stranding in the retroperitoneum adjacent to the iliac vasculature, psoas muscle, and iliac us muscle..  She was on Eliquis for atrial fibrillation.    Eliquis was held for 3 days.  She was placed on anti-inflammatories, steroids, lidocaine.  Her pain improved somewhat.  She was seen by orthopedics, who recommended conservative approach, nonoperative management.  She was recommended to get up ad libitum, weight-bear as tolerated with a walker, gentle hip range of motion.    For her atrial fibrillation, the Eliquis was held a total of 3 days, then resumed.    She had a hypotension due to the acute blood loss.  Day before discharge, she required IV fluids and her blood pressure meds were held.  Her blood pressure rebounded.    She had acute blood loss anemia: Baseline hemoglobin = 13, decreased to 9 = 9.1 with a hematoma.    After an observation stay, she was discharged to TCU at [facility] for some therapy.    Past Medical History:  Past Medical History: "   Diagnosis Date     Coronary artery disease      Hyperlipidemia      Hypertension      PAD (peripheral artery disease) (H) 10/19/2015     Paroxysmal SVT (supraventricular tachycardia) (H)     Created by Conversion      Persistent atrial fibrillation 5/9/2018    New diagnosis April 16 18th and found incidentally on physical exam during yearly exam in primary clinic [id number] score of 5-new Eliquis start     RLS (restless legs syndrome) 2/28/2017           Surgical History:  Past Surgical History:   Procedure Laterality Date     CARDIOVERSION  05/24/2018     CHOLECYSTECTOMY       CORONARY ANGIOPLASTY       CV CORONARY ANGIOGRAM N/A 6/26/2018    Procedure: Coronary Angiogram;  Surgeon: Joby Vargas MD;  Location: [facility];  Service:      EP PACEMAKER INSERT N/A 6/27/2018    Procedure: EP Pacemaker Insertion;  Surgeon: Osito Alvarez MD;  Location: [facility];  Service:      INGUINAL HERNIA REPAIR Right     Indirect- Dr. Pedersen     AK APPENDECTOMY      Description: Appendectomy;  Recorded: 11/13/2008;     AK MASTECTOMY, SIMPLE, COMPLETE      Description: Breast Surgery Simple Mastectomy;  Recorded: 08/25/2009;     AK REMOVAL OF OVARY(S)      Description: Oophorectomy;  Recorded: 08/25/2009;     AK REVISE SECONDARY VARICOSITY      Description: Varicose Vein Ligation;  Recorded: 11/13/2008;     AK TOTAL ABDOM HYSTERECTOMY      Description: Hysterectomy;  Recorded: 11/13/2008;     TUBAL LIGATION         Family History:   Family History   Adopted: Yes   Family history unknown: Yes       Social History:    Social History     Socioeconomic History     Marital status: Single     Spouse name: Not on file     Number of children: 3     Years of education: Not on file     Highest education level: Not on file   Occupational History     Not on file   Social Needs     Financial resource strain: Not on file     Food insecurity:     Worry: Not on file     Inability: Not on file     Transportation  "needs:     Medical: Not on file     Non-medical: Not on file   Tobacco Use     Smoking status: Former Smoker     Packs/day: 1.50     Years: 25.00     Pack years: 37.50     Last attempt to quit: 1974     Years since quittin.0     Smokeless tobacco: Never Used   Substance and Sexual Activity     Alcohol use: No     Drug use: No     Sexual activity: Not on file   Lifestyle     Physical activity:     Days per week: Not on file     Minutes per session: Not on file     Stress: Not on file   Relationships     Social connections:     Talks on phone: Not on file     Gets together: Not on file     Attends Hoahaoism service: Not on file     Active member of club or organization: Not on file     Attends meetings of clubs or organizations: Not on file     Relationship status: Not on file     Intimate partner violence:     Fear of current or ex partner: Not on file     Emotionally abused: Not on file     Physically abused: Not on file     Forced sexual activity: Not on file   Other Topics Concern     Not on file   Social History Narrative    She lives alone in a house. She has 3 children and 8 grandchildren and multiple great grandchildren. She is retired. 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 She does not smoke cigarettes and rarely drinks alcohol.          Review of Systems   She still has a lot of \"hip pain\": She points to her trochanteric bursa  She hurts more than after physical therapy than prior.  After therapy she feels like it is more painful to lift her left leg.  At home she did not need any assistive device, but would use a walker or cane if she are going for longer distances.  She lives on one floor for her house, her son and his partner live on the lower floor.  There are steps to get in.  Her son does the cleaning and laundry, she does her own cooking and light cleaning.  Her daughter takes her grocery shopping.  She would like to use \"pearls\" an " over-the-counter probiotic for her irritable bowel syndrome.  The remainder of the comprehensive review of systems is negative      Vitals:    01/23/20 1200   BP: 104/61   Pulse: 74   Resp: 18   Temp: (!) 96.2  F (35.7  C)   SpO2: 92%   Weight: 142 lb 9.6 oz (64.7 kg)       Physical Exam  Constitutional:       General: She is not in acute distress.     Appearance: Normal appearance. She is not ill-appearing.   HENT:      Right Ear: External ear normal.      Left Ear: External ear normal.      Mouth/Throat:      Mouth: Mucous membranes are moist.   Eyes:      Extraocular Movements: Extraocular movements intact.      Conjunctiva/sclera: Conjunctivae normal.   Neck:      Musculoskeletal: No neck rigidity.   Cardiovascular:      Rate and Rhythm: Normal rate and regular rhythm.      Pulses: Normal pulses.      Comments: 2 murmurs:  right upper sternal border 2/6, apex also 2/6  Pulmonary:      Breath sounds: Normal breath sounds. No rales.   Abdominal:      General: Bowel sounds are normal.      Palpations: Abdomen is soft.      Tenderness: There is no guarding.   Musculoskeletal:      Comments: 1+ bilateral lower tibial dorsal foot edema  Thoracic scoliosis  Tender to palpation over the left trochanteric bursa  Mild sensitivity in the musculature just above the iliac crest left   Skin:     Comments: Very dry skin   Neurological:      General: No focal deficit present.      Mental Status: She is alert.      Gait: Gait normal.   Psychiatric:         Mood and Affect: Mood normal.         Judgment: Judgment normal.             Allergies   Allergen Reactions     Sleeping [Diphenhydramine Hcl]      halluactions        Medication List:  Current Outpatient Medications   Medication Sig     acetaminophen (TYLENOL) 500 MG tablet Take 2 tablets (1,000 mg total) by mouth 3 (three) times a day.     apixaban (ELIQUIS) 2.5 mg Tab tablet Take 1 tablet (2.5 mg total) by mouth 2 (two) times a day.     atorvastatin (LIPITOR) 20 MG tablet  Take 1 tablet (20 mg total) by mouth daily.     b complex vitamins tablet Take 1 tablet by mouth daily.     cholecalciferol, vitamin D3, 5,000 unit Tab Take 1 tablet by mouth. Take 4 days a week. MON, Wed, Fri, Sat     L.acidophilus-Bifido.longum (PROBIOTIC PEARLS) 15 mg (1 billion cell) CpDR Take 2 capsules by mouth as needed. (Patient taking differently: Take 1 capsule by mouth as needed. )     lidocaine 4 % patch Place 1 patch on the skin daily for 10 days. Remove and discard patch with 12 hours or as directed by MD.     meloxicam (MOBIC) 7.5 MG tablet Take 1 tablet (7.5 mg total) by mouth daily for 7 days.     metoprolol succinate (TOPROL-XL) 50 MG 24 hr tablet Take 1 tablet (50 mg total) by mouth daily.     nitroglycerin (NITROSTAT) 0.3 MG SL tablet Place 1 tablet (0.3 mg total) under the tongue every 5 (five) minutes as needed for chest pain.     omeprazole (PRILOSEC) 20 MG capsule Take 1 capsule (20 mg total) by mouth daily before breakfast for 10 days.     oxyCODONE (ROXICODONE) 5 MG immediate release tablet Take 1-2 tablets (5-10 mg total) by mouth every 4 (four) hours as needed.     petrolatum, white-water (VANICREAM LITE) Lotn Apply 1 application topically. Apply topically daily for dry itching skin. Pt reports use 4-5 times/week.     ramipril (ALTACE) 10 MG capsule TAKE 1 CAPSULE BY MOUTH TWICE DAILY (EVERY 12 HOURS)     simethicone (MYLICON) 125 MG chewable tablet Chew 1 tablet (125 mg total) every 6 (six) hours as needed for flatulence.     tetrahydrozoline (VISINE) 0.05 % ophthalmic solution Administer 1 drop to both eyes. Instill 1 drops into both eyes daily in the morning for dry eyes. Pt reports use 3-4 times/week.     triamcinolone (KENALOG) 0.1 % cream Apply topically 2 (two) times a day as needed.     zolpidem (AMBIEN) 10 mg tablet Take 1 tablet (10 mg total) by mouth at bedtime as needed for sleep.       Labs:   Ref Range & Units Units and Ref Range 1/24/20 1/21/20 1/20/20 1/19/2020    WBC  4.0 - 11.0 thou/uL 9.2  12.5High   10.9  11.3High      Hemoglobin 12.0 - 16.0 g/dL 9.1Low   9.2Low   9.1Low   11.1Low      Hematocrit 35.0 - 47.0 % 28.6Low   28.0Low   28.5Low   33.6Low      MCV 80 - 100 fL 96  94  95  92     Platelets 140 - 440 thou/uL 183  134Low   136Low   181       Ref Range & Units 1/24/20 1/21/20    Sodium 136 - 145 mmol/L 142  137    Potassium 3.5 - 5.0 mmol/L 3.4Low   4.6    Chloride 98 - 107 mmol/L 109High   109High     CO2 22 - 31 mmol/L 23  20Low     Glucose 70 - 125 mg/dL 76  152High     Calcium 8.5 - 10.5 mg/dL 8.8  8.7    BUN 8 - 28 mg/dL 21  26    Creatinine 0.60 - 1.10 mg/dL 0.86  0.94    GFR MDRD Non Af Amer >60 mL/min/1.73m2 >60  56Low           Assessment / Plan:    ICD-10-CM    1. Retroperitoneal hematoma K66.1  lidocaine patch, oxycodone.  Mobilize with therapies   2. Persistent atrial fibrillation I48.19  on Eliquis   3. PAD (peripheral artery disease) (H) I73.9    4. Nonrheumatic aortic valve stenosis I35.0  on Eliquis for her A. fib   5. Atherosclerosis of native coronary artery of native heart without angina pectoris I25.10  atorvastatin, metoprolol   6. Essential hypertension I10  Ramipril, Toprol   7. Acute blood loss anemia D62  PPI prophylaxis           Electronically signed by: Shefali De Anda MD

## 2021-06-05 NOTE — TELEPHONE ENCOUNTER
Has an appointment on Thursday with Dr. Moreno .to go thru all of her medication.   She reports today , that she is running out of her Eliquis, and will be out before tomorrow. Last pill will be taken tonight.    Please sign RX , sending #30 to MD for   Refill , until Thursday, 01/09/2020    Leonie Swann RN  [facility] Triage/refill nurse

## 2021-06-05 NOTE — PROGRESS NOTES
[facility] Clinic Note    Deloris Freeman [facility]   89 y.o. female    Date of Visit: 2/4/2020  Chief Complaint   Patient presents with     Follow-up     [facility]       ASSESSMENT/PLAN  1. Retroperitoneal hematoma     2. Anemia due to blood loss, acute  HM2(CBC w/o Differential)   3. Essential hypertension     4. Persistent atrial fibrillation       ---------------------------------------------  1.  Decision already made to restart Eliquis following retroperitoneal hematoma.  It was held for about 3 days, no symptoms or evidence of recurrence.  Hgb improving from a low of 9.1 up to 10 (baseline about 13) . This appears to be a truly spontaneous retroperitoneal bleed, which is unsettling.  CHADS2 Vasc score is 5 (7.2% annual stroke risk) and 4.1% annual risk of bleed (Hasbled).  If recurrent spontaneous bleeding, would be difficult to justify ongoing anticoagulaiton    2. Noted improvement in Hgb    3. BP back to normal (low on presentation at hospital).  Stop amlodipine (was supposed to stop before) due to minor ankle swelling.  No signs/symptoms of heart failure.     4. See #1    Return in about 1 month (around 3/4/2020) for Recheck.      SUBJECTIVE    Deloris Freeman is an 89-year-old woman with history of atrial fibrillation and anticoagulation who presents as a follow-up from TCU after hospitalization for hemoperitoneum.  I last saw her in early January, mainly for medication refills to make sure she was caught up-to-date on these refills.    She presented to [facility] on January 19, was admitted for 2 days for retroperitoneal hematoma.  This was initially manifest as left flank and hip pain.  There is no fracture or trauma history.    Eliquis was held for 3 days.  She was discharged to TCU ([facility]) prior to return home.  The PT was great, but she did not have a good experience otherwise.  She had trouble getting answers.     She was hypotensive, there was  some adjustment of her antihypertensives, but this seemed to return to normal prior to discharge.    Hemoglobin dropped from 13 down to 9.1.    Energy level is improving, bathing self, eating well, working on getting all her confidence in walking back.     She is back home, son and partner live in basement.      Feet and ankles are swollen.  She takes furosemide, also on amlodipine.      ROS A comprehensive review of systems was performed and was otherwise negative    Post Discharge Medication Reconciliation Status: discharge medications reconciled, continue medications without change     Patient Active Problem List   Diagnosis     Mixed hyperlipidemia     Essential hypertension     Insomnia     Hypercholesterolemia     Migraine Headache     Coronary Artery Disease     Paroxysmal SVT (supraventricular tachycardia) (H)     Intermittent claudication (H)     Malignant neoplasm of skin     PAD (peripheral artery disease) (H)     Vitamin D deficiency     RLS (restless legs syndrome)     Persistent atrial fibrillation     Sinus bradycardia     Vertigo     Anxiety     Cardiac pacemaker in situ, dual chamber     Nonrheumatic aortic valve stenosis     Left hip pain     Retroperitoneal hematoma     Sick sinus syndrome (H)     Past Medical History:   Diagnosis Date     Coronary artery disease      Hyperlipidemia      Hypertension      PAD (peripheral artery disease) (H) 10/19/2015     Paroxysmal SVT (supraventricular tachycardia) (H)     Created by Conversion      Persistent atrial fibrillation 5/9/2018    New diagnosis April 16 18th and found incidentally on physical exam during yearly exam in primary clinic [id number] score of 5-new Eliquis start     RLS (restless legs syndrome) 2/28/2017     Past Surgical History:   Procedure Laterality Date     CARDIOVERSION  05/24/2018     CHOLECYSTECTOMY       CORONARY ANGIOPLASTY       CV CORONARY ANGIOGRAM N/A 6/26/2018    Procedure: Coronary Angiogram;  Surgeon: Joby Vargas MD;   Location: [facility];  Service:      EP PACEMAKER INSERT N/A 2018    Procedure: EP Pacemaker Insertion;  Surgeon: Osito Alvarez MD;  Location: [facility];  Service:      INGUINAL HERNIA REPAIR Right     Indirect- Dr. Pedersen     IN APPENDECTOMY      Description: Appendectomy;  Recorded: 2008;     IN MASTECTOMY, SIMPLE, COMPLETE      Description: Breast Surgery Simple Mastectomy;  Recorded: 2009;     IN REMOVAL OF OVARY(S)      Description: Oophorectomy;  Recorded: 2009;     IN REVISE SECONDARY VARICOSITY      Description: Varicose Vein Ligation;  Recorded: 2008;     IN TOTAL ABDOM HYSTERECTOMY      Description: Hysterectomy;  Recorded: 2008;     TUBAL LIGATION       Social History     Socioeconomic History     Marital status: Single     Spouse name: Not on file     Number of children: Not on file     Years of education: Not on file     Highest education level: Not on file   Occupational History     Not on file   Social Needs     Financial resource strain: Not on file     Food insecurity:     Worry: Not on file     Inability: Not on file     Transportation needs:     Medical: Not on file     Non-medical: Not on file   Tobacco Use     Smoking status: Former Smoker     Packs/day: 1.50     Years: 25.00     Pack years: 37.50     Last attempt to quit: 1974     Years since quittin.1     Smokeless tobacco: Never Used   Substance and Sexual Activity     Alcohol use: No     Drug use: No     Sexual activity: Not on file   Lifestyle     Physical activity:     Days per week: Not on file     Minutes per session: Not on file     Stress: Not on file   Relationships     Social connections:     Talks on phone: Not on file     Gets together: Not on file     Attends Buddhism service: Not on file     Active member of club or organization: Not on file     Attends meetings of clubs or organizations: Not on file     Relationship status: Not on file     Intimate partner  violence:     Fear of current or ex partner: Not on file     Emotionally abused: Not on file     Physically abused: Not on file     Forced sexual activity: Not on file   Other Topics Concern     Not on file   Social History Narrative    She lives alone in a house. She has 3 children and 8 grandchildren and multiple great grandchildren. She is retired. 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 She does not smoke cigarettes and rarely drinks alcohol.     Family History   Adopted: Yes   Family history unknown: Yes       Current Outpatient Medications   Medication Sig Dispense Refill     acetaminophen (TYLENOL) 500 MG tablet Take 2 tablets (1,000 mg total) by mouth 3 (three) times a day.  0     apixaban (ELIQUIS) 2.5 mg Tab tablet Take 1 tablet (2.5 mg total) by mouth 2 (two) times a day. 180 tablet 3     atorvastatin (LIPITOR) 20 MG tablet Take 1 tablet (20 mg total) by mouth daily. (Patient taking differently: Take 20 mg in the morning and 10 mg in the evening) 90 tablet 3     b complex vitamins tablet Take 1 tablet by mouth daily.       cholecalciferol, vitamin D3, 5,000 unit Tab Take 1 tablet by mouth. Take 4 days a week. MON, Wed, Fri, Sat       furosemide (LASIX) 20 MG tablet Take 1 tablet (20 mg total) by mouth 2 (two) times a day. 30 tablet 0     metoprolol succinate (TOPROL-XL) 50 MG 24 hr tablet Take 1 tablet (50 mg total) by mouth daily. 90 tablet 3     nitroglycerin (NITROSTAT) 0.3 MG SL tablet Place 1 tablet (0.3 mg total) under the tongue every 5 (five) minutes as needed for chest pain. 90 tablet 12     omeprazole (PRILOSEC) 20 MG capsule Take 20 mg by mouth daily before breakfast.       petrolatum, white-water (VANICREAM LITE) Lotn Apply 1 application topically. Apply topically daily for dry itching skin. Pt reports use 4-5 times/week.       ramipril (ALTACE) 10 MG capsule TAKE 1 CAPSULE BY MOUTH TWICE DAILY (EVERY 12 HOURS) 180 capsule 3     simethicone (MYLICON)  "125 MG chewable tablet Chew 1 tablet (125 mg total) every 6 (six) hours as needed for flatulence.  0     tetrahydrozoline (VISINE) 0.05 % ophthalmic solution Administer 1 drop to both eyes. Instill 1 drops into both eyes daily in the morning for dry eyes. Pt reports use 3-4 times/week.       triamcinolone (KENALOG) 0.1 % cream Apply topically 2 (two) times a day as needed. 80 g 5     zolpidem (AMBIEN) 10 mg tablet Take 10 mg by mouth at bedtime as needed for sleep.       No current facility-administered medications for this visit.        Allergies   Allergen Reactions     Sleeping [Diphenhydramine Hcl]      halluactions        EXAM  Vitals:    02/04/20 1512   BP: 116/68   Patient Site: Left Arm   Patient Position: Sitting   Cuff Size: Adult Regular   Pulse: 64   SpO2: 98%   Weight: 139 lb 9 oz (63.3 kg)   Height: 5' 5\" (1.651 m)         General: alert, no distress  HEENT: sclerae anicteric, moist oral mucosa  Heart: Regular rate and rhythm, early systolic murmur.  No pretibial edema.  Warm extremities  Lungs: Clear to auscultation bilat  Gastrointestinal: abdomen is non-distended.    Skin: warm/dry, no rashes  Neuro: no gross abnormalities  Heme: no ecchymoses or bruising      RESULTS REVIEWED:     ANALYSIS AND SUMMARY OF OLD RECORDS, NOTES AND CONSULTS (2): reviewed hospital discharge, summarized abvoe.      RECORDS REQUESTED (1): None.     OTHER HISTORY SUMMARIZED (from nursing staff, family, friends) (2):     RADIOLOGY TESTS SUMMARIZED or REQUESTED (XRAY/CT/MRI/DXA) (1):   Ct Hip Without Contrast Left    Result Date: 1/19/2020  EXAM: CT HIP WO CONTRAST LEFT LOCATION: [facility] DATE/TIME: 1/19/2020 8:05 AM INDICATION: Hip pain, initial exam left hip pain, sharp with grinding feeling, unable to bear weight. COMPARISON: Radiographs 01/19/2020 TECHNIQUE: Noncontrast. Axial, sagittal and coronal thin-section reconstruction. Dose reduction techniques were used. CONTRAST: None. FINDINGS: No evidence of " fracture or dislocation of the left hip. The imaged portion of the sacrum is intact. Degenerative change at the left hip is mild. Moderate pubic symphysis arthrosis. Mild sacroiliac degenerative change. Moderate multilevel disc and endplate degenerative findings within the incompletely imaged lumbar spine. There is stranding in the retroperitoneum anterior to the left iliac vasculature and left psoas muscle. Superiorly, this extends to the region of the iliacus muscle. Full field of view images were requested from the source data, and knees demonstrate asymmetric enlargement of the left psoas muscle with respect to the right.     1. Mild degenerative findings at the left hip. No acute osseous abnormality. 2. Stranding in the retroperitoneum adjacent to the iliac vasculature, psoas muscle, and iliacus muscle is suspicious for a small volume of blood products related to an underlying left psoas hematoma.     Xr Pelvis W 2 Vw Hip Left    Result Date: 1/19/2020  EXAM: XR PELVIS WITH 2 VIEW HIP LEFT LOCATION: [facility] DATE/TIME: 01/19/2020, 6:31 AM INDICATION: No fall but sharp pain in left hip with any weightbearing and feels grinding if tries to move, unable to stand. COMPARISON: None.     No acute fracture or dislocation. Mild degenerative changes left hip. Vascular calcifications. Surgical clip in the pelvis.       MEDICINE TESTS SUMMARIZED or REQUESTED (EKG/ECHO/COLONOSCOPY/EGD) (1): None    INDEPENDENT REVIEW OF EKG OR X-RAY (2): None.    Lab Results   Component Value Date    HGB 10.0 (L) 02/04/2020    HGB 9.1 (L) 01/24/2020    HGB 9.2 (L) 01/21/2020    HGB 9.1 (L) 01/20/2020    HGB 11.1 (L) 01/19/2020    HGB 13.2 10/02/2019    HGB 12.6 06/26/2018    HGB 13.1 06/24/2018    HGB 14.8 04/16/2018    HGB 13.4 10/25/2017        Data points  4     Win Moreno DO  Internal Medicine  [facility]

## 2021-06-05 NOTE — PROGRESS NOTES
[facility] Patient Outreach:     Discharge Date::  1/28/20      Reason for TCU stay (discharge diagnosis)::  Retroperitoneal hematoma      Are you feeling better, the same or worse since your discharge?:  Patient is feeling better (LE edema is gone.  )          As part of your discharge plan, did they discuss home care with you?: No            Did you receive any new medications, or was there a change to your medications?: Yes        Are you taking those medications, or do you have any established regiment?:  New meds; unsure off hand what they are.  Will bring list to cardiologist on 1/31/20.        Do you have any follow up visits scheduled with your PCP or Specialist?:  Yes, with Specialist      Who are you seeing and when is it scheduled?:  Dr. Vargas(cardiology) on 1/31/20.        I'm glad to hear you're doing well and we want you to continue to do well. Your PCP would like to see you for a follow-up visit. Can we help set that up for your today?: No        (RN) Provided patient the PCP's phone number to call if they have any questions or concerns?: No (Patient knows the number for PCP's office and will be seeing PCP on 2/4/20.  )

## 2021-06-05 NOTE — PATIENT INSTRUCTIONS - HE
STOP  Amlodipine    Check hemoglobin    Diagnosis is spontaneous retroperitoneal hematoma (bleeding in the muscle inside the abdominal cavity). Unclear what caused this.

## 2021-06-05 NOTE — PROGRESS NOTES
"[facility]    Name:   Deloris Larson  : 3/24/1930  Facility:   [facility] [[id number]]   Room:   Code Status: DNR -   Fac type:   SNF (Skilled Nursing Facility, TCU) -     PCP:  Win Moreno DO    CHIEF COMPLAINT / REASON FOR VISIT:  Chief Complaint   Patient presents with     Discharge Summary     TCU discharge after hospitalization for retroperitoneal hematoma.      [facility] from 2020 until 2020 (left retroperitoneal hematoma)      HPI: Deloris Freeman is a 89 y.o. female who developed spontaneous left flank and hip pain.  Evaluation in the ED showed no hip fracture but a retroperitoneal hematoma.  She had been on Eliquis.  She had not fallen.  Eliquis was held for 3 days, and she was placed on anti-inflammatories, steroids, and a lidocaine patch.  The next day, pain was markedly improved, and she was discharged to the TCU for physical and occupational therapies.    She has a history of atrial fibrillation (Eliquis) and sick sinus syndrome (pacemaker placement).  She did have acute blood loss anemia with baseline hemoglobin of 13 dropping down to 9.1 with a hematoma.  It was 9.1 when checked by Dr. De Anda on 2020 but is expected to rebound.      TCU ISSUES    Discharge planning: She is quite anxious to go home.  She lives with her son and daughter-in-law, and there was usually someone around with only brief periods of being alone.  She states, \"I've been taking care of myself for several days,\" suggesting that she has been walking alone, dressing herself, and generally being independent.  She did have a care conference today and will be discharging on 2020.  At home, she will need a home health aide and home PT, most likely for a very short period of time.    Medication changes: Due to hypokalemia, Dr. De Anda did order 2 days of 20 mEq of potassium chloride daily.  She also scheduled her lidocaine patch to the lower back " "and left hip and also discontinued acidophilus orders.    ROS: Review of systems is essentially negative other than as noted here.  She tells me she has a \"tender stomach,\" and she has needed to put herself on \"half rations.\"  At home, she is very plain foods.  She denies any pain, headaches, chest pain, nausea or vomiting, dizziness or dyspnea, dysuria, diplopia, constipation or diarrhea (can easily get the latter), difficulty chewing or swallowing, integumentary issues.  She is sleeping with zolpidem.    Past Medical History:   Diagnosis Date     Coronary artery disease      Hyperlipidemia      Hypertension      PAD (peripheral artery disease) (H) 10/19/2015     Paroxysmal SVT (supraventricular tachycardia) (H)     Created by Conversion      Persistent atrial fibrillation 2018    New diagnosis  and found incidentally on physical exam during yearly exam in primary clinic [id number] score of 5-new Eliquis start     RLS (restless legs syndrome) 2017              Family History   Adopted: Yes   Family history unknown: Yes     Social History     Socioeconomic History     Marital status: Single     Spouse name: Not on file     Number of children: Not on file     Years of education: Not on file     Highest education level: Not on file   Occupational History     Not on file   Social Needs     Financial resource strain: Not on file     Food insecurity:     Worry: Not on file     Inability: Not on file     Transportation needs:     Medical: Not on file     Non-medical: Not on file   Tobacco Use     Smoking status: Former Smoker     Packs/day: 1.50     Years: 25.00     Pack years: 37.50     Last attempt to quit: 1974     Years since quittin.1     Smokeless tobacco: Never Used   Substance and Sexual Activity     Alcohol use: No     Drug use: No     Sexual activity: Not on file   Lifestyle     Physical activity:     Days per week: Not on file     Minutes per session: Not on file     Stress: Not " on file   Relationships     Social connections:     Talks on phone: Not on file     Gets together: Not on file     Attends [employer] service: Not on file     Active member of club or organization: Not on file     Attends meetings of clubs or organizations: Not on file     Relationship status: Not on file     Intimate partner violence:     Fear of current or ex partner: Not on file     Emotionally abused: Not on file     Physically abused: Not on file     Forced sexual activity: Not on file   Other Topics Concern     Not on file   Social History Narrative    She lives alone in a house. She has 3 children and 8 grandchildren and multiple great grandchildren. She is retired. 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 She does not smoke cigarettes and rarely drinks alcohol.       MEDICATIONS: Reviewed from the MAR, physician orders, and/or earlier progress notes.  Post Discharge Medication Reconciliation Status: medication reconciliation previously completed during another office visit.  Current Outpatient Medications   Medication Sig     acetaminophen (TYLENOL) 500 MG tablet Take 2 tablets (1,000 mg total) by mouth 3 (three) times a day.     apixaban (ELIQUIS) 2.5 mg Tab tablet Take 1 tablet (2.5 mg total) by mouth 2 (two) times a day.     atorvastatin (LIPITOR) 20 MG tablet Take 1 tablet (20 mg total) by mouth daily.     b complex vitamins tablet Take 1 tablet by mouth daily.     cholecalciferol, vitamin D3, 5,000 unit Tab Take 1 tablet by mouth. Take 4 days a week. MON, Wed, Fri, Sat     lidocaine 4 % patch Place 1 patch on the skin daily for 10 days. Remove and discard patch with 12 hours or as directed by MD.     meloxicam (MOBIC) 7.5 MG tablet Take 1 tablet (7.5 mg total) by mouth daily for 7 days.     metoprolol succinate (TOPROL-XL) 50 MG 24 hr tablet Take 1 tablet (50 mg total) by mouth daily.     nitroglycerin (NITROSTAT) 0.3 MG SL tablet Place 1 tablet (0.3 mg total)  "under the tongue every 5 (five) minutes as needed for chest pain.     omeprazole (PRILOSEC) 20 MG capsule Take 1 capsule (20 mg total) by mouth daily before breakfast for 10 days.     oxyCODONE (ROXICODONE) 5 MG immediate release tablet Take 1-2 tablets (5-10 mg total) by mouth every 4 (four) hours as needed.     petrolatum, white-water (VANICREAM LITE) Lotn Apply 1 application topically. Apply topically daily for dry itching skin. Pt reports use 4-5 times/week.     ramipril (ALTACE) 10 MG capsule TAKE 1 CAPSULE BY MOUTH TWICE DAILY (EVERY 12 HOURS)     simethicone (MYLICON) 125 MG chewable tablet Chew 1 tablet (125 mg total) every 6 (six) hours as needed for flatulence.     tetrahydrozoline (VISINE) 0.05 % ophthalmic solution Administer 1 drop to both eyes. Instill 1 drops into both eyes daily in the morning for dry eyes. Pt reports use 3-4 times/week.     triamcinolone (KENALOG) 0.1 % cream Apply topically 2 (two) times a day as needed.     zolpidem (AMBIEN) 10 mg tablet Take 1 tablet (10 mg total) by mouth at bedtime as needed for sleep.     ALLERGIES:   Allergies   Allergen Reactions     Sleeping [Diphenhydramine Hcl]      halluactions      DIET: Regular, regular texture, thin liquids.    Vitals:    01/27/20 1157   BP: 92/50   Pulse: 70   Resp: 20   Temp: (!) 96.2  F (35.7  C)   SpO2: 95%   Weight: 142 lb 9.6 oz (64.7 kg)   Height: 5' 5\" (1.651 m)     Body mass index is 23.73 kg/m .    EXAMINATION:   General: Pleasant, alert, and conversant elderly female, sitting comfortably in a recliner, in no apparent distress.  Head: Normocephalic and atraumatic.   Eyes: PERRLA, sclerae clear.   ENT: Moist oral mucosa.   Cardiovascular: Irregularly irregular rhythm with a 4/6 systolic ejection murmur at the left sternal border.  Respiratory: Lungs clear to auscultation bilaterally.   Abdomen: Soft and nontender.   Musculoskeletal/Extremities: Age-related degenerative joint disease.  Bilateral 2+ pretibial edema.  Pacemaker " palpable in the left upper chest wall.  Integument: No rashes, clinically significant lesions, or skin breakdown.   Cognitive/Psychiatric: Alert and oriented x3.  Affect is somewhat anxious.  She is very eager to discharge.    DIAGNOSTICS:   Results for orders placed or performed in visit on 01/24/20   Basic Metabolic Panel   Result Value Ref Range    Sodium 142 136 - 145 mmol/L    Potassium 3.4 (L) 3.5 - 5.0 mmol/L    Chloride 109 (H) 98 - 107 mmol/L    CO2 23 22 - 31 mmol/L    Anion Gap, Calculation 10 5 - 18 mmol/L    Glucose 76 70 - 125 mg/dL    Calcium 8.8 8.5 - 10.5 mg/dL    BUN 21 8 - 28 mg/dL    Creatinine 0.86 0.60 - 1.10 mg/dL    GFR MDRD Af Amer >60 >60 mL/min/1.73m2    GFR MDRD Non Af Amer >60 >60 mL/min/1.73m2     Lab Results   Component Value Date    WBC 9.2 01/24/2020    HGB 9.1 (L) 01/24/2020    HCT 28.6 (L) 01/24/2020    MCV 96 01/24/2020     01/24/2020     Estimated Creatinine Clearance: 45.3 mL/min (by C-G formula based on SCr of 0.86 mg/dL).      ASSESSMENT/Plan:      ICD-10-CM    1. Retroperitoneal hematoma K66.1    2. Persistent atrial fibrillation I48.19    3. SSS (sick sinus syndrome) (H) I49.5    4. Essential hypertension I10    5. Atherosclerosis of native coronary artery of native heart without angina pectoris I25.10    6. Hypercholesterolemia E78.00    7. Cardiac pacemaker in situ, dual chamber Z95.0    8. Primary insomnia F51.01      DISCHARGE PLAN/FACE TO FACE:  I certify that this patient is under my care and that I had a face-to-face encounter that meets the physician face-to-face encounter requirements with this patient.     Date of Face-to-Face Encounter: 01/27/2020     I certify that, based on my findings, the following services are medically necessary home health services: Home health aide and home PT    My clinical findings support the need for the above skilled services because: (Please write a brief narrative summary that describes what the RN, PT, SLP, or other services  will be doing in the home. A list of diagnoses in this section does not meet the CMS requirements): Home health aide to assist with things such as bathing and home PT to continue therapy in the home setting.    This patient is homebound because: (Please write a brief narrative summmary describing the functional limitations as to why this patient is homebound and specifically what makes this patient homebound.):  Services necessarily need to be performed in the home to be of benefit.    The patient is, or has been, under my care and I have initiated the establishment of the plan of care. This patient will be followed by a physician who will periodically review the plan of care.  Initial follow-up should be within 7-10 days.    Approximate time spent with this patient was greater than 30 minutes with greater than 50% spent in discussions regarding services required upon discharge.      The above has been created using voice recognition software. Please be aware that this may unintentionally  produce inaccuracies and/or nonsensical sentences.      Electronically signed by: Oliver Levy CNP

## 2021-06-05 NOTE — PROGRESS NOTES
In clinic device check with Device RN and Dr. Vargas.  Please see link for full device report.  Patient was informed of results and next follow up during today's visit.

## 2021-06-05 NOTE — TELEPHONE ENCOUNTER
----- Message from Gi GRADY Pivec sent at 1/28/2020  1:07 PM CST -----  Regarding: device RN review  Type: pacemaker alert remote transmission for AT/AF burden for 6/24hrs.   Presenting rhythm: atrial fibrillation with ventricular pacing and sensing 60-80 bpm. Significant atrial undersensing noted.  Battery/lead status: stable.  Arrhythmias: since 12/6/19; 2,109 mode switch episodes, EGMs confirm AF with RA undersensing, unable to determine start time of episodes as device isn't detecting AF, ventricular rates well controlled, AF burden 1%. No ventricular arrhythmias detected.  Anticoagulant: Eliquis.   Comments: Normal magnet and pacemaker function. Routed to device RN for review. ROCÍO    Transmission reviewed, agree with above. Difficult to determine onset of AF due to significant undersensing,  but per daily graphs appears to have started about a week ago, 1/21/20. Unable to determine if this is intermittent or ongoing due to undersensing. Reviewed with patient, she states occasionally she feels WHITE, and palpitations, but in general does not seem to be bothered by A.fib at this time. She confirms she is back on Eliquis now but is apprehensive about this due to recent spontaneous retroperitoneal hematoma. She is worried this could happen again. She will discuss with Dr. Vargas this Friday at her appointment. I have asked that she come in earlier so that we can adjust her atrial sensitivity in her device before she sees Dr. Vargas. She agrees and will have her daughter bring her in around 3:45 that day. Device RN has ok'd add-on.      Neetu Quijano, RN

## 2021-06-06 NOTE — PROGRESS NOTES
"[facility] Clinic Note    Deloris Larson   89 y.o. female    Date of Visit: 3/2/2020  Chief Complaint   Patient presents with     Hospital Visit Follow Up     ED, [facility], 2/28/20, back pain       ASSESSMENT/PLAN  1. Acute bilateral low back pain without sciatica  Ambulatory referral to Physical Therapy   2. Persistent atrial fibrillation     3. Retroperitoneal hematoma       ---------------------------------------------    1. History and exam consistent with msk etiology and CT scan from 2/28/20 negative for retroperitoneal hematoma. Recommended she continueTylenol 1000 mg 2-3 times daily and heat. Also referred her to physical therapy. Advised her not to fill the Cyclobenzaprine since she is not having muscle spasms.   2-3. Unprovoked retroperitoneal hematoma has resolved per CT on 2/28/20. Recommended she continue Eliquis.     Return in about 6 weeks (around 4/13/2020) for Recheck.    SUBJECTIVE    Deloris Larson is an 89 y.o. female on Eliquis for persistent A fib who presents for follow up of a small, spontaneous, retroperitoneal hematoma visualized on left hip CT on 1/19/20. She was seen in the [facility] ED on 2/24/20 and 2/28/20 for mild back pain, much less severe than that had with her retroperitoneal hematoma, and had a CT scan on 2/28/20 which was negative for retroperitoneal hematoma or compression fracture. The patient was discharged with a prescription for Flexeril.     Today, the patient reports she continues to have mild, aching, back pain in a band-like distribution across the low back. It is nothing like her pain from the retroperitoneal hematoma in January, which was in the left gluteal region and felt she \"was having another baby.\" Her current back pain is exacerbated by transitional movements and improved by Tylenol 1000 mg 2-3 times daily or by using a hand-knitted shawl to warm her back. She did not fill the Cyclobenzaprine prescription from the ED because she was " concerned about potential side effects and she wanted to talk to her primary doctor before using it. The patient denies numbness, tingling, bladder/bowel incontinence.      ROS:   Per HPI, all other systems negative     Medications, allergies, and problem list were reviewed and updated    Patient Active Problem List   Diagnosis     Mixed hyperlipidemia     Essential hypertension     Insomnia     Hypercholesterolemia     Migraine Headache     Coronary Artery Disease     Paroxysmal SVT (supraventricular tachycardia) (H)     Intermittent claudication (H)     Malignant neoplasm of skin     PAD (peripheral artery disease) (H)     Vitamin D deficiency     RLS (restless legs syndrome)     Persistent atrial fibrillation     Sinus bradycardia     Vertigo     Anxiety     Cardiac pacemaker in situ, dual chamber     Nonrheumatic aortic valve stenosis     Left hip pain     Retroperitoneal hematoma     Sick sinus syndrome (H)     Past Medical History:   Diagnosis Date     Coronary artery disease      Hyperlipidemia      Hypertension      PAD (peripheral artery disease) (H) 10/19/2015     Paroxysmal SVT (supraventricular tachycardia) (H)     Created by Conversion      Persistent atrial fibrillation 5/9/2018    New diagnosis April 16 18th and found incidentally on physical exam during yearly exam in primary clinic [id number] score of 5-new Eliquis start     RLS (restless legs syndrome) 2/28/2017     Current Outpatient Medications   Medication Sig Dispense Refill     acetaminophen (TYLENOL) 500 MG tablet Take 2 tablets (1,000 mg total) by mouth 3 (three) times a day.  0     apixaban (ELIQUIS) 2.5 mg Tab tablet Take 1 tablet (2.5 mg total) by mouth 2 (two) times a day. 180 tablet 3     atorvastatin (LIPITOR) 20 MG tablet Take 1 tablet (20 mg total) by mouth daily. 90 tablet 3     b complex vitamins tablet Take 1 tablet by mouth daily.       cholecalciferol, vitamin D3, 5,000 unit Tab Take 1 tablet by mouth. Take 4 days a week. MON,  "Wed, Fri, Sat       cyclobenzaprine (FLEXERIL) 10 MG tablet Take 0.5 tablets (5 mg total) by mouth 2 (two) times a day as needed for muscle spasms. 20 tablet 0     furosemide (LASIX) 20 MG tablet Take 1 tablet (20 mg total) by mouth 2 (two) times a day. 30 tablet 0     metoprolol succinate (TOPROL-XL) 50 MG 24 hr tablet Take 1 tablet (50 mg total) by mouth daily. 90 tablet 3     nitroglycerin (NITROSTAT) 0.3 MG SL tablet Place 1 tablet (0.3 mg total) under the tongue every 5 (five) minutes as needed for chest pain. 90 tablet 12     omeprazole (PRILOSEC) 20 MG capsule Take 20 mg by mouth daily before breakfast.       petrolatum, white-water (VANICREAM LITE) Lotn Apply 1 application topically. Apply topically daily for dry itching skin. Pt reports use 4-5 times/week.       ramipril (ALTACE) 10 MG capsule TAKE 1 CAPSULE BY MOUTH TWICE DAILY (EVERY 12 HOURS) 180 capsule 3     triamcinolone (KENALOG) 0.1 % cream Apply topically 2 (two) times a day as needed. 80 g 5     zolpidem (AMBIEN) 10 mg tablet Take 10 mg by mouth at bedtime as needed for sleep.       No current facility-administered medications for this visit.      Allergies   Allergen Reactions     Sleeping [Diphenhydramine Hcl]      halluactions        EXAM  Vitals:    03/02/20 0918   BP: 132/76   Patient Site: Left Arm   Patient Position: Sitting   Cuff Size: Adult Regular   Weight: 135 lb 6 oz (61.4 kg)   Height: 5' 5\" (1.651 m)     General: alert, no distress  HEENT: sclerae anicteric, moist oral mucosa  Heart: Irregularly irregular. 3/6 systolic murmur best heart at the RUSB.  No pretibial edema.  Warm extremities  Lungs: Clear to auscultation bilat  Skin: warm/dry, no rashes  Neuro: no gross abnormalities  Back: No midline TTP. Mild TTP over the bilateral lumbar paraspinal musculature. Makes transitional movements slowly.     ASHTYN Nathan on Internal Medicine Rotation 9:20 AM 3/2/2020    Addendum:   Attest above note by ASHTYN Nathan     Deloris has " low back pain which would be best described as mechanical low back pain, some component of arthritis in the back based on lumbar spine CT scan.  She does not have a recurrence of retroperitoneal hematoma, which would be rare for recurrence, was rare to begin with.  If she did have another recurrence of major bleeding episode, we would be forced to discontinue the Eliquis.  Hopefully she will have the confidence knowing that she is unlikely to have another retroperitoneal hematoma as a cause of back pain.  I think she would benefit from ongoing physical therapy to help strengthen her back, and rehab.    She is certainly welcome to see the spine center, which was mentioned at the ER, but she might just primarily need physical therapy at this time.  She prefers not to take a muscle relaxer, okay to use scheduled Tylenol.    On exam, she has some tenderness in the low lumbar spine.  I reviewed the ER notes from her recent ER presentations, the CT results of the lumbar spine scan, copied and pasted below    EXAM: CT LUMBAR SPINE WO CONTRAST  LOCATION: [facility]  DATE/TIME: 2/28/2020 9:39 AM     INDICATION: Back pain or radiculopathy, < 6 weeks, uncomplicated. 89-year-old female with pacemaker. Recent history of left psoas hematoma. Patient now presents with lumbar pain. Concern for possible compression fracture versus recurrence of psoas   hematoma.  COMPARISON: Hip CT 1/19/2020, abdomen pelvis CT 6/24/2018, lumbar spine MRI 12/15/2015.  TECHNIQUE: Routine without IV contrast. Multiplanar reformats. Dose reduction techniques were used.     FINDINGS:   Implanted electronic cardiac pacing or defibrillating device noted. Pleural effusion at the right lung base partially imaged. Nomenclature is based on 5 lumbar type vertebral bodies. Normal vertebral body heights. Small Schmorl's nodes within the   inferior L1, L2 and superior L3 endplates. No acute fracture. No retroperitoneal hematoma is evident along the  visualized psoas muscle. Right renal cyst. Prior cholecystectomy. Atherosclerotic calcification.     T12-L1: Mild disc bulge and facet arthropathy. No stenosis.     L1-L2: Mild disc bulge and facet arthropathy. Mild lateral recess narrowing. No significant central canal or foraminal stenosis.     L2-L3: Mild disc bulge and facet arthropathy. Mild spinal canal narrowing. Slight foraminal narrowing.     L3-L4: Mild to moderate disc bulge. Mild facet arthropathy. Moderate lateral recess and mild spinal canal stenosis. Mild left and mild to moderate right foraminal narrowing.     L4-L5: Mild disc bulge and facet arthropathy. Ligamentum flavum redundancy contributes to probably moderate spinal canal stenosis. Mild to moderate left and mild right foraminal narrowing.     L5-S1: Mild disc bulge. Moderate to advanced right and moderate left facet arthropathy. Mild lateral recess narrowing. Moderate right and mild left foraminal narrowing.     IMPRESSION:   1.  Pleural effusion at the right lung base is partially imaged.  2.  No compression deformity or acute fracture is evident. No retroperitoneal hematoma is visible.  3.  Degenerative changes without severe stenosis. There is probably moderate lateral recess narrowing at L3-L4 and moderate spinal canal narrowing at L4-L5 with less pronounced spinal canal narrowing elsewhere.  4.  Neural foraminal narrowing is moderate on the right at L5-S1 and more mild to moderate elsewhere, as detailed above.    Win Moreno DO  Internal Medicine  [facility]

## 2021-06-07 NOTE — TELEPHONE ENCOUNTER
PT attempted to contact patient for status update. Encouraged to contact PT via  or [employer] if any questions/concerns present at this time, or moving forward.    Steve Martinez, PT, DPT

## 2021-06-07 NOTE — TELEPHONE ENCOUNTER
Called patient for progress check in. She reports things to be going well. Minimal to no discomfort now with turning over in bed, and sit<>stand, supine<>sit transfers. Continuing to do her HEP regularly. No questions or concerns at this time, but she would like PT to hold her chart open, as she may come back to the clinic once COVID crisis subsides, pending resolution of pain.     Steve Martinez, PT, DPT

## 2021-06-07 NOTE — TELEPHONE ENCOUNTER
"Type: routine remote pacemaker transmission.  Presenting rhythm: atrial fibrillation with ventricular sensing and pacing, rate 80 bpm. Single instance of possible non-capture in RV on presenting EGM.  Battery/lead status: stable  Arrhythmias: since 2/8/20, AF is persistent, burden 100%, intermittent atrial undersensing noted, V-rates do not exceed 120 bpm. A few very small complexes with  on EGMs. No ventricular high rate episodes detected. Noted that MV sensor switched 3/9/20.  Anticoagulant: Eliquis  Comments: normal magnet and pacemaker function. Routed to device RN. prd       Transmission reviewed, persistent A.fib continues since January. V-rates controlled. Presenting EGM shows rare, possible RV LOC. ~ 20% V-paced, and not pacemaker dependent. Current RV output is set to \"trend\" at 2V. Capture thresholds currently measuring 1.2 V. Reviewed with [facility] tech support who agreed this could be rare RV LOC, but if asymptomatic would probably be safe to wait until COVID clears. Would recommend increasing output to 3V when able to come back to clonic.     Also, due to recent AF and frequent sensing on Atrial channel, the battery longevity has dropped. TS recommends eventually programming to VVIR and turning off RA sensing to regain battery longevity. Does not feel this is due to faulty battery, but rather frequent AF sensing.      Call placed to patient to review findings. She states every once in a while she can feel an irregular beat, \"but nothing alarming.\" Denies any lightheadedness or near-syncope.Due to COVID-19 crisis we will defer having patient come in for now. Advised to call promptly with any changes and we can reassess need to come in and/or repeat remote transmission. She verbalized understanding. Will repeat remote in 1 month to check RV lead status and %.     Neeut Quijano RN    "

## 2021-06-08 ENCOUNTER — COMMUNICATION - HEALTHEAST (OUTPATIENT)
Dept: NURSING | Facility: CLINIC | Age: 86
End: 2021-06-08

## 2021-06-08 NOTE — PROGRESS NOTES
Patient in clinic today for repeat ABIs, patient is followed for PAD w/ claudication. Patient reports she is walking religiously 30 to 45 a day, does reports some muscle fatigue, however this does not limit her walking or ADL. She is logging her walking. Patient has no ischemic ulcers or rest pain. Explained that her ABIs are unchanged, she will continue walking as this will help with development of collateral flow. Per Dr. Suresh patient will RTC in one year for repeat arterial studies, standard precautions given, patient will should there be a change in symptoms or concerns. Patient verbalized her understanding.

## 2021-06-08 NOTE — PROGRESS NOTES
"[facility] Discharge Summary  Patient Name: Deloris Larson  Date: 5/12/2020  Referral Diagnosis: [unfilled]  Referring provider: Win Moreno DO  Visit Diagnosis:   1. Acute midline low back pain without sciatica     2. Decreased ROM of lumbar spine     3. Abdominal weakness         Goals:  Pt. will be independent with home exercise program in : 6 weeks. MET  Patient will twist/turn : in bed;with no pain;in 6 weeks. PROGRESSING  Patient will transfer: sit/stand;supine/sit;for in/out of bed;for in/out of chair;with no pain;in 6 weeks. PROGRESSING  Patient will decrease : MAL score;for improved quality of life;in 6 weeks;by _ points  by ___ points: >= 30% from eval. NOT ASSESSED  Patient will show increased : strength for ____  Patient will show increased strength for : lifting LE to put pants on without diffiuclty in 6 weeks. NOT ASSESSED      Patient was seen for initial evaluation and treatment on 3/12/20 only.   From telephone call on 4/9/20: \"She reports things to be going well. Minimal to no discomfort now with turning over in bed, and sit<>stand, supine<>sit transfers. Continuing to do her HEP regularly. No questions or concerns at this time, but she would like PT to hold her chart open, as she may come back to the clinic once COVID crisis subsides, pending resolution of pain.\"  PT attempted to reach patient on 4/29/20 for further check in, but without response.    Therapy will be discontinued at this time.  The patient will need a new referral to resume.    Thank you for your referral.  Kristofer Martinez  5/12/2020  4:03 PM  "

## 2021-06-09 ENCOUNTER — RECORDS - HEALTHEAST (OUTPATIENT)
Dept: ADMINISTRATIVE | Facility: CLINIC | Age: 86
End: 2021-06-09

## 2021-06-09 NOTE — TELEPHONE ENCOUNTER
Medication: Zolpidem  Last Date Filled 1/22/20   pulled: YES    Only PCP Prescribing? : YES  Taken as prescribed from physician notes? YES    CSA in last year: YES  Random Utox in last year: NO  (if any of the above answer NO - schedule with PCP)     Opioids + benzodiazepines? NO  (if the above answer YES - schedule with PCP every 6 months)     Is patient on the Executive Review Team? no      All responses suggest: Refilling prescription

## 2021-06-09 NOTE — PROGRESS NOTES
Assessment and Plan:       1. Essential hypertension with goal blood pressure less than 140/90  Blood pressure is currently stable on medication.  - Basic Metabolic Panel  - HM2(CBC w/o Differential)  - Thyroid Stimulating Hormone (TSH)  - Urinalysis-UC if Indicated    2. Mixed hyperlipidemia  Remains on a statin.  - Hepatic Profile  - Lipid Cascade    3. Vitamin D deficiency  We will ensure she is on adequate replacement  - Vitamin D, Total (25-Hydroxy)    4. Menopause  - DXA Bone Density Scan; Future    5. Skin lesion  Needs a new dermatologist that she has a history of skin cancers.  - Ambulatory referral to Dermatology    6. Coronary atherosclerosis  Nitroglycerin was given.    7. Gait instability  Requests a cane to help reduce fall risk.  - Cane    8. Encounter for general adult medical examination with abnormal findings    9. RLS (restless legs syndrome)  She takes as needed Requip.      The patient's current medical problems were reviewed.      The following health maintenance schedule was reviewed with the patient and provided in printed form in the after visit summary:   Health Maintenance   Topic Date Due     DXA SCAN  03/24/1995     INFLUENZA VACCINE RULE BASED (1) 08/01/2016     FALL RISK ASSESSMENT  02/28/2018     ADVANCE DIRECTIVES DISCUSSED WITH PATIENT  02/28/2022     TD 18+ HE  03/20/2023     PNEUMOCOCCAL POLYSACCHARIDE VACCINE AGE 65 AND OVER  Completed     PNEUMOCOCCAL CONJUGATE VACCINE FOR ADULTS (PCV13 OR PREVNAR)  Completed     ZOSTER VACCINE  Completed        Subjective:   Chief Complaint: Deloris Larson is an 86 y.o. female here for an Annual Wellness visit.   HPI: Then to be seen after 1-1/2 year hiatus since I saw her the last time.  She seems to be doing fairly well.  She was seen recently by Dr. Suresh from surgery with regards to her vascular disease and he feels everything is going well.    Review of Systems: She feels tired midmorning and needs to drink coffee in order to stay  awake.  Please see above.  The rest of the review of systems are negative for all systems.    Patient Care Team:  Odalys Scott MD as PCP - General     Patient Active Problem List   Diagnosis     Mixed hyperlipidemia     Hypertension     Insomnia     Migraine Headache     Coronary Artery Disease     Supraventricular Tachycardia     Intermittent Claudication     Malignant neoplasm of skin     PAD (peripheral artery disease)     Vitamin D deficiency     RLS (restless legs syndrome)     Past Medical History:   Diagnosis Date     Coronary artery disease      Hyperlipidemia      Hypertension       Past Surgical History:   Procedure Laterality Date     CORONARY ANGIOPLASTY       DE APPENDECTOMY      Description: Appendectomy;  Recorded: 11/13/2008;     DE MASTECTOMY, SIMPLE, COMPLETE      Description: Breast Surgery Simple Mastectomy;  Recorded: 08/25/2009;     DE REMOVAL OF OVARY(S)      Description: Oophorectomy;  Recorded: 08/25/2009;     DE REVISE SECONDARY VARICOSITY      Description: Varicose Vein Ligation;  Recorded: 11/13/2008;     DE TOTAL ABDOM HYSTERECTOMY      Description: Hysterectomy;  Recorded: 11/13/2008;      Family History   Problem Relation Age of Onset     Adopted: Yes     Family history unknown: Yes      Social History     Social History     Marital status: Single     Spouse name: N/A     Number of children: N/A     Years of education: N/A     Occupational History     Not on file.     Social History Main Topics     Smoking status: Former Smoker     Quit date: 1/1/1974     Smokeless tobacco: Never Used     Alcohol use No     Drug use: No     Sexual activity: Not on file     Other Topics Concern     Not on file     Social History Narrative    She lives alone. She has 3 children and 8 grandchildren and multiple great grandchildren. She is retired. 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 She does not smoke cigarettes and rarely drinks alcohol.      Current  "Outpatient Prescriptions   Medication Sig Dispense Refill     aspirin 325 MG tablet Take 325 mg by mouth daily.       atenolol (TENORMIN) 25 MG tablet TAKE 2 TABLETS BY MOUTH DAILY AT NIGHT 180 tablet 3     atorvastatin (LIPITOR) 40 MG tablet TAKE 1 TABLET BY MOUTH DAILY 90 tablet 5     ibuprofen (ADVIL,MOTRIN) 200 MG tablet Take 200 mg by mouth every 6 (six) hours as needed for pain.       lactobacillus acidophilus (ACIDOPHILUS) cap Take as directed       meclizine (ANTIVERT) 12.5 mg tablet Take 12.5 mg by mouth 3 (three) times a day as needed (take 1-2 tablets three times daily as needed).       ramipril (ALTACE) 10 MG capsule TAKE 1 CAPSULE BY MOUTH TWICE DAILY (EVERY 12 HOURS) 180 capsule 12     rOPINIRole (REQUIP) 0.5 MG tablet Take 0.5 mg by mouth daily as needed.       traMADol (ULTRAM) 50 mg tablet        zolpidem (AMBIEN) 10 mg tablet TAKE 1/2-1 TABLET BY MOUTH AT BEDTIME AS NEEDED FOR SLEEP. 45 tablet 0     nitroglycerin (NITROSTAT) 0.3 MG SL tablet Place 1 tablet (0.3 mg total) under the tongue every 5 (five) minutes as needed for chest pain. 90 tablet 12     No current facility-administered medications for this visit.       Objective:   Vital Signs:   Visit Vitals     /62     Pulse 74     Ht 5' 5.5\" (1.664 m)     Wt 146 lb (66.2 kg)     Breastfeeding No     BMI 23.93 kg/m2        VisionScreening:  No exam data present     PHYSICAL EXAM  Wt Readings from Last 3 Encounters:   02/28/17 146 lb (66.2 kg)   01/14/16 145 lb (65.8 kg)   01/11/16 145 lb (65.8 kg)     General Appearance: Pleasant and alert  HEENT: Sclera are clear, tympanic membranes clear  Lungs: Normal respirations, clear to auscultation  Breasts: Normal-appearing breasts and nipples with no axillary adenopathy   Cardiac: Regular rate and rhythm with no appreciable murmur rub or gallop   Abdomen: Soft and nondistended  Extremities: No edema  Skin: No rashes  Neuro: Moves all extremities and has facial symmetry  Gait: Ambulates with a " normal gait    Personalized prevention plan: Lifestyle interventions to promote self-management and wellness were discussed including good nutrition, ongoing physical activity, falls prevention and continuing with screening tests as recommended including a mammogram and bone density scan.  And those listed in the health maintenance plan listed above.      Much or all of the text in this note was generated through the use of Dragon Dictate voice-to-text software. Errors in spelling or words which seem out of context are unintentional. Sound alike errors, in particular, may have escaped editing.      Assessment Results 2/28/2017   Activities of Daily Living No help needed   Instrumental Activities of Daily Living 2-4 - Moderate impairment   Get Up and Go Score Less than 12 seconds   Mini Cog Total Score 3     A Mini-Cog score of 0-2 suggests the possibility of dementia, score of 3-5 suggests no dementia    Identified Health Risks:     The patient was counseled and encouraged to consider modifying their diet and eating habits. She was provided with information on recommended healthy diet options.  The patient reports that she has difficulty with instrumental activities of daily living.  She was provided with a list of local organizations that provide support services and advised to make a follow up appointment in future to address this further.   She is at risk for falling and has been provided with information to reduce the risk of falling at home.  Patient's advanced directive was discussed and I am comfortable with the patient's wishes.

## 2021-06-10 NOTE — PROGRESS NOTES
"[facility] Follow Up Note    [facility]   87 y[facility]    Date of Visit: 5/25/2017    Chief Complaint   Patient presents with     Sinus Problem     Pneumonia     check     Subjective  Deloris Freeman comes in today she has been feeling ill for the past 4-5 weeks.  She has had a cough and sinus drainage and productive phlegm that is usually grayish in color.  Yesterday she started having chills which prompted her to come in to be seen.  She denies any chest pain or shortness of breath.  She has a lot of chronic sinus problems with a drippy nose.    ROS A comprehensive review of systems was performed and was otherwise negative    Social History:   Social History     Social History Narrative    She lives alone. She has 3 children and 8 grandchildren and multiple great grandchildren. She is retired. 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 She does not smoke cigarettes and rarely drinks alcohol.       Medications were reconciled.  Allergies, social and family history, and the problem list were all reviewed and updated.    Exam  General Appearance: Pleasant and alert  Vitals:    05/25/17 1028   BP: 120/70   Pulse: 64   Resp: 14   Temp: 98.1  F (36.7  C)   TempSrc: Oral   SpO2: 92%   Weight: 144 lb (65.3 kg)   Height: 5' 5.5\" (1.664 m)      Body mass index is 23.6 kg/(m^2).  Wt Readings from Last 3 Encounters:   05/25/17 144 lb (65.3 kg)   02/28/17 146 lb (66.2 kg)   01/14/16 145 lb (65.8 kg)     HEENT: Sclera are clear.  Fluid behind her tympanic membranes  Lungs: Normal respirations, clear to auscultation  Abdomen: Soft and nondistended  Extremities: No edema  Skin: No rashes  Neuro: Moves all extremities and has facial symmetry  Gait: Ambulates with a normal gait    Assessment/Plan  1. Sinusitis  Unclear if she has got a sinus infection or the start of potential pneumonia with an untreated sinus infection.  Cover her with 7 days of a azithromycin.  We will also " treat her with prednisone 40 mg a day for 5 days.  She is told to let us know if she is not feeling better by early next week.    2. Cough  As above.    3. Bronchitis  As above.      Odalys Scott MD  5/25/2017    Much or all of the text in this note was generated through the use of Dragon Dictate voice-to-text software. Errors in spelling or words which seem out of context are unintentional. Sound alike errors, in particular, may have escaped editing.

## 2021-06-10 NOTE — PROGRESS NOTES
.  Office Visit   Deloris Larson   89 y.o. female    Date of Visit: 8/25/2020    Chief Complaint   Patient presents with     Establish Care     Pt is fasting        Assessment and Plan   1. Atrial fibrillation, unspecified type (H)  She will continue on apixaban at this point.  She did have a retroperitoneal hematoma last January but saw cardiology after that and they felt that she could continue on anticoagulation.  - apixaban ANTICOAGULANT (ELIQUIS) 2.5 mg Tab tablet; Take 1 tablet (2.5 mg total) by mouth 2 (two) times a day.  Dispense: 180 tablet; Refill: 3    2. Hypertension  Blood pressure is well controlled and we will continue her current medications.  She is due for blood work today.  - ramipriL (ALTACE) 10 MG capsule; TAKE 1 CAPSULE BY MOUTH TWICE DAILY (EVERY 12 HOURS)  Dispense: 180 capsule; Refill: 3  - metoprolol succinate (TOPROL-XL) 50 MG 24 hr tablet; Take 1 tablet (50 mg total) by mouth daily.  Dispense: 90 tablet; Refill: 3  - Comprehensive Metabolic Panel  - Thyroid Cascade    3. Anemia, unspecified type  She has not had a recheck of her hemoglobin for about 6 months.  I will do that again today.  We will also check an iron level and B12.  - HM2(CBC w/o Differential)  - Ferritin  - Vitamin B12  - Thyroid Cascade    4. Insomnia, unspecified type  She has been on zolpidem for a long time and it works well for her.  She states she does not have any side effects.  - zolpidem (AMBIEN) 10 mg tablet; TAKE 1 TABLET (10 MG TOTAL) BY MOUTH AT BEDTIME AS NEEDED FOR SLEEP.  Dispense: 90 tablet; Refill: 1    5. Edema, unspecified type  - furosemide (LASIX) 20 MG tablet; Take 1 tablet (20 mg total) by mouth 2 (two) times a day.  Dispense: 180 tablet; Refill: 1    6. Hyperlipidemia  - atorvastatin (LIPITOR) 20 MG tablet; Take 1 tablet (20 mg total) by mouth daily.  Dispense: 90 tablet; Refill: 3         Return in about 3 months (around 11/25/2020) for Annual physical - fasting.     History of Present Illness    This 89 y.o. old female comes in to establish care.  She has a history of atrial fibrillation, pacemaker placement, coronary artery disease, hypertension and hyperlipidemia.  Recently she has been feeling well.  She has no acute concerns today.  She does chronically have fatigue but that has not changed.  She had a retroperitoneal hematoma last January that resolved.  She does need a recheck of her hemoglobin at this point.  She saw cardiology after that and they did recommend that she continue with anticoagulation.  She has no other acute concerns today.    Review of Systems: As above, systems otherwise reviewed and negative.     Medications, Allergies and Problem List   Patient Active Problem List   Diagnosis     Mixed hyperlipidemia     Essential hypertension     Insomnia     Coronary Artery Disease     Intermittent claudication (H)     Vitamin D deficiency     RLS (restless legs syndrome)     Persistent atrial fibrillation     Cardiac pacemaker in situ, dual chamber     Nonrheumatic aortic valve stenosis     Retroperitoneal hematoma     Current Outpatient Medications   Medication Sig Dispense Refill     acetaminophen (TYLENOL) 500 MG tablet Take 2 tablets (1,000 mg total) by mouth 3 (three) times a day.  0     apixaban ANTICOAGULANT (ELIQUIS) 2.5 mg Tab tablet Take 1 tablet (2.5 mg total) by mouth 2 (two) times a day. 180 tablet 3     atorvastatin (LIPITOR) 20 MG tablet Take 1 tablet (20 mg total) by mouth daily. 90 tablet 3     b complex vitamins tablet Take 1 tablet by mouth daily.       B infantis/B ani/B delores/B bifid (PROBIOTIC 4X ORAL) Take by mouth.       cholecalciferol, vitamin D3, 5,000 unit Tab Take 1 tablet by mouth. Take 4 days a week. MON, Wed, Fri, Sat       furosemide (LASIX) 20 MG tablet Take 1 tablet (20 mg total) by mouth 2 (two) times a day. 180 tablet 1     metoprolol succinate (TOPROL-XL) 50 MG 24 hr tablet Take 1 tablet (50 mg total) by mouth daily. 90 tablet 3     nitroglycerin  "(NITROSTAT) 0.3 MG SL tablet Place 1 tablet (0.3 mg total) under the tongue every 5 (five) minutes as needed for chest pain. 90 tablet 12     petrolatum, white-water (VANICREAM LITE) Lotn Apply 1 application topically. Apply topically daily for dry itching skin. Pt reports use 4-5 times/week.       ramipriL (ALTACE) 10 MG capsule TAKE 1 CAPSULE BY MOUTH TWICE DAILY (EVERY 12 HOURS) 180 capsule 3     triamcinolone (KENALOG) 0.1 % cream Apply topically 2 (two) times a day as needed. 80 g 5     zolpidem (AMBIEN) 10 mg tablet TAKE 1 TABLET (10 MG TOTAL) BY MOUTH AT BEDTIME AS NEEDED FOR SLEEP. 90 tablet 1     No current facility-administered medications for this visit.      Allergies   Allergen Reactions     Sleeping [Diphenhydramine Hcl]      halluactions           Physical Exam     /68 (Patient Site: Left Arm, Patient Position: Sitting)   Pulse 90   Ht 5' 5\" (1.651 m)   Wt 130 lb (59 kg)   SpO2 97%   BMI 21.63 kg/m      General: This is an alert female in no apparent distress.  Lungs: Normal respiratory effort and lungs are clear to auscultation.  Cardiovascular: Irregularly irregular, 2 out of 6 systolic murmur at the base.  Extremities: No significant lower extremity edema.     Additional Information   Social History     Tobacco Use     Smoking status: Former Smoker     Packs/day: 1.50     Years: 25.00     Pack years: 37.50     Last attempt to quit: 1974     Years since quittin.6     Smokeless tobacco: Never Used   Substance Use Topics     Alcohol use: No     Drug use: No            Kassidy Salas MD    "

## 2021-06-11 NOTE — PROGRESS NOTES
Office Visit   Deloris Larson   89 y.o. female    Date of Visit: 9/29/2020    Chief Complaint   Patient presents with     Follow-up     Oncology appt        Assessment and Plan   1. Malignant neoplasm of colon, unspecified part of colon (H)  She was found on CT scan to have what is probably colon cancer.  She has weight loss and iron deficiency.  She has decided not to pursue colonoscopy as she would not be inclined to have surgery.  Therefore we are going to just monitor her symptoms and address those as needed.  At this point she is eating pretty well and not having any abdominal pain.  I am going to see her back in just over a month to reassess.  We will also get her in touch with our ambulatory care management for assistance with needs that she may have going forward.  If she develops any new symptoms she will let me know.  When I see her back I will re-check her labs.  - Influenza,Quad,High Dose,PF 65 YR+  - Ambulatory referral to Care Management (Primary Care)    2. Iron deficiency anemia due to chronic blood loss    3. Weight loss       Return in about 4 weeks (around 10/27/2020) for Follow up - 40 minute f/u - change from 12/1/20 - does not need to be physical.     History of Present Illness   This 89 y.o. old female comes in to follow-up.  We recently found that she was iron deficient and had weight loss.  We therefore did a CT scan of the abdomen which showed a probable colon cancer.  She had a consultation with GI and the options of doing a colonoscopy were discussed.  At this point she has decided not to proceed with a colonoscopy.  She has been feeling pretty well.  Her appetite is decreased.  She has had a little bit more weight loss of just a couple pounds since August.  She is not having any abdominal pain, nausea, vomiting.  She is taking an iron supplement.  She has no other acute concerns today.    Review of Systems: As above, systems otherwise reviewed and negative.     Medications, Allergies  and Problem List   Patient Active Problem List   Diagnosis     Mixed hyperlipidemia     Essential hypertension     Insomnia     Coronary Artery Disease     Intermittent claudication (H)     Vitamin D deficiency     RLS (restless legs syndrome)     Persistent atrial fibrillation     Cardiac pacemaker in situ, dual chamber     Nonrheumatic aortic valve stenosis     Malignant neoplasm of colon, unspecified part of colon (H)     Current Outpatient Medications   Medication Sig Dispense Refill     acetaminophen (TYLENOL) 500 MG tablet Take 2 tablets (1,000 mg total) by mouth 3 (three) times a day.  0     apixaban ANTICOAGULANT (ELIQUIS) 2.5 mg Tab tablet Take 1 tablet (2.5 mg total) by mouth 2 (two) times a day. 180 tablet 3     atorvastatin (LIPITOR) 20 MG tablet Take 1 tablet (20 mg total) by mouth daily. 90 tablet 3     b complex vitamins tablet Take 1 tablet by mouth daily.       B infantis/B ani/B delores/B bifid (PROBIOTIC 4X ORAL) Take by mouth.       cholecalciferol, vitamin D3, 5,000 unit Tab Take 1 tablet by mouth. Take 4 days a week. MON, Wed, Fri, Sat       furosemide (LASIX) 20 MG tablet Take 1 tablet (20 mg total) by mouth 2 (two) times a day. 180 tablet 1     iron,carbonyl-vitamin C 65 mg iron- 125 mg TbEC Take 1 tablet by mouth daily. 90 tablet 3     metoprolol succinate (TOPROL-XL) 50 MG 24 hr tablet Take 1 tablet (50 mg total) by mouth daily. 90 tablet 3     nitroglycerin (NITROSTAT) 0.3 MG SL tablet Place 1 tablet (0.3 mg total) under the tongue every 5 (five) minutes as needed for chest pain. 90 tablet 12     omeprazole (PRILOSEC) 20 MG capsule Take 1 capsule (20 mg total) by mouth daily before breakfast. 90 capsule 3     petrolatum, white-water (VANICREAM LITE) Lotn Apply 1 application topically. Apply topically daily for dry itching skin. Pt reports use 4-5 times/week.       ramipriL (ALTACE) 10 MG capsule TAKE 1 CAPSULE BY MOUTH TWICE DAILY (EVERY 12 HOURS) 180 capsule 3     triamcinolone (KENALOG) 0.1  "% cream Apply topically 2 (two) times a day as needed. 80 g 5     zolpidem (AMBIEN) 10 mg tablet TAKE 1 TABLET (10 MG TOTAL) BY MOUTH AT BEDTIME AS NEEDED FOR SLEEP. 90 tablet 1     No current facility-administered medications for this visit.      Allergies   Allergen Reactions     Sleeping [Diphenhydramine Hcl]      halluactions           Physical Exam     /70 (Patient Site: Left Arm, Patient Position: Sitting)   Pulse 88   Ht 5' 5\" (1.651 m)   Wt 128 lb (58.1 kg)   SpO2 99%   BMI 21.30 kg/m      General: This is an alert, well-appearing female, no apparent distress.     Additional Information   Social History     Tobacco Use     Smoking status: Former Smoker     Packs/day: 1.50     Years: 25.00     Pack years: 37.50     Last attempt to quit: 1974     Years since quittin.7     Smokeless tobacco: Never Used   Substance Use Topics     Alcohol use: No     Drug use: No            Kassidy Salas MD    "

## 2021-06-12 NOTE — TELEPHONE ENCOUNTER
I believe that this 89 year old patient does have cognitive concerns exacerbated by her hearing loss and having her daughter on the phone would not be helpful.  She would need her daughter there in person.  Please let me know if you need anything more from me.  Thanks.

## 2021-06-12 NOTE — PROGRESS NOTES
Jesu aMn.  I ordered a consult with Oncology to discuss her prognosis, etc.  I can sure discuss a POLST when I see her.  Thanks.

## 2021-06-12 NOTE — PROGRESS NOTES
CHW reviewed CCC RN Assessment    CHW delegation: [facility] CHW will support patient with current goals through scheduled outreach telephone call and will provide resources as needed.      Next outreach due: 10/28/2020

## 2021-06-12 NOTE — CONSULTS
[facility] Hematology and Oncology Consult Note    Patient: Deloris Larson  MRN: [id number]  Date of Service: 10/30/2020        Reason for Visit    I was consulted by Dr. Salas regarding the possibility of colon cancer.    Assessment/Plan    Ms. Deloris Larson is a 89 y.o. woman who had a CT scan of the abdomen and pelvis done on 9/4/2020 on account of symptoms of loss of weight, and some lower abdominal comfort in the context of having some iron deficiency anemia from January 2020.  The CT scan was done with IV contrast but no oral contrast.  There was an apparent area of mural thickening in the cecum, just inferior to the ileocecal valve that was about 3 cm in size.  The radiologist felt that this could be an artifact, inflammatory change or neoplasm.  She did have colonic diverticulosis.  There is also a small cyst in the pancreatic body and mild dilatation of the pancreatic duct which may be an IPMN.  She did have a consultation with gastroenterology and was offered a colonoscopy but it will need to be done as an inpatient because of her age and comorbidities.  She has been somewhat uncertain whether she wants to proceed with a colonoscopy.  She is worried that she may be to unhealthy to undergo the procedure.    I have gone through the patient's past medical records in detail.  I have reviewed her labs.  I have personally reviewed the imaging studies, especially the CT scan from 9/4/2020.  I have reviewed her outside records from [place] [facility] and from the [facility] system through care everywhere.    1.  Today I reviewed the images of the CT scan with the patient and her daughter and pointed out to her the area of the cecal thickening.  I discussed with her that we really do not have good prove that she has cancer.  I cannot rule out the possibility that this cecal thickening is colon cancer but it may be something similar.  It could be just artifactual because of peristalsis.  It could be an area  of inflamed diverticulosis.  It may be a polyp which has not yet proceeded to becoming a cancer.  I would not label her as having colon cancer without a tissue diagnosis.  Even if it is colon cancer, from what we see it seems to be confined to just the cecum.  If she wants this treated at all, I think she needs to consider having the colonoscopy done.    2.  I discussed with her that I do not think that she is too unhealthy to have the colonoscopy.  I do agree that with her comorbidities and age it would be wise to have the colonoscopy done as an inpatient.  While I cannot give a guarantee that everything will go perfectly, I have seen patients much sicker than her undergoing colonoscopy successfully.  If she wants to pursue the possibility of finding out what is going on, I commend calling the gastroenterologist's office and requesting for the colonoscopy to be done.  Arrangement for admission to the hospital etc. will be done between  and her primary physician.    3.  I discussed with her that even if this turns out to be a colon cancer, I think she should consider seeing a surgeon to see whether this can be surgically removed.  After the colonoscopy if the pathology comes back as colon cancer, she can call me and I can refer her to [facility] surgery.  She voiced understanding.    4.  I will be happy to see her again and follow her if this finally turns out to be colon cancer but I hope this turns out to be something benign.    5.  The patient and her daughter decided to go back home and think some more and then make a decision about calling GI for the colonoscopy.        ECOG Performance   ECOG Performance Status: 1  Distress Assessment  Distress Assessment Score: 2        Problem List    1. Abnormal computed tomography of cecum and terminal ileum             CC: Kassidy Salas MD      ______________________________________________________________________________      History    Ms. Deloris Larson is a  89-year-old woman who is here for the consultation with her daughter.    She says that she was found to have iron deficiency anemia after an admission into the hospital in January where she had a left-sided iliopsoas hematoma when she was on Eliquis.    Later on she started feeling that her appetite is down.  She is not eating that well and she has lost about 14 pounds of weight since January.    She states that she has some GI symptoms like clockwork every day.  By about 7:30 in the evening she starts having a lot of gas and some lower abdominal discomfort.  Sometimes she has loose stools.  Sometimes not.  She has never noticed any blood in the stool or black-colored stools even though she feels that there is some change in the color of the stool.    She had a CT scan of the abdomen and pelvis done because of these symptoms on 9/4/2020 which showed some cecal thickening.  She was referred to gastroenterology and had a phone visit with them.  They did discuss about having a colonoscopy but she was not sure that she wanted to go through with that.  They did say that if she needs to have a colonoscopy she will need to be admitted to the hospital for the colonoscopy on account of her comorbidities and age.    In view of the high suspicion that this cecal thickening is cancer she has been referred to oncology for a discussion to help with decision-making.    She admits that she has had a bit of difficulty with memory and she feels a bit unsteady on her feet.  However she still remains socially and physically active.  She states that she can walk about 2 blocks on level ground.  With the aid of her walker she can make it to 3 blocks.  She has a flight of stairs at home that has 13 stairs which she goes up and down at least once a day.    She states that occasionally she has a cough when she eats toast.  Otherwise she does not have a cough.    No fevers or night sweats.  No lumps or bumps anywhere.  No aches or pains.  No  unusual headaches.  No eyesight problems.  No mouth sores.  No swallowing difficulty.  No nausea or vomiting.  No chest pain or palpitation.  No real abdominal pain.  No difficulty with urination.  No vaginal discharge.  No skin rashes.  No numbness or tingling.    Please see below.  A 14 point review of system is otherwise completely negative.    Past History  Past Medical History:   Diagnosis Date     Chronic diarrhea      Coronary artery disease      Hematoma of left iliopsoas muscle 01/19/2020    while on eliquis.     Hyperlipidemia      Hypertension      Lumbar spondylosis 2016     Migraine      PAD (peripheral artery disease) (H) 10/19/2015     Paroxysmal SVT (supraventricular tachycardia) (H)     Created by Conversion      Persistent atrial fibrillation (H) 05/09/2018    New diagnosis April 16 18th and found incidentally on physical exam during yearly exam in primary clinic [id number] score of 5-new Eliquis start     Poliomyelitis      RLS (restless legs syndrome) 02/28/2017     Sick sinus syndrome (H) 01/28/2020     Sigmoid diverticulosis 04/04/2007     Past Surgical History:   Procedure Laterality Date     APPENDECTOMY       CAPSULOTOMY Bilateral 12/2015    YAG capsulotomy surgery. 12/21/15, 12/28/15     CARDIOVERSION  05/24/2018    EP Cardioversion External     CATARACT EXTRACTION, BILATERAL Bilateral 03/2012    3/15 and 3/29 by Dr. Barrett at [facility]     CHOLECYSTECTOMY       COLONOSCOPY W/ BIOPSIES  04/04/2007     COLONOSCOPY W/ POLYPECTOMY  05/07/2012    2 mm tubular adenoma in the rectum. Hemorrhoids. Diverticulosis.     CORONARY ANGIOPLASTY       CV CORONARY ANGIOGRAM N/A 06/26/2018    Procedure: Coronary Angiogram;  Surgeon: Joby Vargas MD;  Location: [facility] Cath Lab;  Service:      EP PACEMAKER INSERT N/A 06/27/2018    Procedure: EP Pacemaker Insertion;  Surgeon: Osito Alvarez MD;  Location: [facility] Cath Lab;  Service:      INGUINAL HERNIA REPAIR Right     Indirect-   Nuvia     MASTECTOMY       OOPHORECTOMY       SKIN CANCER EXCISION  2015    Right leg on 10/21/15. Right arm 9/29/15     TOTAL ABDOMINAL HYSTERECTOMY       TUBAL LIGATION       VARICOSE VEIN SURGERY      ligation?     Family History   Adopted: Yes   Problem Relation Age of Onset     Stomach cancer Grandson 20     Pulmonary embolism Mother 32     Heart disease Father      Pulmonary embolism Maternal Grandmother 32     No Medical Problems Daughter      No Medical Problems Daughter      CABG Son 64     She is adopted and so the history about her biological family is sketchy.      Social History     Socioeconomic History     Marital status:      Spouse name: None     Number of children: 3     Years of education: None     Highest education level: None   Occupational History     Occupation: Retired Deacon of [employer]   Social Needs     Financial resource strain: None     Food insecurity     Worry: None     Inability: None     Transportation needs     Medical: None     Non-medical: None   Tobacco Use     Smoking status: Former Smoker     Packs/day: 1.50     Years: 25.00     Pack years: 37.50     Quit date: 1974     Years since quittin.8     Smokeless tobacco: Never Used   Substance and Sexual Activity     Alcohol use: Yes     Alcohol/week: 7.0 standard drinks     Types: 7 Standard drinks or equivalent per week     Drug use: No     Sexual activity: None   Lifestyle     Physical activity     Days per week: None     Minutes per session: None     Stress: None   Relationships     Social connections     Talks on phone: None     Gets together: None     Attends [employer] service: None     Active member of club or organization: None     Attends meetings of clubs or organizations: None     Relationship status: None     Intimate partner violence     Fear of current or ex partner: None     Emotionally abused: None     Physically abused: None     Forced sexual activity: None   Other Topics Concern     None    Social History Narrative    She lives alone in a house. She has 3 children and 8 grandchildren and multiple great grandchildren. She is retired. 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 She does not smoke cigarettes and rarely drinks alcohol.     Allergies    Allergies   Allergen Reactions     Sleeping [Diphenhydramine Hcl]      halluactions        Review of Systems    General  General (WDL): Exceptions to WDL  Fatigue: Yes - Chronic (Greater than 3 months)  Weight Loss: Yes - Recent (Greater than 3 months)(28#)  ENT  ENT (WDL): Exceptions to WDL  Vertigo (Dizziness): Yes - Chronic (Greater than 3 months)  Changes in vision: Yes - Recent (Less than 3 months)  Glasses or Contacts: Yes - Chronic (Greater than 3 months)  Hearing loss: Yes - Chronic (Greater than 3 months)  Sinus Congestion/Drainage: Yes - Chronic (Greater than 3 months)  Respiratory  Respiratory (WDL): Exceptions to WDL  Cough: Yes - Chronic (Greater than 3 months)  Cardiovascular  Cardiovascular (WDL): Exceptions to WDL  Edema Limbs: Yes - Recent (Less than 3 months)  Lightheadedness: Yes - Recent (Less than 3 months)  Endocrine  Endocrine (WDL): Exceptions to WDL  Cold Intolerance: Yes - Chronic (Greater than 3 months)  Excessive Urination: Yes - Chronic (Greater than 3 months)  Gastrointestinal  Gastrointestinal (WDL): Exceptions to WDL  Heartburn: Yes - Chronic (Greater than 3 months)  Constipation: Yes - Recent (Less than 3 months)  Abdominal Pain: Yes - Chronic (Greater than 3 months)  Poor Appetite: Yes- Chronic (Greater than 3 months)  Musculoskeletal  Musculoskeletal (WDL): Exceptions to WDL  Joint pain: Yes - Chronic (Greater than 3 months)  Muscle pain or stiffness: Yes - Chronic (Greater than 3 months)  Assistive device: Yes - Chronic (Greater than 3 months)  Neurological  Neurological (WDL): Exceptions to WDL  Headaches: Yes - Recent (Less than 3 months)  Difficulty with memory: Yes - Chronic  "(Greater than 3 months)  Vertigo (Dizziness): Yes - Chronic (Greater than 3 months)  Difficulty with Balance: Yes - Chronic (Greater than 3 months)  Psychological/Emotional  Psychological/Emotional (WDL): Exceptions to WDL  Insomnia: Yes - Chronic (Greater than 3 months)  Daytime sleepiness: Yes - Chronic (Greater than 3 months)  Hematological/Lymphatic  Hematological/Lymphatic (WDL): Exceptions to WDL  Bruising: Yes - Chronic (Greater than 3 months)  Dermatological  Dermatologic (WDL): Exceptions to WDL  Rash : Yes - Chronic (Greater than 3 months)  Hair Loss: Yes - Chronic (Greater than 3 months)  Genitourinary/Reproductive  Genitourinary/Reproductive (WDL): Exceptions to WDL  Urinary Frequency: Yes - Chronic (Greater than 3 months)  Reproductive (Females only)     Pain  Currently in Pain: Yes  Pain Score (Initial OR Reassessment): 5  Pain Frequency: Other (Comment)(after eating and each evening)  Location: lower quadrants  Pain Characteristics : Other (Comment);Sharp;Grimace;Sore(gas)  Pain Intervention(s): Heat applied(anorexia, may have acute urgency of bowels)    Physical Exam    Recent Vitals 10/30/2020   Height 5' 6\"   Weight 128 lbs 14 oz   BSA (m2) 1.65 m2   /71   Pulse 79   Temp 97.9   Temp src 1   SpO2 94   Some recent data might be hidden       GENERAL: Alert and oriented to time place and person. Seated comfortably. In no distress.  She looks well overall.  Thin build.  Elderly.  She is frail but actually looks good for her age.  A bit hard of hearing.  A bit forgetful.  No focal neurological deficit is evident.  She needed a hand when she got onto the examining table to steady herself.    HEAD: Atraumatic and normocephalic.    EYES: FRANCES, EOMI.  No pallor.  No icterus.    Oral cavity: no mucosal lesion or tonsillar enlargement.    NECK: supple. JVP normal.  No thyroid enlargement.    LYMPH NODES: No palpable, cervical, axillary or inguinal lymphadenopathy.    CHEST: clear to auscultation " bilaterally.  Resonant to percussion throughout bilaterally.  Symmetrical breath movements bilaterally.    CVS: S1 and S2 are heard. Regular rate and rhythm.  No murmur or gallop or rub heard.  No peripheral edema.    ABDOMEN: Soft. Not tender. Not distended.  No palpable hepatomegaly or splenomegaly.  No other mass palpable.  Bowel sounds heard.    EXTREMITIES: Warm.    SKIN: no rash, or bruising or purpura.  Has a full head of hair.      Lab Results  Results for MATTHEW MURILLO (MRN [id number]) as of 10/30/2020 10:17   Ref. Range 8/25/2020 11:30   Sodium Latest Ref Range: 136 - 145 mmol/L 140   Potassium Latest Ref Range: 3.5 - 5.0 mmol/L 4.0   Chloride Latest Ref Range: 98 - 107 mmol/L 106   CO2 Latest Ref Range: 22 - 31 mmol/L 23   Anion Gap, Calculation Latest Ref Range: 5 - 18 mmol/L 11   BUN Latest Ref Range: 8 - 28 mg/dL 16   Creatinine Latest Ref Range: 0.60 - 1.10 mg/dL 1.09   GFR MDRD Af Amer Latest Ref Range: >60 mL/min/1.73m2 57 (L)   GFR MDRD Non Af Amer Latest Ref Range: >60 mL/min/1.73m2 47 (L)   Calcium Latest Ref Range: 8.5 - 10.5 mg/dL 9.4   AST Latest Ref Range: 0 - 40 U/L 16   ALT Latest Ref Range: 0 - 45 U/L 11   ALBUMIN Latest Ref Range: 3.5 - 5.0 g/dL 4.2   Protein, Total Latest Ref Range: 6.0 - 8.0 g/dL 7.0   Alkaline Phosphatase Latest Ref Range: 45 - 120 U/L 53   Bilirubin, Total Latest Ref Range: 0.0 - 1.0 mg/dL 0.7   Glucose Latest Ref Range: 70 - 125 mg/dL 99   Ferritin Latest Ref Range: 10 - 130 ng/mL 9 (L)   Vitamin B-12 Latest Ref Range: 213 - 816 pg/mL 547   TSH Latest Ref Range: 0.30 - 5.00 uIU/mL 2.20   WBC Latest Ref Range: 4.0 - 11.0 thou/uL 7.8   RBC Latest Ref Range: 3.80 - 5.40 mill/uL 3.57 (L)   Hemoglobin Latest Ref Range: 12.0 - 16.0 g/dL 9.8 (L)   Hematocrit Latest Ref Range: 35.0 - 47.0 % 30.5 (L)   MCV Latest Ref Range: 80 - 100 fL 85   MCH Latest Ref Range: 27.0 - 34.0 pg 27.6   MCHC Latest Ref Range: 32.0 - 36.0 g/dL 32.3   RDW Latest Ref Range: 11.0 - 14.5 % 13.6    Platelets Latest Ref Range: 140 - 440 thou/uL 218   MPV Latest Ref Range: 7.0 - 10.0 fL 7.9         Imaging Results    EXAM: CT ABDOMEN PELVIS WO ORAL W IV CONTRAST  LOCATION: [facility]  DATE/TIME: 9/4/2020 2:04 PM     INDICATION: Abdominal pain, weight loss, anemia.  COMPARISON: CT abdomen pelvis 6/24/2018, 11/2/2017 and 8/31/2011.  TECHNIQUE: CT scan of the abdomen and pelvis was performed following injection of IV contrast. Multiplanar reformats were obtained. Dose reduction techniques were used.  CONTRAST: Iohexol (Omni) 75 mL.     FINDINGS:   LOWER CHEST: Heart size normal with no pericardial effusion. Extensive coronary artery calcification. Pacer lead tips in the RA and RV. Several 4 mm or less lower lobe lung nodules are unchanged and require no further follow-up. Lungs otherwise clear.     HEPATOBILIARY: Mild stable bile duct dilatation related to reservoir effect from prior cholecystectomy. Liver within normal limits.     PANCREAS: Small cyst 1.1 x 0.7 x 0.7 cm pancreatic body and mild dilatation of the main pancreatic duct in the body and tail (but no parenchymal atrophy) have developed.     SPLEEN: Multiple benign stable low-attenuation foci throughout the spleen require no further follow-up.     ADRENAL GLANDS: Normal.     KIDNEYS/BLADDER: No significant mass, stone, or hydronephrosis.     BOWEL: A 3 x 3 x 2 cm area of apparent mural thickening in the cecum just inferior to the ileocecal valve has developed (series 2 image 119, sagittal image 51 and coronal image 41). Severe colonic diverticulosis and milder jejunal diverticulosis with no   diverticulitis.     LYMPH NODES: No lymphadenopathy.     VASCULATURE: Severe calcified atheromatous plaque throughout the normal caliber abdominal aorta and iliofemoral arteries and at the origin of the renal and mesenteric arteries.     PELVIC ORGANS: Hysterectomy.     MUSCULOSKELETAL: Normal.     IMPRESSION:   1.  A 3 x 3 x 2 cm area of apparent mural  thickening in the cecum just inferior to the ileocecal valve has developed and could represent artifact, focal inflammatory change or neoplasm.  2.  Severe colonic diverticulosis and milder jejunal diverticulosis with no diverticulitis.  3.  Small cyst 1.1 x 0.7 x 0.7 cm pancreatic body and mild dilatation of the main pancreatic duct in the body and tail (but no parenchymal atrophy) have developed. 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 If clinically appropriate, consider follow-up MRI (if patient's cardiac pacer is MRI compatible) or pancreas protocol CT in 2 years per guidelines below.      Signed by: Liv Max MD

## 2021-06-12 NOTE — PATIENT INSTRUCTIONS - HE
1.  Recommend calling back to gastroenterology and requesting for the colonoscopy to be done.  I think you are strong enough to go through the procedure, especially if it is being done in the hospital.    2.  At this point I would not assume that you have colon cancer without a biopsy.    3.  Even if it turns out that you do have a colon cancer in the area seen in the CT scan, I think it is still worth considering surgery which may be curative.

## 2021-06-12 NOTE — TELEPHONE ENCOUNTER
Records in Epic / Images in Nil.    Epic Notes   9/29/2020 - Progress Notes / Office Visit, Dr. Kassidy Salas. Primary & Referring.  8/25/2020 - Progress Notes / Office Visit, Dr. Salas.  3/2/2020 - Progress Notes / Hospital follow up, Win Moreno DO.  2/28/2020 - ED Provider Notes / low back pain.    Labs  8/25/2020    Imaging   9/4/2020 - CT abdomen pelvis.  2/28/2020 - CT Lumbar spine.  6/24/2018 - CT abdomen pelvis.    Media  9/14/2020 - Consultation External / Phone visit, Albina Almazan, PAC / [facility].  5/17/2012 - Colonoscopy, [facility].  4/4/2007 - Colonoscopy, [facility].  4/4/2007 - Pathology, [facility].

## 2021-06-12 NOTE — TELEPHONE ENCOUNTER
..New Patient Oncology Nurse Navigator Note     Referring provider: Kassidy Salas MD      Referring Clinic/Organization: [facility]      Referred to (specialty): Med Onc    Requested provider (if applicable): none     Date Referral Received: 10/16/2020     Evaluation for : new dx Colon Ca - unknown primary     Clinical History (per Nurse review of records provided):    9/4/2020:  EXAM: CT ABDOMEN PELVIS WO ORAL W IV CONTRAST  LOCATION: [facility]  DATE/TIME: 9/4/2020 2:04 PM     INDICATION: Abdominal pain, weight loss, anemia.  IMPRESSION:   1.  A 3 x 3 x 2 cm area of apparent mural thickening in the cecum just inferior to the ileocecal valve has developed and could represent artifact, focal inflammatory change or neoplasm.  2.  Severe colonic diverticulosis and milder jejunal diverticulosis with no diverticulitis.  3.  Small cyst 1.1 x 0.7 x 0.7 cm pancreatic body and mild dilatation of the main pancreatic duct in the body and tail (but no parenchymal atrophy) have developed. 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 If clinically appropriate, consider follow-up MRI (if patient's cardiac pacer is MRI compatible) or pancreas protocol CT in 2 years per guidelines below.    9/14/2020:  Phone Consult with [facility] -  Need documentation. (See note below for what pt's daughter relayed about appointment.)    9/29/2020:  Kassidy Salas MD  This 89 y.o. old female comes in to follow-up.  We recently found that she was iron deficient and had weight loss.  We therefore did a CT scan of the abdomen which showed a probable colon cancer.  She had a consultation with GI and the options of doing a colonoscopy were discussed.  At this point she has decided not to proceed with a colonoscopy.  She has been feeling pretty well.  Her appetite is decreased.  She has had a little bit more weight loss of just a couple pounds since August.  She is not  having any abdominal pain, nausea, vomiting.  She is taking an iron supplement.  She has no other acute concerns today.      10/16/87509:  Clinic Care Coordination:  Reviewed supports for seniors through Medicare and hospice.  They are unsure of what her life expectancy is with not pursuing any interventions.  They would roslyn to talk to oncology to discuss her diagnosis and to get a better understanding of how the cancer may progress.  She is open to hospice service, and [facility] encouraged them to have informational meeting with them to learn more.    They prefer to have dar Chacko be contacted regarding any information or contacts.  Patient relies on her to help her remember, and she has hearing loss so sometimes talking on the phone is a problem.      Clinical Assessment / Barriers to Care (Per Nurse):   Navigator called daughter, Ginna, for oncology intake.   She reports that pt is hard of hearing and has short term memory problems and will need assistance in person at consult. Writer explained what tests would normally be done for oncology work up, e.g. biopsies and PET scan. She reports that [facility] doctor did not recommend colonoscopy based on patient's age and underlying heart conditions, pacemaker, etc. Writer will reach out to Dr. Max for further work up.     Daughter explains patient would like information from oncologist about what to expect with this cancer, ie options, symptoms, care needs, prognosis.     Records Location (Care Everywhere, Media, etc.): [facility]. [facility] consult note.      Records Needed: Consult note from [facility]: 9/14/2020     Additional testing needed prior to consult: per Dr. Max.

## 2021-06-12 NOTE — PROGRESS NOTES
Patient is here for consultation of abdominal mass.  Accompanied by daughter, Ginna.  Shefali Purvis RN

## 2021-06-12 NOTE — PROGRESS NOTES
Clinic Care Coordination Contact  [facility]/Voicemail       Clinical Data: Care Coordinator Outreach  Outreach attempted x 1.  Left message on Ginna's voicemail with call back information and requested return call if they need any assistance.  She has oncology appointment set for 10-30 and PCP appointment on 11-2.   Plan: Delegating to CHW to contact in less than 30 days.   [facility] to do oversight in 45 days and is available as needed.   Magda Mccormick MARCOS  Clinic Care Coordinator  [phone]

## 2021-06-12 NOTE — PROGRESS NOTES
Clinic Care Coordination Contact  Care Team Conversations     Called daughter and informed her that [employer] meals application will be completed next week and PCP ordered oncology consult.  She will expect a call.    Call from [facility] and they may have someone to rake and perhaps do her snow shoveling. She will call patient's daughter next week to give an update.     Magda Mccormick, [facility]  Clinic Care Coordinator  [phone]          Clinic Care Coordination Contact    Follow Up Progress Note      Assessment: Called daughter Ginna who was with patient.  Reviewed needs and patient talked to her son with whom she lives and he would like to be her  and she does not need resources for that service at this time.  May need something later.  Would like referral to outdoor chore services. LINETTE FINLEY called [facility] and LM asking if they have any volunteers available, [phone]. Ginna and patient had talked with them before.  If they don't have anyone, Ginna can check [employer] website, look in local newspaper, and would appreciate LINETTE FINELY providing some private pay options.  Discussed meals and her special dietary needs with colon cancer.  They would like to sign up for [employer] meals. Will assist with completing application with PCP.      Reviewed supports for seniors through Medicare and hospice.  They are unsure of what her life expectancy is with not pursuing any interventions.  They would roslyn to talk to oncology to discuss her diagnosis and to get a better understanding of how the cancer may progress.  She is open to hospice service, and [employer] encouraged them to have informational meeting with them to learn more.      Patient currently has DNR/DNI and would like to discuss with PCP doing POLST.  She has upcoming appointment, 11-2 and will discuss then.     Goals addressed this encounter:   Goals Addressed                 This Visit's Progress       Patient Stated      COMPLETED:  Functional (pt-stated)        Goal Statement: I would like to have housekeeping services to assist me with maintaining my home and services to assist me with maintaining my yard in the next 3 months. I would also like to know if I am eligible for any programs that may assist with the cost of these services.   Date Goal set: 10/9/20  Barriers: Patient unable to keep up with her housekeeping related to physical changes.   Strengths: Strong advocate for herself. Supportive family.   Date to Achieve By: 1/9/20  Patient expressed understanding of goal: Yes  Action steps to achieve this goal:  1. I speak with the [facility] LSWMagda at scheduled phone visit on 10/16/20 to discuss resources and possible programs that may help pay for these services.   2. I will provide any necessary documentation, complete any assessments and complete any paperwork that may be associated with this goal.   3. I will report progress toward this goal at scheduled outreach telephone calls  from the [facility] Community Health Worker, Beatris.     10-  Son wants to be the homemaker. Completed goal          Functional (pt-stated)        Goal Statement: Be prepared for what lies ahead with my cancer diagnosis within 6 months.   Date Goal set: 10-  Barriers: Doesn't like the unknown.    Strengths: Wants to be prepared, family support, open to hospice and other supports as my needs change.    Date to Achieve By: 4-  Patient expressed understanding of goal: yes  Action steps to achieve this goal:  1. I will talk with my PCP about completing POLST.  2. I will work with PCP on getting oncology referral to discuss what to expect and how long I may live without interventions.    3. I will be open to talking with hospice to learn more about this resource and to be ready when I am able to enroll.  4. I will update care coordination team with any concerns and progress.               Intervention/Education provided during outreach: Reviewed in home  services, hospice, meal options.  SW CC will assist with completing the [employer] application.     They prefer to have daughter Ginna be contacted regarding any information or contacts.  Patient relies on her to help her remember, and she has hearing loss so sometimes talking on the phone is a problem.           Plan:   Will give options for outdoor chores. Will work with RN CC to complete [employer] Application.  Will ask PCP to discuss POLST at 11-2 appointment.  Will ask PCP about referral to oncology.    Care Coordinator will follow up in one week with resources.  Magda Mccormick, [facility]  Clinic Care Coordinator  [phone]

## 2021-06-13 NOTE — TELEPHONE ENCOUNTER
Orders being requested:   Hold order for ELIQUIS, 3 days prior to Colonoscopy scheduled for 1/7/2021.    Bridging/medication and orders if required    Reason service is needed/diagnosis:   Colonoscopy    When are orders needed by:   As soon as possible.    Where to send Orders: Phone:  Verbal order to: Eduardo lackey [facility], [phone]     Okay to leave detailed message?  Yes    Please leave:    Name of person leaving message and credentials    Name of patient  Date of birth  Bridging medication and instructions if required    Thank you.

## 2021-06-13 NOTE — PROGRESS NOTES
Office Visit   Deloris Larson   89 y.o. female    Date of Visit: 11/13/2020    Chief Complaint   Patient presents with     Follow-up        Assessment and Plan   1. Iron deficiency anemia, unspecified iron deficiency anemia type  I will go ahead and recheck her CBC and ferritin today since she is on iron.  I will get back to her with the results.  - HM2(CBC w/o Differential)  - Ferritin    2. Abnormal computed tomography of cecum and terminal ileum  We did discuss the only way to know what is going on in her colon is with a colonoscopy.  We reviewed her outside GI consultation from September as well as her recent consultation with Dr. Brown and oncology.  I have recommended that she reach out to GI to set up the colonoscopy.  If she decides that she does not want to do that then we did discuss that I am happy to help her manage any upcoming symptoms but we will really know what is going on without the colonoscopy.  She understands and is in agreement with this plan.    3. Cardiac pacemaker in situ, dual chamber  She is wondering where her cardiologist is now.  I have recommended that she contact her cardiologist to find out where their office has been relocated.         Return in about 3 months (around 2/13/2021) for Follow up.     History of Present Illness   This 89 y.o. old female comes in to follow-up.  She has a history of atrial fibrillation and pacemaker placement.  She wonders where her cardiologist has been relocated as he used to be at [facility].  I am not really sure but did recommend that she reach out to find out.  The desk might be able to answer that question as well.  In addition she has a recent diagnosis of iron deficiency and an abnormality seen in the cecum of her terminal ileum which could be cancer.  It also could be an area of inflammation.  She had a GI consultation in September and they recommended colonoscopy.  However she has been reluctant to proceed with that.  She also recently  saw oncology to discuss next steps.  It was recommended that she proceed with a colonoscopy to find out if she truly even has colon cancer.  Today she would like to discuss this in detail.  She has no other acute concerns.    Review of Systems: As above, systems otherwise reviewed and negative.     Medications, Allergies and Problem List   Patient Active Problem List   Diagnosis     Mixed hyperlipidemia     Essential hypertension     Insomnia     Coronary Artery Disease     Intermittent claudication (H)     Vitamin D deficiency     RLS (restless legs syndrome)     Persistent atrial fibrillation     Cardiac pacemaker in situ, dual chamber     Nonrheumatic aortic valve stenosis     Abnormal computed tomography of cecum and terminal ileum     Current Outpatient Medications   Medication Sig Dispense Refill     acetaminophen (TYLENOL) 500 MG tablet Take 2 tablets (1,000 mg total) by mouth 3 (three) times a day.  0     apixaban ANTICOAGULANT (ELIQUIS) 2.5 mg Tab tablet Take 1 tablet (2.5 mg total) by mouth 2 (two) times a day. 180 tablet 3     atorvastatin (LIPITOR) 20 MG tablet Take 1 tablet (20 mg total) by mouth daily. 90 tablet 3     b complex vitamins tablet Take 1 tablet by mouth daily.       B infantis/B ani/B delores/B bifid (PROBIOTIC 4X ORAL) Take by mouth.       cholecalciferol, vitamin D3, 5,000 unit Tab Take 1 tablet by mouth. Take 4 days a week. MON, Wed, Fri, Sat       furosemide (LASIX) 20 MG tablet TAKE 1 TABLET (20 MG TOTAL) BY MOUTH 2 (TWO) TIMES A DAY. 180 tablet 1     iron,carbonyl-vitamin C 65 mg iron- 125 mg TbEC Take 1 tablet by mouth daily. 90 tablet 3     metoprolol succinate (TOPROL-XL) 50 MG 24 hr tablet Take 1 tablet (50 mg total) by mouth daily. 90 tablet 3     nitroglycerin (NITROSTAT) 0.3 MG SL tablet Place 1 tablet (0.3 mg total) under the tongue every 5 (five) minutes as needed for chest pain. 90 tablet 12     omeprazole (PRILOSEC) 20 MG capsule Take 1 capsule (20 mg total) by mouth daily  "before breakfast. 90 capsule 3     petrolatum, white-water (VANICREAM LITE) Lotn Apply 1 application topically. Apply topically daily for dry itching skin. Pt reports use 4-5 times/week.       ramipriL (ALTACE) 10 MG capsule TAKE 1 CAPSULE BY MOUTH TWICE DAILY (EVERY 12 HOURS) 180 capsule 3     triamcinolone (KENALOG) 0.1 % cream Apply topically 2 (two) times a day as needed. 80 g 5     zolpidem (AMBIEN) 10 mg tablet TAKE 1 TABLET (10 MG TOTAL) BY MOUTH AT BEDTIME AS NEEDED FOR SLEEP. 90 tablet 1     No current facility-administered medications for this visit.      Allergies   Allergen Reactions     Sleeping [Diphenhydramine Hcl]      halluactions           Physical Exam     /72 (Patient Site: Left Arm, Patient Position: Sitting)   Pulse 72   Ht 5' 6\" (1.676 m)   Wt 131 lb (59.4 kg)   SpO2 97%   BMI 21.14 kg/m      General: This is an alert female, sitting comfortably, no apparent distress.     Additional Information   Social History     Tobacco Use     Smoking status: Former Smoker     Packs/day: 1.50     Years: 25.00     Pack years: 37.50     Quit date: 1974     Years since quittin.8     Smokeless tobacco: Never Used   Substance Use Topics     Alcohol use: Yes     Alcohol/week: 7.0 standard drinks     Types: 7 Standard drinks or equivalent per week     Drug use: No            Kassidy Salas MD    "

## 2021-06-13 NOTE — PROGRESS NOTES
[facility] Follow Up Note    Deloris Larson   87 y.o. female    Date of Visit: 10/25/2017    Chief Complaint   Patient presents with     Illness     pt states she has been having severe stomachaches. pt says could be food related but not sure. pain so severe she has had to take ibuprofen to help with pain. pt being careful with what she eats and pain is better today. pt says pain is like what she had when she had a hernia. pain is disperse in abdomen     Subjective  Deloris Freeman comes in today she has been having abdominal pain that seems to start from the right lower quadrant and radiates up towards the middle abdomen.  It comes and goes and seems to worsen with certain foods such as raw vegetables.  If she strains to have a bowel movement it makes the pain worse.  She has not had any problems now for 2 days.  On 3 occasions over the last month she has used ibuprofen which has helped.  All of this seemed to start after she was at a wedding but did not get diarrhea, nausea or vomiting after the wedding which was a little over a month ago.  She denies any problems with her bladder function, she has a tendency towards constipation chronically and has been taking fiber daily.    ROS A comprehensive review of systems was performed and was otherwise negative    Social History:   Social History     Social History Narrative    She lives alone. She has 3 children and 8 grandchildren and multiple great grandchildren. She is retired. 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 She does not smoke cigarettes and rarely drinks alcohol.       Medications were reconciled.  Allergies, social and family history, and the problem list were all reviewed and updated.      Exam  General Appearance: Pleasant and alert  Vitals:    10/25/17 0739   BP: 114/64   Patient Site: Left Arm   Patient Position: Sitting   Cuff Size: Adult Regular   Pulse: 79   Weight: 141 lb 9.6 oz (64.2 kg)       Body mass index is 23.21 kg/(m^2).  Wt Readings from Last 3 Encounters:   10/25/17 141 lb 9.6 oz (64.2 kg)   05/25/17 144 lb (65.3 kg)   02/28/17 146 lb (66.2 kg)     HEENT: Sclera are clear.   Lungs: Normal respirations  Abdomen: Soft and nondistended, tenderness in the right lower quadrant to deep palpation  Extremities: No edema  Skin: No rashes  Neuro: Moves all extremities and has facial symmetry  Gait: Ambulates with a normal gait    Assessment/Plan  1. Generalized abdominal pain  Check up further with a CAT scan labs and urine test.  - HM2(CBC w/o Differential)  - Comprehensive Metabolic Panel  - Urinalysis-UC if Indicated  - CT Abdomen Pelvis With Oral With IV Contrast; Future    2. Hypertension  Stable on medication.    3. PAD (peripheral artery disease)  She sees Dr. Suresh annually and is been trying to exercise.    4. Mixed hyperlipidemia  She remains on a statin.    5. Vitamin D deficiency  She has been taking vitamin D.  - Vitamin D, Total (25-Hydroxy)          April D MD [place]  10/25/2017    Much or all of the text in this note was generated through the use of Dragon Dictate voice-to-text software. Errors in spelling or words which seem out of context are unintentional. Sound alike errors, in particular, may have escaped editing.

## 2021-06-13 NOTE — PROGRESS NOTES
Clinic Care Coordination Contact  [facility]/Voicemail       Clinical Data: Care Coordinator Outreach  Outreach attempted x 1.  Patient picked up and could hear talking and then hung up, called again and line was busy.  Plan:   Care Coordinator will try to reach patient again in 3-5 business days.  Next outreach due: 12/8/2020

## 2021-06-13 NOTE — TELEPHONE ENCOUNTER
Refill Approved    Rx renewed per Medication Renewal Policy. Medication was last renewed on 8/25/20, last OV 11/13/20.    Lelia Bush, [facility] Triage/Med Refill 11/15/2020     Requested Prescriptions   Pending Prescriptions Disp Refills     ramipriL (ALTACE) 10 MG capsule [Pharmacy Med Name: RAMIPRIL 10 MG CAPS 10 Capsule] 180 capsule 3     Sig: TAKE 1 CAPSULE BY MOUTH TWICE DAILY (EVERY 12 HOURS)       Ace Inhibitors Refill Protocol Passed - 11/12/2020  9:22 AM        Passed - PCP or prescribing provider visit in past 12 months       Last office visit with prescriber/PCP: 3/2/2020 Win Moreno DO OR same dept: 3/2/2020 Win Moreno DO OR same specialty: 9/29/2020 Kassidy Salas MD  Last physical: Visit date not found Last MTM visit: Visit date not found   Next visit within 3 mo: Visit date not found  Next physical within 3 mo: Visit date not found  Prescriber OR PCP: Win Moreno DO  Last diagnosis associated with med order: 1. Hypertension  - ramipriL (ALTACE) 10 MG capsule [Pharmacy Med Name: RAMIPRIL 10 MG CAPS 10 Capsule]; TAKE 1 CAPSULE BY MOUTH TWICE DAILY (EVERY 12 HOURS)  Dispense: 180 capsule; Refill: 3    If protocol passes may refill for 12 months if within 3 months of last provider visit (or a total of 15 months).             Passed - Serum Potassium in past 12 months     Lab Results   Component Value Date    Potassium 4.0 08/25/2020             Passed - Blood pressure filed in past 12 months     BP Readings from Last 1 Encounters:   11/13/20 128/72             Passed - Serum Creatinine in past 12 months     Creatinine   Date Value Ref Range Status   08/25/2020 1.09 0.60 - 1.10 mg/dL Final

## 2021-06-13 NOTE — PROGRESS NOTES
Patient spoke with [facility] SW on 12/11. No CHW delegation: Outreach within 1 month    Next outreach due: 1/11/21

## 2021-06-13 NOTE — PROGRESS NOTES
Clinic Care Coordination Contact    Follow Up Progress Note      Assessment: Called daughter Ginna and left VM. Talked with patient. She is receiving meals and changed the menu from heavy to a lighter option and likes them.  She has been meeting with oncology and has decided to have a colonoscopy in January to assess her colon.        Everything else is going fine.  Had question about when she might receive the COVID vaccine.  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  Assured her that she will be notified and her health care team will make sure she is getting it ASAP.      Goals addressed this encounter:   Goals Addressed                 This Visit's Progress       Patient Stated      Functional (pt-stated)   30%     Goal Statement: Be prepared for what lies ahead with my cancer diagnosis within 6 months.   Date Goal set: 10-  Barriers: Doesn't like the unknown.    Strengths: Wants to be prepared, family support, open to hospice and other supports as my needs change.    Date to Achieve By: 4-  Patient expressed understanding of goal: yes  Action steps to achieve this goal:  1. I will talk with my PCP about completing POLST.  2. I will work with PCP on getting oncology referral to discuss what to expect and how long I may live without interventions.    3. I will be open to talking with hospice to learn more about this resource and to be ready when I am able to enroll.  4. I will update care coordination team with any concerns and progress.  10- appt on 10-30  12- discussed          COMPLETED: Healthy Eating (pt-stated)        Goal Statement: I would like to have meals delivered to my home in the next 2 months.   Date Goal set: 10/9/20  Barriers: Diagnosis of malignant neoplasm of the colon  Strengths: Strong advocate for herself. Supportive family.  Date to Achieve By: 12/9/20  Patient expressed understanding of goal: Yes  Action steps to achieve this goal:  1. I will speak with the [facility] LSW,  Magda at scheduled phone visit on 10/16/20 to discuss in-home meal delivery programs.   2. I will provide any necessary documentation and complete any paperwork that may be associated with this goal in a timely manner.   3. I will report progress towards this goal at scheduled outreach telephone calls from the [facility] CHW.    12- discussed             Intervention/Education provided during outreach: Discussed vaccine.       Outreach Frequency: monthly    Plan:   Patient will have colonoscopy in January.  Delegating to CHW to contact in less than one month.  Care Coordinator will follow up in 45 days and as needed.  Magda Mccormick, [facility]  Clinic Care Coordinator  [phone]

## 2021-06-13 NOTE — PROGRESS NOTES
Clinic Care Coordination Contact  [facility]/Voicemail       Clinical Data: Care Coordinator Outreach  Outreach attempted x 1.  Left message on patient's voicemail with call back information and requested return call.  Plan  Care Coordinator will try to reach patient again in 3-5 business days.  Next outreach due: 11/17/2020

## 2021-06-14 NOTE — TELEPHONE ENCOUNTER
Controlled Substance Refill Request  Medication Name:   Requested Prescriptions     Pending Prescriptions Disp Refills     zolpidem (AMBIEN) 10 mg tablet [Pharmacy Med Name: ZOLPIDEM TARTRATE 10 MG** 10 Tablet] 90 tablet 1     Sig: TAKE 1 TABLET (10 MG TOTAL) BY MOUTH AT BEDTIME AS NEEDED FOR SLEEP.     Date Last Fill: 8/25/20  [facility]ed [facility]  Submit electronically to pharmacy  Controlled Substance Agreement on file:   Encounter-Level CSA Scan Date - 05/15/2018:    Scan on 5/18/2018  9:58 AM           Encounter-Level CSA Scan Date - 02/28/2017:    Scan on 3/2/2017  7:57 AM        Last office visit:  11/13/20

## 2021-06-14 NOTE — PROGRESS NOTES
Clinic Care Coordination Contact  Care Coordination Transition Communication    Referral Source: PCP    Clinical Data: [facility] . Hospital stay 1/28/21 through 1/30/21. Admitted to [facility] for rehab, medical management and nursing care.  Tested positive for COVID, family members also positive for COVID.      Transition to Facility:                           Contact name and phone number/fax: PANCHO Spencer [phone].  Called and LM for Johanna.    Plan: RN/SW Care Coordinator will await notification from facility staff informing RN/SW Care Coordinator of patient's discharge plans/needs. RN/SW Care Coordinator will review chart and outreach to facility staff every 4 weeks and as needed.     TERRIE Cristobal  Clinic Care Coordinator  [phone]

## 2021-06-14 NOTE — TELEPHONE ENCOUNTER
Reviewed remote device findings with patient's daughter, Ginna. Informed to call back if they have any further questions or concerns. Ginna verbalized understanding and agrees with plan. -NARCISO

## 2021-06-14 NOTE — TELEPHONE ENCOUNTER
Prescription Monitoring Program activity reviewed with no discrepancies noted.      Last fill per : 8/25/20  Quantity/days supply: 90 tabs for 90 days     Controlled Substance Agreement on file: No  Date: NA    Last office visit with provider:  11/13/2020 Kassidy Salas MD    Please advise.

## 2021-06-14 NOTE — TELEPHONE ENCOUNTER
Spoke with pt's daughter and relayed pcp message.  Pt's daughter understanding and agreeable to covid test.  Order pended.

## 2021-06-14 NOTE — TELEPHONE ENCOUNTER
Patient's daughter Ginna called to report that within the past week patient has been experiencing some light headedness, dizziness, and palpitations. Ginna denies any syncope. Ginna is requesting an in-clinic visit with Dr. Alvarez. Ginna reports that patient does have a history of vertigo and episodes of dizziness are occurring when patient rises and sometimes while ambulating. Ginna is unsure if patient is staying hydrated. Device schedulers notified to schedule visit with Dr. Alvarez. Asked Ginna to have patient send in a remote transmission. Ginna states that she will have her  go over to patient's residence to assist with this.     Eduardo Burnham RN BSN  Device Nurse   Add High Risk Medication Management Associated Diagnosis?: No Detail Level: Zone

## 2021-06-14 NOTE — TELEPHONE ENCOUNTER
Daughter of Deloris Freeman calls in to report that Deloris Freeman has fever and chills and cough.  Discusses symptoms of covid and its treatment and when to call back for help.  She agrees to this plan.     Reason for Disposition    [1] COVID-19 infection suspected by caller or triager AND [2] mild symptoms (cough, fever, or others) AND [3] no complications or SOB    Protocols used: CORONAVIRUS (COVID-19) DIAGNOSED OR [id number]-TZ 1.3.21

## 2021-06-14 NOTE — PROGRESS NOTES
Clinic Care Coordination Contact     Situation: Patient chart reviewed by care coordinator.     Background: Pts initial assessment and enrollment to Care Coordination was 10-9-2020.   Patient centered goals were developed with participation from patient.  RN CC handed patient off to CHW for continued outreach every 30 days.      Assessment: CHW has been in contact with patient monthly, talking with patient yesterday. RN CC to contact patient to discuss lack of appetite.       Plan/Recommendations: CHW will involve SW CC as needed or if patient is ready to move to maintenance.  SW CC will continue to monitor progress to goals and CHW outreaches every 6 weeks.     Care Plan updated and mailed to patient: no

## 2021-06-14 NOTE — PROGRESS NOTES
Clinic Care Coordination Contact  Ambulatory Care Coordination to Inpatient Care Management   Hand-In Communication    Date:  January 29, 2021  Name: Deloris Larson is enrolled in Ambulatory Care Coordination program and I am the Lead Care Coordinator.  CC Contact Information: David J Myhre RN, [phone]    Payor Source: Payor: MEDICARE / Plan: MEDICARE A AND B / Product Type: Medicare /   Current services in place:     Please see the CC Snaphot and Care Management Flowsheets for specific  details of this Deloris Larson care plan.   Additional details/specific concerns r/t this admission:      I will follow this admission in Epic. Please feel free to contact me with questions or for further collaboration in discharge planning.

## 2021-06-14 NOTE — TELEPHONE ENCOUNTER
Patient fainted this am in the kitchen and still feels dizzy.    COVID 19 Nurse Triage Plan/Patient Instructions    Please be aware that novel coronavirus (COVID-19) may be circulating in the community. If you develop symptoms such as fever, cough, or SOB or if you have concerns about the presence of another infection including coronavirus (COVID-19), please contact your health care provider or visit [email or web address].     Disposition/Instructions    Call to EMS/911 recommended. Follow protocol based instructions.    Bring Your Own Device:  Please also bring your smart device(s) (smart phones, tablets, laptops) and their charging cables for your personal use and to communicate with your care team during your visit.      Thank you for taking steps to prevent the spread of this virus.  o Limit your contact with others.  o Wear a simple mask to cover your cough.  o Wash your hands well and often.    Resources    [facility]: About COVID-19: [email or web address]    CDC: What to Do If You're Sick: www.cdc.gov/coronavirus/2019-ncov/about/steps-when-sick.html    CDC: Ending Home Isolation: www.cdc.gov/coronavirus/2019-ncov/hcp/disposition-in-home-patients.html     CDC: Caring for Someone: www.cdc.gov/coronavirus/2019-ncov/if-you-are-sick/care-for-someone.html     [facility]: Interim Guidance for Hospital Discharge to Home: www.health.[facility].mn.us/diseases/coronavirus/hcp/hospdischarge.pdf    [facility] clinical trials (COVID-19 research studies): clinicalaffairs.[facility]/n-clinical-trials     Below are the COVID-19 hotlines at the [place] Department of Health ([facility]). Interpreters are available.   o For health questions: Call 140-723-1970 or 1-224.923.1832 (7 a.m. to 7 p.m.)  o For questions about schools and childcare: Call 913-429-9852 or 1-191.127.4961 (7 a.m. to 7 p.m.)     Lyndsey Mcintosh RN   Care Connection Medication Refill and Triage Nurse  8:41 AM  1/28/2021      Reason for Disposition     Fainted, and still feels dizzy afterwards    Protocols used: DIZZINESS-A-OH

## 2021-06-14 NOTE — PROGRESS NOTES
Clinic Care Coordination Contact  [facility]/Voicemail       Clinical Data: Care Coordinator Outreach  Outreach attempted x 1.  Left message on patient's voicemail with call back information and requested return call.  Plan:  Care Coordinator will try to reach patient again in 10 business days.

## 2021-06-14 NOTE — PROGRESS NOTES
Clinic Care Coordination Contact    Community Health Worker Follow Up    Goals:   Goals Addressed                 This Visit's Progress       General      Functional (pt-stated)   40%     Goal Statement: Be prepared for what lies ahead with my cancer diagnosis within 6 months.   Date Goal set: 10-  Barriers: Doesn't like the unknown.    Strengths: Wants to be prepared, family support, open to hospice and other supports as my needs change.    Date to Achieve By: 4-  Patient expressed understanding of goal: yes  Action steps to achieve this goal:  1. I will talk with my PCP about completing POLST.  2. I worked with my PCP on getting oncology referral to discuss what to expect and how long I may live without interventions.    3. I will be open to talking with hospice to learn more about this resource and to be ready when I am able to enroll.  4. I will update care coordination team with any concerns and progress.    Discussed 1/20/21              CHW Next Follow-up: CHW will continue to support patient with goals through routine scheduled outreach.     Discussion: CHW spoke with patient's daughter. Patient has cancelled her colonoscopy and isn't sure she will reschedule. She is worried about complications.  She has cancelled her meal delivery. She does not have much of an appetite and can only handle certain foods that don't upset her stomach. Her daughter does her grocery shopping and she is drinking 1 boost daily.   Daughter is concerned about her water intake also and thinks that is why she gets dizzy lately, that or her limited caloric intake.   Daughter would like CCC RN to call patient to discuss her diet and if she should pursue getting the colonoscopy.    CHW Plan: CHW will continue to support patient with goals through routine scheduled outreach.     Next outreach due: 2/23/21

## 2021-06-14 NOTE — TELEPHONE ENCOUNTER
I recommend she get tested as she would be a candidate for monoclonal antibody treatment if positive.  If she agrees, please forward the order.  DEZ

## 2021-06-15 NOTE — PROGRESS NOTES
Clinic Care Coordination Contact    Follow Up Progress Note      Assessment: Called daughter Ginna. Patient is not happy with the home care that is coming in. She thinks family can help her and Ginna has directed her to continue to work with home care as her family all works and can't be over to help her out everyday.      The night after PCP appointment, when she can Ambien, she sleep walked and fell and hit her head. She was taken to [facility] and they wanted to discharge her to [facility], but patient wanted to go home and they allowed it since she has home care.  She has slept walked in the past. Ginna does not think patient needs Ambien to sleep as she is already worn out from COVID. She is not taking it at this time. Patient has complaints that her medications are messed up due to the many people who who changing her medications during the past 6 weeks.  Patient will need someone with her at her next PCP appointment and family should be able to attend. She wants to review with PCP her meds.      Call to patient who is accepting the RN and OT who starts tomorrow. Is not interested in PT at this time. Was very happy with the care at [facility] and recommends it.  Has gotten one vaccine and is scheduled for the second. Wants to do inpatient appointment with PCP and will call to schedule. Her son in law is retired and can take her.  She is feeling positive about her recovery and that she can continue to live at her house. She does not want to set up any other goals. Agreed to support her until she is discharged from home care. At that time, we can assess needs for care coordination services.     Goals addressed this encounter:   Goals Addressed                 This Visit's Progress       Patient Stated      Functional (pt-stated)   90%     Goal Statement: Be prepared for what lies ahead with my cancer diagnosis within 6 months.   Date Goal set: 10-  Barriers: Doesn't like the unknown.    Strengths: Wants to be  prepared, family support, open to hospice and other supports as my needs change.    Date to Achieve By: 4-  Patient expressed understanding of goal: yes  Action steps to achieve this goal:  1. I will talk with my PCP about completing POLST.  2. I worked with my PCP on getting oncology referral to discuss what to expect and how long I may live without interventions.    3. I will be open to talking with hospice to learn more about this resource and to be ready when I am able to enroll.  4. I will update care coordination team with any concerns and progress.    Discussed 1/20/21  Discussed on 2/22/21, 3-8-2021               Intervention/Education provided during outreach: Support for daughter with setting limits with patient.      Outreach Frequency: monthly    Plan:   Delegating to CHW to contact in less than 30 days.  Care Coordinator will follow up in 45 days and as needed.  Magda Mccormick, [facility]  Clinic Care Coordinator  [phone]

## 2021-06-15 NOTE — TELEPHONE ENCOUNTER
Reason for Call: Request for an order or referral:    Order or referral being requested:   SKILLED NURSING - 2 TIMES WEEKLY FOR 2 WEEKS  1 TIME WEEKLY FOR 2 WEEKS  1 TIME WEEKLY EVERY OTHER WEEK FOR 4 WEEKS    NEED TO CLARIFY MEDICATIONS    Date needed: as soon as possible    Has the patient been seen by the PCP for this problem? YES    Additional comments: N/A    Phone number Patient can be reached at:  Other phone number:  [phone]    Best Time:  ANYTIME    Can we leave a detailed message on this number?  Yes    Call taken on 2/24/2021 at 3:23 PM by Shefali Roland

## 2021-06-15 NOTE — PROGRESS NOTES
Office Visit   Deloris Larson   89 y.o. female    Date of Visit: 3/12/2021    Chief Complaint   Patient presents with     Medication Question Or Clarification     since hospital discharge        Assessment and Plan     1. Hypertension  Her blood pressure is satisfactory today.  It is unclear if she is taking 50 or 25 mg of metoprolol.  Her med list from [facility] on February 27 indicates 25 mg but we had her on 50 prior.  I do not have her documentation from that ER visit.  I have asked her to confirm what her doses so that we can reflect that accurately in the chart.  I have changed it to 25 mg today but again it needs to be confirmed.  She is in agreement with this plan.  We also reviewed the rest of her medications today.  - metoprolol succinate (TOPROL-XL) 50 MG 24 hr tablet; Take 0.5 tablets (25 mg total) by mouth daily.  Dispense: 45 tablet; Refill: 3         No follow-ups on file.     History of Present Illness   This 89 y.o. old female comes into follow-up.  I saw her in late February for hospital follow-up.  At that time she was having trouble with sleep and had been on Ambien for many years.  She asked for refill and we discussed the potential side effects.  She knew the side effects and I did give her a prescription.  Unfortunately after taking it she had a fall and ended up being seen at [facility].  Reportedly she was kept there overnight.  I do not have any documentation from that visit.  She has a med list from when she was discharged and it says that her metoprolol was 25 mg daily even though prior to that it was 50 mg daily.  She has no concerns and is now just taking Tylenol at bedtime.  She has been sleeping okay.  She has no lingering problems from the fall.  I will take Ambien off of her list.  The rest of her list is satisfactory and unchanged.    Review of Systems: As above, systems otherwise reviewed and negative.     Medications, Allergies and Problem List   Patient Active Problem  List   Diagnosis     Mixed hyperlipidemia     Essential hypertension     Insomnia     Coronary Artery Disease     Intermittent claudication (H)     Vitamin D deficiency     RLS (restless legs syndrome)     Persistent atrial fibrillation     Cardiac pacemaker in situ, dual chamber     Nonrheumatic aortic valve stenosis     Abnormal computed tomography of cecum and terminal ileum     Syncope, unspecified syncope type     2019 novel coronavirus disease (COVID-19)     Pancreatic cyst     Bronchiectasis with acute lower respiratory infection (H)     Orthostatic hypotension     COVID-19     Current Outpatient Medications   Medication Sig Dispense Refill     acetaminophen (TYLENOL) 500 MG tablet Take 2 tablets (1,000 mg total) by mouth 3 (three) times a day as needed for pain.       aluminum hydrox-magnesium carb (GAVISCON)  mg/15 mL Susp Take 30 mL by mouth 4 (four) times a day as needed (indegestion or heartburn).       apixaban ANTICOAGULANT (ELIQUIS) 2.5 mg Tab tablet Take 1 tablet (2.5 mg total) by mouth 2 (two) times a day. 180 tablet 3     atorvastatin (LIPITOR) 20 MG tablet Take 1 tablet (20 mg total) by mouth daily. 90 tablet 3     b complex vitamins tablet Take 1 tablet by mouth daily.       cholecalciferol, vitamin D3, 5,000 unit Tab Take 1 tablet by mouth. Take 4 days a week. MON, Wed, Fri, Sat       iron,carbonyl-vitamin C 65 mg iron- 125 mg TbEC Take 1 tablet by mouth daily. 90 tablet 3     L.acidophilus-Bifido.longum (PROBIOTIC PEARLS) 15 mg (1 billion cell) CpDR Take 1 capsule by mouth 2 (two) times a day.       metoprolol succinate (TOPROL-XL) 50 MG 24 hr tablet Take 0.5 tablets (25 mg total) by mouth daily. 45 tablet 3     nitroglycerin (NITROSTAT) 0.3 MG SL tablet Place 1 tablet (0.3 mg total) under the tongue every 5 (five) minutes as needed for chest pain. 90 tablet 12     No current facility-administered medications for this visit.      Allergies   Allergen Reactions     Sleeping  "[Diphenhydramine Hcl]      halluactions           Physical Exam     /72 (Patient Site: Left Arm, Patient Position: Sitting, Cuff Size: Adult Regular)   Pulse 72   Temp 97.9  F (36.6  C) (Tympanic)   Resp 20   Ht 5' 4\" (1.626 m)   Wt 130 lb 7 oz (59.2 kg)   SpO2 95%   BMI 22.39 kg/m      General: This is an alert female in no apparent distress.     Additional Information   Social History     Tobacco Use     Smoking status: Former Smoker     Packs/day: 1.50     Years: 25.00     Pack years: 37.50     Quit date: 1974     Years since quittin.2     Smokeless tobacco: Never Used   Substance Use Topics     Alcohol use: Yes     Alcohol/week: 7.0 standard drinks     Types: 7 Standard drinks or equivalent per week     Drug use: No            Kassidy Salas MD  "

## 2021-06-15 NOTE — PROGRESS NOTES
Clinic Care Coordination Contact    Situation: Patient chart reviewed by care coordinator.    Background: Patient has been discharged from TCU to home with home care from [facility]. CHW contacted daughter on 2-23 and helped set up PCP appointment which was completed on 2-26.    Assessment: No outreach needed today.     Plan/Recommendations: Will call daughter in 7-10 business days to assess needs.     TERRIE Cristobal  Clinic Care Coordinator  [phone]

## 2021-06-15 NOTE — PROGRESS NOTES
Patient's daughter spoke with CCC SW on 3/8 and discussed goals.    CHW delegations: Delegating to CHW to contact in less than 30 days.    Next outreach due: 4/7/21

## 2021-06-15 NOTE — PROGRESS NOTES
Office Visit   Deloris Larson   89 y.o. female    Date of Visit: 2/25/2021    Chief Complaint   Patient presents with     Follow-up     [facility], [facility], Cov[facility]        Assessment and Plan   1. Hospital discharge follow-up  She is doing gradually better.  She has home health coming in.  They recommended some physical therapy.  She does have a little bit of restless leg symptoms as well as ongoing fatigue.  I am going to recheck all of her labs.  She is quite anemic during her hospital stay.  Her respiratory symptoms have completely resolved.  She has no other ongoing symptoms.    2. Primary insomnia  She was given doxepin to try but did not tolerate it.  She really wants to go back to zolpidem.  She states she has been on it for many many years and understands the risk but wants to take it for sleep.  - zolpidem (AMBIEN) 5 MG tablet; Take 1 tablet (5 mg total) by mouth at bedtime as needed for sleep.  Dispense: 90 tablet; Refill: 0    3. Essential hypertension  - Comprehensive Metabolic Panel    4. Generalized muscle weakness    5. Anemia, unspecified type  - HM2(CBC w/o Differential)  - Vitamin B12  - Ferritin    6. Vitamin D deficiency  - Vitamin D, Total (25-Hydroxy)         No follow-ups on file.     History of Present Illness   This 89 y.o. old female comes into follow-up.  She was hospitalized with COVID-19 in early February.  States she was quite ill but then has recovered.  She was in a transitional care unit until 5 days prior to this visit.  Since being home she has been doing pretty well.  She is still pretty tired.  She is getting around without difficulty.  She is no longer having any cough or shortness of breath or fevers.  Feels that her appetite is decreased but she is drinking Ensure.  She has had some muscle twitching and restless leg symptoms.  These seem to be a little bit worse at night.  She has no other acute concerns.    Review of Systems: As above, systems otherwise  reviewed and negative.     Medications, Allergies and Problem List   Patient Active Problem List   Diagnosis     Mixed hyperlipidemia     Essential hypertension     Insomnia     Coronary Artery Disease     Intermittent claudication (H)     Vitamin D deficiency     RLS (restless legs syndrome)     Persistent atrial fibrillation     Cardiac pacemaker in situ, dual chamber     Nonrheumatic aortic valve stenosis     Abnormal computed tomography of cecum and terminal ileum     Syncope, unspecified syncope type     2019 novel coronavirus disease (COVID-19)     Pancreatic cyst     Bronchiectasis with acute lower respiratory infection (H)     Orthostatic hypotension     COVID-19     Current Outpatient Medications   Medication Sig Dispense Refill     acetaminophen (TYLENOL) 500 MG tablet Take 2 tablets (1,000 mg total) by mouth 3 (three) times a day. (Patient taking differently: Take 1,000 mg by mouth 3 (three) times a day as needed for pain. )  0     aluminum hydrox-magnesium carb (GAVISCON)  mg/15 mL Susp Take 30 mL by mouth 4 (four) times a day as needed (indegestion or heartburn).       apixaban ANTICOAGULANT (ELIQUIS) 2.5 mg Tab tablet Take 1 tablet (2.5 mg total) by mouth 2 (two) times a day. 180 tablet 3     atorvastatin (LIPITOR) 20 MG tablet Take 1 tablet (20 mg total) by mouth daily. 90 tablet 3     b complex vitamins tablet Take 1 tablet by mouth daily.       cholecalciferol, vitamin D3, 5,000 unit Tab Take 1 tablet by mouth. Take 4 days a week. MON, Wed, Fri, Sat       iron,carbonyl-vitamin C 65 mg iron- 125 mg TbEC Take 1 tablet by mouth daily. 90 tablet 3     L.acidophilus-Bifido.longum (PROBIOTIC PEARLS) 15 mg (1 billion cell) CpDR Take 1 capsule by mouth 2 (two) times a day.       meclizine (ANTIVERT) 32 MG chewable tablet Chew 1 tablet (32 mg total) 3 (three) times a day as needed. 60 tablet 0     metoprolol succinate (TOPROL-XL) 50 MG 24 hr tablet Take 1 tablet (50 mg total) by mouth daily. 90 tablet  "3     nitroglycerin (NITROSTAT) 0.3 MG SL tablet Place 1 tablet (0.3 mg total) under the tongue every 5 (five) minutes as needed for chest pain. 90 tablet 12     zolpidem (AMBIEN) 5 MG tablet Take 1 tablet (5 mg total) by mouth at bedtime as needed for sleep. 90 tablet 0     No current facility-administered medications for this visit.      Allergies   Allergen Reactions     Sleeping [Diphenhydramine Hcl]      halluactions           Physical Exam     /66 (Patient Site: Left Arm, Patient Position: Sitting, Cuff Size: Adult Regular)   Ht 5' 4\" (1.626 m)   Wt 126 lb 6 oz (57.3 kg)   BMI 21.69 kg/m      General: This is an alert female, sitting comfortably, no apparent distress.     Additional Information   Social History     Tobacco Use     Smoking status: Former Smoker     Packs/day: 1.50     Years: 25.00     Pack years: 37.50     Quit date: 1974     Years since quittin.1     Smokeless tobacco: Never Used   Substance Use Topics     Alcohol use: Yes     Alcohol/week: 7.0 standard drinks     Types: 7 Standard drinks or equivalent per week     Drug use: No          Kassidy Salas MD  "

## 2021-06-15 NOTE — PROGRESS NOTES
Clinic Care Coordination Contact    Follow Up Progress Note      Assessment: [facility] RN spoke with patient's daughter, Ginna today. Assisted patient with making a follow up TCU inpatient appointment with PCP on 2/25/21. Provided Ginna with the COVID-19 scheduling line. Ginna said her mother has appeared more anxious since her return to her home from the TCU. Patient received orders for home care following discharge from the TCU. Home care staff from [facility] have contacted patient, but Ginna said her mother ended up yelling at them on the phone, which she said was very unlike her mother. She also said other family members have noticed changes in patient's behavior. Ginna said right now she was going to chalk it up to patient's anxiety. She will discuss with PCP at next Office Visit. She said she would continue to encourage patient to accept the home care being offered.     Goals addressed this encounter:   Goals Addressed                 This Visit's Progress       Patient Stated      Functional (pt-stated)        Goal Statement: Be prepared for what lies ahead with my cancer diagnosis within 6 months.   Date Goal set: 10-  Barriers: Doesn't like the unknown.    Strengths: Wants to be prepared, family support, open to hospice and other supports as my needs change.    Date to Achieve By: 4-  Patient expressed understanding of goal: yes  Action steps to achieve this goal:  1. I will talk with my PCP about completing POLST.  2. I worked with my PCP on getting oncology referral to discuss what to expect and how long I may live without interventions.    3. I will be open to talking with hospice to learn more about this resource and to be ready when I am able to enroll.  4. I will update care coordination team with any concerns and progress.    Discussed 1/20/21  Discussed on 2/22/21               Intervention/Education provided during outreach: Discussed the importance of taking her medications as prescribed.  Encouraged her to attend all scheduled appointments. Discussed COVID-19 precautions. Encouraged to have patient wear Life Alert at all times.           Plan: CCC RN will continue to monitor, support patient with current goal and will be available to assist as nursing needs arise.   Care Coordinator will follow up in one month.

## 2021-06-15 NOTE — PROGRESS NOTES
Clinic Care Coordination Contact    Situation: Patient chart reviewed by care coordinator.    Background: Patient was in [facility] with worsening COVID. Was discharged back to TCU on 2-6.     Assessment: No outreach needed. TCU SW was notified previously.     Plan/Recommendations: Will do chart review in 2 weeks and will call if notified of discharge prior to then.     Magda Mccormick [facility]  Clinic Care Coordinator  [phone]

## 2021-06-15 NOTE — TELEPHONE ENCOUNTER
Chanel was given verbal okay from patient's provider for requested orders.    Jayde Toledo LPN.  Clinical Messaging Team @ [facility]

## 2021-06-15 NOTE — PROGRESS NOTES
Clinic Care Coordination Contact  Care Team Conversations   Message from TCU LINETTE Spencer: patient will be discharging to home on 2-20 with [facility] home care orders.    Plan- call patient in 5-10 business days to discuss needs.  TERRIE Cristobal  Clinic Care Coordinator  [phone]

## 2021-06-15 NOTE — PROGRESS NOTES
Patient's daughter spoke with CCC RN on 2/23.  No CHW delegations at this time.    Next outreach due: 3/10/22

## 2021-06-16 NOTE — PROGRESS NOTES
Patient's daughter spoke with CCC RN on 4/6 and discussed goals.    CHW delegations: Follow up in 1 month    Next outreach due: 5/3/21

## 2021-06-16 NOTE — PROGRESS NOTES
Care Coordination Clinician Chart Review   Situation: Patient chart reviewed by care coordinator.       Background: Care Coordination initial assessment and enrollment to Care Coordination was 10-9-2020.   Patient centered goal(s) were developed with participation from patient.  RN CC handed patient off to CHW for continued outreach every 30 days.        Assessment: Per chart review, patient outreach completed by CC RN on 4-6.  Patient is actively working to accomplish goal(s).  Patient's goal(s) remain appropriate and relevant at this time.   Patient is not due for updated Complex Care Plan.  Annual assessment will be due 10-9-2021.      Goals        Patient Stated      Functional (pt-stated)      Goal Statement: Be prepared for what lies ahead with my cancer diagnosis within 6 months.   Date Goal set: 10-  Barriers: Doesn't like the unknown.    Strengths: Wants to be prepared, family support, open to hospice and other supports as my needs change.    Date to Achieve By: 4-, updated 8-1-2021  Patient expressed understanding of goal: yes  Action steps to achieve this goal:  1. I will talk with my PCP about completing POLST.  2. I worked with my PCP on getting oncology referral to discuss what to expect and how long I may live without interventions.    3. I will be open to talking with hospice to learn more about this resource and to be ready when I am able to enroll.  4. I will update care coordination team with any concerns and progress.    Discussed 1/20/21  Discussed on 2/22/21, 3-8-2021  Discussed on 4/6/21                  Plan/Recommendations: The patient will continue working with Care Coordination to achieve goal(s) as above.  CHW will involve LINETTE CC as needed or if patient is ready to move to maintenance.  SW CC will continue to monitor progress to goals and CHW outreaches every 6 weeks.   Care Plan updated and mailed to patient: No

## 2021-06-16 NOTE — PROGRESS NOTES
Clinic Care Coordination Contact    Follow Up Progress Note      Assessment: CCC RN spoke with patient's daughter Ginna today. Ginna reported her mother is doing well. She has had both of COVID vaccines. Patient has been complaining of some mild memory issues that Ginna said she is blaming on the COVID vaccines. Ginna said she has noticed some mild memory issues since her mother was discharged home from the TCU and believes it is more age related. Ginna denied needing any additional resources at this time. She said she and her family have been able to assist her mother with her needs. Appreciative of the call today. Agreed to follow up in one month.     Goals addressed this encounter:   Goals Addressed                 This Visit's Progress       Patient Stated      Functional (pt-stated)        Goal Statement: Be prepared for what lies ahead with my cancer diagnosis within 6 months.   Date Goal set: 10-  Barriers: Doesn't like the unknown.    Strengths: Wants to be prepared, family support, open to hospice and other supports as my needs change.    Date to Achieve By: 4-  Patient expressed understanding of goal: yes  Action steps to achieve this goal:  1. I will talk with my PCP about completing POLST.  2. I worked with my PCP on getting oncology referral to discuss what to expect and how long I may live without interventions.    3. I will be open to talking with hospice to learn more about this resource and to be ready when I am able to enroll.  4. I will update care coordination team with any concerns and progress.    Discussed 1/20/21  Discussed on 2/22/21, 3-8-2021  Discussed on 4/6/21               Intervention/Education provided during outreach: Encouraged her to contact CCC if needing any additional resources for her mother.           Plan: CCC RN will continue to monitor, support patient with current goal and will be available to assist as nursing needs arise.     Care Coordinator will follow up in one  month.

## 2021-06-16 NOTE — TELEPHONE ENCOUNTER
Telephone Encounter by Deloris Pittman CMA at 9/24/2019 10:26 AM     Author: Deloris Pittman CMA Service: -- Author Type: Certified Medical Assistant    Filed: 9/24/2019 12:03 PM Encounter Date: 9/23/2019 Status: Signed    : Deloris Pittman CMA (Certified Medical Assistant)       Medication: Zolpidem   Last Date Filled 8/23/19   pulled: YES    Only PCP Prescribing? : YES  Taken as prescribed from physician notes? YES    CSA in last year: YES  Random Utox in last year: Not needed  (if any of the above answer NO - schedule with PCP)     Opioids + benzodiazepines? NO  (if the above answer YES - schedule with PCP every 6 months)     Is patient on the Executive Review Team? No      All responses suggest: Scheduling with PCP for further intervention    Return in about 1 month (around 7/10/2019) for Recheck.

## 2021-06-16 NOTE — TELEPHONE ENCOUNTER
Telephone Encounter by Leticia Smith LPN at 4/29/2019  9:06 AM     Author: Leticia Smith LPN Service: -- Author Type: Licensed Nurse    Filed: 4/29/2019  9:10 AM Encounter Date: 4/25/2019 Status: Signed    : Leticia Smith LPN (Licensed Nurse)       Medication: Zolpidem  Last Date Filled 3/27/19   pulled: YES         Only PCP Prescribing? : YES  Taken as prescribed from physician notes?  Unknown    CSA in last year: YES  Random Utox in last year: NO  (if any of the above answer NO - schedule with PCP)     Opioids + benzodiazepines? NO  (if the above answer YES - schedule with PCP every 6 months)     Is patient on the Executive Review Team? No      All responses suggest: Refilling prescription

## 2021-06-17 ENCOUNTER — COMMUNICATION - HEALTHEAST (OUTPATIENT)
Dept: ADMINISTRATIVE | Facility: CLINIC | Age: 86
End: 2021-06-17

## 2021-06-17 NOTE — PROGRESS NOTES
In clinic device check with Dr. Alvarez.  Please see link for full device report.  Patient was informed of results and next follow up during today's visit.

## 2021-06-17 NOTE — PROGRESS NOTES
"  Assessment and Plan:   Overall, feeling a bit more anxious, describing something that could either be heartburn or cardiac.  Will treat anxiety with escitalopram, do a trial of omeprazole for GERD and have her stop zolpidem and lorazepam and also as noted below.    1. Mixed hyperlipidemia  She is on a statin.  - Hepatic Profile  - Lipid Ogle    2. Hypertension  Stable on medication.  - Basic Metabolic Panel  - HM2(CBC w/o Differential)  - Thyroid Stimulating Hormone (TSH)  - Urinalysis-UC if Indicated    3. Vitamin D deficiency  Normal bone density test last year.  - Vitamin D, Total (25-Hydroxy)    4. Palpitation  - Holter Monitor; Future     The patient's current medical problems were reviewed.    The following health maintenance schedule was reviewed with the patient and provided in printed form in the after visit summary:   Health Maintenance   Topic Date Due     DXA SCAN  03/06/2019     FALL RISK ASSESSMENT  04/16/2019     ADVANCE DIRECTIVES DISCUSSED WITH PATIENT  02/28/2022     TD 18+ HE  03/20/2023     PNEUMOCOCCAL POLYSACCHARIDE VACCINE AGE 65 AND OVER  Completed     INFLUENZA VACCINE RULE BASED  Completed     PNEUMOCOCCAL CONJUGATE VACCINE FOR ADULTS (PCV13 OR PREVNAR)  Completed     ZOSTER VACCINE  Completed        Subjective:   Chief Complaint: Deloris Larson is an 88 y.o. female here for an Annual Wellness visit.   HPI: She comes in today for her annual wellness visit.  For the past couple months she has been having almost daily episodes in which she \"feels something arising up\" from her abdomen and it makes her feel nervous when it happens and sometimes slightly dizzy.  She admits sometimes she forgets to eat or drink.  She is been contemplating staying in her own home or moving into an assisted living and she does have her name at [facility].  She finds herself feeling a bit more nervous lately.  She takes lorazepam once in a while and did try it for 1 of these \"episodes\" and it did " help.  She especially just takes the lorazepam when she goes to the dentist.  She cannot sleep unless she takes zolpidem and it puts her out every night.    Review of Systems:    Please see above.  The rest of the review of systems are negative for all systems.    Patient Care Team:  Odalys Scott MD as PCP - General     Patient Active Problem List   Diagnosis     Mixed hyperlipidemia     Hypertension     Insomnia     Migraine Headache     Coronary Artery Disease     Supraventricular Tachycardia     Intermittent Claudication     Malignant neoplasm of skin     PAD (peripheral artery disease)     Vitamin D deficiency     RLS (restless legs syndrome)     Past Medical History:   Diagnosis Date     Coronary artery disease      Hyperlipidemia      Hypertension       Past Surgical History:   Procedure Laterality Date     CORONARY ANGIOPLASTY       MA APPENDECTOMY      Description: Appendectomy;  Recorded: 11/13/2008;     MA MASTECTOMY, SIMPLE, COMPLETE      Description: Breast Surgery Simple Mastectomy;  Recorded: 08/25/2009;     MA REMOVAL OF OVARY(S)      Description: Oophorectomy;  Recorded: 08/25/2009;     MA REVISE SECONDARY VARICOSITY      Description: Varicose Vein Ligation;  Recorded: 11/13/2008;     MA TOTAL ABDOM HYSTERECTOMY      Description: Hysterectomy;  Recorded: 11/13/2008;      Family History   Problem Relation Age of Onset     Adopted: Yes     Family history unknown: Yes      Social History     Social History     Marital status: Single     Spouse name: N/A     Number of children: N/A     Years of education: N/A     Occupational History     Not on file.     Social History Main Topics     Smoking status: Former Smoker     Quit date: 1/1/1974     Smokeless tobacco: Never Used     Alcohol use No     Drug use: No     Sexual activity: Not on file     Other Topics Concern     Not on file     Social History Narrative    She lives alone. She has 3 children and 8 grandchildren and multiple great  "grandchildren. She is retired. 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 She does not smoke cigarettes and rarely drinks alcohol.      Current Outpatient Prescriptions   Medication Sig Dispense Refill     aspirin 325 MG tablet Take 325 mg by mouth daily.       atenolol (TENORMIN) 25 MG tablet TAKE 2 TABLETS BY MOUTH DAILY AT NIGHT 180 tablet 3     atorvastatin (LIPITOR) 40 MG tablet TAKE 1 TABLET BY MOUTH DAILY 90 tablet 1     cholecalciferol, vitamin D3, 5,000 unit Tab Take by mouth. Take 4 days a week       ibuprofen (ADVIL,MOTRIN) 200 MG tablet Take 200 mg by mouth every 6 (six) hours as needed for pain.       lactobacillus acidophilus (ACIDOPHILUS) cap Take as directed       meclizine (ANTIVERT) 12.5 mg tablet Take 12.5 mg by mouth 3 (three) times a day as needed (take 1-2 tablets three times daily as needed).       nitroglycerin (NITROSTAT) 0.3 MG SL tablet Place 1 tablet (0.3 mg total) under the tongue every 5 (five) minutes as needed for chest pain. 90 tablet 12     ramipril (ALTACE) 10 MG capsule TAKE 1 CAPSULE BY MOUTH TWICE DAILY (EVERY 12 HOURS) 180 capsule 1     escitalopram oxalate (LEXAPRO) 5 MG tablet Take 1 tablet (5 mg total) by mouth daily. 90 tablet prn     omeprazole (PRILOSEC) 40 MG capsule Take 1 capsule (40 mg total) by mouth daily. 90 capsule 3     traZODone (DESYREL) 50 MG tablet 1/2 up to 2 tablets as needed for sleep 60 tablet prn     No current facility-administered medications for this visit.       Objective:   Vital Signs:   Visit Vitals     /84 (Patient Site: Left Arm, Patient Position: Sitting, Cuff Size: Adult Regular)     Pulse 66     Ht 5' 6\" (1.676 m)     Wt 141 lb 5 oz (64.1 kg)     SpO2 98%     BMI 22.81 kg/m2        VisionScreening:  No exam data present     PHYSICAL EXAM  Wt Readings from Last 3 Encounters:   04/16/18 141 lb 5 oz (64.1 kg)   10/25/17 141 lb 9.6 oz (64.2 kg)   05/25/17 144 lb (65.3 kg)     General Appearance: " Pleasant and alert  HEENT: Sclera are clear, tympanic membranes clear  Lungs: Normal respirations, clear to auscultation  Breasts: Normal-appearing breasts and nipples with no axillary adenopathy   Cardiac: Regular rate and rhythm with no appreciable murmur rub or gallop   Abdomen: Soft and nondistended  Extremities: No edema  Skin: No rashes  Neuro: Moves all extremities and has facial symmetry  Gait: Ambulates with a normal gait    Personalized prevention plan: Lifestyle interventions to promote self-management and wellness were discussed including good nutrition, ongoing physical activity, falls prevention and continuing with screening tests as recommended including bone density scan in a few years and those listed in the health maintenance plan listed above.    Much or all of the text in this note was generated through the use of Dragon Dictate voice-to-text software. Errors in spelling or words which seem out of context are unintentional. Sound alike errors, in particular, may have escaped editing.      Assessment Results 4/16/2018   Activities of Daily Living No help needed   Instrumental Activities of Daily Living 2-4 - Moderate impairment   Get Up and Go Score Less than 12 seconds   Mini Cog Total Score 4   Some recent data might be hidden     A Mini-Cog score of 0-2 suggests the possibility of dementia, score of 3-5 suggests no dementia    Identified Health Risks:     The patient reports that she has difficulty with instrumental activities of daily living.  She was provided with a list of local organizations that provide support services and advised to make a follow up appointment in 2 weeks to address this further.   She is at risk for falling and has been provided with information to reduce the risk of falling at home.  Patient's advanced directive was discussed and I am comfortable with the patient's wishes.

## 2021-06-17 NOTE — PROGRESS NOTES
Assessment & Plan     Deloris Freeman was seen today for establish care and rash.    Diagnoses and all orders for this visit:    Anemia due to other cause, not classified, patient's hemoglobin has been decreased for quite some time since January 28, 2020, and has been ranging from 8-10.  Patient says that she had some sort of bleed, a year ago in January.  She says the source of this bleeding was never found.  She has no shortness of breath, no chest pain, no dizziness.  She states she had internal bleeding, she had terrible pain, which seems to been in her lower abdomen, and lower back.  Patient is on Eliquis 2.5 mg twice a day, for atrial fibrillation.  Discussed with her today, that this is an issue, if she is still bleeding from somewhere.  We will recheck CBC today, iron studies. patient is on iron daily, so the iron studies may not be that helpful.  -     HM1(CBC and Differential)  -     Hepatic Profile  -     Ferritin  -     Iron and Transferrin Iron Binding Capacity  -     Cancel: Transferrin    Clinical diagnosis of COVID-19, patient states that she was in hospital for a month, with COVID-19, in February of this year.  This initially started with you having for at the beginning of February.  She was admitted, sent to a TCU, had to be readmitted, and was sent back to a TCU.  She currently lives in a house, has a four-point cane that she uses in her house.  She lives in 1 level.  Her son, and his partner, lives on another level, basement.  Patient does have steps to get into her house, but is able to do this.  She is independent.  She does not have any extra services.  She feels like her balance is off since the Covid, however this is improving.  She does not feel that she needs physical therapy.  She had physical therapy, and occupational therapy for some time she returned home, after this Covid diagnosis.  She has bars in shower, and a bar on the outside of her shower.  She feels safe, showering twice a week.   She has not had any falls since she returned home.  She has had some loss of weight over the past 5 years.  She has gained some weight back.    Persistent atrial fibrillation, patient is on Eliquis 2.5 mg twice a day.    Essential hypertension, patient is on metoprolol 25 mg daily.  Blood pressure today is controlled at 138/80, heart rate of 73.  -     Basic Metabolic Panel    Other insomnia, patient's son endorses that she used to be on Ambien, for sleep.  She was weaned off Ambien and is using a low-dose of melatonin, and is sleeping well with this.    Cardiac pacemaker in situ, dual chamber, patient has a pacemaker.    Pruritic disorder, patient has an itchy rash involving her upper back, upper arms, chest and neck.  This has been present since she had the Covid diagnosis.  She has been using cortisone No. 10 with some relief.  Examination today, there are some scratch marks, skin looks really good.  No rash.  Discussed with her using Eucerin, after showers, and every day, on areas of itchiness.  Limit fragrances in laundry detergents, soaps.  -     lanolin alcohol-mo-w.pet-ceres (EUCERIN) Crea; Apply to itchy rash on back, upper arms, neck and chest area, every day, and directly after a shower.    Nonrheumatic aortic valve stenosis    Vitamin D deficiency, patient is on 5000 units of vitamin D daily.  Vitamin D was low February 28, 2017, when it was 18.  Latest vitamin D level is 76, which is on the high range of normal February 25, 2021.    RLS (restless legs syndrome), patient is currently on iron, and says that the restless leg syndrome is not bothering her at this time.    Abnormal finding of blood chemistry, unspecified   -     Ferritin  -     Iron and Transferrin Iron Binding Capacity  -     Cancel: Transferrin    Dry skin, as above.  -     lanolin alcohol-mo-w.pet-ceres (EUCERIN) Crea; Apply to itchy rash on back, upper arms, neck and chest area, every day, and directly after a shower.    Some concern  about her memory, patient says that she has difficulty finding words, and names of things sometimes.  This has been present even before she had Covid.    On questioning she does not appear to have had any deleterious effects from the Covid 19 infection, does not appear to have any chronic side effects from this.    Echocardiogram done October 14, 2019: Ejection fraction 59%.  Moderate concentric hypertrophy.  Normal right ventricular size and systolic function.  Mild aortic stenosis.  Mild aortic regurgitation.  Moderate mitral annular calcification.  No stenosis.  No pulmonary hypertension.    Cardiology notes:  Tachybradycardia syndrome.  Tachycardias manifest as atrial fibrillation with elevated ventricular response, SVT.  Bradycardia with sick sinus syndrome, sinus pauses.  [employer] dual-chamber pacemaker June 27, 2018.    Review of records indicate that she had a retroperitoneal hematoma January 2020.  Cardiology reviewed and decided that she should continue with the anticoagulation, apixaban 2.5 mg twice a day.    70 minutes spent on the date of the encounter doing chart review, review of outside records, review of test results, patient visit and documentation, and discussion with family. Son in law present, and giving history as well.       Return in about 3 months (around 8/24/2021), or Follow with Dr. Diaz, 40 minutes please.    Jayde Diaz MD  [facility]   Matthew Larson is 89 y.o. and presents today for the following health issues     HPI   Here to establish primary care.     Results for MATTHEW LARSON (MRN [id number]) as of 5/24/2021 11:32   Ref. Range 2/25/2021 13:58   Sodium Latest Ref Range: 136 - 145 mmol/L 141   Potassium Latest Ref Range: 3.5 - 5.0 mmol/L 5.3 (H)   Chloride Latest Ref Range: 98 - 107 mmol/L 109 (H)   CO2 Latest Ref Range: 22 - 31 mmol/L 22   Anion Gap, Calculation Latest Ref Range: 5 - 18 mmol/L 10   BUN  Latest Ref Range: 8 - 28 mg/dL 20   Creatinine Latest Ref Range: 0.60 - 1.10 mg/dL 1.38 (H)   GFR MDRD Af Amer Latest Ref Range: >60 mL/min/1.73m2 44 (L)   GFR MDRD Non Af Amer Latest Ref Range: >60 mL/min/1.73m2 36 (L)       Results for MATTHEW MURILLO (MRN [id number]) as of 5/24/2021 11:32   Ref. Range 2/5/2021 07:43 2/25/2021 13:58   BUN Latest Ref Range: 8 - 28 mg/dL 15 20   Creatinine Latest Ref Range: 0.60 - 1.10 mg/dL 1.01 1.38 (H)   GFR MDRD Af Amer Latest Ref Range: >60 mL/min/1.73m2 >60 44 (L)   GFR MDRD Non Af Amer Latest Ref Range: >60 mL/min/1.73m2 51 (L) 36 (L)     Results for MATTHEW MURILLO (MRN [id number]) as of 5/24/2021 11:32   Ref. Range 2/5/2021 07:43 2/25/2021 13:58   Potassium Latest Ref Range: 3.5 - 5.0 mmol/L 4.2 5.3 (H)     Results for MATTHEW MURILLO (MRN [id number]) as of 5/24/2021 11:32   Ref. Range 2/25/2021 13:58   Glucose Latest Ref Range: 70 - 125 mg/dL 93   Vitamin D, Total (25-Hydroxy) Latest Ref Range: 30.0 - 80.0 ng/mL 76.0   Ferritin Latest Ref Range: 10 - 130 ng/mL 34   Vitamin B-12 Latest Ref Range: 213 - 816 pg/mL 1,204 (H)     Patient has had an anemia, for a while.  Hemoglobin has been ranging from 8-10, since January 20, 2020.  Prior to that hemoglobin was 13-14.  Latest hemoglobin February 25, 2021, 9.4, MCV 96.  On previous differential, patient has also had neutropenia [absolute].  White cell count has been on the low side as low as 3, January 29, 2021.  Ferritin has been low, decreased to 9, August 25, 2020.  Vitamin B12 is normal.    Results for MATTHEW MURILLO (MRN [id number]) as of 5/24/2021 11:32   Ref. Range 2/25/2021 13:58   Ferritin Latest Ref Range: 10 - 130 ng/mL 34     Results for MATTHEW MURILLO (MRN [id number]) as of 5/24/2021 11:32   Ref. Range 8/25/2020 11:30 11/13/2020 11:34 1/28/2021 18:18 2/25/2021 13:58   Ferritin Latest Ref Range: 10 - 130 ng/mL 9 (L) 13 73 34     Results for MATTHEW MURILLO (MRN [id number]) as of 5/24/2021 11:32   Ref. Range  8/25/2020 11:30 2/25/2021 13:58   Vitamin B-12 Latest Ref Range: 213 - 816 pg/mL 547 1,204 (H)     Results for MATTHEW MURILLO (MRN [id number]) as of 5/24/2021 11:32   Ref. Range 2/28/2017 13:14 10/25/2017 08:09 4/16/2018 12:38 2/25/2021 13:58   Vitamin D, Total (25-Hydroxy) Latest Ref Range: 30.0 - 80.0 ng/mL 18.1 (L) 41.6 79.9 76.0     Results for MATTHEW MURILLO (MRN [id number]) as of 5/24/2021 11:32   Ref. Range 1/30/2021 08:17   LD (LDH) Latest Ref Range: 125 - 220 U/L 313 (H)     Component      Latest Ref Rng & Units 1/28/2021 1/29/2021 1/30/2021   Retic Absolute Count      0.010 - 0.110 mill/uL 0.033 0.053 0.050   Retic Ct Pct      0.8 - 2.7 % 2.02 1.81 1.72     Past Surgical History:   Procedure Laterality Date     APPENDECTOMY       CAPSULOTOMY Bilateral 12/2015    YAG capsulotomy surgery. 12/21/15, 12/28/15     CARDIOVERSION  05/24/2018    EP Cardioversion External     CATARACT EXTRACTION, BILATERAL Bilateral 03/2012    3/15 and 3/29 by Dr. Barrett at the [facility]     CHOLECYSTECTOMY       COLONOSCOPY W/ BIOPSIES  04/04/2007     COLONOSCOPY W/ POLYPECTOMY  05/07/2012    2 mm tubular adenoma in the rectum. Hemorrhoids. Diverticulosis.     CORONARY ANGIOPLASTY       CV CORONARY ANGIOGRAM N/A 06/26/2018    Procedure: Coronary Angiogram;  Surgeon: Joby Vargas MD;  Location: [facility] Lab;  Service:      EP PACEMAKER INSERT N/A 06/27/2018    Procedure: EP Pacemaker Insertion;  Surgeon: Osito Alvarez MD;  Location: [facility] Cath Lab;  Service:      INGUINAL HERNIA REPAIR Right     Indirect- Dr. Pedersen     MASTECTOMY       OOPHORECTOMY       SKIN CANCER EXCISION  2015    Right leg on 10/21/15. Right arm 9/29/15     TOTAL ABDOMINAL HYSTERECTOMY       TUBAL LIGATION       VARICOSE VEIN SURGERY      ligation?     Social History     Social History Narrative    She lives alone in a house. She has 3 children and 8 grandchildren and multiple great grandchildren. She is retired. 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 She does not smoke cigarettes and rarely drinks alcohol.        She was a De[employer] in her [employer], for 40 years.  Her [employer] had to close, due to declining numbers.        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 She has never driven a car. Does her own cooking, and baking. She does not use a computer, never did.        Jayde Diaz MD 5/24/2021             Review of Systems   All other systems reviewed and are negative.          Objective    /80 (Patient Site: Right Arm, Patient Position: Sitting)   Pulse 73   Ht 5' 4\" (1.626 m)   Wt 133 lb (60.3 kg)   SpO2 97%   BMI 22.83 kg/m    Body mass index is 22.83 kg/m .  Physical Exam   Constitutional: She is oriented to person, place, and time. No distress.   Interactive and alert.   Neck: No thyromegaly present.   Cardiovascular:   No murmur heard.  Left chest wall pacemaker. No elevation in JVP.   Pulmonary/Chest: No respiratory distress. She has no wheezes. She has no rales.   Abdominal: She exhibits no distension and no mass.   No hepatosplenomegaly   Musculoskeletal:         General: No edema.   Lymphadenopathy:     She has no cervical adenopathy.   Neurological: She is alert and oriented to person, place, and time.   Has some difficulty with giinge me specifics of when things occurred, in terms of time. But overall she is able to tell me a lot about her life and what has happened to her in terms of her health.    Gait is pretty good. She watches where her feet are, and takes her time getting up onto a step to get onto the examining bed. She does not have her cane with her. Truncal strength pretty good, she is able to get back up from lying flat.   Skin:   Has some scratch marks upper back, no other rashes, skin looks pretty good.   Psychiatric: She has a normal mood and affect. Her behavior is normal.               "

## 2021-06-17 NOTE — TELEPHONE ENCOUNTER
Telephone Encounter by Jaki Nunez CMA at 7/14/2020  2:49 PM     Author: Jaki Nunez CMA Service: -- Author Type: Certified Medical Assistant    Filed: 7/14/2020  2:55 PM Encounter Date: 7/13/2020 Status: Signed    : Jaki Nunez CMA (Certified Medical Assistant)

## 2021-06-17 NOTE — PROGRESS NOTES
[facility] Follow Up Note    Deloris Larson   88 y.o. female    Date of Visit: 4/30/2018    Chief Complaint   Patient presents with     Follow-up     Insomnia     Subjective  Deloris Freeman comes in today for follow-up after some medication changes be made at her annual wellness visit and follow-up recent abnormal Holter report.  She was seen by me for her annual wellness visit on April 16.  She was complaining of some potential palpitations and a Holter monitor was set up.  She was in atrial fibrillation during the entire Holter monitor which is a new finding.  To auscultation she was not in atrial fibrillation mid April when I did listen.  Would not do an EKG prior to this and her last EKG was done a couple years ago and did not show atrial fibrillation.  She tells me that her symptoms come and go and she was having symptoms during her Holter monitor, she does not have any symptoms today.  She has been unable to sleep with trazodone and would like to go back to zolpidem.    ROS A comprehensive review of systems was performed and was otherwise negative.    Social History     Social History Narrative    She lives alone. She has 3 children and 8 grandchildren and multiple great grandchildren. She is retired. 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 She does not smoke cigarettes and rarely drinks alcohol.       Medications were reconciled.  Allergies, social and family history, and the problem list were all reviewed and updated.    Old records reviewed: Holter monitor showing atrial fibrillation with a normal rate response.    Exam  General Appearance: Pleasant and alert   Vitals:    04/30/18 1128 04/30/18 1129   BP: 144/68 138/74   Patient Site: Left Arm Left Arm   Patient Position: Sitting Sitting   Cuff Size: Adult Regular Adult Regular   Pulse: 66    Weight: 143 lb (64.9 kg)       Body mass index is 23.08 kg/(m^2).  Wt Readings from Last 3 Encounters:   04/30/18  143 lb (64.9 kg)   04/16/18 141 lb 5 oz (64.1 kg)   10/25/17 141 lb 9.6 oz (64.2 kg)     HEENT: Sclera are clear.   Lungs: Normal respirations  Cardiac: Regular rate and rhythm   Abdomen: Soft and nondistended  Extremities: No edema  Skin: No rashes  Neuro: Moves all extremities and has facial symmetry  Gait: Ambulates with a normal gait    Assessment/Plan  1. Atrial fibrillation  Auscultation again today does not show any obvious atrial fibrillation.  We reviewed the Holter results with her and I suspect she is having paroxysmal atrial fibrillation.  She was started on apixaban for cardiovascular protection (chads vascular 2 score is at least 5), rate has been stable even when she is in it on atenolol 25 mg twice daily so we can continue this, will discuss with her at some point in the future changing over to metoprolol XL.  She is agreeable to being seen in the atrial fibrillation clinic.  - Echo Complete; Future  - Ambulatory referral to Afib Clinic    2. Hypertension  Continue current management with atenolol and ramipril.    3. Anxiety  Improved with escitalopram 5 mg daily.  She prefers to go back on zolpidem rather than trazodone and new prescription was sent.    4. Insomnia  As above.          Odalys Scott MD  Internal and Geriatric Medicine  [facility]    Much or all of the text in this note was generated through the use of Dragon Dictate voice-to-text software. Errors in spelling or words which seem out of context are unintentional. Sound alike errors, in particular, may have escaped editing.

## 2021-06-17 NOTE — PROGRESS NOTES
[facility] HEART [facility]   Arrhythmia Clinic    Assessment/Plan:  Diagnoses and all orders for this visit:    Persistent atrial fibrillation and new diagnosis.  Rate well controlled per 24-hour Holter.  Questioning symptoms of lightheadedness and short of breath per holter and more fatigue per her report.  Reports fatigue over the last 6 months  and A. fib not likely that long.  Due to tendency towards lower heart rates, I recommended cardioversion without antiarrhythmics today.  Discussed risk that she may not stay in normal rhythm without antiarrhythmic use as likely ongoing for several months prior to diagnosis.  Will decrease atenolol to 25 mg in a.m. only and to stop p.m. atenolol the night before cardioversion.  On Eliquis 5 mg p.o. every 12 hours and denies any missed doses.  She is using a pillbox so she knows that she has not missed any doses.  Discussed risk on anticoagulation of other bleeding and to call if this were to occur.  Also discussed that if it becomes too expensive to stay on Eliquis to discuss this in clinic as could go to warfarin.  Currently Eliquis is affordable for her.  She started Eliquis on May 1 so she is eligible for cardioversion on May 22 or after.  I discussed natural progression.  I discussed rate versus rhythm control is decided on symptoms.  Discussed higher incidence of A. fib with advanced age.  No significant valve disease.    I discussed cardioversion and prep for procedure.  See after visit summary for more details.  I discussed call system in our clinic.  I gave her my contact information.  To follow-up with me in 5-6 weeks.  -     ECG Clinic - Today  -     EP Cardioversion External; Future; Expected date: 5/9/18    Paroxysmal SVT (supraventricular tachycardia) history.    Essential hypertension and moderate LVH so needs tighter control.  Will reevaluate after cardioversion.  Will need follow-up in primary clinic for further assessment and treatment.    Atherosclerosis of  native coronary artery of native heart without angina pectoris    Other orders  -     Verify informed consent for Elective Cardioversion; Standing  -     Vital signs; Standing  -     NPO 8 hours prior to procedure; Standing  -     Notify Anesthesiologist -; Standing  -     Verify 100% compliance with oral anti-coagulant over the past 3 weeks verbally with the patient prior to cardioversion. Notify procedularlist if missed doses.; Standing  -     Emergency equipment at bedside; Standing  -     Oxygen nasal cannula; Standing  -     Inpatient consult to Anesthesiology Reason for Consult? cardioversion; Did you contact the consulting MD? No; Consult priority: Today (urgent); Communication for MD: No phone communication necessary for now; Standing  -     Insert and maintain IV; Standing  -     lidocaine 10 mg/mL (1 %) injection 0.1-0.3 mL; Inject 0.1-0.3 mL under the skin as needed (for IV insertion).  -     sodium chloride 0.9%; Infuse 25 mL/hr into a venous catheter continuous.  -     Saline lock IV; Standing  -     sodium chloride flush 3 mL (NS); Infuse 3 mL into a venous catheter Line Care.    60minutes were spent in face-to-face counseling regarding above diagnoses and options for treatment.  ______________________________________________________________________    Subjective:    I had the opportunity to see Deloris Larson at the [facility]. Deloris Larson is a 88 y.o. female and here for EP consult regarding new diagnosis of atrial fibrillation at annual physical visit with Dr. Scott on 4/23/2018.  Deloris Larson has a known history of hypertension and PSVT, but no previous history of atrial fibrillation or heart disease.  She has fallen twice in the last year and once was inside the home and once was on ice.  She uses a cane occasionally when she goes out the outside the home.  I encouraged her to consider using a walker all the time and to buy one that would fit through the doors in  her house.  This visit will serve as history and physical for cardioversion.  See problem list for more details.  Medical, past medical, surgical and social history reviewed and updated.  Meds and allergies reviewed.   No personal  history of adverse reactions to anesthesia or abnormal bleeding with surgery and adopted.  Deloris Freeman lives in her own home and independent in ADLs.  Her son stays with her a couple of nights a week.  She is a very supportive family.  A deacon from her [employer] brought her to this clinic visit.  She is slightly hard of hearing but understands well if you talk a little louder and look directly at her when you speak.      ______________________________________________________________________    Problem List:  Patient Active Problem List   Diagnosis     Mixed hyperlipidemia     Essential hypertension     Insomnia     Migraine Headache     Coronary Artery Disease     Paroxysmal SVT (supraventricular tachycardia)     Intermittent Claudication     Malignant neoplasm of skin     PAD (peripheral artery disease)     Vitamin D deficiency     RLS (restless legs syndrome)     Persistent atrial fibrillation     Medical History:  Past Medical History:   Diagnosis Date     Coronary artery disease      Hyperlipidemia      Hypertension      Surgical History:  Past Surgical History:   Procedure Laterality Date     CORONARY ANGIOPLASTY       PA APPENDECTOMY      Description: Appendectomy;  Recorded: 11/13/2008;     PA MASTECTOMY, SIMPLE, COMPLETE      Description: Breast Surgery Simple Mastectomy;  Recorded: 08/25/2009;     PA REMOVAL OF OVARY(S)      Description: Oophorectomy;  Recorded: 08/25/2009;     PA REVISE SECONDARY VARICOSITY      Description: Varicose Vein Ligation;  Recorded: 11/13/2008;     PA TOTAL ABDOM HYSTERECTOMY      Description: Hysterectomy;  Recorded: 11/13/2008;     Social History:  Social History   Substance Use Topics     Smoking status: Former Smoker     Quit date: 1/1/1974      Smokeless tobacco: Never Used     Alcohol use No        Review of Systems: Review of Systems:   General: WNL  Eyes: WNL  Ears/Nose/Throat: WNL  Lungs: WNL  Heart: Irregular Heartbeat  Stomach: Constipation, Diarrhea  Bladder: WNL  Muscle/Joints: Joint Pain  Skin: WNL  Nervous System: Falls, Loss of Balance  Mental Health: Confusion     Blood: WNL      Family History:  Family History   Problem Relation Age of Onset     Adopted: Yes     Family history unknown: Yes         Allergies:  Allergies   Allergen Reactions     Losartan Other (See Comments)     hallucinations     Medications:  Current Outpatient Prescriptions   Medication Sig Dispense Refill     apixaban (ELIQUIS) 5 mg Tab tablet Take 1 tablet (5 mg total) by mouth 2 (two) times a day. 180 tablet prn     atenolol (TENORMIN) 25 MG tablet Take 1 tablet (25 mg total) by mouth 2 (two) times a day. 180 tablet 3     atorvastatin (LIPITOR) 40 MG tablet TAKE 1 TABLET BY MOUTH DAILY 90 tablet 1     cholecalciferol, vitamin D3, 5,000 unit Tab Take by mouth. Take 4 days a week       ibuprofen (ADVIL,MOTRIN) 200 MG tablet Take 200 mg by mouth every 6 (six) hours as needed for pain.       lactobacillus acidophilus (ACIDOPHILUS) cap Take as directed       meclizine (ANTIVERT) 12.5 mg tablet Take 12.5 mg by mouth 3 (three) times a day as needed (take 1-2 tablets three times daily as needed).       nitroglycerin (NITROSTAT) 0.3 MG SL tablet Place 1 tablet (0.3 mg total) under the tongue every 5 (five) minutes as needed for chest pain. 90 tablet 12     omeprazole (PRILOSEC) 40 MG capsule Take 1 capsule (40 mg total) by mouth daily. 90 capsule 3     ramipril (ALTACE) 10 MG capsule TAKE 1 CAPSULE BY MOUTH TWICE DAILY (EVERY 12 HOURS) 180 capsule 1     zolpidem (AMBIEN) 10 mg tablet Take 0.5-1 tablets (5-10 mg total) by mouth at bedtime as needed for sleep. 30 tablet 5     escitalopram oxalate (LEXAPRO) 5 MG tablet Take 1 tablet (5 mg total) by mouth daily. 90 tablet prn     No  "current facility-administered medications for this visit.        Objective:   Vital signs:  /78 (Patient Site: Left Arm, Patient Position: Sitting, Cuff Size: Adult Regular)  Pulse 64  Resp 16  Ht 5' 6\" (1.676 m)  Wt 139 lb (63 kg)  BMI 22.44 kg/m2      Physical Exam:    GENERAL APPEARANCE: Alert, cooperative and in no acute distress.  HEENT: No scleral icterus. No Xanthelasma. Oral mucuos membranes pink and moist.  NECK: JVP Nl.   CHEST: clear to auscultation  CARDIOVASCULAR: S1, S2 without murmur ,clicks or rubs. Irregular, irregular.  Radial and posterior tibial pulses are intact and symetric.  Abdomen soft and nontender.  EXTREMITIES: No cyanosis, clubbing or edema.    Results personally reviewed:  Results for orders placed during the hospital encounter of 05/03/18   Echo Complete [ECH10] 05/03/2018    Narrative   Normal left ventricular size and systolic function. Moderate left   ventricular hypertrophy is present.    Left ventricle ejection fraction is normal. The calculated left   ventricular ejection fraction is 56%.    Normal right ventricular size and systolic function.    Sclerodegenerative valve disease is present without hemodynamically   significant regurgitation or stenosis.    No previous study for comparison.          Results for orders placed during the hospital encounter of 06/06/14   NM Pharmacologic Stress Test    Narrative Gated SPECT demonstrates an ejection fraction greater than 70%   Without wall motion abnormality.    CONCLUSION:  1.  Regadenoson stress.  2.  No evidence of ischemia or infarction by nuclear imaging.  3.  Normal ejection fraction without regional wall motion abnormality.             Results for orders placed or performed in visit on 05/09/18   ECG Clinic - Today   Result Value Ref Range    SYSTOLIC BLOOD PRESSURE  mmHg    DIASTOLIC BLOOD PRESSURE  mmHg    VENTRICULAR RATE 66 BPM    ATRIAL RATE 227 BPM    P-R INTERVAL  ms    QRS DURATION 84 ms    Q-T INTERVAL 410 " ms    QTC CALCULATION (BEZET) 429 ms    P Axis  degrees    R AXIS -44 degrees    T AXIS -4 degrees    MUSE DIAGNOSIS       Atrial fibrillation  Left axis deviation  Nonspecific ST abnormality , probably digitalis effect  Abnormal ECG  When compared with ECG of 11-OCT-2007 08:07,  Atrial fibrillation has replaced Sinus rhythm         TSH:   Lab Results   Component Value Date    TSH 3.61 04/16/2018     BNP: No results found for: BNP  BMP:  Lab Results   Component Value Date    CREATININE 0.88 04/16/2018    BUN 12 04/16/2018     04/16/2018    K 4.4 04/16/2018     04/16/2018    CO2 26 04/16/2018       This note has been dictated using voice recognition software. Any grammatical or context distortions are unintentional and inherent to the software.    ELVIS CASTRO RN, [facility] HEART [facility]  [phone]

## 2021-06-17 NOTE — PROGRESS NOTES
Clinic Care Coordination Contact  [facility]/Voicemail       Clinical Data: Care Coordinator Outreach  Outreach attempted x 1.  Left message on patient's voicemail with call back information and requested return call.  Plan:  Care Coordinator will try to reach patient again in 3-5 business days.  Next outreach due: 5/19/21

## 2021-06-17 NOTE — PATIENT INSTRUCTIONS - HE
Deloris Larson    Thank you for coming to the [facility] today for a cardiac evaluation  It was my pleasure to see you today  A good contact for any questions would be Carrie Vegas  RN @ [phone]    Pacemaker evaluation today shows battery good for another 4.5 years  You have been atrial fibrillation for the past year; heart rate is controlled-will accept Atrial fibrillation and pursue rt control strategy  During atrial fibrillation is important to control for symptoms, control heart rate and prevent blood clots with anticoagulation-Eliquis 2.5 mg twice daily  Your blood pressure was a bit elevated earlier but later was normal at 140/  Obtain a home blood pressure machine and check your blood pressure periodically to see if you have elevated blood pressures-call results to Bell Vegas RN  [phone]  Continue current medications  Have pacemaker device check through transtelephonic monitoring every 3 months  Follow-up [facility] the [facility]-perhaps you  could see Dr. Bray   Return in 1 year or sooner as need arises

## 2021-06-17 NOTE — PROGRESS NOTES
[facility] Heart Care Note    Assessment:    Tachybradycardia syndrome  Tachycardias manifest as atrial fibrillation with elevated ventricular response  SVT  Chronic AF 2020-rate control   Bradycardia with sick sinus syndrome, sinus pauses  [employer] dual-chamber pacemaker June 27, 2018  Preserved left ventricular function; echocardiogram October 16, 2019 ejection fraction 59%  Chronic anticoagulation with Eliquis-reduced dose; age and lowweight   and renal    coronary angiography; remote PCI;  6-; no significant/severeCAD      Plan:    Pacemaker evaluation today shows battery good for another 4.5 years  You have been atrial fibrillation for the past year; heart rate is controlled-will accept Atrial fibrillation and pursue rt control strategy  During atrial fibrillation is important to control for symptoms, control heart rate and prevent blood clots with anticoagulation-Eliquis 2.5 mg twice daily  Your blood pressure was a bit elevated earlier but later was normal at 140/  Obtain a home blood pressure machine and check your blood pressure periodically to see if you have elevated blood pressures-call results to Bell Vegas RN  [phone]  Continue current medications  Have pacemaker device check through transtelephonic monitoring every 3 months  Follow-up in the [facility] clinic-perhaps you  could see Dr. Bray   Return in 1 year or sooner as need arises          Subjective:    I had the opportunity to see.Deloris Larson , who is a 89 y.o. female with a known history of bradycardia syndrome  She has developed atrial fibrillation is been atrial fibrillation for over a year  During atrial fibrillation ventricular rate is quite well controlled and she shows 25% ventricular pacing  Does take Eliquis 2.5 mg twice daily-dose reduced because of advanced age low weight and renal insufficiency GFR 44  Was very sick with Covid spent about a month in and out of the hospital on oxygen seems to have  recovered: February 2021 thee lives at home-her son lives in apartment in the basement  Gets along pretty well  Has some fatigue  No chest pain, no angina  Denies excessive breathlessness no orthopnea PND or pedal edema  No awareness of palpitations or arrhythmias no syncopal episodes  Does not measure BP at home   Toprol was increased to 50 mg daily  Complains of skin itchiness no clear rash  Also has trouble sleeping since Ambien was stopped-advised her to try some melatonin      Problem List:  Patient Active Problem List   Diagnosis     Mixed hyperlipidemia     Essential hypertension     Insomnia     Coronary Artery Disease     Intermittent claudication (H)     Vitamin D deficiency     RLS (restless legs syndrome)     Persistent atrial fibrillation     SSS (sick sinus syndrome) (H)     Cardiac pacemaker in situ, dual chamber     Nonrheumatic aortic valve stenosis     Abnormal computed tomography of cecum and terminal ileum     Syncope, unspecified syncope type     2019 novel coronavirus disease (COVID-19)     Pancreatic cyst     Bronchiectasis with acute lower respiratory infection (H)     Orthostatic hypotension     COVID-19     Chronic atrial fibrillation (H)     Medical History:  Past Medical History:   Diagnosis Date     Chronic diarrhea      Coronary artery disease      Hematoma of left iliopsoas muscle 01/19/2020    while on eliquis.     Hyperlipidemia      Hypertension      Lumbar spondylosis 2016     Migraine      PAD (peripheral artery disease) (H) 10/19/2015     Paroxysmal SVT (supraventricular tachycardia) (H)     Created by Conversion      Persistent atrial fibrillation (H) 05/09/2018    New diagnosis April 16 18th and found incidentally on physical exam during yearly exam in primary clinic [id number] score of 5-new Eliquis start     Poliomyelitis      RLS (restless legs syndrome) 02/28/2017     Sick sinus syndrome (H) 01/28/2020     Sigmoid diverticulosis 04/04/2007     Surgical History:  Past  Surgical History:   Procedure Laterality Date     APPENDECTOMY       CAPSULOTOMY Bilateral 2015    YAG capsulotomy surgery. 12/21/15, 12/28/15     CARDIOVERSION  2018    EP Cardioversion External     CATARACT EXTRACTION, BILATERAL Bilateral 2012    3/15 and 3/29 by Dr. Barrett at the [facility]     CHOLECYSTECTOMY       COLONOSCOPY W/ BIOPSIES  2007     COLONOSCOPY W/ POLYPECTOMY  2012    2 mm tubular adenoma in the rectum. Hemorrhoids. Diverticulosis.     CORONARY ANGIOPLASTY       CV CORONARY ANGIOGRAM N/A 2018    Procedure: Coronary Angiogram;  Surgeon: Joby Vargas MD;  Location: [facility] Cath Lab;  Service:      EP PACEMAKER INSERT N/A 2018    Procedure: EP Pacemaker Insertion;  Surgeon: Osito Alvarez MD;  Location: [facility] Cath Lab;  Service:      INGUINAL HERNIA REPAIR Right     Indirect- Dr. Pedersen     MASTECTOMY       OOPHORECTOMY       SKIN CANCER EXCISION      Right leg on 10/21/15. Right arm 9/29/15     TOTAL ABDOMINAL HYSTERECTOMY       TUBAL LIGATION       VARICOSE VEIN SURGERY      ligation?     Social History:  Social History     Socioeconomic History     Marital status:      Spouse name: Not on file     Number of children: 3     Years of education: Not on file     Highest education level: Not on file   Occupational History     Occupation: Retired [employer]   Social Needs     Financial resource strain: Not hard at all     Food insecurity     Worry: Never true     Inability: Never true     Transportation needs     Medical: No     Non-medical: No   Tobacco Use     Smoking status: Former Smoker     Packs/day: 1.50     Years: 25.00     Pack years: 37.50     Quit date: 1974     Years since quittin.3     Smokeless tobacco: Never Used   Substance and Sexual Activity     Alcohol use: Yes     Alcohol/week: 7.0 standard drinks     Types: 7 Standard drinks or equivalent per week     Drug use: No     Sexual activity:  Not on file   Lifestyle     Physical activity     Days per week: 0 days     Minutes per session: Not on file     Stress: Not at all   Relationships     Social connections     Talks on phone: More than three times a week     Gets together: Twice a week     Attends [employer] service: More than 4 times per year     Active member of club or organization: Not on file     Attends meetings of clubs or organizations: Not on file     Relationship status:      Intimate partner violence     Fear of current or ex partner: No     Emotionally abused: No     Physically abused: No     Forced sexual activity: No   Other Topics Concern     Not on file   Social History Narrative    She lives alone in a house. She has 3 children and 8 grandchildren and multiple great grandchildren. She is retired. 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 She does not smoke cigarettes and rarely drinks alcohol.       Review of Systems:      General: WNL  Eyes: WNL  Ears/Nose/Throat: WNL  Lungs: Shortness of Breath  Heart: Shortness of Breath with activity, Fainting  Stomach: Constipation, Diarrhea  Bladder: WNL  Muscle/Joints: WNL  Skin: Rash(Itchy Skin.)  Nervous System: Falls  Mental Health: WNL     Blood: WNL        Family History:  Family History   Adopted: Yes   Problem Relation Age of Onset     Stomach cancer Grandson 20     Pulmonary embolism Mother 32     Heart disease Father      Pulmonary embolism Maternal Grandmother 32     No Medical Problems Daughter      No Medical Problems Daughter      CABG Son 64         Allergies:  Allergies   Allergen Reactions     Sleeping [Diphenhydramine Hcl]      halluactions        Medications:  Current Outpatient Medications   Medication Sig Dispense Refill     acetaminophen (TYLENOL) 500 MG tablet Take 2 tablets (1,000 mg total) by mouth 3 (three) times a day as needed for pain.       aluminum hydrox-magnesium carb (GAVISCON)  mg/15 mL Susp Take 30 mL by  "mouth 4 (four) times a day as needed (indegestion or heartburn).       apixaban ANTICOAGULANT (ELIQUIS) 2.5 mg Tab tablet Take 1 tablet (2.5 mg total) by mouth 2 (two) times a day. 180 tablet 3     atorvastatin (LIPITOR) 20 MG tablet Take 1 tablet (20 mg total) by mouth daily. 90 tablet 3     b complex vitamins tablet Take 1 tablet by mouth daily.       cholecalciferol, vitamin D3, 5,000 unit Tab Take 1 tablet by mouth. Take 4 days a week. MON, Wed, Fri, Sat       iron,carbonyl-vitamin C 65 mg iron- 125 mg TbEC Take 1 tablet by mouth daily. 90 tablet 3     L.acidophilus-Bifido.longum (PROBIOTIC PEARLS) 15 mg (1 billion cell) CpDR Take 1 capsule by mouth 2 (two) times a day.       metoprolol succinate (TOPROL-XL) 50 MG 24 hr tablet Take 0.5 tablets (25 mg total) by mouth daily. 45 tablet 3     nitroglycerin (NITROSTAT) 0.3 MG SL tablet Place 1 tablet (0.3 mg total) under the tongue every 5 (five) minutes as needed for chest pain. 90 tablet 12     No current facility-administered medications for this visit.          Surgical site  Maker is well-healed to left subclavicular site not much tissue overlies the device explanted chronic atrial fibrillation for more than a year  25% ventricular pacing  Regular rate well controlled  Device was reprogrammed to VVIR since we will accept chronic atrial fibrillation pursue rate control and A. fib alerts were turned off  Battery good for another 4.5 years      Objective:   Vital signs:  BP (!) 182/86 (Patient Site: Left Arm, Patient Position: Sitting, Cuff Size: Adult Small)   Pulse 84   Resp 14   Ht 5' 4\" (1.626 m)   Wt 131 lb (59.4 kg) Comment: With shoes.  BMI 22.49 kg/m      Pressure 144 sitting, 130 standing  Rate 70 pretty regular  Patient quite thin   Physical Exam:      GENERAL APPEARANCE: Alert, cooperative and in no acute distress.  HEENT: No scleral icterus. No Xanthelasma. Oral mucous membranes pink and moist.  NECK: JVP normal cm. No Hepatojugular reflux. Thyroid " not  Palpable  CHEST: clear to auscultation and percussion  CARDIOVASCULAR: S1, S2 without murmur    Brachial, radial  pulses are intact and symmetric.   No carotid bruits noted.  ABDOMEN: Non tender. BS+. No bruits.  EXTREMITIES: No cyanosis, clubbing or edema.    Lab Results:  LIPIDS:  Lab Results   Component Value Date    CHOL 138 04/16/2018    CHOL 154 02/28/2017    CHOL 147 10/19/2015     Lab Results   Component Value Date    HDL 41 (L) 04/16/2018    HDL 48 (L) 02/28/2017    HDL 57 10/19/2015     Lab Results   Component Value Date    LDLCALC 76 04/16/2018    LDLCALC 85 02/28/2017    LDLCALC 74 10/19/2015     Lab Results   Component Value Date    TRIG 106 04/16/2018    TRIG 105 02/28/2017    TRIG 79 10/19/2015     No components found for: CHOLHDL    BMP:  Lab Results   Component Value Date    CREATININE 1.38 (H) 02/25/2021    BUN 20 02/25/2021     02/25/2021    K 5.3 (H) 02/25/2021     (H) 02/25/2021    CO2 22 02/25/2021         This note has been dictated using voice recognition software. Any grammatical or context distortions are unintentional and inherent to the software.  Osito Alvarez MD  [facility]  [phone]

## 2021-06-17 NOTE — PROGRESS NOTES
Care Coordination Clinician Chart Review   Situation: Patient chart reviewed by care coordinator.       Background: Care Coordination initial assessment and enrollment to Care Coordination was 10-9-2020.   Patient centered goal(s) were developed with participation from patient.  LINETTE FINLEY handed patient off to CHW for continued outreach every 30 days.        Assessment: Per chart review, patient outreach completed by CC CHW on 5-12 leaving .  Patient is actively working to accomplish goal(s).  Patient's goal(s) remain appropriate and relevant at this time.   Patient is not due for updated Complex Care Plan.  Annual assessment will be due 10-9-2021.      Goals        Patient Stated      Functional (pt-stated)      Goal Statement: Be prepared for what lies ahead with my cancer diagnosis within 6 months.   Date Goal set: 10-  Barriers: Doesn't like the unknown.    Strengths: Wants to be prepared, family support, open to hospice and other supports as my needs change.    Date to Achieve By: 4-, updated 8-1-2021  Patient expressed understanding of goal: yes  Action steps to achieve this goal:  1. I will talk with my PCP about completing POLST.  2. I worked with my PCP on getting oncology referral to discuss what to expect and how long I may live without interventions.    3. I will be open to talking with hospice to learn more about this resource and to be ready when I am able to enroll.  4. I will update care coordination team with any concerns and progress.    Discussed 1/20/21  Discussed on 2/22/21, 3-8-2021  Discussed on 4/6/21                  Plan/Recommendations: The patient will continue working with Care Coordination to achieve goal(s) as above.  CHW will involve LINETTE FINLEY as needed or if patient is ready to move to maintenance.  LINETTE FINLEY will continue to monitor progress to goals and CHW outreaches every 6 weeks.   Care Plan updated and mailed to patient: No

## 2021-06-18 ENCOUNTER — RECORDS - HEALTHEAST (OUTPATIENT)
Dept: ADMINISTRATIVE | Facility: CLINIC | Age: 86
End: 2021-06-18

## 2021-06-18 NOTE — ANESTHESIA PREPROCEDURE EVALUATION
Anesthesia Evaluation      Patient summary reviewed   No history of anesthetic complications     Airway   Mallampati: II  Neck ROM: full   Pulmonary - negative ROS and normal exam   (+) shortness of breath,                          Cardiovascular   Exercise tolerance: > or = 4 METS  (+) hypertension, CAD, dysrhythmias, , hypercholesterolemia, PVD    ECG reviewed  Rhythm: irregular  Rate: abnormal,         Neuro/Psych - negative ROS     Endo/Other       GI/Hepatic/Renal    (+) GERD well controlled,             Dental      Comment: Missing teeth.                       Anesthesia Plan  Planned anesthetic: general mask    ASA 3   Induction: intravenous   Anesthetic plan and risks discussed with: patient and child/children    Post-op plan: routine recovery

## 2021-06-18 NOTE — PROGRESS NOTES
[facility] Follow Up Note    Deloris Larson   88 y.o. female    Date of Visit: 5/15/2018    Chief Complaint   Patient presents with     Follow-up     2 weeks     Subjective  Deloris Freeman comes in today to follow-up her atrial fibrillation.  She is now anticoagulated and is scheduled to have a cardioversion later this month.  She still gets mildly short of breath and will feel some palpitations intermittently.  She denies any side effects of the medications and otherwise feels well.  Her chads vascular 2 score is 5.    ROS A comprehensive review of systems was performed and was otherwise negative.    Social History     Social History Narrative    She lives alone in a house. She has 3 children and 8 grandchildren and multiple great grandchildren. She is retired. 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 She does not smoke cigarettes and rarely drinks alcohol.       Medications were reconciled.  Allergies, social and family history, and the problem list were all reviewed and updated.    Old records reviewed: Notes from Leida Gee, echocardiogram showing normal ejection fraction    Exam  General Appearance: Pleasant and alert   Vitals:    05/15/18 0815   BP: 122/78   Patient Site: Left Arm   Patient Position: Sitting   Cuff Size: Adult Regular   Pulse: 72   SpO2: 99%   Weight: 138 lb (62.6 kg)      Body mass index is 22.27 kg/(m^2).  Wt Readings from Last 3 Encounters:   05/15/18 138 lb (62.6 kg)   05/09/18 139 lb (63 kg)   05/03/18 143 lb (64.9 kg)     HEENT: Sclera are clear.   Lungs: Normal respirations  Cardiac: Irregularly irregular  Abdomen: Soft and nondistended  Extremities: No edema  Skin: No rashes  Neuro: Moves all extremities and has facial symmetry  Gait: Ambulates with a normal gait    Assessment/Plan  1. Persistent atrial fibrillation  On scheduled for a cardioversion.    2. Essential hypertension  Blood pressure is stable.    3. PAD (peripheral  artery disease)  Should also benefit from anticoagulation, she is also on a statin.    4. Insomnia, unspecified type  Stable on zolpidem, CSA was renewed.          Odalys Scott MD  Internal and Geriatric Medicine  [facility]    Much or all of the text in this note was generated through the use of Dragon Dictate voice-to-text software. Errors in spelling or words which seem out of context are unintentional. Sound alike errors, in particular, may have escaped editing.

## 2021-06-18 NOTE — LETTER
Letter by Win Moreno DO at      Author: Win Moreno DO Service: -- Author Type: --    Filed:  Encounter Date: 1/25/2019 Status: (Other)         [facility] INTERNAL MEDICINE  01/25/19    Patient: Deloris Larson  YOB: 1930  Medical Record Number: [id number]  CSN: [id number]                                                                              Non-opioid Controlled Substance Agreement    I understand that my care provider has prescribed a controlled substance to help manage my condition(s). I am taking this medicine to help me function or work. I know this is strong medicine, and that it can cause serious side effects. Controlled substances can be sedating, addicting and may cause a dependency on the drug. They can affect my ability to drive or think, and cause depression. They need to be taken exactly as prescribed. Combining controlled substances with certain medicines or chemicals (such as cocaine, sedatives and tranquilizers, sleeping pills, meth) can be dangerous or even fatal. Also, if I stop controlled substances suddenly, I may have severe withdrawal symptoms.  If not helpful, I may be asked to stop them.    The risks, benefits, and side effects of these medicine(s) were explained to me. I agree that:    1. I will take part in other treatments as advised by my care team. This may be psychiatry or counseling, physical therapy, behavioral therapy, group treatment or a referral to a pain clinic. I will reduce or stop my medicine when my care team tells me to do so.  2. I will take my medicines as prescribed. I will not change the dose or schedule unless my care team tells me to. There will be no refills if I run out early.  I may be contactedwithout warning and asked to complete a urine drug test or pill count at any time.   3. I will keep all my appointments, and understand this is part of the monitoring of controlled substances. My care team may require an office visit for EVERY  controlled substance refill. If I miss appointments or dont follow instructions, my care team may stop my medicine.  4. I will not ask other providers to prescribe controlled substances, and I will not accept controlled substances from other people. If I need another prescribed controlled substance for a new reason, I will tell my care team within 1 business day.  5. I will use one pharmacy to fill all of my controlled substance prescriptions, and it is up to me to make sure that I do not run out of my medicines on weekends or holidays. If my care team is willing to refill my controlled substance prescription without a visit, I must request refills only during office hours, refills may take up to 3 days to process, and it may take up to 5 to 7 days for my medicine to be mailed and ready at my pharmacy. Prescriptions will not be mailed anywhere except my pharmacy.    6. I am responsible for my prescriptions. If the medicine/prescription is lost or stolen, it will not be replaced. I also agree not to share controlled substance medicines with anyone.          [facility] INTERNAL MEDICINE  01/25/19  Patient:  Deloris Larson  YOB: 1930  Medical Record Number: [id number]  CSN: [id number]    7. I agree to not use ANY illegal or recreational drugs. This includes marijuana, cocaine, bath salts or other drugs. I agree not to use alcohol unless my care team says I may. I agree to give urine samples whenever asked. If I dont give a urine sample, the care team may stop my medicine.    8. If I enroll in the [place] Medical Marijuana program, I will tell my care team. I will also sign an agreement to share my medical records with my care team.    9. I will bring in my list of medicines (or my medicine bottles) each time I come to the clinic.   10. I will tell my care team right away if I become pregnant or have a new medical problem treated outside of my regular clinic.  11. I understand that this medicine can affect my  thinking and judgment. It may be unsafe for me to drive, use machinery and do dangerous tasks. I will not do any of these things until I know how the medicine affects me. If my dose changes, I will wait to see how it affects me. I will contact my care team if I have concerns about medicine side effects.    I understand that if I do not follow any of the conditions above, my prescriptions or treatment may be stopped.      I agree that my provider, clinic care team, and pharmacy may work with any city, state or federal law enforcement agency that investigates the misuse, sale, or other diversion of my controlled medicine. I will allow my provider to discuss my care with or share a copy of this agreement with any other treating provider, pharmacy or emergency room where I receive care. I agree to give up (waive) any right of privacy or confidentiality with respect to these consents.   I have read this agreement and have asked questions about anything I did not understand.    ___________________________________________________________________________  Patient signature - Date/Time  -Deloris Larson                                      ___________________________________________________________________________  Witness signature                                                                    ___________________________________________________________________________  Provider signature- Win Moreno, DO

## 2021-06-18 NOTE — PATIENT INSTRUCTIONS - HE
"Patient Instructions by Kristofer Martinez PT at 3/12/2020 11:00 AM     Author: Kristofer Martinez PT Service: -- Author Type: Physical Therapist    Filed: 3/12/2020 11:45 AM Encounter Date: 3/12/2020 Status: Signed    : Kristofer Martinez PT (Physical Therapist)        PELVIC TILT    While lying on your back, use your stomach muscles to press your spine downwards towards the ground. Do not move into a painful position.    Hold 5 seconds.  10x.    2x/day.      BRIDGING    While lying on your back, tighten your lower abdominals, squeeze your buttocks and then raise your buttocks off the floor/bed as creating a \"Bridge\" with your body.    Hold 3 seconds.  5-10x as tolerated.    2x/day.  *Make sure to squeeze your butt muscles together while doing this exercise.      SINGLE KNEE TO CHEST STRETCH - SKTC    While Lying on your back,  hold your knee and gently pull it up towards your chest.    Hold 30 seconds, 2x each leg.  2x/day.      LOWER TRUNK ROTATIONS - LTR    Lying on your back with your knees bent, gently move your knees side-to-side.    Hold 2 seconds.  10x each side.    2x/day.            "

## 2021-06-18 NOTE — ANESTHESIA POSTPROCEDURE EVALUATION
Patient: Deloris Larson  * No procedures listed *  Anesthesia type: general    [place] [facility]  Last vitals:   Vitals:    05/24/18 1322   BP:    Pulse: 61   Resp: 13   Temp:    SpO2:      Post vital signs: stable  Level of consciousness: awake and responds to simple questions  Post-anesthesia pain: pain controlled  Post-anesthesia nausea and vomiting: no  Pulmonary: unassisted, return to baseline  Cardiovascular: stable and blood pressure at baseline  Hydration: adequate  Anesthetic events: no    QCDR Measures:  ASA# 11 - Leslie-op Cardiac Arrest: ASA11B - Patient did NOT experience unanticipated cardiac arrest  ASA# 12 - Leslie-op Mortality Rate: ASA12B - Patient did NOT die  ASA# 13 - PACU Re-Intubation Rate: NA - No ETT / LMA used for case  ASA# 10 - Composite Anes Safety: ASA10A - No serious adverse event    Additional Notes:

## 2021-06-18 NOTE — PROGRESS NOTES
3/24/1930  Home:[phone] (home) Cell:There is no such number on file (mobile).  Emergency Contact: Ginna Ya     +++Important patient information for [facility]/Cath Lab staff : PT IS ON ELIQUIS+++    [facility] EP Cath Lab Procedure Order     Cardioversion:    Cardioversion     PT IS ON ELIQUIS CLEARED BY OMAR TREJO CNP TO GO FOR CV AFTER 5/22    Diagnosis:  AF  Anticipated Case Duration:  Standard  Scheduling Needs/Timeframe:  PT IS SET FOR THURS. 5/24/2018 AT 12 00 WITH  OMAR GEE CNP         Current Device: None None  Device Company/Device Rep Needed for Procedure: None    Anesthesia:  General-CV Only  Research Protocol:  No    [facility] EP Cath Lab Prep   Ordering Provider: Omar Gee NP  Ordering Date: 5/9/2018  Orders Status: Intial order placed and Order set placed  EP NC Contact: Solange Cast LPN    H&P:  Compled by OMAR GEE CNP on 5/9/2018 if scheduled within 30 days, pt to schedule with PMD if procedure outside of this timeframe  PCP: Odalys Scott MD, [phone]    Pre-op Labs: N/A for procedure    Medical Records Pertinent for Procedure:  N/A    Patient Education:    PT HAS A  FOR PROCEDURE  PT INSTRUCTED TO HOLD ANY VITAMINS, MINERALS, CALCIUM, IRON OR SUPPLEMENTS THE  MORNING  OF CV  PT INSTRUCTED TO STOP HER ATENOLOL THE NIGHT BEFORE HER CV  PT INSTRUCTED TO BATHE OR SHOWER BEFORE COMING IN  PT INSTRUCTED TO LEAVE JEWELRY AT HOME  PT IS ON ELIQUIS AND NO MISSED DOSES  PT WILL FOLLOW UP WITH OMAR IN 4 WEEKS AND  HAS BEEN NOTIFIED    Teach with Patient: Completed via phone on 5/16/2018    Risks Reviewed:     Cardioversion    >90% acute success rate, <10% failure to convert or   reverts shortly after cardioversion.    <1% embolic event of (CVA, pulmonary embolism, or   other site).    75% risk for superficial burn.  Risks associated with general anesthesia will be addressed by the Anesthesiology Department    Consent: Will be obtained in [facility] day of  procedure    Pre-Procedure Instructions that were Reviewed with Patient:  NPO after midnight, Remove all jewelry prior to coming in for procedure, Shower prior to arrival, Transportation arrangements needed s/p procedure, Post-procedure follow up process and Sedation plan/orders    Pre-Procedure Medication Instructions:  Instructions given to pt regarding anticoagulants: Eliquis- instructed to continue anticoagulation uninterrupted through their procedure  Instructions given to pt regarding antiarrhythmic medication: Beta Blocker; Pt instructed to hold NIGHT BEFORE PROCEDUREdays prior to procedure  Instructions for medication, other than anticoagulants/antiarrhythmics listed above, given to pt: to take all morning medications with small sips of water, with the exception of OTC supplements and MVI    Allergies   Allergen Reactions     Losartan Other (See Comments)     hallucinations       Current Outpatient Prescriptions:      apixaban (ELIQUIS) 5 mg Tab tablet, Take 1 tablet (5 mg total) by mouth 2 (two) times a day., Disp: 180 tablet, Rfl: prn     atenolol (TENORMIN) 25 MG tablet, Take 1 tablet (25 mg total) by mouth 2 (two) times a day., Disp: 180 tablet, Rfl: 3     atorvastatin (LIPITOR) 40 MG tablet, TAKE 1 TABLET BY MOUTH DAILY, Disp: 90 tablet, Rfl: 1     cholecalciferol, vitamin D3, 5,000 unit Tab, Take by mouth. Take 4 days a week, Disp: , Rfl:      escitalopram oxalate (LEXAPRO) 5 MG tablet, Take 1 tablet (5 mg total) by mouth daily., Disp: 90 tablet, Rfl: prn     ibuprofen (ADVIL,MOTRIN) 200 MG tablet, Take 200 mg by mouth every 6 (six) hours as needed for pain., Disp: , Rfl:      lactobacillus acidophilus (ACIDOPHILUS) cap, Take as directed, Disp: , Rfl:      meclizine (ANTIVERT) 12.5 mg tablet, Take 12.5 mg by mouth 3 (three) times a day as needed (take 1-2 tablets three times daily as needed)., Disp: , Rfl:      nitroglycerin (NITROSTAT) 0.3 MG SL tablet, Place 1 tablet (0.3 mg total) under the tongue  every 5 (five) minutes as needed for chest pain., Disp: 90 tablet, Rfl: 12     omeprazole (PRILOSEC) 40 MG capsule, Take 40 mg by mouth daily before breakfast., Disp: , Rfl:      ramipril (ALTACE) 10 MG capsule, TAKE 1 CAPSULE BY MOUTH TWICE DAILY (EVERY 12 HOURS), Disp: 180 capsule, Rfl: 1     zolpidem (AMBIEN) 10 mg tablet, Take 0.5-1 tablets (5-10 mg total) by mouth at bedtime as needed for sleep., Disp: 30 tablet, Rfl: 5    Documentation Date:5/16/2018 9:02 AM  Lindy Cast LPN

## 2021-06-18 NOTE — ANESTHESIA CARE TRANSFER NOTE
Last vitals:   Vitals:    05/24/18 1311   BP: (!) 174/91   Pulse: 67   Resp: 16   Temp: 36.6  C (97.8  F)   SpO2: 100%     Patient's level of consciousness is drowsy  Spontaneous respirations: yes  Maintains airway independently: yes  Dentition unchanged: yes  Oropharynx: oropharynx clear of all foreign objects    QCDR Measures:  ASA# 20 - Surgical Safety Checklist: WHO surgical safety checklist completed prior to induction  PQRS# 430 - Adult PONV Prevention: 4558F - Pt received => 2 anti-emetic agents (different classes) preop & intraop  ASA# 8 - Peds PONV Prevention: NA - Not pediatric patient, not GA or 2 or more risk factors NOT present  PQRS# 424 - Leslie-op Temp Management: 4559F - At least one body temp DOCUMENTED => 35.5C or 95.9F within required timeframe  PQRS# 426 - PACU Transfer Protocol: - Transfer of care checklist used  ASA# 14 - Acute Post-op Pain: ASA14B - Patient did NOT experience pain >= 7 out of 10

## 2021-06-19 NOTE — LETTER
Letter by Rosalba Petit EPS at      Author: Rosalba Petit EPS Service: -- Author Type: --    Filed:  Encounter Date: 7/8/2019 Status: (Other)         Deloris Larson  [address]  [place] [address]      July 8, 2019      Dear Ms. Larson,    RE: Remote Results    We are writing to you regarding your recent Remote Pacemaker check from home. Your transmission was received successfully. Battery status is satisfactory at this time.     Your results are within normal limits.    Your next device appointment will be a clinic visit on September 27th, 2019 at 9:20am  at our  [facility] location, [address] [place] [address].    To schedule or reschedule, please call [phone] and press 1.    NOTE: If you would like to do an extra transmission, please call [phone] and press 3 to speak to a nurse BEFORE transmitting. This ensures that the Device Clinic staff is aware of the reason you are sending a transmission, and can follow-up with you after it has been reviewed.    We will be checking your implanted device from home (remotely) every three months unless otherwise instructed. We will need to see you in the clinic at least once a year. You may need to be seen in the clinic sooner depending on the results of your check.    Please be aware:    The follow-up schedule is like a Physician prescription.    Your remote monitor is paired to your specific implanted device.      Sincerely,    [facility]

## 2021-06-19 NOTE — PROGRESS NOTES
DEVICE CLINIC RN POST-OP NOTE    Reason for visit: Post-op pacemaker check and wound assessment     Device: [employer] L331 Accolade MRI  Procedure date: 6/27/2018  Implant Diagnosis: SSS, PAF, SVT  Implanting Physician: Dr. Osito Alvarez    Assessment  Subjective: No concerns at this time, she is feeling well  Vitals:   Vitals:    07/12/18 1340   BP: 110/58   Pulse: 66   Resp: 20   Temp: 98.5  F (36.9  C)     Heart Sounds: normal  Lung Sounds: clear to auscultation bilaterally  Incision/device pocket: The steri-strips were removed from the incision and it was cleansed with adhesive remover and alcohol wipes. The incision is clean, dry and intact. There are no signs of infection present.      Device Findings  Interrogation of device reveals normal sensing and capture thresholds  See the Paceart Report for a full summary of the device information.        Patient Education  Deloris Larson was accompanied today by her daughter, Bonnie.     [facility]'s Partnership Agreement for Device Patients and Post-operative Checklist were presented and reviewed with patient at today's appointment.    Signs and symptoms of infection, poor wound healing, and device function were reviewed. Range of motion and weight restrictions for the left side are four weeks. She was issued a Latitude remote monitor and instructed on its set-up and use for remote monitoring by the [employer] representative prior to hospital discharge. These instructions were reviewed with the patient at today s visit.       Plan  Remote on 8/2/18  Return on 9/28/18    Yessenia Chao RN BSN  [facility]

## 2021-06-19 NOTE — LETTER
Letter by Win Moreno DO at      Author: Win Moreno DO Service: -- Author Type: --    Filed:  Encounter Date: 10/3/2019 Status: Signed         Deloris Larson  [address] [place] [address]             October 3, 2019         Dear Ms. Larson,    Below are the results from your recent visit:    Resulted Orders   HM2(CBC w/o Differential)   Result Value Ref Range    WBC 8.3 4.0 - 11.0 thou/uL    RBC 4.10 3.80 - 5.40 mill/uL    Hemoglobin 13.2 12.0 - 16.0 g/dL    Hematocrit 39.2 35.0 - 47.0 %    MCV 96 80 - 100 fL    MCH 32.3 27.0 - 34.0 pg    MCHC 33.8 32.0 - 36.0 g/dL    RDW 10.5 (L) 11.0 - 14.5 %    Platelets 194 140 - 440 thou/uL    MPV 7.7 7.0 - 10.0 fL   Basic Metabolic Panel   Result Value Ref Range    Sodium 141 136 - 145 mmol/L    Potassium 4.0 3.5 - 5.0 mmol/L    Chloride 105 98 - 107 mmol/L    CO2 29 22 - 31 mmol/L    Anion Gap, Calculation 7 5 - 18 mmol/L    Glucose 96 70 - 125 mg/dL    Calcium 9.4 8.5 - 10.5 mg/dL    BUN 8 8 - 28 mg/dL    Creatinine 0.84 0.60 - 1.10 mg/dL    GFR MDRD Af Amer >60 >60 mL/min/1.73m2    GFR MDRD Non Af Amer >60 >60 mL/min/1.73m2    Narrative    Fasting Glucose reference range is 70-99 mg/dL per  American Diabetes Association (ADA) guidelines.   Thyroid Stimulating Hormone (TSH)   Result Value Ref Range    TSH 1.84 0.30 - 5.00 uIU/mL       Normal lab tests-- no clear cause for fatigue could be identified.     Please call with questions or contact us using [employer].    Sincerely,        Electronically signed by Win Moreno DO

## 2021-06-19 NOTE — LETTER
Letter by Rosalba Petit EPS at      Author: Rosalba Petit EPS Service: -- Author Type: --    Filed:  Encounter Date: 7/8/2019 Status: (Other)         Deloris Larson  [address] [place] [address]      July 8, 2019      Dear Ms. Larson,    RE: Remote Results    We are writing to you regarding your recent Remote Pacemaker check from home. Your transmission was received successfully. Battery status is satisfactory at this time.     Your results are within normal limits.    Your next device appointment will be a clinic visit.  Please call in July to schedule.      To schedule or reschedule, please call [phone] and press 1.    NOTE: If you would like to do an extra transmission, please call [phone] and press 3 to speak to a nurse BEFORE transmitting. This ensures that the Device Clinic staff is aware of the reason you are sending a transmission, and can follow-up with you after it has been reviewed.    We will be checking your implanted device from home (remotely) every three months unless otherwise instructed. We will need to see you in the clinic at least once a year. You may need to be seen in the clinic sooner depending on the results of your check.    Please be aware:    The follow-up schedule is like a Physician prescription.    Your remote monitor is paired to your specific implanted device.      Sincerely,    [facility]

## 2021-06-19 NOTE — LETTER
Letter by Win Moreno DO at      Author: Win Moreno DO Service: -- Author Type: --    Filed:  Encounter Date: 10/16/2019 Status: Signed         Deloris Larson  [address] [place] [address]             October 16, 2019         Dear Ms. Larson,    Below are the results from your recent visit:    The below report discusses in detail the ultrasound of the heart.  Your heart is strong, and there are no major problems with the heart valves.  You have some calcium buildup on one valve which causes a murmur when I listen to your heart, but it is not causing problems, and probably will not be an issue in the future.      Resulted Orders   Echo Complete   Result Value Ref Range      Normal left ventricular size.The calculated left ventricular ejection   fraction is 59%. This represents a normal ejection fraction. Moderate   concentric hypertrophy noted.    Normal right ventricular size and systolic function. TAPSE is normal,   which is consistent with normal right ventricular systolic function.    Left atrial volume is moderately increased.    Mild aortic stenosis. Mild aortic regurgitation.    Moderate mitral annular calcification. No mitral stenosis present. Trace   mitral regurgitation.    Mild tricuspid valve regurgitation. No pulmonary hypertension present.   The estimated systolic pulmonary artery pressure is 28 mmhg.    When compared to the previous study dated 5/3/2018, mild aortic stenosis   is now present.          Please call with questions or contact us using [employer].    Sincerely,        Electronically signed by Win Moreno DO

## 2021-06-19 NOTE — LETTER
Letter by Gi Carey at      Author: Gi Carey Service: -- Author Type: --    Filed:  Encounter Date: 4/2/2019 Status: (Other)         Deloris Larson  [address]  [place] [address]      April 2, 2019      Dear Ms. Larson,    RE: Remote Results    We are writing to you regarding your recent Remote Pacemaker check from home. Your transmission was received successfully. Battery status is satisfactory at this time.     Your results are within normal limits.    Your next device appointment will be a remote check on July 3, 2019; this will occur automatically.    To schedule or reschedule, please call [phone] and press 1.    NOTE: If you would like to do an extra transmission, please call [phone] and press 3 to speak to a nurse BEFORE transmitting. This ensures that the Device Clinic staff is aware of the reason you are sending a transmission, and can follow-up with you after it has been reviewed.    We will be checking your implanted device from home (remotely) every three months unless otherwise instructed. We will need to see you in the clinic at least once a year. You may need to be seen in the clinic sooner depending on the results of your check.    Please be aware:    The follow-up schedule is like a Physician prescription.    Your remote monitor is paired to your specific implanted device.      Sincerely,    [facility]

## 2021-06-20 NOTE — LETTER
Letter by Lanette Romero RDCS at      Author: Lanette Romero RDCS Service: -- Author Type: --    Filed:  Encounter Date: 4/30/2020 Status: (Other)         Deloris Larson  [address] [place] [address]      April 30, 2020      Dear Ms. Larson,    RE: Remote Results    We are writing to you regarding your recent Remote Pacemaker check from home. Your transmission was received successfully. Battery status is satisfactory at this time.     Your results are being reviewed.  You will be contacted if further information is necessary.     Your next device appointment will be a remote check on August 4, 2020; this will occur automatically.    To schedule or reschedule, please call [phone] and press 1.    NOTE: If you would like to do an extra transmission, please call [phone] and press 3 to speak to a nurse BEFORE transmitting. This ensures that the Device Clinic staff is aware of the reason you are sending a transmission, and can follow-up with you after it has been reviewed.    We will be checking your implanted device from home (remotely) every three months unless otherwise instructed. We will need to see you in the clinic at least once a year. You may need to be seen in the clinic sooner depending on the results of your check.    Please be aware:    The follow-up schedule is like a Physician prescription.    Your remote monitor is paired to your specific implanted device.      Sincerely,    [facility]

## 2021-06-20 NOTE — LETTER
"Letter by Shefali De Anda MD at      Author: Shefali De Anda MD Service: -- Author Type: --    Filed:  Encounter Date: 1/24/2020 Status: (Other)         Patient: Deloris Larson   MR Number: [id number]   YOB: 1930   Date of Visit: 1/24/2020     [facility]      Facility:    [facility] [[id number]]    Code Status: DNR   [facility] 1/19 through 1/21/2020      Chief Complaint/Reason for Visit:  Chief Complaint   Patient presents with   ? H & P     Retroperitoneal hematoma: Left       HPI:   Deloris Freeman is a 89 y.o. female with known hypertension, persistent atrial fibrillation, coronary artery disease, restless leg syndrome, peripheral vascular disease, sick sinus syndrome (s/p pacemaker), aortic stenosis, migraine, insomnia.    On the day of admission, she got up out of bed to go to the bathroom.  She had sudden onset of left hip pain, incredibly severe between 9-10/10.  She felt like \"2 bones were rubbing against each other.  She could not move or weight-bear, as any movement caused increased pain.  She called EMS and was brought to [facility].    CT of the left hip: Negative for fracture, but probable left psoas hematoma, owing stranding in the retroperitoneum adjacent to the iliac vasculature, psoas muscle, and iliac us muscle..  She was on Eliquis for atrial fibrillation.    Eliquis was held for 3 days.  She was placed on anti-inflammatories, steroids, lidocaine.  Her pain improved somewhat.  She was seen by orthopedics, who recommended conservative approach, nonoperative management.  She was recommended to get up ad libitum, weight-bear as tolerated with a walker, gentle hip range of motion.    For her atrial fibrillation, the Eliquis was held a total of 3 days, then resumed.    She had a hypotension due to the acute blood loss.  Day before discharge, she required IV fluids and her blood pressure meds were held.  Her blood " pressure rebounded.    She had acute blood loss anemia: Baseline hemoglobin = 13, decreased to 9 = 9.1 with a hematoma.    After an observation stay, she [facility].    Past Medical History:  Past Medical History:   Diagnosis Date   ? Coronary artery disease    ? Hyperlipidemia    ? Hypertension    ? PAD (peripheral artery disease) (H) 10/19/2015   ? Paroxysmal SVT (supraventricular tachycardia) (H)     Created by Conversion    ? Persistent atrial fibrillation 5/9/2018    New diagnosis April 16 18th and found incidentally on physical exam during yearly exam in primary clinic [id number] score of 5-new Eliquis start   ? RLS (restless legs syndrome) 2/28/2017           Surgical History:  Past Surgical History:   Procedure Laterality Date   ? CARDIOVERSION  05/24/2018   ? CHOLECYSTECTOMY     ? CORONARY ANGIOPLASTY     ? CV CORONARY ANGIOGRAM N/A 6/26/2018    Procedure: Coronary Angiogram;  Surgeon: Joby Vargas MD;  Location: [facility] Cath Lab;  Service:    ? EP PACEMAKER INSERT N/A 6/27/2018    Procedure: EP Pacemaker Insertion;  Surgeon: Osito Alvarez MD;  Location: [facility] Cath Lab;  Service:    ? INGUINAL HERNIA REPAIR Right     Indirect- Dr. Pedersen   ? WY APPENDECTOMY      Description: Appendectomy;  Recorded: 11/13/2008;   ? WY MASTECTOMY, SIMPLE, COMPLETE      Description: Breast Surgery Simple Mastectomy;  Recorded: 08/25/2009;   ? WY REMOVAL OF OVARY(S)      Description: Oophorectomy;  Recorded: 08/25/2009;   ? WY REVISE SECONDARY VARICOSITY      Description: Varicose Vein Ligation;  Recorded: 11/13/2008;   ? WY TOTAL ABDOM HYSTERECTOMY      Description: Hysterectomy;  Recorded: 11/13/2008;   ? TUBAL LIGATION         Family History:   Family History   Adopted: Yes   Family history unknown: Yes       Social History:    Social History     Socioeconomic History   ? Marital status: Single     Spouse name: Not on file   ? Number of children: 3  "  ? Years of education: Not on file   ? Highest education level: Not on file   Occupational History   ? Not on file   Social Needs   ? Financial resource strain: Not on file   ? Food insecurity:     Worry: Not on file     Inability: Not on file   ? Transportation needs:     Medical: Not on file     Non-medical: Not on file   Tobacco Use   ? Smoking status: Former Smoker     Packs/day: 1.50     Years: 25.00     Pack years: 37.50     Last attempt to quit: 1974     Years since quittin.0   ? Smokeless tobacco: Never Used   Substance and Sexual Activity   ? Alcohol use: No   ? Drug use: No   ? Sexual activity: Not on file   Lifestyle   ? Physical activity:     Days per week: Not on file     Minutes per session: Not on file   ? Stress: Not on file   Relationships   ? Social connections:     Talks on phone: Not on file     Gets together: Not on file     Attends Jehovah's witness service: Not on file     Active member of club or organization: Not on file     Attends meetings of clubs or organizations: Not on file     Relationship status: Not on file   ? Intimate partner violence:     Fear of current or ex partner: Not on file     Emotionally abused: Not on file     Physically abused: Not on file     Forced sexual activity: Not on file   Other Topics Concern   ? Not on file   Social History Narrative    She lives alone in a house. She has 3 children and 8 grandchildren and multiple great grandchildren. She is retired. 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 She does not smoke cigarettes and rarely drinks alcohol.          Review of Systems   She still has a lot of \"hip pain\": She points to her trochanteric bursa  She hurts more than after physical therapy than prior.  After therapy she feels like it is more painful to lift her left leg.  At home she did not need any assistive device, but would use a walker or cane if she are going for longer distances.  She lives on one floor for " "her house, her son and his partner live on the lower floor.  There are steps to get in.  Her son does the cleaning and laundry, she does her own cooking and light cleaning.  Her daughter takes her grocery shopping.  She would like to use \"pearls\" an over-the-counter probiotic for her irritable bowel syndrome.  The remainder of the comprehensive review of systems is negative      Vitals:    01/23/20 1200   BP: 104/61   Pulse: 74   Resp: 18   Temp: (!) 96.2  F (35.7  C)   SpO2: 92%   Weight: 142 lb 9.6 oz (64.7 kg)       Physical Exam  Constitutional:       General: She is not in acute distress.     Appearance: Normal appearance. She is not ill-appearing.   HENT:      Right Ear: External ear normal.      Left Ear: External ear normal.      Mouth/Throat:      Mouth: Mucous membranes are moist.   Eyes:      Extraocular Movements: Extraocular movements intact.      Conjunctiva/sclera: Conjunctivae normal.   Neck:      Musculoskeletal: No neck rigidity.   Cardiovascular:      Rate and Rhythm: Normal rate and regular rhythm.      Pulses: Normal pulses.      Comments: 2 murmurs:  right upper sternal border 2/6, apex also 2/6  Pulmonary:      Breath sounds: Normal breath sounds. No rales.   Abdominal:      General: Bowel sounds are normal.      Palpations: Abdomen is soft.      Tenderness: There is no guarding.   Musculoskeletal:      Comments: 1+ bilateral lower tibial dorsal foot edema  Thoracic scoliosis  Tender to palpation over the left trochanteric bursa  Mild sensitivity in the musculature just above the iliac crest left   Skin:     Comments: Very dry skin   Neurological:      General: No focal deficit present.      Mental Status: She is alert.      Gait: Gait normal.   Psychiatric:         Mood and Affect: Mood normal.         Judgment: Judgment normal.             Allergies   Allergen Reactions   ? Sleeping [Diphenhydramine Hcl]      halluactions        Medication List:  Current Outpatient Medications   Medication " Sig   ? acetaminophen (TYLENOL) 500 MG tablet Take 2 tablets (1,000 mg total) by mouth 3 (three) times a day.   ? apixaban (ELIQUIS) 2.5 mg Tab tablet Take 1 tablet (2.5 mg total) by mouth 2 (two) times a day.   ? atorvastatin (LIPITOR) 20 MG tablet Take 1 tablet (20 mg total) by mouth daily.   ? b complex vitamins tablet Take 1 tablet by mouth daily.   ? cholecalciferol, vitamin D3, 5,000 unit Tab Take 1 tablet by mouth. Take 4 days a week. MON, Wed, Fri, Sat   ? L.acidophilus-Bifido.longum (PROBIOTIC PEARLS) 15 mg (1 billion cell) CpDR Take 2 capsules by mouth as needed. (Patient taking differently: Take 1 capsule by mouth as needed. )   ? lidocaine 4 % patch Place 1 patch on the skin daily for 10 days. Remove and discard patch with 12 hours or as directed by MD.   ? meloxicam (MOBIC) 7.5 MG tablet Take 1 tablet (7.5 mg total) by mouth daily for 7 days.   ? metoprolol succinate (TOPROL-XL) 50 MG 24 hr tablet Take 1 tablet (50 mg total) by mouth daily.   ? nitroglycerin (NITROSTAT) 0.3 MG SL tablet Place 1 tablet (0.3 mg total) under the tongue every 5 (five) minutes as needed for chest pain.   ? omeprazole (PRILOSEC) 20 MG capsule Take 1 capsule (20 mg total) by mouth daily before breakfast for 10 days.   ? oxyCODONE (ROXICODONE) 5 MG immediate release tablet Take 1-2 tablets (5-10 mg total) by mouth every 4 (four) hours as needed.   ? petrolatum, white-water (VANICREAM LITE) Lotn Apply 1 application topically. Apply topically daily for dry itching skin. Pt reports use 4-5 times/week.   ? ramipril (ALTACE) 10 MG capsule TAKE 1 CAPSULE BY MOUTH TWICE DAILY (EVERY 12 HOURS)   ? simethicone (MYLICON) 125 MG chewable tablet Chew 1 tablet (125 mg total) every 6 (six) hours as needed for flatulence.   ? tetrahydrozoline (VISINE) 0.05 % ophthalmic solution Administer 1 drop to both eyes. Instill 1 drops into both eyes daily in the morning for dry eyes. Pt reports use 3-4 times/week.   ? triamcinolone (KENALOG) 0.1 %  cream Apply topically 2 (two) times a day as needed.   ? zolpidem (AMBIEN) 10 mg tablet Take 1 tablet (10 mg total) by mouth at bedtime as needed for sleep.       Labs:   Ref Range & Units Units and Ref Range 1/24/20 1/21/20 1/20/20 1/19/2020    WBC 4.0 - 11.0 thou/uL 9.2  12.5High   10.9  11.3High      Hemoglobin 12.0 - 16.0 g/dL 9.1Low   9.2Low   9.1Low   11.1Low      Hematocrit 35.0 - 47.0 % 28.6Low   28.0Low   28.5Low   33.6Low      MCV 80 - 100 fL 96  94  95  92     Platelets 140 - 440 thou/uL 183  134Low   136Low   181       Ref Range & Units 1/24/20 1/21/20    Sodium 136 - 145 mmol/L 142  137    Potassium 3.5 - 5.0 mmol/L 3.4Low   4.6    Chloride 98 - 107 mmol/L 109High   109High     CO2 22 - 31 mmol/L 23  20Low     Glucose 70 - 125 mg/dL 76  152High     Calcium 8.5 - 10.5 mg/dL 8.8  8.7    BUN 8 - 28 mg/dL 21  26    Creatinine 0.60 - 1.10 mg/dL 0.86  0.94    GFR MDRD Non Af Amer >60 mL/min/1.73m2 >60  56Low           Assessment / Plan:    ICD-10-CM    1. Retroperitoneal hematoma K66.1  lidocaine patch, oxycodone.  Mobilize with therapies   2. Persistent atrial fibrillation I48.19  on Eliquis   3. PAD (peripheral artery disease) (H) I73.9    4. Nonrheumatic aortic valve stenosis I35.0  on Eliquis for her A. fib   5. Atherosclerosis of native coronary artery of native heart without angina pectoris I25.10  atorvastatin, metoprolol   6. Essential hypertension I10  Ramipril, Toprol   7. Acute blood loss anemia D62  PPI prophylaxis           Electronically signed by: Shefali De Anda MD

## 2021-06-20 NOTE — LETTER
Letter by Yessenia Cazares CNP at      Author: Yessenia Cazares CNP Service: -- Author Type: --    Filed:  Encounter Date: 1/22/2020 Status: (Other)         Patient: Deloris Larson   MR Number: [id number]   YOB: 1930   Date of Visit: 1/22/2020     [facility]    Facility:   [facility] [[id number]]   Code Status: POLST AVAILABLE      CHIEF COMPLAINT/REASON FOR VISIT:  Chief Complaint   Patient presents with   ? Problem Visit     retroperitoneal bleed       HISTORY:      HPI: Deloris Freeman is a 89 y.o. female who was admitted to [facility] from 1/19/20-1/21/20 for retroperitoneal hematoma.  She presented to the ED with left flank pain and was found to has a spontaneous retroperitoneal bleed taking Eliquis for chronic anticoagulation for management of atrial fibrillation.  She had an acute drop in her hemoglobin to 9.1 during her hospitalization with baseline hemoglobin 13.  Her Eliquis was held for three days and her pain was treated effectively with lidocaine patch, Tylenol, and meloxicam 7-day course.  Deloris Freeman  has a past medical history of Coronary artery disease, Hyperlipidemia, Hypertension, PAD (peripheral artery disease) (H) (10/19/2015), Paroxysmal SVT (supraventricular tachycardia) (H), Persistent atrial fibrillation (5/9/2018), and RLS (restless legs syndrome) (2/28/2017).  She is currently at [facility]CU s/p hospitalization for acute rehabilitation.      Today Ms. Larson is being evaluated for retroperitoneal bleed.  She denied any concerns at this visit.  She said that her left flank pain has resolved with lidocaine patch and meloxicam for pain management.  Her Eliquis was resumed yesterday without any concerns at this time.  She was agreeable to continued monitoring of hemoglobin to monitor anemia related to acute blood loss.  The patient denied changes in mood or appetite, sleep disturbances, lightheadedness, dizziness,  breathing difficulty, chest pain, palpitations, constipation, urinary symptoms, numbness or tingling in extremities, and pain.  Her two daughters were present at this visit and denied any concerns at this time.        Past Medical History:   Diagnosis Date   ? Coronary artery disease    ? Hyperlipidemia    ? Hypertension    ? PAD (peripheral artery disease) (H) 10/19/2015   ? Paroxysmal SVT (supraventricular tachycardia) (H)     Created by Conversion    ? Persistent atrial fibrillation 2018    New diagnosis  and found incidentally on physical exam during yearly exam in primary clinic [id number] score of 5-new Eliquis start   ? RLS (restless legs syndrome) 2017             Family History   Adopted: Yes   Family history unknown: Yes     Social History     Socioeconomic History   ? Marital status: Single     Spouse name: None   ? Number of children: None   ? Years of education: None   ? Highest education level: None   Occupational History   ? None   Social Needs   ? Financial resource strain: None   ? Food insecurity:     Worry: None     Inability: None   ? Transportation needs:     Medical: None     Non-medical: None   Tobacco Use   ? Smoking status: Former Smoker     Packs/day: 1.50     Years: 25.00     Pack years: 37.50     Last attempt to quit: 1974     Years since quittin.1   ? Smokeless tobacco: Never Used   Substance and Sexual Activity   ? Alcohol use: No   ? Drug use: No   ? Sexual activity: None   Lifestyle   ? Physical activity:     Days per week: None     Minutes per session: None   ? Stress: None   Relationships   ? Social connections:     Talks on phone: None     Gets together: None     Attends Pentecostalism service: None     Active member of club or organization: None     Attends meetings of clubs or organizations: None     Relationship status: None   ? Intimate partner violence:     Fear of current or ex partner: None     Emotionally abused: None     Physically abused: None      Forced sexual activity: None   Other Topics Concern   ? None   Social History Narrative    She lives alone in a house. She has 3 children and 8 grandchildren and multiple great grandchildren. She is retired. 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 She does not smoke cigarettes and rarely drinks alcohol.       REVIEW OF SYSTEM:  Pertinent items are noted in HPI.  A 12 point comprehensive review of systems was negative except as noted.    PHYSICAL EXAM:     General Appearance:    Alert, cooperative, no distress, appears stated age   Head:    Normocephalic, without obvious abnormality, atraumatic   Eyes:    PERRL, conjunctiva/corneas clear, both eyes   Ears:    Normal external ear canals, both ears   Nose:   Nares normal, septum midline, mucosa normal, no drainage    or sinus tenderness   Throat:   Lips, mucosa, and tongue normal; teeth and gums normal   Neck:   Supple, symmetrical, trachea midline, no adenopathy;     thyroid:  no enlargement/tenderness/nodules; no carotid    bruit or JVD   Back:     Symmetric, no curvature, ROM normal, no CVA tenderness   Lungs:     Clear to auscultation bilaterally, respirations unlabored   Chest Wall:    No tenderness or deformity    Heart:    Regular rate and rhythm, S1 and S2 normal, no murmur, rub   or gallop   Abdomen:     Soft, non-tender, bowel sounds active all four quadrants,     no masses, no organomegaly   Extremities:   Extremities normal, atraumatic, no cyanosis or trace BLE nonpitting edema   Pulses:   2+ and symmetric all extremities   Skin:   Skin color, texture, turgor normal, no rashes or lesions   Neurologic:   Oriented to person, place, time, and situation; exhibits logical thought processes; generalized weakness; ambulatory with walker           LABS:     Check CBC and BMP on 1/24/20.    ASSESSMENT:      ICD-10-CM    1. Retroperitoneal hematoma K66.1    2. Iron deficiency anemia due to chronic blood loss D50.0    3.  Left hip pain M25.552    4. Physical deconditioning R53.81    5. Primary insomnia F51.01        PLAN:      Check CBC and BMP on 1/24/20 to continue to monitor acute blood loss with retroperitoneal bleed.  Re-ordered Ambien for primary insomnia through next week.  Plan for acute rehabilitation for one week per patient report.  Otherwise continue current care plan for all other chronic medical conditions, as they are stable. Encouraged patient to engage in healthy lifestyle behaviors such as engaging in social activities, exercising (PT/OT), eating well, and following care plan. Follow up for routine check-up, or sooner if needed. Will continue to monitor patient and work with nursing staff collaboratively to work toward positive patient outcomes.     Electronically signed by: Yessenia Cazares CNP

## 2021-06-20 NOTE — LETTER
Letter by Win Moreno DO at      Author: Win Moreno DO Service: -- Author Type: --    Filed:  Encounter Date: 2/5/2020 Status: (Other)         Deloris Larson  [address] [place] [address]             February 5, 2020         Dear Ms. Larson,    Below are the results from your recent visit:    Resulted Orders   HM2(CBC w/o Differential)   Result Value Ref Range    WBC 10.0 4.0 - 11.0 thou/uL    RBC 3.20 (L) 3.80 - 5.40 mill/uL    Hemoglobin 10.0 (L) 12.0 - 16.0 g/dL    Hematocrit 29.9 (L) 35.0 - 47.0 %    MCV 93 80 - 100 fL    MCH 31.1 27.0 - 34.0 pg    MCHC 33.3 32.0 - 36.0 g/dL    RDW 12.3 11.0 - 14.5 %    Platelets 288 140 - 440 thou/uL    MPV 7.0 7.0 - 10.0 fL       The hemoglobin is improving, which is reassuring    Please call with questions or contact us using [employer].    Sincerely,        Electronically signed by Win Moreno DO

## 2021-06-20 NOTE — LETTER
Letter by Lanette Romero RDCS at      Author: Lanette Romero RDCS Service: -- Author Type: --    Filed:  Encounter Date: 8/4/2020 Status: (Other)         Deloris Larson  [address] [place] [address]      August 4, 2020      Dear Ms. Larson,    RE: Remote Results    We are writing to you regarding your recent Remote Pacemaker check from home. Your transmission was received successfully. Battery status is satisfactory at this time.     Your results are showing no significant changes.    Your next device appointment will be a remote check on November 3, 2020; this will occur automatically.    To schedule or reschedule, please call [phone] and press 1.    NOTE: If you would like to do an extra transmission, please call [phone] and press 3 to speak to a nurse BEFORE transmitting. This ensures that the Device Clinic staff is aware of the reason you are sending a transmission, and can follow-up with you after it has been reviewed.    We will be checking your implanted device from home (remotely) every three months unless otherwise instructed. We will need to see you in the clinic at least once a year. You may need to be seen in the clinic sooner depending on the results of your check.    Please be aware:    The follow-up schedule is like a Physician prescription.    Your remote monitor is paired to your specific implanted device.      Sincerely,    [facility]

## 2021-06-20 NOTE — LETTER
Letter by Lanette Romero RDCS at      Author: Lanette Romero RDCS Service: -- Author Type: --    Filed:  Encounter Date: 6/1/2020 Status: (Other)         Deloris Larson  [address] [place] [address]      June 1, 2020      Dear Ms. Larson,    RE: Remote Results    We are writing to you regarding your recent Remote Pacemaker check from home. Your transmission was received successfully. Battery status is satisfactory at this time.     Your results are being reviewed.  You will be contacted if further information is necessary.     Your next device appointment will be a remote check on August 4, 2020; this will occur automatically.    To schedule or reschedule, please call [phone] and press 1.    NOTE: If you would like to do an extra transmission, please call [phone] and press 3 to speak to a nurse BEFORE transmitting. This ensures that the Device Clinic staff is aware of the reason you are sending a transmission, and can follow-up with you after it has been reviewed.    We will be checking your implanted device from home (remotely) every three months unless otherwise instructed. We will need to see you in the clinic at least once a year. You may need to be seen in the clinic sooner depending on the results of your check.    Please be aware:    The follow-up schedule is like a Physician prescription.    Your remote monitor is paired to your specific implanted device.      Sincerely,    [facility]

## 2021-06-20 NOTE — LETTER
Letter by Win Moreno DO at      Author: Win Moreno DO Service: -- Author Type: --    Filed:  Encounter Date: 1/9/2020 Status: Signed         [facility]  01/09/20    Patient: Deloris Larson  YOB: 1930  Medical Record Number: [id number]  CSN: [id number]                                                                              Non-opioid Controlled Substance Agreement    I understand that my care provider has prescribed a controlled substance to help manage my condition(s). I am taking this medicine to help me function or work. I know this is strong medicine, and that it can cause serious side effects. Controlled substances can be sedating, addicting and may cause a dependency on the drug. They can affect my ability to drive or think, and cause depression. They need to be taken exactly as prescribed. Combining controlled substances with certain medicines or chemicals (such as cocaine, sedatives and tranquilizers, sleeping pills, meth) can be dangerous or even fatal. Also, if I stop controlled substances suddenly, I may have severe withdrawal symptoms.  If not helpful, I may be asked to stop them.    The risks, benefits, and side effects of these medicine(s) were explained to me. I agree that:    1. I will take part in other treatments as advised by my care team. This may be psychiatry or counseling, physical therapy, behavioral therapy, group treatment or a referral to a pain clinic. I will reduce or stop my medicine when my care team tells me to do so.  2. I will take my medicines as prescribed. I will not change the dose or schedule unless my care team tells me to. There will be no refills if I run out early.  I may be contactedwithout warning and asked to complete a urine drug test or pill count at any time.   3. I will keep all my appointments, and understand this is part of the monitoring of controlled substances. My care team may require an office visit for EVERY  controlled substance refill. If I miss appointments or dont follow instructions, my care team may stop my medicine.  4. I will not ask other providers to prescribe controlled substances, and I will not accept controlled substances from other people. If I need another prescribed controlled substance for a new reason, I will tell my care team within 1 business day.  5. I will use one pharmacy to fill all of my controlled substance prescriptions, and it is up to me to make sure that I do not run out of my medicines on weekends or holidays. If my care team is willing to refill my controlled substance prescription without a visit, I must request refills only during office hours, refills may take up to 3 days to process, and it may take up to 5 to 7 days for my medicine to be mailed and ready at my pharmacy. Prescriptions will not be mailed anywhere except my pharmacy.    6. I am responsible for my prescriptions. If the medicine/prescription is lost or stolen, it will not be replaced. I also agree not to share controlled substance medicines with anyone.          [facility]  01/09/20  Patient:  Deloris Larson  YOB: 1930  Medical Record Number: [id number]  CSN: [id number]    7. I agree to not use ANY illegal or recreational drugs. This includes marijuana, cocaine, bath salts or other drugs. I agree not to use alcohol unless my care team says I may. I agree to give urine samples whenever asked. If I dont give a urine sample, the care team may stop my medicine.    8. If I enroll in the [place] Medical Marijuana program, I will tell my care team. I will also sign an agreement to share my medical records with my care team.    9. I will bring in my list of medicines (or my medicine bottles) each time I come to the clinic.   10. I will tell my care team right away if I become pregnant or have a new medical problem treated outside of my regular clinic.  11. I understand that this medicine can affect my  thinking and judgment. It may be unsafe for me to drive, use machinery and do dangerous tasks. I will not do any of these things until I know how the medicine affects me. If my dose changes, I will wait to see how it affects me. I will contact my care team if I have concerns about medicine side effects.    I understand that if I do not follow any of the conditions above, my prescriptions or treatment may be stopped.      I agree that my provider, clinic care team, and pharmacy may work with any city, state or federal law enforcement agency that investigates the misuse, sale, or other diversion of my controlled medicine. I will allow my provider to discuss my care with or share a copy of this agreement with any other treating provider, pharmacy or emergency room where I receive care. I agree to give up (waive) any right of privacy or confidentiality with respect to these consents.   I have read this agreement and have asked questions about anything I did not understand.    ___________________________________________________________________________  Patient signature - Date/Time  -Deloris Larson                                      ___________________________________________________________________________  Witness signature                                                                    ___________________________________________________________________________  Provider signature- Win Moreno, DO

## 2021-06-20 NOTE — LETTER
"Letter by Oliver Levy CNP at      Author: Oliver Levy CNP Service: -- Author Type: --    Filed:  Encounter Date: 2020 Status: (Other)         Patient: Deloris Larson   MR Number: [id number]   YOB: 1930   Date of Visit: 2020     [facility]    Name:   Deloris Larson  : 3/24/1930  Facility:   [facility] [[id number]]   Room:   Code Status: DNR -   Fac type:   SNF (Skilled Nursing Facility, TCU) -     PCP:  Win Moreno DO    CHIEF COMPLAINT / REASON FOR VISIT:  Chief Complaint   Patient presents with   ? Discharge Summary     TCU discharge after hospitalization for retroperitoneal hematoma.      [facility] from 2020 until 2020 (left retroperitoneal hematoma)      HPI: Deloris Freeman is a 89 y.o. female who developed spontaneous left flank and hip pain.  Evaluation in the ED showed no hip fracture but a retroperitoneal hematoma.  She had been on Eliquis.  She had not fallen.  Eliquis was held for 3 days, and she was placed on anti-inflammatories, steroids, and a lidocaine patch.  The next day, pain was markedly improved, and she was discharged to the TCU for physical and occupational therapies.    She has a history of atrial fibrillation (Eliquis) and sick sinus syndrome (pacemaker placement).  She did have acute blood loss anemia with baseline hemoglobin of 13 dropping down to 9.1 with a hematoma.  It was 9.1 when checked by Dr. De Anda on 2020 but is expected to rebound.      TCU ISSUES    Discharge planning: She is quite anxious to go home.  She lives with her son and daughter-in-law, and there was usually someone around with only brief periods of being alone.  She states, \"I've been taking care of myself for several days,\" suggesting that she has been walking alone, dressing herself, and generally being independent.  She did have a care conference today and will be discharging on " "01/28/2020.  At home, she will need a home health aide and home PT, most likely for a very short period of time.    Medication changes: Due to hypokalemia, Dr. De Anda did order 2 days of 20 mEq of potassium chloride daily.  She also scheduled her lidocaine patch to the lower back and left hip and also discontinued acidophilus orders.    ROS: Review of systems is essentially negative other than as noted here.  She tells me she has a \"tender stomach,\" and she has needed to put herself on \"half rations.\"  At home, she is very plain foods.  She denies any pain, headaches, chest pain, nausea or vomiting, dizziness or dyspnea, dysuria, diplopia, constipation or diarrhea (can easily get the latter), difficulty chewing or swallowing, integumentary issues.  She is sleeping with zolpidem.    Past Medical History:   Diagnosis Date   ? Coronary artery disease    ? Hyperlipidemia    ? Hypertension    ? PAD (peripheral artery disease) (H) 10/19/2015   ? Paroxysmal SVT (supraventricular tachycardia) (H)     Created by Conversion    ? Persistent atrial fibrillation 5/9/2018    New diagnosis April 16 18th and found incidentally on physical exam during yearly exam in primary clinic [id number] score of 5-new Eliquis start   ? RLS (restless legs syndrome) 2/28/2017              Family History   Adopted: Yes   Family history unknown: Yes     Social History     Socioeconomic History   ? Marital status: Single     Spouse name: Not on file   ? Number of children: Not on file   ? Years of education: Not on file   ? Highest education level: Not on file   Occupational History   ? Not on file   Social Needs   ? Financial resource strain: Not on file   ? Food insecurity:     Worry: Not on file     Inability: Not on file   ? Transportation needs:     Medical: Not on file     Non-medical: Not on file   Tobacco Use   ? Smoking status: Former Smoker     Packs/day: 1.50     Years: 25.00     Pack years: 37.50     Last attempt to quit: 1/1/1974     " Years since quittin.1   ? Smokeless tobacco: Never Used   Substance and Sexual Activity   ? Alcohol use: No   ? Drug use: No   ? Sexual activity: Not on file   Lifestyle   ? Physical activity:     Days per week: Not on file     Minutes per session: Not on file   ? Stress: Not on file   Relationships   ? Social connections:     Talks on phone: Not on file     Gets together: Not on file     Attends Mosque service: Not on file     Active member of club or organization: Not on file     Attends meetings of clubs or organizations: Not on file     Relationship status: Not on file   ? Intimate partner violence:     Fear of current or ex partner: Not on file     Emotionally abused: Not on file     Physically abused: Not on file     Forced sexual activity: Not on file   Other Topics Concern   ? Not on file   Social History Narrative    She lives alone in a house. She has 3 children and 8 grandchildren and multiple great grandchildren. She is retired. 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 She does not smoke cigarettes and rarely drinks alcohol.       MEDICATIONS: Reviewed from the MAR, physician orders, and/or earlier progress notes.  Post Discharge Medication Reconciliation Status: medication reconciliation previously completed during another office visit.  Current Outpatient Medications   Medication Sig   ? acetaminophen (TYLENOL) 500 MG tablet Take 2 tablets (1,000 mg total) by mouth 3 (three) times a day.   ? apixaban (ELIQUIS) 2.5 mg Tab tablet Take 1 tablet (2.5 mg total) by mouth 2 (two) times a day.   ? atorvastatin (LIPITOR) 20 MG tablet Take 1 tablet (20 mg total) by mouth daily.   ? b complex vitamins tablet Take 1 tablet by mouth daily.   ? cholecalciferol, vitamin D3, 5,000 unit Tab Take 1 tablet by mouth. Take 4 days a week. MON, Wed, Fri, Sat   ? lidocaine 4 % patch Place 1 patch on the skin daily for 10 days. Remove and discard patch with 12 hours or as  "directed by MD.   ? meloxicam (MOBIC) 7.5 MG tablet Take 1 tablet (7.5 mg total) by mouth daily for 7 days.   ? metoprolol succinate (TOPROL-XL) 50 MG 24 hr tablet Take 1 tablet (50 mg total) by mouth daily.   ? nitroglycerin (NITROSTAT) 0.3 MG SL tablet Place 1 tablet (0.3 mg total) under the tongue every 5 (five) minutes as needed for chest pain.   ? omeprazole (PRILOSEC) 20 MG capsule Take 1 capsule (20 mg total) by mouth daily before breakfast for 10 days.   ? oxyCODONE (ROXICODONE) 5 MG immediate release tablet Take 1-2 tablets (5-10 mg total) by mouth every 4 (four) hours as needed.   ? petrolatum, white-water (VANICREAM LITE) Lotn Apply 1 application topically. Apply topically daily for dry itching skin. Pt reports use 4-5 times/week.   ? ramipril (ALTACE) 10 MG capsule TAKE 1 CAPSULE BY MOUTH TWICE DAILY (EVERY 12 HOURS)   ? simethicone (MYLICON) 125 MG chewable tablet Chew 1 tablet (125 mg total) every 6 (six) hours as needed for flatulence.   ? tetrahydrozoline (VISINE) 0.05 % ophthalmic solution Administer 1 drop to both eyes. Instill 1 drops into both eyes daily in the morning for dry eyes. Pt reports use 3-4 times/week.   ? triamcinolone (KENALOG) 0.1 % cream Apply topically 2 (two) times a day as needed.   ? zolpidem (AMBIEN) 10 mg tablet Take 1 tablet (10 mg total) by mouth at bedtime as needed for sleep.     ALLERGIES:   Allergies   Allergen Reactions   ? Sleeping [Diphenhydramine Hcl]      halluactions      DIET: Regular, regular texture, thin liquids.    Vitals:    01/27/20 1157   BP: 92/50   Pulse: 70   Resp: 20   Temp: (!) 96.2  F (35.7  C)   SpO2: 95%   Weight: 142 lb 9.6 oz (64.7 kg)   Height: 5' 5\" (1.651 m)     Body mass index is 23.73 kg/m .    EXAMINATION:   General: Pleasant, alert, and conversant elderly female, sitting comfortably in a recliner, in no apparent distress.  Head: Normocephalic and atraumatic.   Eyes: PERRLA, sclerae clear.   ENT: Moist oral mucosa.   Cardiovascular: " Irregularly irregular rhythm with a 4/6 systolic ejection murmur at the left sternal border.  Respiratory: Lungs clear to auscultation bilaterally.   Abdomen: Soft and nontender.   Musculoskeletal/Extremities: Age-related degenerative joint disease.  Bilateral 2+ pretibial edema.  Pacemaker palpable in the left upper chest wall.  Integument: No rashes, clinically significant lesions, or skin breakdown.   Cognitive/Psychiatric: Alert and oriented x3.  Affect is somewhat anxious.  She is very eager to discharge.    DIAGNOSTICS:   Results for orders placed or performed in visit on 01/24/20   Basic Metabolic Panel   Result Value Ref Range    Sodium 142 136 - 145 mmol/L    Potassium 3.4 (L) 3.5 - 5.0 mmol/L    Chloride 109 (H) 98 - 107 mmol/L    CO2 23 22 - 31 mmol/L    Anion Gap, Calculation 10 5 - 18 mmol/L    Glucose 76 70 - 125 mg/dL    Calcium 8.8 8.5 - 10.5 mg/dL    BUN 21 8 - 28 mg/dL    Creatinine 0.86 0.60 - 1.10 mg/dL    GFR MDRD Af Amer >60 >60 mL/min/1.73m2    GFR MDRD Non Af Amer >60 >60 mL/min/1.73m2     Lab Results   Component Value Date    WBC 9.2 01/24/2020    HGB 9.1 (L) 01/24/2020    HCT 28.6 (L) 01/24/2020    MCV 96 01/24/2020     01/24/2020     Estimated Creatinine Clearance: 45.3 mL/min (by C-G formula based on SCr of 0.86 mg/dL).      ASSESSMENT/Plan:      ICD-10-CM    1. Retroperitoneal hematoma K66.1    2. Persistent atrial fibrillation I48.19    3. SSS (sick sinus syndrome) (H) I49.5    4. Essential hypertension I10    5. Atherosclerosis of native coronary artery of native heart without angina pectoris I25.10    6. Hypercholesterolemia E78.00    7. Cardiac pacemaker in situ, dual chamber Z95.0    8. Primary insomnia F51.01      DISCHARGE PLAN/FACE TO FACE:  I certify that this patient is under my care and that I had a face-to-face encounter that meets the physician face-to-face encounter requirements with this patient.     Date of Face-to-Face Encounter: 01/27/2020     I certify that,  based on my findings, the following services are medically necessary home health services: Home health aide and home PT    My clinical findings support the need for the above skilled services because: (Please write a brief narrative summary that describes what the RN, PT, SLP, or other services will be doing in the home. A list of diagnoses in this section does not meet the CMS requirements): Home health aide to assist with things such as bathing and home PT to continue therapy in the home setting.    This patient is homebound because: (Please write a brief narrative summmary describing the functional limitations as to why this patient is homebound and specifically what makes this patient homebound.):  Services necessarily need to be performed in the home to be of benefit.    The patient is, or has been, under my care and I have initiated the establishment of the plan of care. This patient will be followed by a physician who will periodically review the plan of care.  Initial follow-up should be within 7-10 days.    Approximate time spent with this patient was greater than 30 minutes with greater than 50% spent in discussions regarding services required upon discharge.      The above has been created using voice recognition software. Please be aware that this may unintentionally  produce inaccuracies and/or nonsensical sentences.      Electronically signed by: Oliver Levy CNP          Benzoyl Peroxide Pregnancy And Lactation Text: This medication is Pregnancy Category C. It is unknown if benzoyl peroxide is excreted in breast milk.

## 2021-06-21 NOTE — LETTER
Letter by Kassidy Salas MD at      Author: Kassidy Salas MD Service: -- Author Type: --    Filed:  Encounter Date: 2/26/2021 Status: (Other)         Deloris Larson  [address] [place] [address]             February 26, 2021         Dear Ms. Larson,    Below are the results from your recent visit:    Resulted Orders   HM2(CBC w/o Differential)   Result Value Ref Range    WBC 8.4 4.0 - 11.0 thou/uL    RBC 3.15 (L) 3.80 - 5.40 mill/uL    Hemoglobin 9.4 (L) 12.0 - 16.0 g/dL    Hematocrit 30.3 (L) 35.0 - 47.0 %    MCV 96 80 - 100 fL    MCH 29.8 27.0 - 34.0 pg    MCHC 31.0 (L) 32.0 - 36.0 g/dL    RDW 15.4 (H) 11.0 - 14.5 %    Platelets 201 140 - 440 thou/uL    MPV 10.1 (H) 7.0 - 10.0 fL   Comprehensive Metabolic Panel   Result Value Ref Range    Sodium 141 136 - 145 mmol/L    Potassium 5.3 (H) 3.5 - 5.0 mmol/L    Chloride 109 (H) 98 - 107 mmol/L    CO2 22 22 - 31 mmol/L    Anion Gap, Calculation 10 5 - 18 mmol/L    Glucose 93 70 - 125 mg/dL    BUN 20 8 - 28 mg/dL    Creatinine 1.38 (H) 0.60 - 1.10 mg/dL    GFR MDRD Af Amer 44 (L) >60 mL/min/1.73m2    GFR MDRD Non Af Amer 36 (L) >60 mL/min/1.73m2    Bilirubin, Total 0.5 0.0 - 1.0 mg/dL    Calcium 9.2 8.5 - 10.5 mg/dL    Protein, Total 6.8 6.0 - 8.0 g/dL    Albumin 3.6 3.5 - 5.0 g/dL    Alkaline Phosphatase 69 45 - 120 U/L    AST 16 0 - 40 U/L    ALT 14 0 - 45 U/L    Narrative    Fasting Glucose reference range is 70-99 mg/dL per  American Diabetes Association (ADA) guidelines.   Vitamin D, Total (25-Hydroxy)   Result Value Ref Range    Vitamin D, Total (25-Hydroxy) 76.0 30.0 - 80.0 ng/mL    Narrative    Deficiency <10.0 ng/mL  Insufficiency 10.0-29.9 ng/mL  Sufficiency 30.0-80.0 ng/mL  Toxicity (possible) >100.0 ng/mL   Vitamin B12   Result Value Ref Range    Vitamin B-12 1,204 (H) 213 - 816 pg/mL   Ferritin   Result Value Ref Range    Ferritin 34 10 - 130 ng/mL        Jesu Freeman.  Your labs are detailed above. You are anemic but it is improving from  when you were in the hospital.  In addition, your kidney function is a bit decreased so be sure to drink plenty of fluids.  You'll want to recheck these labs at your appointment in May.  Let me know if you have any questions.  [facility]        Electronically signed by Kassidy Salas MD

## 2021-06-21 NOTE — LETTER
Letter by Lanette Romero RDCS at      Author: Lanette Romero RDCS Service: -- Author Type: --    Filed:  Encounter Date: 4/27/2021 Status: (Other)         Deloris Larson  [address] [place] [address]      April 27, 2021      Dear Ms. Larson,    RE: Remote Results    We are writing to you regarding your recent Remote Pacemaker check from home. Your transmission was received successfully. Battery status is satisfactory at this time.     Your results are showing no significant changes.    Your next device appointment will be a clinic visit on May 7, 2021 at 12:50PM at our  [place] Location, [address] [facility].    To schedule or reschedule, please call [phone] and press 1.    NOTE: If you would like to do an extra transmission, please call [phone] and press 3 to speak to a nurse BEFORE transmitting. This ensures that the Device Clinic staff is aware of the reason you are sending a transmission, and can follow-up with you after it has been reviewed.    We will be checking your implanted device from home (remotely) every three months unless otherwise instructed. We will need to see you in the clinic at least once a year. You may need to be seen in the clinic sooner depending on the results of your check.    Please be aware:    The follow-up schedule is like a Physician prescription.    Your remote monitor is paired to your specific implanted device.      Sincerely,    [facility]

## 2021-06-21 NOTE — LETTER
"Letter by Reema Crawford RN at      Author: Reema Crawford RN Service: -- Author Type: --    Filed:  Encounter Date: 10/19/2020 Status: (Other)       Dear Fartun,     Thank you for choosing [facility] for your care.  We are committed to providing you with the highest quality and compassionate healthcare services.  The following information pertains to your first appointment with our clinic.    Date/Time of appointment:   Friday, October 30, 2020 at 10:00 AM, arriving at 9:30 AM    Note: Arriving 30 minutes prior to your appointment allows time to complete forms, possible labs and for your nursing assessment.    Name of your Physician: Mansoor Peck MD    What to bring to your appointment:    Completed Patient History/Initial Nursing Assessment (this form was sent to you) and a Medication/Allergy List .    Any paperwork or films from your physician that we have asked you to bring.    Your current insurance card(s).    Parking:    Please refer to the map included to direct you to [facility].    The \"Radiation Oncology\" parking lot is west of the main entrance, this is free parking and is right next to the Cancer Care Entrance.    Come in the Cancer Care Entrance and check in.      We hope these instructions are helpful to you.  If you have any questions or concerns, please call us at [phone].  It is our pleasure to assist you.    Warm Regards,  Reema Crawford RN  Nurse Navigator  [phone]             "

## 2021-06-21 NOTE — PROGRESS NOTES
[facility] Follow Up Note    [facility]   88 y.o. female    Date of Visit: 10/24/2018    Chief Complaint   Patient presents with     Follow-up     Subjective  Deloris Freeman comes in today for routine follow-up.  She was diagnosed with atrial fibrillation earlier this year and was stable after a cardioversion but then developed unstable angina and sick sinus syndrome and had a pacemaker placed in June.  She has been doing well and remains anticoagulated without difficulty.  After some discussion, she is going to follow-up with Dr. Win Moreno upon my departure.  She does get nervous coming into the doctor's office and sometimes has white coat hypertension.  She has had intermittent abdominal discomfort after the evening meal followed by explosive diarrhea the next morning.  CT scan done 1 year ago was normal.    ROS A comprehensive review of systems was performed and was otherwise negative.    Social History     Social History Narrative    She lives alone in a house. She has 3 children and 8 grandchildren and multiple great grandchildren. She is retired. 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 She does not smoke cigarettes and rarely drinks alcohol.       Medications were reconciled.  Allergies, social and family history, and the problem list were all reviewed and updated.    Old records reviewed: Recent cardiology records    Exam  General Appearance: Pleasant and alert   Vitals:    10/24/18 1345 10/24/18 1349   BP: 160/76 144/78   Patient Site: Left Arm    Patient Position: Sitting    Cuff Size: Adult Regular    Pulse: 66    SpO2: 98%    Weight: 136 lb 7 oz (61.9 kg)       Body mass index is 22.7 kg/(m^2).  Wt Readings from Last 3 Encounters:   10/24/18 136 lb 7 oz (61.9 kg)   09/28/18 137 lb (62.1 kg)   07/12/18 135 lb 1.6 oz (61.3 kg)     HEENT: Sclera are clear.   Lungs: Normal respirations  Cardiac: Regular rate and rhythm  Abdomen: Soft and  nondistended  Extremities: No edema  Skin: No rashes  Neuro: Moves all extremities and has facial symmetry  Gait: Ambulates with a normal gait    Assessment/Plan  1. Hypertension  She does have some white coat hypertension.  May need adjustment in her blood pressure medications though in the future.  We will continue as it is for now.  She is can follow-up with Dr. Hobbs within a couple of months.  - ramipril (ALTACE) 10 MG capsule; TAKE 1 CAPSULE BY MOUTH TWICE DAILY (EVERY 12 HOURS)  Dispense: 180 capsule; Refill: 5    2. Hyperlipidemia  - atorvastatin (LIPITOR) 40 MG tablet; Take 1 tablet (40 mg total) by mouth daily.  Dispense: 90 tablet; Refill: 3    3. Paroxysmal atrial fibrillation (H)  Has remained stable since cardioversion.  She is anticoagulated.    4. SSS (sick sinus syndrome) (H)  Stable since pacemaker placement.    5.  Abdominal discomfort  She gets a stomachache in the evening after eating meals on occasion which then usually a turns into diarrhea in the morning.  I encouraged her to keep a bowel and food diary to see if it pertains to certain meals and foods.  Could be related to richer foods due to her cholecystectomy and may benefit from cholestyramine in the evenings.          Odalys Scott MD  Internal and Geriatric Medicine  [facility]    Much or all of the text in this note was generated through the use of Dragon Dictate voice-to-text software. Errors in spelling or words which seem out of context are unintentional. Sound alike errors, in particular, may have escaped editing.

## 2021-06-21 NOTE — LETTER
Letter by Myhre, David J, RN at      Author: Myhre, David J, RN Service: -- Author Type: --    Filed:  Encounter Date: 10/9/2020 Status: (Other)       CARE COORDINATION  [facility]  [address] [facility] [address]      October 14, 2020    Deloris Larson  [address]  [place] [address]      Dear Deloris Freeman,    I am a clinic care coordinator who works with Kassidy Salas MD at the [facility]. I wanted to thank you for spending the time to talk with me.  Below is a description of clinic care coordination and how I can further assist you.      The clinic care coordination team is made up of a registered nurse,  and community health worker who understand the health care system. The goal of clinic care coordination is to help you manage your health and improve access to the health care system in the most efficient manner. The team can assist you in meeting your health care goals by providing education, coordinating services, strengthening the communication among your providers and supporting you with any resource needs.    Please feel free to contact the Community Health WorkerBeatris at [phone] with any questions or concerns. We are focused on providing you with the highest-quality healthcare experience possible and that all starts with you.     Sincerely,     David Myhre, RN  CCC RN     Enclosed: I have enclosed a copy of the Care Plan. This has helpful information and goals that we have talked about. Please keep this in an easy to access place to use as needed.

## 2021-06-23 ENCOUNTER — COMMUNICATION - HEALTHEAST (OUTPATIENT)
Dept: NURSING | Facility: CLINIC | Age: 86
End: 2021-06-23

## 2021-06-23 NOTE — PROGRESS NOTES
[facility] Clinic Note    Deloris Larson   88 y.o. female    Date of Visit: 1/25/2019  Chief Complaint   Patient presents with     Establish Care     Change from Dr Scott       ASSESSMENT/PLAN  1. Persistent atrial fibrillation (H)     2. Hypertension  ramipril (ALTACE) 10 MG capsule   3. Essential hypertension     4. Atherosclerosis of native coronary artery of native heart without angina pectoris     5. Cardiac pacemaker in situ, dual chamber       ---------------------------------------------    1.  Noted history of atrial fibrillation, currently managed with metoprolol and Eliquis    2-3.  Well controlled on metoprolol, ramipril    4.  We reviewed her heart attack back in June.  She does not have a cardiologist.  I think it would be wise to get one.  We will look into this further at the next visit    5.  Noted presence of pacemaker.  It does appear she is having this followed by [facility].      Return in about 6 months (around 7/25/2019) for Recheck.      SUBJECTIVE    Deloris Larson is here to establish care. She grew up in [place].  She went to [employer].      She has history of two heart attacks at [facility] [place], last one was June.  She now has a pacemaker.    She does not have a regular cardiologist.    She lives alone, son stays a few nights a week.    She does not have any memory issues, though short term memory is a little impaired.    She has PAD in the legs, was going each year for leg testing.  She describes a bad experience getting what sounds like ankle-brachial indices.    She enjoys walking, she walks 5 minutes at a time.  She occasionally with cane.     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[employer], going to [employer]          ROS:   Per HPI, all other systems negative     Medications, allergies, and problem list were reviewed and updated    Patient Active Problem List   Diagnosis     Mixed hyperlipidemia     Essential  hypertension     Insomnia     Migraine Headache     Coronary Artery Disease     Paroxysmal SVT (supraventricular tachycardia) (H)     Intermittent claudication (H)     Malignant neoplasm of skin     PAD (peripheral artery disease) (H)     Vitamin D deficiency     RLS (restless legs syndrome)     Persistent atrial fibrillation (H)     Sinus bradycardia     Vertigo     Anxiety     SSS (sick sinus syndrome) (H)     Cardiac pacemaker in situ, dual chamber     Past Medical History:   Diagnosis Date     Coronary artery disease      Hyperlipidemia      Hypertension      PAD (peripheral artery disease) (H) 10/19/2015     Paroxysmal SVT (supraventricular tachycardia) (H)     Created by Conversion      Persistent atrial fibrillation (H) 5/9/2018    New diagnosis April 16 18th and found incidentally on physical exam during yearly exam in primary clinic [id number] score of 5-new Eliquis start     RLS (restless legs syndrome) 2/28/2017     Current Outpatient Medications   Medication Sig Dispense Refill     L. acidophilus/Bifido longum (PROBIOTIC PEARLS ORAL) Take by mouth as needed.       simethicone (MYLICON) 125 MG chewable tablet Chew 125 mg every 6 (six) hours as needed for flatulence.       acetaminophen (TYLENOL) 500 MG tablet Take 1 tablet (500 mg total) by mouth every 4 (four) hours as needed.  0     apixaban (ELIQUIS) 2.5 mg Tab tablet Take 1 tablet (2.5 mg total) by mouth 2 (two) times a day. 180 tablet 5     atorvastatin (LIPITOR) 40 MG tablet Take 1 tablet (40 mg total) by mouth daily. 90 tablet 3     b complex vitamins tablet Take 1 tablet by mouth daily.       cholecalciferol, vitamin D3, 5,000 unit Tab Take 1 tablet by mouth. Take 4 days a week. MON, Wed, Fri, Sat       meclizine (ANTIVERT) 12.5 mg tablet Take 1 tablet (12.5 mg total) by mouth 3 (three) times a day as needed (take 1-2 tablets three times daily as needed).  12     metoprolol succinate (TOPROL-XL) 50 MG 24 hr tablet Take 1 tablet (50 mg total) by  "mouth daily. 90 tablet 5     nitroglycerin (NITROSTAT) 0.3 MG SL tablet Place 1 tablet (0.3 mg total) under the tongue every 5 (five) minutes as needed for chest pain. 90 tablet 12     ramipril (ALTACE) 10 MG capsule TAKE 1 CAPSULE BY MOUTH TWICE DAILY (EVERY 12 HOURS) 180 capsule 3     zolpidem (AMBIEN) 10 mg tablet Take 0.5-1 tablets (5-10 mg total) by mouth at bedtime as needed for sleep. 30 tablet 3     No current facility-administered medications for this visit.      Allergies   Allergen Reactions     Sleeping [Diphenhydramine Hcl]      halluactions        EXAM  Vitals:    01/25/19 1014   BP: 128/68   Patient Site: Left Arm   Patient Position: Sitting   Cuff Size: Adult Regular   Pulse: 60   Resp: 16   SpO2: 98%   Weight: 137 lb 3 oz (62.2 kg)   Height: 5' 5\" (1.651 m)         General: Alert, pleasant, no distress  Heart: Regular rate rhythm, no murmurs  Lungs: Clear to auscultation bilaterally    Results reviewed: Reviewed last note by Dr. Odalys Scott    Results for orders placed or performed during the hospital encounter of 06/24/18   Basic Metabolic Panel   Result Value Ref Range    Sodium 141 136 - 145 mmol/L    Potassium 3.9 3.5 - 5.0 mmol/L    Chloride 108 (H) 98 - 107 mmol/L    CO2 25 22 - 31 mmol/L    Anion Gap, Calculation 8 5 - 18 mmol/L    Glucose 86 70 - 125 mg/dL    Calcium 9.0 8.5 - 10.5 mg/dL    BUN 13 8 - 28 mg/dL    Creatinine 0.75 0.60 - 1.10 mg/dL    GFR MDRD Af Amer >60 >60 mL/min/1.73m2    GFR MDRD Non Af Amer >60 >60 mL/min/1.73m2           Data points: 3    Win Moreno,   Internal Medicine  [facility]    "

## 2021-06-25 NOTE — PROGRESS NOTES
Clinic Care Coordination Contact  [facility]/Voicemail       Clinical Data: Care Coordinator Outreach  Outreach attempted x 1.  Left message on patient's daughter's voicemail with call back information and requested return call.  Plan:  Care Coordinator will try to reach patient again in 3-5 business days.  Next outreach due: 6/2/21       Date & Time: 10/30/2022, 10:53 AM  Patient: Bhumika Monroe  Encounter Provider(s):    STEFANIA Hill         This certifies that Carlos Cueva, a patient at an [facility] facility, am leaving the facility voluntarily and against the advice of my physician. I acknowledge that I have been:    1. informed that my physician believes that I need to receive care here;  2. informed that if I leave, I could become sicker or even die; and  3. provided discharge instructions consistent with my current diagnosis. I hereby release my physician, the facility, and its employees from all responsibility for any ill effects which may result from this action. __________________________________  Patient or authorized caregiver signature    __________________________________  RN signature    If no patient or patient representative signature was obtained, sign below to acknowledge that the form was reviewed with the patient and that the patient refused to sign.     __________________________________  RN signature

## 2021-06-26 NOTE — PROGRESS NOTES
Clinic Care Coordination Contact  [facility]/Voicemail       Clinical Data: Care Coordinator Outreach  Outreach attempted x 1. No answer  Plan: Transfer care to [facility]  Care Coordinator will try to reach patient again in 3-5 business days.  Next outreach due: 6/15/21

## 2021-06-26 NOTE — PROGRESS NOTES
Progress Notes by Leida Gee CNP at 6/7/2018  8:30 AM     Author: Leida Gee CNP Service: -- Author Type: Nurse Practitioner    Filed: 6/7/2018  9:40 AM Encounter Date: 6/7/2018 Status: Signed    : Leida Gee CNP (Nurse Practitioner)           Click to link to [facility]     [facility] ELECTROPHYSIOLOGY NOTE      Assessment/Recommendations   Assessment/Plan:    Diagnoses and all orders for this visit:    Persistent atrial fibrillation and heart rate had been well-controlled on atenolol total of 50 mg a day.  No improvement in symptoms since cardioversion and actually worse fatigue and shortness of breath, but appears to have maintained sinus rhythm since.  Questioning worsening of symptoms related to bradycardia.  No signs or symptoms of decompensated heart failure.  Unsure if I would recommend rate versus rhythm control in the future.  Encouraged her to see Dr. Scott every 3-4 months as she gets a rhythm check at primary clinic.  If she is willing to do this, I would probably only see her yearly.  -     Holter Monitor; Future; Expected date: 6/14/18  -     ECG Clinic - Today  Sinus bradycardia and had been on atenolol 25 mg p.o. daily long-term but appears bradycardic on this.  Having potential bradycardic symptoms of fatigue and short of breath with activity.  Discussed that atrial fib could be caused by Bradycardia and will use Holter to rule out need for permanent pacemaker.  Permanent pacemaker was not discussed in any detail today as I am not suspecting she needs this.      Essential hypertension and well controlled.      Vertigo and if vertigo episodes in the future to see Dr. Scott is may be candidate for referral to dizzy balance center or PT for vertigo.  Her daughter tells me she has a history of vertigo.  The meclizine is working for her.  She has taken it once since ER visit.    [id number] score of 5 and on Eliquis without  problems.  Follow up in clinic with me in 6 months.     History of Present Illness    Ms. Deloris Larson is a very pleasant 88 y.o. female who comes in today for EP follow-up after cardioversion on 5/24/2018.  Deloris Larson has a known history of hypertension, peripheral vascular disease, and PSVT.  She was found to be in persistent atrial fib on annual physical with Dr. Scott in April of this year.  She had some fatigue and shortness of breath and lightheadedness before cardioversion and tells me today that fatigue and shortness of breath with activity are worse since cardioversion and surprised to hear that she is in normal rhythm today.  She cannot put a date to when the symptoms started.  She has a 37.5 year pack history of smoking.  She quit in 1974.  She denies any PND or peripheral edema.  She complains of her legs feeling heavy and is wearing a skirt because her legs are too heavy to put them into a pants.  She walks inside the home as she has fallen twice in the last year when walking outside.  She denies any lightheadedness, but has had one occasion of vertigo since ER visit at [facility] a few weeks ago.  She has a history of vertigo.  Meclizine has helped vertigo episodes.  She is short of breath with walking 400 feet or more or with stairs or if she is carrying something heavy.  She is not short of breath walking room to room.    Cardiographics (personally reviewed):  Results for orders placed during the hospital encounter of 05/03/18   Echo Complete [ECH10] 05/03/2018    Narrative   Normal left ventricular size and systolic function. Moderate left   ventricular hypertrophy is present.    Left ventricle ejection fraction is normal. The calculated left   ventricular ejection fraction is 56%.    Normal right ventricular size and systolic function.    Sclerodegenerative valve disease is present without hemodynamically   significant regurgitation or stenosis.    No previous study for  comparison.          Results for orders placed during the hospital encounter of 06/06/14   NM Pharmacologic Stress Test    Narrative   FINDINGS:  These images demonstrate normal tracer uptake pattern in all   Wall segments.  Gated SPECT demonstrates an ejection fraction greater than 70%   Without wall motion abnormality.    CONCLUSION:  1.  Regadenoson stress.  2.  No evidence of ischemia or infarction by nuclear imaging.  3.  Normal ejection fraction without regional wall motion abnormality.         Results for orders placed or performed in visit on 06/07/18   ECG Clinic - Today   Result Value Ref Range    SYSTOLIC BLOOD PRESSURE  mmHg    DIASTOLIC BLOOD PRESSURE  mmHg    VENTRICULAR RATE 50 BPM    ATRIAL RATE 50 BPM    P-R INTERVAL 180 ms    QRS DURATION 80 ms    Q-T INTERVAL 444 ms    QTC CALCULATION (BEZET) 404 ms    P Axis 42 degrees    R AXIS -56 degrees    T AXIS 36 degrees    MUSE DIAGNOSIS       Sinus bradycardia  Left anterior fascicular block  Anterolateral infarct , age undetermined  Abnormal ECG  When compared with ECG of 24-MAY-2018 14:09,  Sinus rhythm is no longer with A-V dissociation  Anterior infarct is now Present  Anterolateral infarct is now Present            Problem List:  Patient Active Problem List   Diagnosis   ? Mixed hyperlipidemia   ? Essential hypertension   ? Insomnia   ? Migraine Headache   ? Coronary Artery Disease   ? Paroxysmal SVT (supraventricular tachycardia)   ? Intermittent Claudication   ? Malignant neoplasm of skin   ? PAD (peripheral artery disease)   ? Vitamin D deficiency   ? RLS (restless legs syndrome)   ? Persistent atrial fibrillation   ? Sinus bradycardia   ? Vertigo       Physical Examination Review of Systems   Vitals:    06/07/18 0828   BP: 110/62   Pulse: 72   Resp: 16     Body mass index is 22.11 kg/(m^2).  Wt Readings from Last 3 Encounters:   06/07/18 137 lb (62.1 kg)   05/24/18 139 lb (63 kg)   05/15/18 138 lb (62.6 kg)     General Appearance:   Alert,  well-appearing and in no acute distress.   HEENT: Atraumatic, normocephalic.  No scleral icterus, normal conjunctivae; mucous membranes pink and moist.     Chest: Chest symmetric, spine straight.   Lungs:   Respirations unlabored: Lungs are clear to auscultation.   Cardiovascular:   Normal first and second heart sounds with no murmurs, rubs, or gallops.  Regular, regular.  Radial and posterior tibial pulses are intact.  Normal JVD, no edema.       Extremities: No cyanosis or clubbing   Musculoskeletal: Moves all extremities   Skin: Warm, dry, intact.    Neurologic: Mood and affect are appropriate, alert and oriented to person, place, time, and situation    General: WNL  Eyes: WNL  Ears/Nose/Throat: WNL  Lungs: Shortness of Breath  Heart: Arm Pain, Shortness of Breath with activity, Fainting  Stomach: WNL  Bladder: WNL  Muscle/Joints: Muscle Weakness  Skin: WNL  Nervous System: Dizziness  Mental Health: WNL     Blood: WNL       Medical History  Surgical History Family History Social History   Past Medical History:   Diagnosis Date   ? Coronary artery disease    ? Hyperlipidemia    ? Hypertension     Past Surgical History:   Procedure Laterality Date   ? CARDIOVERSION  05/24/2018   ? CORONARY ANGIOPLASTY     ? [place] APPENDECTOMY      Description: Appendectomy;  Recorded: 11/13/2008;   ? [place] MASTECTOMY, SIMPLE, COMPLETE      Description: Breast Surgery Simple Mastectomy;  Recorded: 08/25/2009;   ? [place] REMOVAL OF OVARY(S)      Description: Oophorectomy;  Recorded: 08/25/2009;   ? [place] REVISE SECONDARY VARICOSITY      Description: Varicose Vein Ligation;  Recorded: 11/13/2008;   ? [place] TOTAL ABDOM HYSTERECTOMY      Description: Hysterectomy;  Recorded: 11/13/2008;    Family History   Problem Relation Age of Onset   ? Adopted: Yes   ? Family history unknown: Yes    Social History     Social History   ? Marital status: Single     Spouse name: N/A   ? Number of children: N/A   ? Years of education: N/A     Occupational History   ?  Not on file.     Social History Main Topics   ? Smoking status: Former Smoker     Packs/day: 1.50     Years: 25.00     Quit date: 1/1/1974   ? Smokeless tobacco: Never Used   ? Alcohol use No   ? Drug use: No   ? Sexual activity: Not on file     Other Topics Concern   ? Not on file     Social History Narrative    She lives alone in a house. She has 3 children and 8 grandchildren and multiple great grandchildren. She is retired. 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 She does not smoke cigarettes and rarely drinks alcohol.          Medications  Allergies   Current Outpatient Prescriptions   Medication Sig Dispense Refill   ? apixaban (ELIQUIS) 5 mg Tab tablet Take 1 tablet (5 mg total) by mouth 2 (two) times a day. 180 tablet prn   ? atorvastatin (LIPITOR) 40 MG tablet TAKE 1 TABLET BY MOUTH DAILY 90 tablet 1   ? cholecalciferol, vitamin D3, 5,000 unit Tab Take by mouth. Take 4 days a week     ? ibuprofen (ADVIL,MOTRIN) 200 MG tablet Take 200 mg by mouth every 6 (six) hours as needed for pain.     ? lactobacillus acidophilus (ACIDOPHILUS) cap Take as directed     ? meclizine (ANTIVERT) 12.5 mg tablet Take 12.5 mg by mouth 3 (three) times a day as needed (take 1-2 tablets three times daily as needed).     ? nitroglycerin (NITROSTAT) 0.3 MG SL tablet Place 1 tablet (0.3 mg total) under the tongue every 5 (five) minutes as needed for chest pain. 90 tablet 12   ? ramipril (ALTACE) 10 MG capsule TAKE 1 CAPSULE BY MOUTH TWICE DAILY (EVERY 12 HOURS) 180 capsule 1   ? zolpidem (AMBIEN) 10 mg tablet Take 0.5-1 tablets (5-10 mg total) by mouth at bedtime as needed for sleep. 30 tablet 5     No current facility-administered medications for this visit.       Allergies   Allergen Reactions   ? Losartan Other (See Comments)     hallucinations      Medical, surgical, family, social history, and medications were all reviewed and updated as necessary.   Lab Results    Chemistry CBC/INR  CHOLESTROL   Lab Results   Component Value Date    CREATININE 0.88 04/16/2018    BUN 12 04/16/2018     04/16/2018    K 4.4 04/16/2018     04/16/2018    CO2 26 04/16/2018     Creatinine (mg/dL)   Date Value   04/16/2018 0.88   10/25/2017 0.83   02/28/2017 0.83   12/09/2015 0.95     No results found for: BNP Lab Results   Component Value Date    WBC 8.1 04/16/2018    HGB 14.8 04/16/2018    HCT 42.7 04/16/2018    MCV 91 04/16/2018     04/16/2018     No results found for: INR   Lab Results   Component Value Date    CHOL 138 04/16/2018    HDL 41 (L) 04/16/2018    LDLCALC 76 04/16/2018    TRIG 106 04/16/2018          Greater than than 25 minutes were spent face to face in this visit discussing diagnoses as listed above, counseling, and coordination of care.    This note has been dictated using voice recognition software. Any grammatical, typographical, or context distortions are unintentional and inherent to the software.    Leida Gee RN,  [facility]   Electrophysiology  [phone]

## 2021-06-26 NOTE — TELEPHONE ENCOUNTER
Any Diaz,     I spoke with patient's daughter, Ginna today. She stated her mother's hearing is getting much worse. Patient agreeable to see Audiology at this time. I am not sure if patient needs a referral, so thought I would send you a note. Please advise.     Thanks,     Dave Myhre, RN  CCC RN

## 2021-06-28 NOTE — PROGRESS NOTES
Progress Notes by Kristofer Martinez PT at 3/12/2020 11:00 AM     Author: Kristofer Martinez PT Service: -- Author Type: Physical Therapist    Filed: 3/12/2020 11:58 AM Encounter Date: 3/12/2020 Status: Attested    : Kristofer Martinez PT (Physical Therapist) Cosigner: Win Moreno DO at 3/12/2020 12:09 PM    Attestation signed by Win Moreno DO at 3/12/2020 12:09 PM                         [facility] Certification Request    March 12, 2020      Patient: Deloris Larson  MR Number: [id number]  YOB: 1930  Date of Visit: 3/12/2020      Dear Dr. Win Moreno:    Thank you for this referral.   We are seeing Deloris Larson in Physical Therapy for low back pain.    Medicare and/or Medicaid requires physician review and approval of the treatment plan. Please review the plan of care and verify that you agree with the therapy plan of care by co-signing this note.      Plan of Care  Authorization / Certification Start Date: 03/12/20  Authorization / Certification End Date: 06/10/20  Authorization / Certification Number of Visits: 8  Communication with: Referral Source  Patient Related Instruction: Nature of Condition;Treatment plan and rationale;Self Care instruction;Basis of treatment;Body mechanics;Posture;Precautions;Next steps;Expected outcome  Times per Week: 1-2  Number of Weeks: 6  Number of Visits: 8  Therapeutic Exercise: ROM;Stretching;Strengthening  Neuromuscular Reeducation: core  Manual Therapy: soft tissue mobilization;myofascial release      Goals:  Pt. will be independent with home exercise program in : 6 weeks  Patient will twist/turn : in bed;with no pain;in 6 weeks  Patient will transfer: sit/stand;supine/sit;for in/out of bed;for in/out of chair;with no pain;in 6 weeks    Patient will decrease : MAL score;for improved quality of life;in 6 weeks;by _ points  by ___ points: >= 30% from eval  Patient will show increased : strength for ____  Patient will  show increased strength for : lifting LE to put pants on without diffiuclty in 6 weeks.        If you have any questions or concerns, please don't hesitate to call.    Sincerely,      Kristofer Martinez, PT        Physician recommendation:     ___ Follow therapist's recommendation        ___ Modify therapy      *Physician co-signature indicates they certify the need for these services furnished within this plan and while under their care.    [facility]   Initial Evaluation    Patient Name: Deloris Larson  Date of evaluation: 3/12/2020  PRECAUTIONS: PACEMAKER, PAD  Referral Diagnosis: Acute bilateral low back pain without sciatica   Referring provider: Win Moreno DO  Visit Diagnosis:     ICD-10-CM    1. Acute midline low back pain without sciatica  M54.5    2. Decreased ROM of lumbar spine  M53.86    3. Abdominal weakness  R19.8        Assessment:      Pt. is appropriate for skilled PT intervention as outlined in the Plan of Care (POC).  Pt. is a good candidate for skilled PT services to improve pain levels and function.  Goals and POC established in collaboration with the patient.    Pt presents to PT with acute midline low back pain with movement coordination impairments and consider L gluteal tightness. Question the relationship between previous L gluteal hemotoma in January, creating spinal stabilizer inhibition, leading to patient's current pain and impairments.    Goals:  Pt. will be independent with home exercise program in : 6 weeks  Patient will twist/turn : in bed;with no pain;in 6 weeks  Patient will transfer: sit/stand;supine/sit;for in/out of bed;for in/out of chair;with no pain;in 6 weeks    Patient will decrease : MAL score;for improved quality of life;in 6 weeks;by _ points  by ___ points: >= 30% from eval  Patient will show increased : strength for ____  Patient will show increased strength for : lifting LE to put pants on without diffiuclty in 6 weeks.      Patient's  "expectations/goals are realistic.    Barriers to Learning or Achieving Goals:  Age.        Plan / Patient Instructions:        Plan of Care:   Authorization / Certification Start Date: 03/12/20  Authorization / Certification End Date: 06/10/20  Authorization / Certification Number of Visits: 8  Communication with: Referral Source  Patient Related Instruction: Nature of Condition;Treatment plan and rationale;Self Care instruction;Basis of treatment;Body mechanics;Posture;Precautions;Next steps;Expected outcome  Times per Week: 1-2  Number of Weeks: 6  Number of Visits: 8  Therapeutic Exercise: ROM;Stretching;Strengthening  Neuromuscular Reeducation: core  Manual Therapy: soft tissue mobilization;myofascial release      Plan for next visit: Review HEP. Progress supine core ex and add supine PIRI IR stretch as able.     Subjective:       History of Present Illness:    Deloris Freeman is a 89 y.o. female who presents to therapy today with complaints of acute midline low back pain.   Onset: Feb 2020- denies specific injury or incident, \"but felt sprained.\" Occasional twinges, but does feel to be getting much better already.  Duration: Intermittent- improved from constant before  Worse with turning over in bed, sit>stand, supine<>sit, lifting legs to get pants on (she reports this is limited by stiffness in the legs)  Better with Tylenol  Pain Medication: use PRN  Sleep: Denies waking due to pain    Denies previous low back surgeries.    From PCP visit on 3/2/20: \"Today, the patient reports she continues to have mild, aching, back pain in a band-like distribution across the low back. It is nothing like her pain from the retroperitoneal hematoma in January, which was in the left gluteal region and felt she \"was having another baby.\" Her current back pain is exacerbated by transitional movements and improved by Tylenol 1000 mg 2-3 times daily or by using a hand-knitted shawl to warm her back. She did not fill the Cyclobenzaprine " "prescription from the ED because she was concerned about potential side effects and she wanted to talk to her primary doctor before using it. The patient denies numbness, tingling, bladder/bowel incontinence.\"    Pt seeks PT to \"get help with back pain, to help with stiffness in my feet and legs.\"    Pain Rating:3  Pain rating at best: 2  Pain rating at worst: 8  Pain description: aching, pain and sharp     Objective:      Patient Outcome Measures :    Modified Oswestry Low Back Pain Disablity Questionnaire  in %: 32     Scores range from 0-100%, where a score of 0% represents minimal pain and maximal function. The minimal clinically important difference is a score reduction of 12%.     PER EMR:  EXAM: CT LUMBAR SPINE WO CONTRAST  LOCATION: [facility]  DATE/TIME: 2/28/2020 9:39 AM     INDICATION: Back pain or radiculopathy, < 6 weeks, uncomplicated. 89-year-old female with pacemaker. Recent history of left psoas hematoma. Patient now presents with lumbar pain. Concern for possible compression fracture versus recurrence of psoas   hematoma.  COMPARISON: Hip CT 1/19/2020, abdomen pelvis CT 6/24/2018, lumbar spine MRI 12/15/2015.  TECHNIQUE: Routine without IV contrast. Multiplanar reformats. Dose reduction techniques were used.     FINDINGS:   Implanted electronic cardiac pacing or defibrillating device noted. Pleural effusion at the right lung base partially imaged. Nomenclature is based on 5 lumbar type vertebral bodies. Normal vertebral body heights. Small Schmorl's nodes within the   inferior L1, L2 and superior L3 endplates. No acute fracture. No retroperitoneal hematoma is evident along the visualized psoas muscle. Right renal cyst. Prior cholecystectomy. Atherosclerotic calcification.     T12-L1: Mild disc bulge and facet arthropathy. No stenosis.     L1-L2: Mild disc bulge and facet arthropathy. Mild lateral recess narrowing. No significant central canal or foraminal stenosis.     L2-L3: Mild disc " bulge and facet arthropathy. Mild spinal canal narrowing. Slight foraminal narrowing.     L3-L4: Mild to moderate disc bulge. Mild facet arthropathy. Moderate lateral recess and mild spinal canal stenosis. Mild left and mild to moderate right foraminal narrowing.     L4-L5: Mild disc bulge and facet arthropathy. Ligamentum flavum redundancy contributes to probably moderate spinal canal stenosis. Mild to moderate left and mild right foraminal narrowing.     L5-S1: Mild disc bulge. Moderate to advanced right and moderate left facet arthropathy. Mild lateral recess narrowing. Moderate right and mild left foraminal narrowing.     IMPRESSION:   1.  Pleural effusion at the right lung base is partially imaged.  2.  No compression deformity or acute fracture is evident. No retroperitoneal hematoma is visible.  3.  Degenerative changes without severe stenosis. There is probably moderate lateral recess narrowing at L3-L4 and moderate spinal canal narrowing at L4-L5 with less pronounced spinal canal narrowing elsewhere.  4.  Neural foraminal narrowing is moderate on the right at L5-S1 and more mild to moderate elsewhere, as detailed above.    Examination  1. Acute midline low back pain without sciatica     2. Decreased ROM of lumbar spine     3. Abdominal weakness       Involved side: Bilateral  Posture Observation:      General sitting posture is  fair.  General standing posture is fair.    Lumbar ROM: All % of WNL  Date:      *Indicate scale AROM AROM AROM   Lumbar Flexion 100     Lumbar Extension 50      Right Left Right Left Right Left   Lumbar Sidebending 75 75       Lumbar Rotation 75 75       Thoracic Flexion      Thoracic Extension      Thoracic Sidebending         Thoracic Rotation           SLR: 65* bilat  Hip PROM: Mild loss t/o all motions. Notes increased sensitivity with testing on L LE, which she relates to recent hemotoma.  Palpation and PIVM: Moderate to significant TTP L superior glute/piriformis. Denies TTP  lumbar paraspinals. PIVM not assessed      Treatment Today     TREATMENT MINUTES COMMENTS   Evaluation 20 Discussed therapy diagnosis, prognosis, POC, relevant anatomy and basis for tx.   Self-care/ Home management     Manual therapy     Neuromuscular Re-education     Therapeutic Activity     Therapeutic Exercises 25 Exercises:  Exercise #1: PPT - H  Comment #1: 10x5 sec (plan to progress as able)  Exercise #2: Bridges - H  Comment #2: 5x3 sec (limited by R hamstring cramping)  Exercise #3: SKTC; LTR stretches - H  Comment #3: 2x30 sec bilat; 10x2 sec bilat       Gait training     Modality__________________                Total 45    Blank areas are intentional and mean the treatment did not include these items.            PT Evaluation Code: (Please list factors)  Patient History/Comorbidities: age  Examination: Acute LBP  Clinical Presentation: Stable  Clinical Decision Making: Low    Patient History/  Comorbidities Examination  (body structures and functions, activity limitations, and/or participation restrictions) Clinical Presentation Clinical Decision Making (Complexity)   No documented Comorbidities or personal factors 1-2 Elements Stable and/or uncomplicated Low   1-2 documented comorbidities or personal factor 3 Elements Evolving clinical presentation with changing characteristics Moderate   3-4 documented comorbidities or personal factors 4 or more Unstable and unpredictable High           Kristofer Martinez  3/12/2020  8:54 AM

## 2021-06-28 NOTE — PROGRESS NOTES
Progress Notes by Joby Vargas MD at 1/31/2020  4:10 PM     Author: Joby Vargas MD Service: -- Author Type: Physician    Filed: 1/31/2020  4:40 PM Encounter Date: 1/31/2020 Status: Signed    : Joby Vargas MD (Physician)           Ms. Deloris Larson is referred by Dr. Moreno to the [facility] team to evaluate recent admit.      Assessment/Recommendations   Assessment:    1.  Hypertension.  2.  Dyslipidemia.  3.  Coronary artery disease as outlined above.  4.  Paroxysmal atrial fibrillation.  5.  Paroxysmal supraventricular tachycardia other than atrial fibrillation.  6.  Intermittent claudication.  7.  Sinus bradycardia.  8.  History of migraine headaches.  9.  Mixed dyslipidemia.    Plan:  1. Add furosemide 20 in am, advised to trial 3-5 days as needed for edema.  2. Continue with eliquis  3. Walking as tolerated       History of Present Illness/Subjective    Ms. Deloris Larson is a 89 y.o. female with atrial fibrillation.  Pt new to me, seen many years ago for a coronary angiogram.  Coronary atherosclerosis found but not requiring PCI.   Hx Afib, with SSS.  2018 pacer placed.  Parox a fib occasionally since.  On eliquis A/C.    LDL 86  Pt admitted for spontaneous retroperitoneal hematoma.  Eliquis held but has been restarted.  Moved to telemetry.  Now at home.  Feels much better.  Occasion chest tight shortness of breath dizzy.  Pacer interrogated and adjusted.  Chronic atrial fibrillation.      ECG: Personally reviewed. .    ECHOCARDIOGRAM: 2019 normal LVEF, moderate LVH, mild AS.      Physical Examination Review of Systems   Vitals:    01/31/20 1548   BP: 126/66   Pulse: 80   Resp: 16     Body mass index is 23.3 kg/m .  Wt Readings from Last 5 Encounters:   01/31/20 140 lb (63.5 kg)   01/27/20 142 lb 9.6 oz (64.7 kg)   01/23/20 142 lb 9.6 oz (64.7 kg)   01/19/20 141 lb 8 oz (64.2 kg)   01/09/20 136 lb 6 oz (61.9 kg)     BP Readings from Last 5 Encounters:   01/31/20  126/66   01/27/20 92/50   01/23/20 104/61   01/21/20 147/65   01/09/20 142/68     Pulse Readings from Last 5 Encounters:   01/31/20 80   01/27/20 70   01/23/20 74   01/21/20 86   01/09/20 60       General:   Alert, appears stated age and cooperative  normocephalic, without obvious abnormality   ENT/Mouth: Membranes moist, no oral lesions or bleeding gums.      EYES:  No scleral icterus, normal conjunctivae   Neck: No carotid bruits or thyromegaly   Chest/Lungs:   Lungs are clear to auscultation, no rales or wheezing,    Cardiovascular:   irRegular. Normal first and second heart sounds with no murmurs, rubs, or gallops; No edema bilaterally    Abdomen:  Normal bowel tones   Extremities: No cyanosis or clubbing, pulses intact   Skin: Color, texture normal.    Neurologic: No focal neurologic deficits          General: Weight Loss  Eyes: WNL  Ears/Nose/Throat: WNL  Lungs: Cough, Shortness of Breath  Heart: Chest Pain, Shortness of Breath with activity, Irregular Heartbeat, Leg Swelling  Stomach: Diarrhea  Bladder: WNL  Muscle/Joints: Joint Pain, Muscle Pain  Skin: WNL  Nervous System: Dizziness, Loss of Balance  Mental Health: Depression, Anxiety     Blood: Easy Bleeding     Medical History  Surgical History Family History Social History   Past Medical History:   Diagnosis Date   ? Coronary artery disease    ? Hyperlipidemia    ? Hypertension    ? PAD (peripheral artery disease) (H) 10/19/2015   ? Paroxysmal SVT (supraventricular tachycardia) (H)     Created by Conversion    ? Persistent atrial fibrillation 5/9/2018    New diagnosis April 16 18th and found incidentally on physical exam during yearly exam in primary clinic [id number] score of 5-new Eliquis start   ? RLS (restless legs syndrome) 2/28/2017    Past Surgical History:   Procedure Laterality Date   ? CARDIOVERSION  05/24/2018   ? CHOLECYSTECTOMY     ? CORONARY ANGIOPLASTY     ? CV CORONARY ANGIOGRAM N/A 6/26/2018    Procedure: Coronary Angiogram;  Surgeon:  Jboy Vargas MD;  Location: [facility];  Service:    ? EP PACEMAKER INSERT N/A 2018    Procedure: EP Pacemaker Insertion;  Surgeon: Osito Alvarez MD;  Location: [facility];  Service:    ? INGUINAL HERNIA REPAIR Right     Indirect- Dr. Pedersen   ? MA APPENDECTOMY      Description: Appendectomy;  Recorded: 2008;   ? MA MASTECTOMY, SIMPLE, COMPLETE      Description: Breast Surgery Simple Mastectomy;  Recorded: 2009;   ? MA REMOVAL OF OVARY(S)      Description: Oophorectomy;  Recorded: 2009;   ? MA REVISE SECONDARY VARICOSITY      Description: Varicose Vein Ligation;  Recorded: 2008;   ? MA TOTAL ABDOM HYSTERECTOMY      Description: Hysterectomy;  Recorded: 2008;   ? TUBAL LIGATION      Family History   Adopted: Yes   Family history unknown: Yes    Social History     Socioeconomic History   ? Marital status: Single     Spouse name: Not on file   ? Number of children: Not on file   ? Years of education: Not on file   ? Highest education level: Not on file   Occupational History   ? Not on file   Social Needs   ? Financial resource strain: Not on file   ? Food insecurity:     Worry: Not on file     Inability: Not on file   ? Transportation needs:     Medical: Not on file     Non-medical: Not on file   Tobacco Use   ? Smoking status: Former Smoker     Packs/day: 1.50     Years: 25.00     Pack years: 37.50     Last attempt to quit: 1974     Years since quittin.1   ? Smokeless tobacco: Never Used   Substance and Sexual Activity   ? Alcohol use: No   ? Drug use: No   ? Sexual activity: Not on file   Lifestyle   ? Physical activity:     Days per week: Not on file     Minutes per session: Not on file   ? Stress: Not on file   Relationships   ? Social connections:     Talks on phone: Not on file     Gets together: Not on file     Attends Congregational service: Not on file     Active member of club or organization: Not on file     Attends meetings of clubs or  organizations: Not on file     Relationship status: Not on file   ? Intimate partner violence:     Fear of current or ex partner: Not on file     Emotionally abused: Not on file     Physically abused: Not on file     Forced sexual activity: Not on file   Other Topics Concern   ? Not on file   Social History Narrative    She lives alone in a house. She has 3 children and 8 grandchildren and multiple great grandchildren. She is retired. 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 She does not smoke cigarettes and rarely drinks alcohol.          Medications  Allergies   Current Outpatient Medications   Medication Sig Dispense Refill   ? acetaminophen (TYLENOL) 500 MG tablet Take 2 tablets (1,000 mg total) by mouth 3 (three) times a day.  0   ? apixaban (ELIQUIS) 2.5 mg Tab tablet Take 1 tablet (2.5 mg total) by mouth 2 (two) times a day. 180 tablet 3   ? atorvastatin (LIPITOR) 20 MG tablet Take 1 tablet (20 mg total) by mouth daily. (Patient taking differently: Take 20 mg in the morning and 10 mg in the evening) 90 tablet 3   ? b complex vitamins tablet Take 1 tablet by mouth daily.     ? cholecalciferol, vitamin D3, 5,000 unit Tab Take 1 tablet by mouth. Take 4 days a week. MON, Wed, Fri, Sat     ? lidocaine 4 % patch Place 1 patch on the skin daily for 10 days. Remove and discard patch with 12 hours or as directed by MD. 10 patch 0   ? metoprolol succinate (TOPROL-XL) 50 MG 24 hr tablet Take 1 tablet (50 mg total) by mouth daily. 90 tablet 3   ? nitroglycerin (NITROSTAT) 0.3 MG SL tablet Place 1 tablet (0.3 mg total) under the tongue every 5 (five) minutes as needed for chest pain. 90 tablet 12   ? omeprazole (PRILOSEC) 20 MG capsule Take 1 capsule (20 mg total) by mouth daily before breakfast for 10 days. 10 capsule 0   ? oxyCODONE (ROXICODONE) 5 MG immediate release tablet Take 1-2 tablets (5-10 mg total) by mouth every 4 (four) hours as needed. 13 tablet 0   ? petrolatum,  white-water (VANICREAM LITE) Lotn Apply 1 application topically. Apply topically daily for dry itching skin. Pt reports use 4-5 times/week.     ? ramipril (ALTACE) 10 MG capsule TAKE 1 CAPSULE BY MOUTH TWICE DAILY (EVERY 12 HOURS) 180 capsule 3   ? simethicone (MYLICON) 125 MG chewable tablet Chew 1 tablet (125 mg total) every 6 (six) hours as needed for flatulence.  0   ? tetrahydrozoline (VISINE) 0.05 % ophthalmic solution Administer 1 drop to both eyes. Instill 1 drops into both eyes daily in the morning for dry eyes. Pt reports use 3-4 times/week.     ? triamcinolone (KENALOG) 0.1 % cream Apply topically 2 (two) times a day as needed. 80 g 5   ? zolpidem (AMBIEN) 10 mg tablet Take 1 tablet (10 mg total) by mouth at bedtime as needed for sleep. 10 tablet 0     No current facility-administered medications for this visit.     Allergies   Allergen Reactions   ? Sleeping [Diphenhydramine Hcl]      halluactions          Lab Results    Chemistry/lipid CBC Cardiac Enzymes/BNP/TSH/INR   Lab Results   Component Value Date    CHOL 138 04/16/2018    HDL 41 (L) 04/16/2018    LDLCALC 76 04/16/2018    TRIG 106 04/16/2018    CREATININE 0.86 01/24/2020    BUN 21 01/24/2020    K 3.4 (L) 01/24/2020     01/24/2020     (H) 01/24/2020    CO2 23 01/24/2020    Lab Results   Component Value Date    WBC 9.2 01/24/2020    HGB 9.1 (L) 01/24/2020    HCT 28.6 (L) 01/24/2020    MCV 96 01/24/2020     01/24/2020    Lab Results   Component Value Date    TROPONINI 0.01 06/25/2018    TSH 1.84 10/02/2019    INR 1.23 (H) 01/19/2020         Clinical evaluation time today including exam 30 minutes.  At least 50% of clinic evaluation time involved in assessment and patient counseling.  Part of this chart was created using a dictation software.  Typographic errors, word substitutions, and grammatical errors may unintentionally occur.    Joby Vargas M.D.  [facility]

## 2021-06-28 NOTE — PROGRESS NOTES
Progress Notes by Yessenia Chao, RN at 9/27/2019  9:20 AM     Author: Yessenia Chao, RN Service: -- Author Type: Registered Nurse    Filed: 9/30/2019 10:45 AM Encounter Date: 9/27/2019 Status: Signed    : Yessenia Chao RN (Registered Nurse)       In clinic device check with Device RN.  Please see link for full device report.  Patient was informed of results and next follow up during today's visit.     Patient notes she continues to lose weight and the device is very prominent in her left chest. The can and leads are palpable and visible. The skin is intact and there are no signs of breakdown. I discussed the need to assess it daily, she also notes her daughter comes over once a week and she has her look at it too. She plans to show Dr. Hobbs next week as well. It is not bothersome to her, she uses creams for her skin which helps with discomfort and itching. Advised she call with any signs of skin breakdown, redness, thinning of skin, and sores. She agreed.        Device Report:  Type: In office annual pacemaker check.  Presenting Rhythm: Atrial paced, ventricular sensed (intermittent AS-VS, NSR).  Lead/Battery status: Stable.  Arrhythmias: 7 mode switches, all <1min, short runs of AT. 4 high ventricular arrhythmias; on 9/4/19 at 13:16 she had a 26bt run of NSVT up to 300ms, she denies symptoms. Last ECHO 5/3/18 EF 56%, history of CAD. One other EGM and previous events per notes are all SVT.   Comments: Normal device function. MV increased from 9 to 10 to assist with heart rate variability with activity. Routing NSVT to Dr. Alvarez for review. KADIE

## 2021-06-29 NOTE — PROGRESS NOTES
Progress Notes by Myhre, David J, RN at 10/9/2020 11:00 AM     Author: Myhre, David J, RN Service: -- Author Type: Registered Nurse    Filed: 10/14/2020  3:41 PM Encounter Date: 10/9/2020 Status: Signed    : Myhre, David J, RN (Registered Nurse)       Clinic Care Coordination Contact    Clinic Care Coordination Contact  OUTREACH    Referral Information:  Referral Source: PCP    Primary Diagnosis: Psychosocial    Chief Complaint   Patient presents with   ? Clinic Care Coordination - Initial        Universal Utilization:  Clinic Utilization  Difficulty keeping appointments:: No  Compliance Concerns: No  No PCP office visit in Past Year: No  Utilization    Last refreshed: 10/14/2020  2:38 PM: Hospital Admissions 1           Last refreshed: 10/14/2020  2:38 PM: ED Visits 3           Last refreshed: 10/14/2020  2:38 PM: No Show Count (past year) 0              Current as of: 10/14/2020  2:38 PM          Patient's daughter Ginna was also on today's call.     Clinical Concerns:  Current Medical Concerns: Patient is an 89 year old woman with a history of mixed hyperlipidemia, HTN, insomnia, CAD, persistent atrial fibrillation with cardiac pacemaker in situ, non-rheumatic aortic valve stenosis, malignant neoplasm of colon.   Patient lives in a single family home. Her son and his partner occupy the basement of her home.   Patient stated she recently had a CT scan that found probable colon cancer. She reported she is not willing to follow up with a colonoscopy, because she said she would not have the surgery if indicated at her age. She has agreed to continue to work with her PCP Dr. Salas for symptom management.    Patient stated her primary concern now is to get some help at home with housekeeping and with yard work, as she can no longer do this by herself related to fatigue from her physical changes. Patient additionally stated she would like to have meals delivered to her home. She stated it is getting harder for her  to prepare meals and would like to ensure she is having one good meal a day.    Current Behavioral Concerns: Patient denied any mental concerns. She stated she has good support from her family and friends.    Education Provided to patient:  Discussed the importance of taking her medications daily as directed. Encouraged patient to attend all upcoming appointments. Next appointment wit her PCP is on 11/2/20. Discussed safety methods to implement in the home to prevent falls/injury, Discussed the importance of washing her hands frequently, keeping her hands away from her face, masking in public and engaging in social distancing at this time.   Pain  Pain (GOAL):: No  Health Maintenance Reviewed: Up to date    Medication Management:  Patient manges her medications independently. She denied needing assistance with medication set up or education. She was able to name each medication she is taking, state the reason she is taking each medication and state the correct dosage of each medication. She stated she is always compliant with her medications.     Functional Status:  Dependent ADLs:: Ambulation-cane  Dependent IADLs:: Cleaning, Money Management, Transportation  Bed or wheelchair confined:: No  Mobility Status: Independent w/Device  Fallen 2 or more times in the past year?: No  Any fall with injury in the past year?: No    Living Situation:  Current living arrangement:: I live in a private home with family  Type of residence:: Private home - no stairs   Patient is currently able to navigate the stairs in her home.     Lifestyle & Psychosocial Needs:        Diet:: Regular  Inadequate nutrition (GOAL):: No  Tube Feeding: No  Inadequate activity/exercise (GOAL):: Yes  Significant changes in sleep pattern (GOAL): No  Transportation means:: Family   As noted, would like to have  Meals delivered.   Denied needing [employer] at this time.      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 She said she has good support from her former [employer] family as well as her own family.  Socioeconomic History   ? Marital status:      Spouse name: Not on file   ? Number of children: 3   ? Years of education: Not on file   ? Highest education level: Not on file   Occupational History   ? Occupation: Retired Deacon of [employer]     Tobacco Use   ? Smoking status: Former Smoker     Packs/day: 1.50     Years: 25.00     Pack years: 37.50     Quit date: 1974     Years since quittin.8   ? Smokeless tobacco: Never Used   Substance and Sexual Activity   ? Alcohol use: No   ? Drug use: No          Financial Concerns: No financial concerns currently.       Resources and Interventions:  Current Resources:            Equipment Currently Used at Home: walker, rolling, cane, straight, grab bar, tub/shower   Denied needing any additional DME at this time.         Advance Care Plan/Directive  Advanced Care Plans/Directives on file:: Yes  Type Advanced Care Plans/Directives: Advanced Directive - On File    Referrals Placed: None     Goals:   Goals        General    Functional (pt-stated)     Notes - Note edited  10/14/2020  2:46 PM by Myhre, David J, RN    Goal Statement: I would like to have housekeeping services to assist me with maintaining my home and services to assist me with maintaining my yard in the next 3 months. I would also like to know if I am eligible for any programs that may assist with the cost of these services.   Date Goal set: 10/9/20  Barriers: Patient unable to keep up with her housekeeping related to physical changes.   Strengths: Strong advocate for herself. Supportive family.   Date to Achieve By: 20  Patient expressed understanding of goal: Yes  Action steps to achieve this goal:  1. I speak with the [facility] Magda FALCON at  scheduled phone visit on 10/16/20 to discuss resources and possible programs that may help pay for these services.   2. I will provide any necessary documentation, complete any assessments and complete any paperwork that may be associated with this goal.   3. I will report progress toward this goal at scheduled outreach telephone calls  from the [facility] Community Health Worker, Beatris.         Healthy Eating (pt-stated)     Notes - Note edited  10/14/2020  2:55 PM by Myhre, David J, RN    Goal Statement: I would like to have meals delivered to my home in the next 2 months.   Date Goal set: 10/9/20  Barriers: Diagnosis of malignant neoplasm of the colon  Strengths: Strong advocate for herself. Supportive family.  Date to Achieve By: 12/9/20  Patient expressed understanding of goal: Yes  Action steps to achieve this goal:  1. I will speak with the [facility] LSWMagda at scheduled phone visit on 10/16/20 to discuss in-home meal delivery programs.   2. I will provide any necessary documentation and complete any paperwork that may be associated with this goal in a timely manner.   3. I will report progress towards this goal at scheduled outreach telephone calls from the [facility] CHW.              Patient/Caregiver understanding: Verbalized understanding of goals and other information discussed at today's assessment.        Future Appointments              In 2 days DTN [facility]    In 2 weeks Kassidy Salas MD [facility]          Plan: CCC RN will continue to monitor and will be available as nursing needs arise. [facility] LSW will speak with patient at scheduled phone visit on 10/16/20 to further discuss goals and resources. [facility] CHW will support patient with current goals through scheduled outreach telephone call and will provide resources as needed.

## 2021-07-03 NOTE — ADDENDUM NOTE
Addendum Note by Thor Hdz CNP at 1/27/2020 11:55 AM     Author: Thor Hdz CNP Service: -- Author Type: Nurse Practitioner    Filed: 1/27/2020 12:18 PM Encounter Date: 1/27/2020 Status: Signed    : Thor Hdz CNP (Nurse Practitioner)    Addended by: THOR HDZ on: 1/27/2020 12:18 PM        Modules accepted: Level of Service

## 2021-07-03 NOTE — ADDENDUM NOTE
Addendum Note by Mami Salas MD at 8/25/2020 11:00 AM     Author: Mami Salas MD Service: -- Author Type: Physician    Filed: 8/27/2020 12:53 PM Encounter Date: 8/25/2020 Status: Signed    : Mami Salas MD (Physician)    Addended by: MAMI SALAS on: 8/27/2020 12:53 PM        Modules accepted: Orders

## 2021-07-03 NOTE — ADDENDUM NOTE
Addendum Note by Shey Bravo MD at 4/16/2018  2:11 PM     Author: Shey Bravo MD Service: -- Author Type: Physician    Filed: 4/16/2018  2:11 PM Encounter Date: 4/16/2018 Status: Signed    : Shey Bravo MD (Physician)    Addended by: SHEY BRAVO on: 4/16/2018 02:11 PM        Modules accepted: Orders

## 2021-07-03 NOTE — ADDENDUM NOTE
Addendum Note by Brandon Gamez LPN at 1/27/2021 11:19 AM     Author: Brandon Gamez LPN Service: -- Author Type: Licensed Nurse    Filed: 1/27/2021 11:19 AM Encounter Date: 1/27/2021 Status: Signed    : Brandon Gamez LPN (Licensed Nurse)    Addended by: BRANDON GAMEZ on: 1/27/2021 11:19 AM        Modules accepted: Orders

## 2021-07-03 NOTE — ADDENDUM NOTE
Addendum Note by Mami Salas MD at 8/25/2020 11:00 AM     Author: Mami Salas MD Service: -- Author Type: Physician    Filed: 9/8/2020  2:03 PM Encounter Date: 8/25/2020 Status: Signed    : Mami Salas MD (Physician)    Addended by: MAMI SALAS on: 9/8/2020 02:03 PM        Modules accepted: Orders

## 2021-07-03 NOTE — ADDENDUM NOTE
Addendum Note by Mami Salas MD at 10/16/2020  9:00 AM     Author: Mami Salas MD Service: -- Author Type: Physician    Filed: 10/16/2020 12:07 PM Encounter Date: 10/16/2020 Status: Signed    : Mami Salas MD (Physician)    Addended by: MAMI SALAS on: 10/16/2020 12:07 PM        Modules accepted: Orders

## 2021-07-03 NOTE — ADDENDUM NOTE
Addendum Note by Mami Salas MD at 1/27/2021 12:10 PM     Author: Mami Salas MD Service: -- Author Type: Physician    Filed: 1/27/2021 12:10 PM Encounter Date: 1/27/2021 Status: Signed    : Mami Salas MD (Physician)    Addended by: MAMI SALAS on: 1/27/2021 12:10 PM        Modules accepted: Orders

## 2021-07-04 NOTE — LETTER
Letter by oJby Vargas MD at      Author: Joby Vargas MD Service: -- Author Type: --    Filed:  Encounter Date: 6/17/2021 Status: (Other)         Deloris Larson  [address] [place] [address]      June 17, 2021      Dear Deloris Freeman,    This letter is to remind you that you are overdue for your follow up appointment with Dr. Joby Vargas. To help ensure you are in the best health possible, a regular follow-up with your cardiologist is essential.     Please call our Patient Scheduling Line at [phone] to schedule your appointment at your earliest convenience.  If you have recently scheduled an appointment, please disregard this letter.    We look forward to seeing you again. As always, we are available at the number  above for any questions or concerns you may have.      Sincerely,     The Physicians and Staff of [facility]

## 2021-07-14 PROBLEM — I49.5 SICK SINUS SYNDROME (H): Status: RESOLVED | Noted: 2020-01-28 | Resolved: 2020-09-29

## 2021-07-14 PROBLEM — R00.1 SINUS BRADYCARDIA: Status: RESOLVED | Noted: 2018-06-07 | Resolved: 2020-08-25

## 2021-07-14 PROBLEM — I95.9 HYPOTENSION, UNSPECIFIED HYPOTENSION TYPE: Status: RESOLVED | Noted: 2020-01-20 | Resolved: 2020-02-05

## 2021-07-27 ENCOUNTER — PATIENT OUTREACH (OUTPATIENT)
Dept: NURSING | Facility: CLINIC | Age: 86
End: 2021-07-27

## 2021-07-27 NOTE — PROGRESS NOTES
Clinic Care Coordination Contact  Community Health Worker Follow Up    Goals:       Intervention and Education during outreach: CHW spoke with patients dar Chacko. Ginna let CHW know that when Deloris Freeman was in the hospital for COVID patient was told that scans show she does not have cancer. That goal was completed.     Patients daughter would like to talk with Deloris Freeman more about getting help in the home. Ginna will talk with Deloris Richard about help in the home over the next month.     CHW Plan: CHW will follow up with patients dar Chacko in 1 month on 8/27/21

## 2021-08-11 ENCOUNTER — OFFICE VISIT (OUTPATIENT)
Dept: AUDIOLOGY | Facility: CLINIC | Age: 86
End: 2021-08-11
Payer: MEDICARE

## 2021-08-11 DIAGNOSIS — L98.491: Primary | ICD-10-CM

## 2021-08-11 DIAGNOSIS — H61.21 IMPACTED CERUMEN OF RIGHT EAR: ICD-10-CM

## 2021-08-11 PROCEDURE — 99207 PR NO CHARGE LOS: CPT | Performed by: AUDIOLOGIST

## 2021-08-11 PROCEDURE — V5299 HEARING SERVICE: HCPCS | Performed by: AUDIOLOGIST

## 2021-08-11 NOTE — PROGRESS NOTES
No charge appointment. Otoscopy revealed right cerumen occlusion (left ear otoscopy was unremarkable). Ms. Larson indicated a sore area on the scaphoid fossa/helix portion of her right pinna; skin in that area appeared red and inflamed, with some crusting visible. She indicated she sleeps on her right side and feels that may have caused the injury, but is unable to sleep on her left side due to positional dizziness. She had a previous hearing evaluation through [facility] in 2013, where sensorineural hearing loss was identified (no audiogram was available for review, however).     Testing was deferred until cerumen removal can take place; transducer placement may also be challenging with the pinna damage observed. Schedulers were tasked to contact her at her home phone number on record to schedule with ENT first for ear cleaning/exam of pinna, to be followed by hearing testing with audiology on the same date, if possible. Ms. Larson relies on others for transportation; these appointments will need to be coordinated with her transportation sources.    Ms. Larson expressed verbal understanding of this information and plan.    Kelechi Mcarthur, [facility]  [place] Licensed Audiologist [id number]

## 2021-08-12 ENCOUNTER — PATIENT OUTREACH (OUTPATIENT)
Dept: NURSING | Facility: CLINIC | Age: 86
End: 2021-08-12

## 2021-08-12 NOTE — PROGRESS NOTES
Clinic Care Coordination Contact    PC from patient to confirm appointments scheduled on 9/9/21:    Date: 9/9/2021 Status: Scheduled   Time: 2:20 PM Length: 20   Visit Type: NEW [656] Copay: $0.00   Provider: Yasmine Benítez MD Department: [facility] ENT     Date: 9/9/2021 Status: Scheduled   Time: 2:40 PM Length: 40   Visit Type: ENT CLINIC [4267] Copay: $0.00   Provider: Tanisha Harris AuD Department: [facility] AUDIOLOGY     Next CHW outreach date: CHW will follow up with patients daughter Ginna in 1 month on 9/13/21

## 2021-08-16 ENCOUNTER — PATIENT OUTREACH (OUTPATIENT)
Dept: CARE COORDINATION | Facility: CLINIC | Age: 86
End: 2021-08-16

## 2021-08-16 ENCOUNTER — ANCILLARY PROCEDURE (OUTPATIENT)
Dept: CARDIOLOGY | Facility: CLINIC | Age: 86
End: 2021-08-16
Attending: INTERNAL MEDICINE
Payer: MEDICARE

## 2021-08-16 DIAGNOSIS — Z95.0 CARDIAC PACEMAKER IN SITU: ICD-10-CM

## 2021-08-16 PROCEDURE — 93294 REM INTERROG EVL PM/LDLS PM: CPT | Performed by: INTERNAL MEDICINE

## 2021-08-16 PROCEDURE — 93296 REM INTERROG EVL PM/IDS: CPT | Performed by: INTERNAL MEDICINE

## 2021-08-16 NOTE — TELEPHONE ENCOUNTER
Epic Integration Completed.  Episode visits linked.  Care team updated.  Goals updated/added.  Outreach added

## 2021-08-16 NOTE — PROGRESS NOTES
Clinic Care Coordination Contact    Care Coordination Clinician Chart Review  Situation: Patient chart reviewed by care coordinator.       Background: Care Coordination initial assessment and enrollment to Care Coordination was 10/09/20.   Patient centered goals were developed with participation from patient.  RN  handed patient off to CHW for continued outreach every 30 days.        Assessment: Per chart review, patient outreach completed by  CHW on 7/27.  Patient is not actively working to accomplish goals.  Patient's has completed Goals.  CHW to determine at next outreach if patient to transition to Maintenance or if SW visit it appropriate.   Patient is not due for updated Complex Care Plan.  Annual assessment will be due 10/2021.      Goals    None             Plan/Recommendations: The patient will continue working with Care Coordination  as noted above.  CHW will involve RN or SW as needed or if patient is ready to move to maintenance.  RN will continue to monitor progress and CHW outreaches every 6 weeks.   Care Plan updated and mailed to patient: No

## 2021-08-17 LAB
MDC_IDC_EPISODE_DTM: NORMAL
MDC_IDC_EPISODE_DURATION: 12 S
MDC_IDC_EPISODE_DURATION: 13 S
MDC_IDC_EPISODE_DURATION: 13 S
MDC_IDC_EPISODE_DURATION: 15 S
MDC_IDC_EPISODE_ID: NORMAL
MDC_IDC_EPISODE_TYPE: NORMAL
MDC_IDC_LEAD_IMPLANT_DT: NORMAL
MDC_IDC_LEAD_IMPLANT_DT: NORMAL
MDC_IDC_LEAD_LOCATION: NORMAL
MDC_IDC_LEAD_LOCATION: NORMAL
MDC_IDC_LEAD_LOCATION_DETAIL_1: NORMAL
MDC_IDC_LEAD_LOCATION_DETAIL_1: NORMAL
MDC_IDC_LEAD_MFG: NORMAL
MDC_IDC_LEAD_MFG: NORMAL
MDC_IDC_LEAD_MODEL: NORMAL
MDC_IDC_LEAD_MODEL: NORMAL
MDC_IDC_LEAD_POLARITY_TYPE: NORMAL
MDC_IDC_LEAD_POLARITY_TYPE: NORMAL
MDC_IDC_LEAD_SERIAL: NORMAL
MDC_IDC_LEAD_SERIAL: NORMAL
MDC_IDC_LEAD_SPECIAL_FUNCTION: NORMAL
MDC_IDC_LEAD_SPECIAL_FUNCTION: NORMAL
MDC_IDC_MSMT_BATTERY_DTM: NORMAL
MDC_IDC_MSMT_BATTERY_REMAINING_LONGEVITY: 72 MO
MDC_IDC_MSMT_BATTERY_REMAINING_PERCENTAGE: 100 %
MDC_IDC_MSMT_BATTERY_STATUS: NORMAL
MDC_IDC_MSMT_LEADCHNL_RA_IMPEDANCE_VALUE: 743 OHM
MDC_IDC_MSMT_LEADCHNL_RV_IMPEDANCE_VALUE: 626 OHM
MDC_IDC_MSMT_LEADCHNL_RV_PACING_THRESHOLD_AMPLITUDE: 1.4 V
MDC_IDC_MSMT_LEADCHNL_RV_PACING_THRESHOLD_PULSEWIDTH: 0.4 MS
MDC_IDC_PG_IMPLANT_DTM: NORMAL
MDC_IDC_PG_MFG: NORMAL
MDC_IDC_PG_MODEL: NORMAL
MDC_IDC_PG_SERIAL: NORMAL
MDC_IDC_PG_TYPE: NORMAL
MDC_IDC_SESS_CLINIC_NAME: NORMAL
MDC_IDC_SESS_DTM: NORMAL
MDC_IDC_SESS_TYPE: NORMAL
MDC_IDC_SET_BRADY_AT_MODE_SWITCH_RATE: 170 {BEATS}/MIN
MDC_IDC_SET_BRADY_LOWRATE: 60 {BEATS}/MIN
MDC_IDC_SET_BRADY_MAX_SENSOR_RATE: 130 {BEATS}/MIN
MDC_IDC_SET_BRADY_MODE: NORMAL
MDC_IDC_SET_LEADCHNL_RA_SENSING_ADAPTATION_MODE: NORMAL
MDC_IDC_SET_LEADCHNL_RA_SENSING_SENSITIVITY: 0.15 MV
MDC_IDC_SET_LEADCHNL_RV_PACING_AMPLITUDE: 2 V
MDC_IDC_SET_LEADCHNL_RV_PACING_CAPTURE_MODE: NORMAL
MDC_IDC_SET_LEADCHNL_RV_PACING_POLARITY: NORMAL
MDC_IDC_SET_LEADCHNL_RV_PACING_PULSEWIDTH: 0.4 MS
MDC_IDC_SET_LEADCHNL_RV_SENSING_ADAPTATION_MODE: NORMAL
MDC_IDC_SET_LEADCHNL_RV_SENSING_POLARITY: NORMAL
MDC_IDC_SET_LEADCHNL_RV_SENSING_SENSITIVITY: 2.5 MV
MDC_IDC_SET_ZONE_DETECTION_INTERVAL: 375 MS
MDC_IDC_SET_ZONE_TYPE: NORMAL
MDC_IDC_SET_ZONE_VENDOR_TYPE: NORMAL
MDC_IDC_STAT_BRADY_DTM_END: NORMAL
MDC_IDC_STAT_BRADY_DTM_START: NORMAL
MDC_IDC_STAT_BRADY_RA_PERCENT_PACED: 0 %
MDC_IDC_STAT_BRADY_RV_PERCENT_PACED: 33 %
MDC_IDC_STAT_EPISODE_RECENT_COUNT: 0
MDC_IDC_STAT_EPISODE_RECENT_COUNT: 4
MDC_IDC_STAT_EPISODE_RECENT_COUNT_DTM_END: NORMAL
MDC_IDC_STAT_EPISODE_RECENT_COUNT_DTM_START: NORMAL
MDC_IDC_STAT_EPISODE_TYPE: NORMAL
MDC_IDC_STAT_EPISODE_VENDOR_TYPE: NORMAL

## 2021-09-08 ENCOUNTER — PATIENT OUTREACH (OUTPATIENT)
Dept: NURSING | Facility: CLINIC | Age: 86
End: 2021-09-08

## 2021-09-08 NOTE — PROGRESS NOTES
Clinic Care Coordination Contact    PC from patient with concern with getting her heart medication. Patient said that the pharmacy is needing Dr. Diaz to approve. Patient will call clinic to discuss and get assistance with that.    Next CHW outreach date: CHW will follow up with patients dar Chacko on 10/1/21.

## 2021-09-09 ENCOUNTER — OFFICE VISIT (OUTPATIENT)
Dept: AUDIOLOGY | Facility: CLINIC | Age: 86
End: 2021-09-09
Payer: MEDICARE

## 2021-09-09 ENCOUNTER — OFFICE VISIT (OUTPATIENT)
Dept: OTOLARYNGOLOGY | Facility: CLINIC | Age: 86
End: 2021-09-09
Payer: MEDICARE

## 2021-09-09 DIAGNOSIS — H90.3 SENSORINEURAL HEARING LOSS (SNHL) OF BOTH EARS: Primary | ICD-10-CM

## 2021-09-09 DIAGNOSIS — H61.101 LESION OF RIGHT PINNA: ICD-10-CM

## 2021-09-09 DIAGNOSIS — H90.3 SENSORINEURAL HEARING LOSS, BILATERAL: Primary | ICD-10-CM

## 2021-09-09 DIAGNOSIS — K21.00 GASTROESOPHAGEAL REFLUX DISEASE WITH ESOPHAGITIS WITHOUT HEMORRHAGE: ICD-10-CM

## 2021-09-09 DIAGNOSIS — I48.19 PERSISTENT ATRIAL FIBRILLATION (H): Primary | ICD-10-CM

## 2021-09-09 DIAGNOSIS — E78.5 HYPERLIPIDEMIA, UNSPECIFIED HYPERLIPIDEMIA TYPE: ICD-10-CM

## 2021-09-09 PROCEDURE — 99203 OFFICE O/P NEW LOW 30 MIN: CPT | Performed by: OTOLARYNGOLOGY

## 2021-09-09 PROCEDURE — 92557 COMPREHENSIVE HEARING TEST: CPT | Performed by: AUDIOLOGIST

## 2021-09-09 PROCEDURE — 92567 TYMPANOMETRY: CPT | Performed by: AUDIOLOGIST

## 2021-09-09 PROCEDURE — 99207 PR NO CHARGE LOS: CPT | Performed by: AUDIOLOGIST

## 2021-09-09 RX ORDER — SIMETHICONE 125 MG
1 TABLET,CHEWABLE ORAL
COMMUNITY
Start: 2020-01-21 | End: 2022-11-01

## 2021-09-09 NOTE — TELEPHONE ENCOUNTER
Reason for Call:  Medication or medication refill: 3 medication refills     Do you use a [facility]? NO Name of the pharmacy and phone number for the current request:  [facility] [address] in [place], [phone]    Name of the medication requested:     apixaban ANTICOAGULANT (ELIQUIS) 2.5 mg Tab tablet  Take 1 tablet (2.5 mg total) by mouth 2 (two) times a day. 8/25/2020       atorvastatin (LIPITOR) 20 MG tablet     --   Sig - Route: Take 20 mg by mouth At Bedtime  - Oral     metoprolol succinate ER (TOPROL-XL) 25 MG 24 hr tablet     --   Sig - Route: Take 25 mg by mouth daily  - Oral     Other request: Patient had an office visit with Dr Diaz on 05/24/2021. Patient didn't realize she no showed her appointment 08/24/2021. [facility]     Can we leave a detailed message on this number? YES    Phone number patient can be reached at: Home number on file [phone] (home)    Best Time: ANY    Call taken on 9/9/2021 at 11:35 AM by Gifty Kearney

## 2021-09-09 NOTE — TELEPHONE ENCOUNTER
Gifty from pharmacy calling to request a refill on  Omeprazole 20 cap daily  Pharmacy   [facility]   [phone]

## 2021-09-09 NOTE — LETTER
9/9/2021         RE: Deloris Larson  [address]  [place] [address]        Dear Colleague,    Thank you for referring your patient, Deloris Larson, to the [facility]. Please see a copy of my visit note below.    HPI: This patient is a 89yo F who presents for evaluation of hearing at the request of Dr. Diaz. There has been a gradual loss of hearing over the past several years in both ears. Denies otalgia, otorrhea, vertigo, and other major medical issues. She additionally reports a non-healing ulcer on her right outer ear, present for at least months.    Past medical history, surgical history, social history, family history, medications, and allergies have been reviewed with the patient and are documented above.    Review of Systems: a 10-system review was performed. Pertinent positives are noted in the HPI and on a separate scanned document in the chart.    PHYSICAL EXAMINATION:  GEN: no acute distress, normocephalic  EYES: extraocular movements are intact, pupils are equal and round. Sclera clear.   EARS: auricles are normally formed. There is a crusted lesion on the right antihelix on a raised base with telangiectasias, most-likely a BCC. The external auditory canals are clear with minimal cerumen. Tympanic membranes are intact bilaterally with no signs of infection, effusion, retractions, or perforations.  NOSE: anterior nares are patent.   NECK: soft and supple. No lymphadenopathy or masses. Airway is midline.  NEURO: CN VII and XII symmetric. alert and oriented. No spontaneous nystagmus. Gait is normal.  PULM: breathing comfortably on room air, normal chest expansion with respiration  CARDS: no cyanosis or clubbing, normal carotid pulses    AUDIOGRAM: mild to severe SNHL bilaterally with type A tymps, WRS 88/92    MEDICAL DECISION-MAKING: This patient is a 89yo F with sensorineural hearing loss. Discussed hearing protection. Medically clear for hearing aids  should the patient desire them. As far as the lesion is concerned, discussed doing a biopsy here vs Dermatology referral for probable MOHs. We all agree that Derm is likely the better route for her, so a referral was made.        Again, thank you for allowing me to participate in the care of your patient.        Sincerely,        Yasmine Benítez MD

## 2021-09-09 NOTE — PROGRESS NOTES
AUDIOLOGY REPORT    SUMMARY: Audiology visit completed. See audiogram for results. Abuse screening not completed due to same day appt with ENT clinic, where this is addressed.      RECOMMENDATIONS: Follow-up with ENT.      Kelechi Mcarthur, [facility]  [place] Licensed Audiologist [phone]

## 2021-09-09 NOTE — PROGRESS NOTES
HPI: This patient is a 89yo F who presents for evaluation of hearing at the request of Dr. Diaz. There has been a gradual loss of hearing over the past several years in both ears. Denies otalgia, otorrhea, vertigo, and other major medical issues. She additionally reports a non-healing ulcer on her right outer ear, present for at least months.    Past medical history, surgical history, social history, family history, medications, and allergies have been reviewed with the patient and are documented above.    Review of Systems: a 10-system review was performed. Pertinent positives are noted in the HPI and on a separate scanned document in the chart.    PHYSICAL EXAMINATION:  GEN: no acute distress, normocephalic  EYES: extraocular movements are intact, pupils are equal and round. Sclera clear.   EARS: auricles are normally formed. There is a crusted lesion on the right antihelix on a raised base with telangiectasias, most-likely a BCC. The external auditory canals are clear with minimal cerumen. Tympanic membranes are intact bilaterally with no signs of infection, effusion, retractions, or perforations.  NOSE: anterior nares are patent.   NECK: soft and supple. No lymphadenopathy or masses. Airway is midline.  NEURO: CN VII and XII symmetric. alert and oriented. No spontaneous nystagmus. Gait is normal.  PULM: breathing comfortably on room air, normal chest expansion with respiration  CARDS: no cyanosis or clubbing, normal carotid pulses    AUDIOGRAM: mild to severe SNHL bilaterally with type A tymps, WRS 88/92    MEDICAL DECISION-MAKING: This patient is a 89yo F with sensorineural hearing loss. Discussed hearing protection. Medically clear for hearing aids should the patient desire them. As far as the lesion is concerned, discussed doing a biopsy here vs Dermatology referral for probable MOHs. We all agree that Derm is likely the better route for her, so a referral was made.

## 2021-09-09 NOTE — TELEPHONE ENCOUNTER
"Please sign pended refill if appropriate. Per the below call, \"Patient had an office visit with Dr Diaz on 05/24/2021. Patient didn't realize she no showed her appointment 08/24/2021.\"    Refill Request  Medication names: Pending Prescriptions:                       Disp   Refills    apixaban ANTICOAGULANT (ELIQUIS) 2.5 MG t*180 ta*0            Sig: Take 1 tablet (2.5 mg) by mouth 2 times daily    atorvastatin (LIPITOR) 20 MG tablet       90 tab*0            Sig: Take 1 tablet (20 mg) by mouth At Bedtime    metoprolol succinate ER (TOPROL-XL) 25 MG*90 tab*0            Sig: Take 1 tablet (25 mg) by mouth daily      Who prescribed the medications: Former Primary [facility]  Last refills on medications: 8/25/20; 8/25/20; 3/12/21  Requested Pharmacy: [facility]  Last appointment with PCP: 5/24/21  Next appointment: Patient due for appointment        "

## 2021-09-10 DIAGNOSIS — E78.5 HYPERLIPIDEMIA: ICD-10-CM

## 2021-09-10 DIAGNOSIS — I48.19 PERSISTENT ATRIAL FIBRILLATION (H): Primary | ICD-10-CM

## 2021-09-10 RX ORDER — ATORVASTATIN CALCIUM 20 MG/1
20 TABLET, FILM COATED ORAL AT BEDTIME
Qty: 90 TABLET | Refills: 1 | Status: SHIPPED | OUTPATIENT
Start: 2021-09-10 | End: 2021-10-08

## 2021-09-10 RX ORDER — METOPROLOL SUCCINATE 25 MG/1
25 TABLET, EXTENDED RELEASE ORAL DAILY
Qty: 90 TABLET | Refills: 1 | Status: SHIPPED | OUTPATIENT
Start: 2021-09-10 | End: 2021-09-14

## 2021-09-10 RX ORDER — METOPROLOL SUCCINATE 25 MG/1
12.5 TABLET, EXTENDED RELEASE ORAL DAILY
Qty: 45 TABLET | Refills: 1 | Status: SHIPPED | OUTPATIENT
Start: 2021-09-10 | End: 2021-09-14

## 2021-09-14 ENCOUNTER — TELEPHONE (OUTPATIENT)
Dept: CARDIOLOGY | Facility: CLINIC | Age: 86
End: 2021-09-14

## 2021-09-14 DIAGNOSIS — I48.19 PERSISTENT ATRIAL FIBRILLATION (H): ICD-10-CM

## 2021-09-14 RX ORDER — METOPROLOL SUCCINATE 25 MG/1
25 TABLET, EXTENDED RELEASE ORAL DAILY
Qty: 90 TABLET | Refills: 1 | Status: SHIPPED | OUTPATIENT
Start: 2021-09-14 | End: 2021-10-08

## 2021-09-14 NOTE — TELEPHONE ENCOUNTER
----- Message from Yudi Fuentes RN sent at 9/14/2021  2:00 PM CDT -----  Regarding: FW: GAG  pt  Merle Young- would you be able to give Jada a call back to discuss metoprolol sent in by you 9/10?  It looks like there are two different prescriptions sent in one by you and one by PCP.      Per TRACEY's last office visit 5/7/21 it appears pt should be on 25mg daily, but I'm not sure if you found more recent information and therefore refilled as 12.5mg daily?    Thanks,    Yudi  ----- Message -----  From: Jie Hare  Sent: 9/14/2021  12:23 PM CDT  To: [facility] Ep Support [facility]  Subject: GAG  pt                                          General phone call:    Caller: [facility] - Jada  Primary cardiologist: GAG  Detailed reason for call: Clarify Metoprolol - she has a couple  Best phone number: [phone]  Best time to contact: any  Ok to leave a detailedmessage? yes      Additional Info:

## 2021-10-01 ENCOUNTER — PATIENT OUTREACH (OUTPATIENT)
Dept: NURSING | Facility: CLINIC | Age: 86
End: 2021-10-01

## 2021-10-01 NOTE — LETTER
[facility]  Patient Centered Plan of Care  About Me:        Patient Name:  Deloris Larson    YOB: 1930  Age:         89 year old   Bashir MRN:    [id number] Telephone Information:  Home Phone [phone]   Mobile [phone]       Address:  [address]  [place] [address] Email address:  [email or web address]      Emergency Contact(s)    Name Relationship Lgl Grd Work Phone Home Phone Mobile Phone   1. CELINE GUTIERREZ Daughter No  [phone]   2. LAURA ENRIQUEZ Daughter No  [phone]   3. YOLY MIMS Friend    [phone]           Primary language:  English     needed? No   [employer]:  [phone] op. 1  Other communication barriers:    Preferred Method of Communication:     Current living arrangement:    Mobility Status/ Medical Equipment:      Health Maintenance  Health Maintenance Reviewed:      My Access Plan  Medical Emergency 911   Primary Clinic Line [facility] - [phone]   24 Hour Appointment Line [phone] or  [phone]) (toll-free)   24 Hour Nurse Line [phone] (toll-free)   Preferred Urgent Care     Preferred Hospital     Preferred Pharmacy [facility] - [place], [place] - [address]     Behavioral Health Crisis Line The National Suicide Prevention Lifeline at 1-474.190.4644 or 911             My Care Team Members  Patient Care Team       Relationship Specialty Notifications Start End    Jayde Diaz MD PCP - General Internal Medicine  6/8/21     Phone: [phone] Fax: [phone]         [address] [place] [address]    Tamera High Community Health Worker Primary Care - CC Admissions 6/23/21     [phone]    Marline Huitron, ETHAN Lead Care Coordinator Primary Care - CC Admissions 6/23/21              Liv Max MD Assigned Cancer Care Provider   7/16/21     Phone: [phone] Fax: [phone]         [address] [facility] [address]    Jayde Diaz MD Assigned PCP   7/16/21     Phone: [phone] Fax: [phone]         [facility] [place] [address]    Yasmine Benítez MD Assigned Surgical Provider   9/12/21     Phone: [phone] Fax: [phone]         [facility] [address] [facility] [address]            My Care Plans  Self Management and Treatment Plan  Goals and (Comments)       Action Plans on File:                       Advance Care Plans/Directives Type:        My Medical and Care Information  Problem List   Patient Active Problem List   Diagnosis     2019 novel coronavirus disease (COVID-19)     Syncope, unspecified syncope type     Orthostatic hypotension     Bronchiectasis without complication (H)     Essential hypertension     Chronic atrial fibrillation (H)     Cardiac pacemaker in situ     Aortic valve stenosis, etiology of cardiac valve disease unspecified     Chronic diarrhea     Restless legs syndrome (RLS)     SHARON (generalized anxiety disorder)     Insomnia, unspecified type     Debility     Acute respiratory failure with hypoxia (H)     Hypotension, unspecified hypotension type     Elevated troponin     Demand ischemia (H)     Pulmonary nodules     Pancreatic mass     Fatigue, unspecified type     Poor nutrition     Fall, subsequent encounter      Current Medications and Allergies:  See printed Medication Report.    Care Coordination Start Date: 10/9/2020   Frequency of Care Coordination: monthly   Form Last Updated: 10/01/2021

## 2021-10-01 NOTE — LETTER
[facility] CARE COORDINATION  [address] [place] [address]    October 1, 2021        Deloris Larson  [address] [place] [address]          Dear Deloris,     Attached is an updated Patient Centered Plan of Care for your continued enrollment in Care Coordination. Please let us know if you have additional questions, concerns, or goals that we can assist with.    Sincerely,    Marline Huitron RN  Clinic Care Coordinator

## 2021-10-01 NOTE — PROGRESS NOTES
Clinic Care Coordination Contact     Care Coordination Clinician Chart Review  Situation: Patient chart reviewed by care coordinator.       Background: Care Coordination initial assessment and enrollment to Care Coordination was 10/09/20.   Patient centered goals were developed with participation from patient.  RN  handed patient off to CHW for continued outreach every 30 days.        Assessment: Per chart review, patient outreach completed by  CHW on 9/08.  Patient is not actively working to accomplish goals.  Patient's has completed Goals.  CHW to determine at next outreach if patient to transition to Maintenance or if SW visit it appropriate.   Patient is due for updated Complex Care Plan.  Annual assessment will be due 10/2021.      Goals    None              Plan/Recommendations: The patient will continue working with Care Coordination  as noted above.  CHW will involve RN or SW as needed or if patient is ready to move to maintenance.  RN will continue to monitor progress and CHW outreaches every 6 weeks.   Care Plan updated and mailed to patient: yes

## 2021-10-04 ENCOUNTER — PATIENT OUTREACH (OUTPATIENT)
Dept: CARE COORDINATION | Facility: CLINIC | Age: 86
End: 2021-10-04

## 2021-10-04 NOTE — PROGRESS NOTES
Clinic Care Coordination Contact  [facility]/Voicemail    Clinical Data: Care Coordinator Outreach  Outreach attempted x 1.  [facility] with call back information and requested return call.  Plan: Care Coordinator will try to reach patient again in 10 business days.    Next CHW outreach date: 10/19/21

## 2021-10-08 ENCOUNTER — OFFICE VISIT (OUTPATIENT)
Dept: INTERNAL MEDICINE | Facility: CLINIC | Age: 86
End: 2021-10-08
Payer: MEDICARE

## 2021-10-08 VITALS
OXYGEN SATURATION: 95 % | DIASTOLIC BLOOD PRESSURE: 78 MMHG | SYSTOLIC BLOOD PRESSURE: 130 MMHG | BODY MASS INDEX: 23.17 KG/M2 | WEIGHT: 135 LBS | HEART RATE: 74 BPM

## 2021-10-08 DIAGNOSIS — I48.19 PERSISTENT ATRIAL FIBRILLATION (H): ICD-10-CM

## 2021-10-08 DIAGNOSIS — F41.1 GAD (GENERALIZED ANXIETY DISORDER): ICD-10-CM

## 2021-10-08 DIAGNOSIS — E78.5 HYPERLIPIDEMIA, UNSPECIFIED HYPERLIPIDEMIA TYPE: ICD-10-CM

## 2021-10-08 DIAGNOSIS — G47.00 INSOMNIA, UNSPECIFIED TYPE: ICD-10-CM

## 2021-10-08 DIAGNOSIS — L98.9 SKIN LESION: Primary | ICD-10-CM

## 2021-10-08 PROCEDURE — 99214 OFFICE O/P EST MOD 30 MIN: CPT | Mod: 25 | Performed by: INTERNAL MEDICINE

## 2021-10-08 PROCEDURE — 90662 IIV NO PRSV INCREASED AG IM: CPT | Performed by: INTERNAL MEDICINE

## 2021-10-08 PROCEDURE — G0008 ADMIN INFLUENZA VIRUS VAC: HCPCS | Performed by: INTERNAL MEDICINE

## 2021-10-08 RX ORDER — FERROUS SULFATE 325(65) MG
325 TABLET ORAL
COMMUNITY
Start: 2021-10-08 | End: 2022-11-01

## 2021-10-08 RX ORDER — METOPROLOL SUCCINATE 25 MG/1
25 TABLET, EXTENDED RELEASE ORAL DAILY
Qty: 90 TABLET | Refills: 3 | Status: SHIPPED | OUTPATIENT
Start: 2021-10-08 | End: 2022-11-01

## 2021-10-08 RX ORDER — ATORVASTATIN CALCIUM 20 MG/1
20 TABLET, FILM COATED ORAL AT BEDTIME
Qty: 90 TABLET | Refills: 3 | Status: SHIPPED | OUTPATIENT
Start: 2021-10-08 | End: 2022-10-05

## 2021-10-08 NOTE — PATIENT INSTRUCTIONS
Tylenol PM at night to help sleep , you can still take melatonin with it ( the herbal supplements you showed me )  It says you are allergic to benadryl but I would like you to still try it     I can give you a prescription strength medication that is safer than ambien if this doest work     Your leg pain could be arthritic and you cannot take ibuprofen because you are on eliquis , you can only take Tylenol or tylenol with  A narcotic attached to it like tyelenol with codeine . I could give you a prescription if needed , if tylenol is not enough   However ,Try holding the atorvastatin for a month . This is a cholsterol pill that can cause muscle pain espeicaly of the thighs , if you feel subjkectively better then gloryue it kiel . The risk outweighs the benefits .

## 2021-10-08 NOTE — PROGRESS NOTES
Assessment & Plan     1. Persistent atrial fibrillation (H)  Rate is controlled.  She is on a lower dose of Eliquis due to age and weight.  However we have to balance that with a fall risk.  Currently I feel the benefit outweighs the risk  - metoprolol succinate ER (TOPROL-XL) 25 MG 24 hr tablet; Take 1 tablet (25 mg) by mouth daily  Dispense: 90 tablet; Refill: 3  - apixaban ANTICOAGULANT (ELIQUIS) 2.5 MG tablet; Take 1 tablet (2.5 mg) by mouth 2 times daily  Dispense: 180 tablet; Refill: 3    2. Hyperlipidemia, unspecified hyperlipidemia type  She is already on a statin.  However the leg pain she describes is very typical of statin related myopathy.  She only takes Tylenol.  I did give her the option of Tylenol with codeine.  Her pain is not so much in the joints of her back or her knees but in the upper thighs.  I recommend holding the statin for a month to see if she feels better  - atorvastatin (LIPITOR) 20 MG tablet; Take 1 tablet (20 mg) by mouth At Bedtime  Dispense: 90 tablet; Refill: 3    3. Skin lesion  She has a suspicious lesion on her right earlobe that looks like a basal cell.  Will refer to dermatology.  - Adult Dermatology Referral; Future    4. SHARON (generalized anxiety disorder)  Her biggest concern is insomnia.  She has done well with Ambien in the past but her last hospitalization discontinued it due to the fall risk.  She did receive some marginal benefit from taking melatonin.  She said some days it works.  She is written that she is allergic to Benadryl but she has not tried Tylenol PM.  She could try that and then I could consider Remeron low-dose.  I am sort of cautious about starting any new medication given her age but her sleep is also something that distresses her.  I will see her back about sleep issues in 2 months.    5. Insomnia, unspecified type                   Return in about 2 months (around 12/8/2021).    Kaitlin Steiner MD  [facility]  CRISTOBAL Puentes is a 89 year old who presents for the following health issues   A very pleasant lady establishing care . She has a h/o atrial fibrillation and is on eliquis .    was in hospital for a month, with COVID-19, in February of this year.   She was admitted, sent to a TCU, had to be readmitted, and was sent back to a TCU.  She currently lives in a house, has a four-point cane that she uses in her house.  She lives in 1 level.  Her son, and his partner, lives on another level, basement.  Patient does have steps to get into her house, but is able to do this.  She is independent.  She does not have any extra services.  She feels like her balance is off since the Covid, however this is improving.      Other issues :  Cardiac pacemaker in situ, dual chamber, patient has a pacemaker.     Hearing checked   Is a fall risk and Fell  a week ago . Uses a walker she happens when she is rushing to do something.  2017 normal bone density   She is completely independent in her IADLs.  And ADLs.  But does not drive , her friends from [employer] help her with transport.  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      Current Outpatient Medications   Medication     acetaminophen (TYLENOL) 500 MG tablet     alum hydroxide-mag carbonate (GENACTON)  MG/15ML SUSP suspension     apixaban ANTICOAGULANT (ELIQUIS) 2.5 MG tablet     Apoaequorin (PREVAGEN) 10 MG CAPS     atorvastatin (LIPITOR) 20 MG tablet     cholecalciferol (VITAMIN D3) 125 mcg (5000 units) capsule     ferrous sulfate (FEROSUL) 325 (65 Fe) MG tablet     meclizine 25 MG CHEW     metoprolol succinate ER (TOPROL-XL) 25 MG 24 hr tablet     Probiotic Product (PROBIOTIC ACIDOPHILUS BEADS) CAPS     simethicone (MYLICON) 125 MG chewable tablet     vitamin (B COMPLEX-C) tablet     No current facility-administered medications for this visit.           Review of Systems   Constitutional, HEENT, cardiovascular, pulmonary, gi and gu systems are negative, except as  otherwise noted.      Objective    /78 (BP Location: Right arm, Patient Position: Sitting, Cuff Size: Adult Regular)   Pulse 74   Wt 61.2 kg (135 lb)   SpO2 95%   BMI 23.17 kg/m    Body mass index is 23.17 kg/m .  Physical Exam   GENERAL: healthy, alert and no distress  NECK: no adenopathy, no asymmetry, masses, or scars and thyroid normal to palpation  RESP: lungs clear to auscultation - no rales, rhonchi or wheezes  CV: regular rate and rhythm, normal S1 S2, no S3 or S4, no murmur, click or rub, no peripheral edema and peripheral pulses strong  ABDOMEN: soft, nontender, no hepatosplenomegaly, no masses and bowel sounds normal  MS: no gross musculoskeletal defects noted, no edema

## 2021-10-19 ENCOUNTER — PATIENT OUTREACH (OUTPATIENT)
Dept: CARE COORDINATION | Facility: CLINIC | Age: 86
End: 2021-10-19

## 2021-10-19 NOTE — PROGRESS NOTES
Clinic Care Coordination Contact    Clinical Data: Care Coordinator Outreach  Outreach attempted x 2.  Left message on patient's daughter Ginna's voicemail with call back information and requested return call.  Plan: Care Coordinator will rount patients chart to CCC RN to review for disenrollment.    Patient has no active goals at this time.

## 2021-10-21 ENCOUNTER — PATIENT OUTREACH (OUTPATIENT)
Dept: CARE COORDINATION | Facility: CLINIC | Age: 86
End: 2021-10-21

## 2021-10-21 NOTE — PROGRESS NOTES
Clinic Care Coordination Contact    Community Health Worker Follow Up    CHW Plan: Care Coordinator will send disenrollment letter with care coordinator contact information via [employer]. Care Coordinator will do no further outreaches at this time.    Marline Huitron RN       Okay to unenroll.  Looks like patient has established care at [place].    Message text      You  Marline Huitron, ETHAN      CHW has called patients daughter x2 with no return call. Please advise for un-enrollment

## 2021-10-21 NOTE — LETTER
[facility] CARE COORDINATION  [facility] [place] [address] [facility] [address]    October 21, 2021    Deloris Larson  [address] [place] [address]      Dear Deloris/Ginna,    I have been unsuccessful in reaching you since our last contact. At this time the Care Coordination team will make no further attempts to reach you, however this does not change your ability to continue receiving care from your providers at your primary care clinic. If you need additional support from a care coordinator in the future please contact Tamera High at [phone].    All of us at [facility] are invested in your health and are here to assist you in meeting your goals.     Sincerely,    Tamera High  Community Health Worker  [facility] Care Coordination   Office: [phone]

## 2021-11-03 ENCOUNTER — TRANSFERRED RECORDS (OUTPATIENT)
Dept: HEALTH INFORMATION MANAGEMENT | Facility: CLINIC | Age: 86
End: 2021-11-03
Payer: MEDICARE

## 2021-11-16 ENCOUNTER — ANCILLARY PROCEDURE (OUTPATIENT)
Dept: CARDIOLOGY | Facility: CLINIC | Age: 86
End: 2021-11-16
Attending: INTERNAL MEDICINE
Payer: MEDICARE

## 2021-11-16 DIAGNOSIS — Z95.0 CARDIAC PACEMAKER IN SITU: ICD-10-CM

## 2021-11-16 DIAGNOSIS — I49.5 SICK SINUS SYNDROME (H): ICD-10-CM

## 2021-11-16 PROCEDURE — 93296 REM INTERROG EVL PM/IDS: CPT | Performed by: INTERNAL MEDICINE

## 2021-11-16 PROCEDURE — 93294 REM INTERROG EVL PM/LDLS PM: CPT | Performed by: INTERNAL MEDICINE

## 2021-12-02 ENCOUNTER — TRANSFERRED RECORDS (OUTPATIENT)
Dept: HEALTH INFORMATION MANAGEMENT | Facility: CLINIC | Age: 86
End: 2021-12-02
Payer: MEDICARE

## 2021-12-08 ENCOUNTER — OFFICE VISIT (OUTPATIENT)
Dept: INTERNAL MEDICINE | Facility: CLINIC | Age: 86
End: 2021-12-08
Payer: MEDICARE

## 2021-12-08 VITALS
OXYGEN SATURATION: 95 % | WEIGHT: 140 LBS | HEART RATE: 78 BPM | DIASTOLIC BLOOD PRESSURE: 74 MMHG | BODY MASS INDEX: 23.9 KG/M2 | SYSTOLIC BLOOD PRESSURE: 122 MMHG | HEIGHT: 64 IN

## 2021-12-08 DIAGNOSIS — R53.83 FATIGUE, UNSPECIFIED TYPE: ICD-10-CM

## 2021-12-08 DIAGNOSIS — I10 ESSENTIAL HYPERTENSION: ICD-10-CM

## 2021-12-08 DIAGNOSIS — E78.2 MIXED HYPERLIPIDEMIA: Primary | ICD-10-CM

## 2021-12-08 LAB
ANION GAP SERPL CALCULATED.3IONS-SCNC: 9 MMOL/L (ref 5–18)
BUN SERPL-MCNC: 12 MG/DL (ref 8–28)
CALCIUM SERPL-MCNC: 9.2 MG/DL (ref 8.5–10.5)
CHLORIDE BLD-SCNC: 109 MMOL/L (ref 98–107)
CHOLEST SERPL-MCNC: 154 MG/DL
CK SERPL-CCNC: 71 U/L (ref 30–190)
CO2 SERPL-SCNC: 26 MMOL/L (ref 22–31)
CREAT SERPL-MCNC: 0.99 MG/DL (ref 0.6–1.1)
ERYTHROCYTE [DISTWIDTH] IN BLOOD BY AUTOMATED COUNT: 15.5 % (ref 10–15)
FASTING STATUS PATIENT QL REPORTED: NORMAL
GFR SERPL CREATININE-BSD FRML MDRD: 50 ML/MIN/1.73M2
GLUCOSE BLD-MCNC: 104 MG/DL (ref 70–125)
HCT VFR BLD AUTO: 32.3 % (ref 35–47)
HDLC SERPL-MCNC: 55 MG/DL
HGB BLD-MCNC: 9.5 G/DL (ref 11.7–15.7)
LDLC SERPL CALC-MCNC: 86 MG/DL
MCH RBC QN AUTO: 29.3 PG (ref 26.5–33)
MCHC RBC AUTO-ENTMCNC: 29.4 G/DL (ref 31.5–36.5)
MCV RBC AUTO: 100 FL (ref 78–100)
MDC_IDC_EPISODE_DTM: NORMAL
MDC_IDC_EPISODE_DTM: NORMAL
MDC_IDC_EPISODE_ID: NORMAL
MDC_IDC_EPISODE_ID: NORMAL
MDC_IDC_EPISODE_TYPE: NORMAL
MDC_IDC_EPISODE_TYPE: NORMAL
MDC_IDC_LEAD_IMPLANT_DT: NORMAL
MDC_IDC_LEAD_IMPLANT_DT: NORMAL
MDC_IDC_LEAD_LOCATION: NORMAL
MDC_IDC_LEAD_LOCATION: NORMAL
MDC_IDC_LEAD_LOCATION_DETAIL_1: NORMAL
MDC_IDC_LEAD_LOCATION_DETAIL_1: NORMAL
MDC_IDC_LEAD_MFG: NORMAL
MDC_IDC_LEAD_MFG: NORMAL
MDC_IDC_LEAD_MODEL: NORMAL
MDC_IDC_LEAD_MODEL: NORMAL
MDC_IDC_LEAD_POLARITY_TYPE: NORMAL
MDC_IDC_LEAD_POLARITY_TYPE: NORMAL
MDC_IDC_LEAD_SERIAL: NORMAL
MDC_IDC_LEAD_SERIAL: NORMAL
MDC_IDC_LEAD_SPECIAL_FUNCTION: NORMAL
MDC_IDC_LEAD_SPECIAL_FUNCTION: NORMAL
MDC_IDC_MSMT_BATTERY_DTM: NORMAL
MDC_IDC_MSMT_BATTERY_REMAINING_LONGEVITY: 72 MO
MDC_IDC_MSMT_BATTERY_REMAINING_PERCENTAGE: 100 %
MDC_IDC_MSMT_BATTERY_STATUS: NORMAL
MDC_IDC_MSMT_LEADCHNL_RA_IMPEDANCE_VALUE: 753 OHM
MDC_IDC_MSMT_LEADCHNL_RV_IMPEDANCE_VALUE: 613 OHM
MDC_IDC_MSMT_LEADCHNL_RV_PACING_THRESHOLD_AMPLITUDE: 1.4 V
MDC_IDC_MSMT_LEADCHNL_RV_PACING_THRESHOLD_PULSEWIDTH: 0.4 MS
MDC_IDC_PG_IMPLANT_DTM: NORMAL
MDC_IDC_PG_MFG: NORMAL
MDC_IDC_PG_MODEL: NORMAL
MDC_IDC_PG_SERIAL: NORMAL
MDC_IDC_PG_TYPE: NORMAL
MDC_IDC_SESS_CLINIC_NAME: NORMAL
MDC_IDC_SESS_DTM: NORMAL
MDC_IDC_SESS_TYPE: NORMAL
MDC_IDC_SET_BRADY_AT_MODE_SWITCH_RATE: 170 {BEATS}/MIN
MDC_IDC_SET_BRADY_LOWRATE: 60 {BEATS}/MIN
MDC_IDC_SET_BRADY_MAX_SENSOR_RATE: 130 {BEATS}/MIN
MDC_IDC_SET_BRADY_MODE: NORMAL
MDC_IDC_SET_LEADCHNL_RA_SENSING_ADAPTATION_MODE: NORMAL
MDC_IDC_SET_LEADCHNL_RA_SENSING_SENSITIVITY: 0.15 MV
MDC_IDC_SET_LEADCHNL_RV_PACING_AMPLITUDE: 2 V
MDC_IDC_SET_LEADCHNL_RV_PACING_CAPTURE_MODE: NORMAL
MDC_IDC_SET_LEADCHNL_RV_PACING_POLARITY: NORMAL
MDC_IDC_SET_LEADCHNL_RV_PACING_PULSEWIDTH: 0.4 MS
MDC_IDC_SET_LEADCHNL_RV_SENSING_ADAPTATION_MODE: NORMAL
MDC_IDC_SET_LEADCHNL_RV_SENSING_POLARITY: NORMAL
MDC_IDC_SET_LEADCHNL_RV_SENSING_SENSITIVITY: 2.5 MV
MDC_IDC_SET_ZONE_DETECTION_INTERVAL: 375 MS
MDC_IDC_SET_ZONE_TYPE: NORMAL
MDC_IDC_SET_ZONE_VENDOR_TYPE: NORMAL
MDC_IDC_STAT_BRADY_DTM_END: NORMAL
MDC_IDC_STAT_BRADY_DTM_START: NORMAL
MDC_IDC_STAT_BRADY_RA_PERCENT_PACED: 0 %
MDC_IDC_STAT_BRADY_RV_PERCENT_PACED: 27 %
MDC_IDC_STAT_EPISODE_RECENT_COUNT: 0
MDC_IDC_STAT_EPISODE_RECENT_COUNT_DTM_END: NORMAL
MDC_IDC_STAT_EPISODE_RECENT_COUNT_DTM_START: NORMAL
MDC_IDC_STAT_EPISODE_TYPE: NORMAL
MDC_IDC_STAT_EPISODE_VENDOR_TYPE: NORMAL
PLATELET # BLD AUTO: 188 10E3/UL (ref 150–450)
POTASSIUM BLD-SCNC: 4.1 MMOL/L (ref 3.5–5)
PREALB SERPL IA-MCNC: 20 MG/DL (ref 15–45)
RBC # BLD AUTO: 3.24 10E6/UL (ref 3.8–5.2)
SODIUM SERPL-SCNC: 144 MMOL/L (ref 136–145)
TRIGL SERPL-MCNC: 67 MG/DL
TSH SERPL DL<=0.005 MIU/L-ACNC: 4.25 UIU/ML (ref 0.3–5)
WBC # BLD AUTO: 6.6 10E3/UL (ref 4–11)

## 2021-12-08 PROCEDURE — 84443 ASSAY THYROID STIM HORMONE: CPT | Performed by: INTERNAL MEDICINE

## 2021-12-08 PROCEDURE — 80048 BASIC METABOLIC PNL TOTAL CA: CPT | Performed by: INTERNAL MEDICINE

## 2021-12-08 PROCEDURE — 36415 COLL VENOUS BLD VENIPUNCTURE: CPT | Performed by: INTERNAL MEDICINE

## 2021-12-08 PROCEDURE — 82550 ASSAY OF CK (CPK): CPT | Performed by: INTERNAL MEDICINE

## 2021-12-08 PROCEDURE — 99214 OFFICE O/P EST MOD 30 MIN: CPT | Performed by: INTERNAL MEDICINE

## 2021-12-08 PROCEDURE — 80061 LIPID PANEL: CPT | Performed by: INTERNAL MEDICINE

## 2021-12-08 PROCEDURE — 85027 COMPLETE CBC AUTOMATED: CPT | Performed by: INTERNAL MEDICINE

## 2021-12-08 PROCEDURE — 83921 ORGANIC ACID SINGLE QUANT: CPT | Performed by: INTERNAL MEDICINE

## 2021-12-08 PROCEDURE — 84134 ASSAY OF PREALBUMIN: CPT | Performed by: INTERNAL MEDICINE

## 2021-12-08 RX ORDER — TRIAMCINOLONE ACETONIDE 1 MG/G
CREAM TOPICAL
COMMUNITY
Start: 2021-12-02 | End: 2022-11-01

## 2021-12-08 ASSESSMENT — MIFFLIN-ST. JEOR: SCORE: 1035.04

## 2021-12-08 NOTE — PROGRESS NOTES
Assessment & Plan     Mixed hyperlipidemia    - Basic metabolic panel  (Ca, Cl, CO2, Creat, Gluc, K, Na, BUN)  - Lipid panel reflex to direct LDL Fasting  - TSH  - CK total  - Methylmalonic Acid  - CBC with platelets  - Prealbumin  - Basic metabolic panel  (Ca, Cl, CO2, Creat, Gluc, K, Na, BUN)  - Lipid panel reflex to direct LDL Fasting  - TSH  - CK total  - Methylmalonic Acid  - CBC with platelets  - Prealbumin    Essential hypertension  bp controlled     Fatigue, unspecified type  Much better . Weight has stabilized   Overall doing well           Next visit annual wellness          Return in about 6 months (around 6/8/2022) for Routine preventive.    Kaitlin Steiner MD  [facility]   Deloris is a 89 year old who presents for the following health issues very pleasant patient who recently established with me here for follow up  Has some lower back easily controlled with tyelenol  Worse first thing in the morning   Son and allan live in [place]    grandson does the clenaing , maryam does her own . Has had falls but not recently . Does not drive     HPI     Patient Active Problem List   Diagnosis     2019 novel coronavirus disease (COVID-19)     Syncope, unspecified syncope type     Orthostatic hypotension     Bronchiectasis without complication (H)     Essential hypertension     Chronic atrial fibrillation (H)     Cardiac pacemaker in situ     Aortic valve stenosis, etiology of cardiac valve disease unspecified     Chronic diarrhea     Restless legs syndrome (RLS)     SHARON (generalized anxiety disorder)     Insomnia, unspecified type     Debility     Acute respiratory failure with hypoxia (H)     Hypotension, unspecified hypotension type     Elevated troponin     Demand ischemia (H)     Pulmonary nodules     Pancreatic mass     Fatigue, unspecified type     Poor nutrition     Fall, subsequent encounter     Current Outpatient Medications   Medication     acetaminophen  "(TYLENOL) 500 MG tablet     alum hydroxide-mag carbonate (GENACTON)  MG/15ML SUSP suspension     apixaban ANTICOAGULANT (ELIQUIS) 2.5 MG tablet     Apoaequorin (PREVAGEN) 10 MG CAPS     atorvastatin (LIPITOR) 20 MG tablet     cholecalciferol (VITAMIN D3) 125 mcg (5000 units) capsule     ferrous sulfate (FEROSUL) 325 (65 Fe) MG tablet     meclizine 25 MG CHEW     metoprolol succinate ER (TOPROL-XL) 25 MG 24 hr tablet     Probiotic Product (PROBIOTIC ACIDOPHILUS BEADS) CAPS     simethicone (MYLICON) 125 MG chewable tablet     triamcinolone (KENALOG) 0.1 % external cream     vitamin (B COMPLEX-C) tablet     No current facility-administered medications for this visit.       no shortness of breath ,no  chest pain ,no  Falls, no urinary incontinence , no weight changes , sleep and moodhave been good . Independent in ADLS       Review of Systems   Constitutional, HEENT, cardiovascular, pulmonary, gi and gu systems are negative, except as otherwise noted.      Objective    /74 (BP Location: Left arm, Patient Position: Sitting, Cuff Size: Adult Small)   Pulse 78   Ht 1.626 m (5' 4\")   Wt 63.5 kg (140 lb)   SpO2 95%   BMI 24.03 kg/m    Body mass index is 24.03 kg/m .  Physical Exam   GENERAL: healthy, alert and no distress  NECK: no adenopathy, no asymmetry, masses, or scars and thyroid normal to palpation  RESP: lungs clear to auscultation - no rales, rhonchi or wheezes  CV: regular rate and rhythm, normal S1 S2, no S3 or S4, no murmur, click or rub, no peripheral edema and peripheral pulses strong  MS: no gross musculoskeletal defects noted, no edema        "

## 2021-12-14 LAB — METHYLMALONATE SERPL-SCNC: 0.25 UMOL/L (ref 0–0.4)

## 2022-01-06 DIAGNOSIS — K21.00 GASTROESOPHAGEAL REFLUX DISEASE WITH ESOPHAGITIS WITHOUT HEMORRHAGE: ICD-10-CM

## 2022-01-09 NOTE — TELEPHONE ENCOUNTER
Outpatient Medication Detail     Disp Refills Start End JORGITO   omeprazole (PRILOSEC) 20 MG DR capsule (Discontinued) 90 capsule 3 9/10/2021 10/8/2021 --   Sig - Route: Take 1 capsule (20 mg) by mouth daily - Oral   Sent to pharmacy as: Omeprazole 20 MG Oral Capsule Delayed Release (priLOSEC)   Class: E-Prescribe   Order: [id number]   E-Prescribing Status: Receipt confirmed by pharmacy (9/10/2021  4:04 PM CDT)   E-Cancel Status: Request approved by pharmacy (10/8/2021 11:58 AM CDT)       E-Cancel Status Note: Already Dispensed, Cancelling Remaining

## 2022-01-18 VITALS
OXYGEN SATURATION: 98 % | DIASTOLIC BLOOD PRESSURE: 68 MMHG | HEART RATE: 64 BPM | WEIGHT: 139.56 LBS | BODY MASS INDEX: 23.25 KG/M2 | SYSTOLIC BLOOD PRESSURE: 116 MMHG | HEIGHT: 65 IN

## 2022-01-18 VITALS
DIASTOLIC BLOOD PRESSURE: 68 MMHG | OXYGEN SATURATION: 97 % | WEIGHT: 130 LBS | BODY MASS INDEX: 21.66 KG/M2 | HEART RATE: 90 BPM | SYSTOLIC BLOOD PRESSURE: 124 MMHG | HEIGHT: 65 IN

## 2022-01-18 VITALS
SYSTOLIC BLOOD PRESSURE: 182 MMHG | HEART RATE: 84 BPM | DIASTOLIC BLOOD PRESSURE: 86 MMHG | HEIGHT: 64 IN | BODY MASS INDEX: 22.36 KG/M2 | WEIGHT: 131 LBS | RESPIRATION RATE: 14 BRPM

## 2022-01-18 VITALS
HEART RATE: 72 BPM | OXYGEN SATURATION: 95 % | DIASTOLIC BLOOD PRESSURE: 72 MMHG | BODY MASS INDEX: 22.27 KG/M2 | RESPIRATION RATE: 20 BRPM | TEMPERATURE: 97.9 F | SYSTOLIC BLOOD PRESSURE: 130 MMHG | HEIGHT: 64 IN | WEIGHT: 130.44 LBS

## 2022-01-18 VITALS
DIASTOLIC BLOOD PRESSURE: 76 MMHG | SYSTOLIC BLOOD PRESSURE: 132 MMHG | HEART RATE: 80 BPM | DIASTOLIC BLOOD PRESSURE: 71 MMHG | DIASTOLIC BLOOD PRESSURE: 66 MMHG | WEIGHT: 135.38 LBS | WEIGHT: 140 LBS | SYSTOLIC BLOOD PRESSURE: 126 MMHG | WEIGHT: 128.9 LBS | HEART RATE: 79 BPM | HEIGHT: 65 IN | RESPIRATION RATE: 16 BRPM | HEIGHT: 65 IN | HEIGHT: 66 IN | OXYGEN SATURATION: 94 % | SYSTOLIC BLOOD PRESSURE: 135 MMHG | TEMPERATURE: 97.9 F | BODY MASS INDEX: 23.32 KG/M2 | BODY MASS INDEX: 22.56 KG/M2 | BODY MASS INDEX: 20.72 KG/M2

## 2022-01-18 VITALS
RESPIRATION RATE: 18 BRPM | TEMPERATURE: 96.2 F | WEIGHT: 142.6 LBS | DIASTOLIC BLOOD PRESSURE: 61 MMHG | OXYGEN SATURATION: 92 % | BODY MASS INDEX: 23.73 KG/M2 | SYSTOLIC BLOOD PRESSURE: 104 MMHG | HEART RATE: 74 BPM

## 2022-01-18 VITALS
WEIGHT: 131 LBS | HEART RATE: 72 BPM | OXYGEN SATURATION: 97 % | BODY MASS INDEX: 21.05 KG/M2 | SYSTOLIC BLOOD PRESSURE: 128 MMHG | DIASTOLIC BLOOD PRESSURE: 72 MMHG | HEIGHT: 66 IN

## 2022-01-18 VITALS
HEART RATE: 60 BPM | HEIGHT: 65 IN | BODY MASS INDEX: 22.72 KG/M2 | DIASTOLIC BLOOD PRESSURE: 68 MMHG | SYSTOLIC BLOOD PRESSURE: 142 MMHG | WEIGHT: 136.38 LBS | OXYGEN SATURATION: 98 %

## 2022-01-18 VITALS
HEIGHT: 65 IN | SYSTOLIC BLOOD PRESSURE: 130 MMHG | WEIGHT: 128 LBS | HEART RATE: 88 BPM | OXYGEN SATURATION: 99 % | DIASTOLIC BLOOD PRESSURE: 70 MMHG | BODY MASS INDEX: 21.33 KG/M2

## 2022-01-18 VITALS
WEIGHT: 142.6 LBS | RESPIRATION RATE: 20 BRPM | HEIGHT: 65 IN | OXYGEN SATURATION: 95 % | TEMPERATURE: 96.2 F | SYSTOLIC BLOOD PRESSURE: 92 MMHG | DIASTOLIC BLOOD PRESSURE: 50 MMHG | HEART RATE: 70 BPM | BODY MASS INDEX: 23.76 KG/M2

## 2022-01-18 VITALS
DIASTOLIC BLOOD PRESSURE: 66 MMHG | SYSTOLIC BLOOD PRESSURE: 126 MMHG | HEIGHT: 64 IN | BODY MASS INDEX: 21.57 KG/M2 | WEIGHT: 126.38 LBS

## 2022-01-18 VITALS
SYSTOLIC BLOOD PRESSURE: 130 MMHG | HEART RATE: 73 BPM | OXYGEN SATURATION: 97 % | HEIGHT: 64 IN | WEIGHT: 133 LBS | BODY MASS INDEX: 22.71 KG/M2 | DIASTOLIC BLOOD PRESSURE: 80 MMHG

## 2022-01-18 VITALS — RESPIRATION RATE: 16 BRPM

## 2022-01-30 ENCOUNTER — HEALTH MAINTENANCE LETTER (OUTPATIENT)
Age: 87
End: 2022-01-30

## 2022-03-04 ENCOUNTER — ANCILLARY PROCEDURE (OUTPATIENT)
Dept: CARDIOLOGY | Facility: CLINIC | Age: 87
End: 2022-03-04
Attending: INTERNAL MEDICINE
Payer: MEDICARE

## 2022-03-04 DIAGNOSIS — I49.5 SICK SINUS SYNDROME (H): ICD-10-CM

## 2022-03-04 DIAGNOSIS — Z95.0 CARDIAC PACEMAKER IN SITU: ICD-10-CM

## 2022-03-04 PROCEDURE — 93296 REM INTERROG EVL PM/IDS: CPT | Performed by: INTERNAL MEDICINE

## 2022-03-04 PROCEDURE — 93294 REM INTERROG EVL PM/LDLS PM: CPT | Performed by: INTERNAL MEDICINE

## 2022-03-18 LAB
MDC_IDC_EPISODE_DTM: NORMAL
MDC_IDC_EPISODE_DURATION: 14 S
MDC_IDC_EPISODE_ID: NORMAL
MDC_IDC_EPISODE_TYPE: NORMAL
MDC_IDC_LEAD_IMPLANT_DT: NORMAL
MDC_IDC_LEAD_IMPLANT_DT: NORMAL
MDC_IDC_LEAD_LOCATION: NORMAL
MDC_IDC_LEAD_LOCATION: NORMAL
MDC_IDC_LEAD_LOCATION_DETAIL_1: NORMAL
MDC_IDC_LEAD_LOCATION_DETAIL_1: NORMAL
MDC_IDC_LEAD_MFG: NORMAL
MDC_IDC_LEAD_MFG: NORMAL
MDC_IDC_LEAD_MODEL: NORMAL
MDC_IDC_LEAD_MODEL: NORMAL
MDC_IDC_LEAD_POLARITY_TYPE: NORMAL
MDC_IDC_LEAD_POLARITY_TYPE: NORMAL
MDC_IDC_LEAD_SERIAL: NORMAL
MDC_IDC_LEAD_SERIAL: NORMAL
MDC_IDC_LEAD_SPECIAL_FUNCTION: NORMAL
MDC_IDC_LEAD_SPECIAL_FUNCTION: NORMAL
MDC_IDC_MSMT_BATTERY_DTM: NORMAL
MDC_IDC_MSMT_BATTERY_REMAINING_LONGEVITY: 66 MO
MDC_IDC_MSMT_BATTERY_REMAINING_PERCENTAGE: 100 %
MDC_IDC_MSMT_BATTERY_STATUS: NORMAL
MDC_IDC_MSMT_LEADCHNL_RA_IMPEDANCE_VALUE: 725 OHM
MDC_IDC_MSMT_LEADCHNL_RV_IMPEDANCE_VALUE: 597 OHM
MDC_IDC_MSMT_LEADCHNL_RV_PACING_THRESHOLD_AMPLITUDE: 1.4 V
MDC_IDC_MSMT_LEADCHNL_RV_PACING_THRESHOLD_PULSEWIDTH: 0.4 MS
MDC_IDC_PG_IMPLANT_DTM: NORMAL
MDC_IDC_PG_MFG: NORMAL
MDC_IDC_PG_MODEL: NORMAL
MDC_IDC_PG_SERIAL: NORMAL
MDC_IDC_PG_TYPE: NORMAL
MDC_IDC_SESS_CLINIC_NAME: NORMAL
MDC_IDC_SESS_DTM: NORMAL
MDC_IDC_SESS_TYPE: NORMAL
MDC_IDC_SET_BRADY_AT_MODE_SWITCH_RATE: 170 {BEATS}/MIN
MDC_IDC_SET_BRADY_LOWRATE: 60 {BEATS}/MIN
MDC_IDC_SET_BRADY_MAX_SENSOR_RATE: 130 {BEATS}/MIN
MDC_IDC_SET_BRADY_MODE: NORMAL
MDC_IDC_SET_LEADCHNL_RA_SENSING_ADAPTATION_MODE: NORMAL
MDC_IDC_SET_LEADCHNL_RA_SENSING_SENSITIVITY: 0.15 MV
MDC_IDC_SET_LEADCHNL_RV_PACING_AMPLITUDE: 2 V
MDC_IDC_SET_LEADCHNL_RV_PACING_CAPTURE_MODE: NORMAL
MDC_IDC_SET_LEADCHNL_RV_PACING_POLARITY: NORMAL
MDC_IDC_SET_LEADCHNL_RV_PACING_PULSEWIDTH: 0.4 MS
MDC_IDC_SET_LEADCHNL_RV_SENSING_ADAPTATION_MODE: NORMAL
MDC_IDC_SET_LEADCHNL_RV_SENSING_POLARITY: NORMAL
MDC_IDC_SET_LEADCHNL_RV_SENSING_SENSITIVITY: 2.5 MV
MDC_IDC_SET_ZONE_DETECTION_INTERVAL: 375 MS
MDC_IDC_SET_ZONE_TYPE: NORMAL
MDC_IDC_SET_ZONE_VENDOR_TYPE: NORMAL
MDC_IDC_STAT_BRADY_DTM_END: NORMAL
MDC_IDC_STAT_BRADY_DTM_START: NORMAL
MDC_IDC_STAT_BRADY_RA_PERCENT_PACED: 0 %
MDC_IDC_STAT_BRADY_RV_PERCENT_PACED: 25 %
MDC_IDC_STAT_EPISODE_RECENT_COUNT: 0
MDC_IDC_STAT_EPISODE_RECENT_COUNT: 1
MDC_IDC_STAT_EPISODE_RECENT_COUNT_DTM_END: NORMAL
MDC_IDC_STAT_EPISODE_RECENT_COUNT_DTM_START: NORMAL
MDC_IDC_STAT_EPISODE_TYPE: NORMAL
MDC_IDC_STAT_EPISODE_VENDOR_TYPE: NORMAL

## 2022-04-08 ENCOUNTER — OFFICE VISIT (OUTPATIENT)
Dept: INTERNAL MEDICINE | Facility: CLINIC | Age: 87
End: 2022-04-08
Payer: MEDICARE

## 2022-04-08 VITALS
OXYGEN SATURATION: 96 % | WEIGHT: 145.7 LBS | DIASTOLIC BLOOD PRESSURE: 80 MMHG | SYSTOLIC BLOOD PRESSURE: 152 MMHG | BODY MASS INDEX: 25.01 KG/M2 | HEART RATE: 120 BPM

## 2022-04-08 DIAGNOSIS — Z23 HIGH PRIORITY FOR 2019-NCOV VACCINE: ICD-10-CM

## 2022-04-08 DIAGNOSIS — K63.9 CECAL LESION: ICD-10-CM

## 2022-04-08 DIAGNOSIS — L98.491: ICD-10-CM

## 2022-04-08 DIAGNOSIS — C18.9 MALIGNANT NEOPLASM OF COLON, UNSPECIFIED PART OF COLON (H): ICD-10-CM

## 2022-04-08 DIAGNOSIS — D64.9 ANEMIA, UNSPECIFIED TYPE: ICD-10-CM

## 2022-04-08 DIAGNOSIS — M25.669 STIFFNESS OF KNEE JOINT, UNSPECIFIED LATERALITY: ICD-10-CM

## 2022-04-08 DIAGNOSIS — J96.01 ACUTE RESPIRATORY FAILURE WITH HYPOXIA (H): ICD-10-CM

## 2022-04-08 DIAGNOSIS — J47.9 BRONCHIECTASIS WITHOUT COMPLICATION (H): ICD-10-CM

## 2022-04-08 DIAGNOSIS — E55.9 VITAMIN D INSUFFICIENCY: ICD-10-CM

## 2022-04-08 DIAGNOSIS — I10 HYPERTENSION, UNSPECIFIED TYPE: ICD-10-CM

## 2022-04-08 DIAGNOSIS — I48.19 PERSISTENT ATRIAL FIBRILLATION (H): ICD-10-CM

## 2022-04-08 DIAGNOSIS — R19.7 DIARRHEA, UNSPECIFIED TYPE: Primary | ICD-10-CM

## 2022-04-08 DIAGNOSIS — D69.6 THROMBOCYTOPENIA (H): ICD-10-CM

## 2022-04-08 LAB
ALBUMIN SERPL-MCNC: 4 G/DL (ref 3.5–5)
ALP SERPL-CCNC: 70 U/L (ref 45–120)
ALT SERPL W P-5'-P-CCNC: 12 U/L (ref 0–45)
ANION GAP SERPL CALCULATED.3IONS-SCNC: 13 MMOL/L (ref 5–18)
AST SERPL W P-5'-P-CCNC: 18 U/L (ref 0–40)
BILIRUB SERPL-MCNC: 0.6 MG/DL (ref 0–1)
BUN SERPL-MCNC: 10 MG/DL (ref 8–28)
C REACTIVE PROTEIN LHE: 0.2 MG/DL (ref 0–0.8)
CALCIUM SERPL-MCNC: 9.5 MG/DL (ref 8.5–10.5)
CHLORIDE BLD-SCNC: 106 MMOL/L (ref 98–107)
CO2 SERPL-SCNC: 26 MMOL/L (ref 22–31)
CREAT SERPL-MCNC: 1.08 MG/DL (ref 0.6–1.1)
ERYTHROCYTE [DISTWIDTH] IN BLOOD BY AUTOMATED COUNT: 14 % (ref 10–15)
ERYTHROCYTE [SEDIMENTATION RATE] IN BLOOD BY WESTERGREN METHOD: 9 MM/HR (ref 0–20)
FERRITIN SERPL-MCNC: 23 NG/ML (ref 10–130)
FOLATE SERPL-MCNC: 19.7 NG/ML
GFR SERPL CREATININE-BSD FRML MDRD: 48 ML/MIN/1.73M2
GLUCOSE BLD-MCNC: 125 MG/DL (ref 70–125)
HCT VFR BLD AUTO: 35.8 % (ref 35–47)
HGB BLD-MCNC: 11 G/DL (ref 11.7–15.7)
IRON SATN MFR SERPL: 34 % (ref 15–46)
IRON SERPL-MCNC: 122 UG/DL (ref 35–180)
MCH RBC QN AUTO: 29.6 PG (ref 26.5–33)
MCHC RBC AUTO-ENTMCNC: 30.7 G/DL (ref 31.5–36.5)
MCV RBC AUTO: 96 FL (ref 78–100)
PLATELET # BLD AUTO: 205 10E3/UL (ref 150–450)
POTASSIUM BLD-SCNC: 4.5 MMOL/L (ref 3.5–5)
PROT SERPL-MCNC: 6.5 G/DL (ref 6–8)
RBC # BLD AUTO: 3.72 10E6/UL (ref 3.8–5.2)
RHEUMATOID FACT SER NEPH-ACNC: <15 IU/ML (ref 0–30)
SODIUM SERPL-SCNC: 145 MMOL/L (ref 136–145)
TIBC SERPL-MCNC: 364 UG/DL (ref 240–430)
WBC # BLD AUTO: 7.7 10E3/UL (ref 4–11)

## 2022-04-08 PROCEDURE — 82746 ASSAY OF FOLIC ACID SERUM: CPT | Performed by: INTERNAL MEDICINE

## 2022-04-08 PROCEDURE — 91305 COVID-19,PF,PFIZER (12+ YRS): CPT | Performed by: INTERNAL MEDICINE

## 2022-04-08 PROCEDURE — 83550 IRON BINDING TEST: CPT | Performed by: INTERNAL MEDICINE

## 2022-04-08 PROCEDURE — 82728 ASSAY OF FERRITIN: CPT | Performed by: INTERNAL MEDICINE

## 2022-04-08 PROCEDURE — 36415 COLL VENOUS BLD VENIPUNCTURE: CPT | Performed by: INTERNAL MEDICINE

## 2022-04-08 PROCEDURE — 86200 CCP ANTIBODY: CPT | Performed by: INTERNAL MEDICINE

## 2022-04-08 PROCEDURE — 0054A COVID-19,PF,PFIZER (12+ YRS): CPT | Performed by: INTERNAL MEDICINE

## 2022-04-08 PROCEDURE — 86431 RHEUMATOID FACTOR QUANT: CPT | Performed by: INTERNAL MEDICINE

## 2022-04-08 PROCEDURE — 80053 COMPREHEN METABOLIC PANEL: CPT | Performed by: INTERNAL MEDICINE

## 2022-04-08 PROCEDURE — 82306 VITAMIN D 25 HYDROXY: CPT | Performed by: INTERNAL MEDICINE

## 2022-04-08 PROCEDURE — 85027 COMPLETE CBC AUTOMATED: CPT | Performed by: INTERNAL MEDICINE

## 2022-04-08 PROCEDURE — 86140 C-REACTIVE PROTEIN: CPT | Performed by: INTERNAL MEDICINE

## 2022-04-08 PROCEDURE — 84165 PROTEIN E-PHORESIS SERUM: CPT | Performed by: PATHOLOGY

## 2022-04-08 PROCEDURE — 85652 RBC SED RATE AUTOMATED: CPT | Performed by: INTERNAL MEDICINE

## 2022-04-08 PROCEDURE — 99214 OFFICE O/P EST MOD 30 MIN: CPT | Mod: 25 | Performed by: INTERNAL MEDICINE

## 2022-04-08 RX ORDER — METOPROLOL SUCCINATE 50 MG/1
50 TABLET, EXTENDED RELEASE ORAL DAILY
Qty: 90 TABLET | Refills: 3 | Status: SHIPPED | OUTPATIENT
Start: 2022-04-08 | End: 2023-05-12

## 2022-04-08 RX ORDER — MELOXICAM 7.5 MG/1
7.5 TABLET ORAL DAILY
Qty: 90 TABLET | Refills: 3 | Status: SHIPPED | OUTPATIENT
Start: 2022-04-08 | End: 2022-08-17

## 2022-04-08 RX ORDER — LOPERAMIDE HCL 2 MG
2 CAPSULE ORAL
Qty: 90 CAPSULE | Refills: 3 | Status: SHIPPED | OUTPATIENT
Start: 2022-04-08 | End: 2022-11-01

## 2022-04-08 NOTE — PATIENT INSTRUCTIONS
For the diarrhea take the antidiarrheal pill before breakfast to offset the post breakfast loose stools   You can also take peptobismol before lunch to stop any further diarrhea   Take calcium and vit d   Calcium 1200mg a day ( you can take TUMS )   Vit d 2000iu a day      take tyelenol arthritis take one in the day time       DOUBLE the metoprolol to take 50mg a day   I sent in a new prescription to the [facility] for 50mg     Send a stool sample if diarrhea persists

## 2022-04-08 NOTE — PROGRESS NOTES
Assessment & Plan     Diarrhea, unspecified type  Given her age and lack of weight loss or bleeding and abdominal pain , will defer more aggressive investigations . Certainly should avoid a colonoscopy . Unlikely a significant colitis as she has no pain and is eating very well . Will treat symptoms first . If possible she could try and send stool for c diff but only if watery   - loperamide (IMODIUM) 2 MG capsule  Dispense: 90 capsule; Refill: 3  - Clostridium difficile Toxin B PCR    Stiffness of knee joint, unspecified laterality  She doesn't have synovitis or prolonged morning stiffness . Most likely has DJD but will check inflammatory markers   - ESR: Erythrocyte sedimentation rate  - CRP, inflammation  - Rheumatoid factor  - Cyclic Citrullinated Peptide Antibody IgG  - Protein electrophoresis  - meloxicam (MOBIC) 7.5 MG tablet  Dispense: 90 tablet; Refill: 3  - ESR: Erythrocyte sedimentation rate  - CRP, inflammation  - Rheumatoid factor  - Cyclic Citrullinated Peptide Antibody IgG  - Protein electrophoresis    External ear ulcer, limited to breakdown of skin (H)      Malignant neoplasm of colon, unspecified part of colon (H)  She was told she might have colon cancer , based on an abnormal CT scan in 2020 but as fast as I can tell repeat CT was normal in 2021 . Will try and find the colonoscopy report . Will not do cologard either given her age     Thrombocytopenia (H)      Acute respiratory failure with hypoxia (H)      Persistent atrial fibrillation (H)  Rate is elevated and so is blood pressure   Increase metoprolol to 50mg a day   Bronchiectasis without complication (H)      Higpriority for 2019-nCoV vaccine    - COVID-19,PF,PFIZER (12+ Yrs GRAY LABEL)    Cecal lesion      Anemia, unspecified type  Recommend labs and   - Comprehensive metabolic panel  - CBC with platelets  - Iron and iron binding capacity  - Ferritin  - Folate  - Comprehensive metabolic panel  - CBC with platelets  - Iron and iron  binding capacity  - Ferritin  - Folate    Vitamin D insufficiency    - Vitamin D Deficiency  - Vitamin D Deficiency    Hypertension, unspecified type  Heart rate and blood pressure elevated . Recommend increasing metoprolol to 50mg   - metoprolol succinate ER (TOPROL-XL) 50 MG 24 hr tablet  Dispense: 90 tablet; Refill: 3    To let me know if symptoms do not improve            Return in about 3 months (around 7/8/2022).    Kaitlin Steiner MD  [facility]    Subjective   Deloris Freeman is a 89 year old who presents for the following health issues   Less energy , has profuse diarrhea after breakfast . Urgent with mixed watery consistency,   no blood and no abdominal pain . Sometimes has to go more times .   This is a progression . 4-5 months   Had covid feb 21   Had a ct scan in 2021 noted to have pancreatic cysts.    Size of largest cyst:  Less than 1 cm: CT or MRI with contrast in 2-3 years.  Consider Gastroenterology consultation for all categories of pancreatic cysts.      History of Present Illness       Reason for visit:  Check up  Symptom onset:  3-4 weeks ago  Symptoms include:  Diarrhea  Symptom intensity:  Moderate  Symptom progression:  Worsening  Had these symptoms before:  Yes  Has tried/received treatment for these symptoms:  No  What makes it worse:  Eating  What makes it better:  No    She eats 0-1 servings of fruits and vegetables daily.She consumes 1 sweetened beverage(s) daily.She exercises with enough effort to increase her heart rate 9 or less minutes per day.  She exercises with enough effort to increase her heart rate 3 or less days per week.   She is taking medications regularly.     Six Item Cognitive Impairment Test   (6CIT):      What year is it?                               Correct - 0 points    What month is it?                               Correct - 0 points      Give the patient an address to remember with five components:   [place] ( first and last name - 2  components)   [address]  (number and name of street - 2 components)   [place] ( city - 1 component)      About what time is it (within the hour)? Correct - 0 points    Count backwards from 20 to 1:   Correct - 0 points    Say the months of the year in reverse: Correct - 0 points    Repeat the address phrase:   1 error - 2 points    Total 6CIT Score:      2/28    Interpretation: The 6CIT uses an inverse score and questions are weighted to produce a total out of 28. Scores of 0-7 are considered normal and 8 or more significant.    Advantages The test has high sensitivity without compromising specificity even in mild dementia. It is easy to translate linguistically and culturally.  Disadvantages The main disadvantage is in the scoring and weighting of the test, which is initially confusing, however computer models have simplified this greatly.    Probability Statistics: At the 7/8 cut off: Overall figures sensitivity 90% specificity 100%, in mild dementia sensitivity = 78% , specificity = 100%    Copyright 2000 [facility], [facility]. Courtesy of Dr. Joby Domínguez     Patient Active Problem List   Diagnosis     2019 novel coronavirus disease (COVID-19)     Syncope, unspecified syncope type     Orthostatic hypotension     Bronchiectasis without complication (H)     Essential hypertension     Chronic atrial fibrillation (H)     Cardiac pacemaker in situ     Aortic valve stenosis, etiology of cardiac valve disease unspecified     Chronic diarrhea     Restless legs syndrome (RLS)     SHARON (generalized anxiety disorder)     Insomnia, unspecified type     Debility     Acute respiratory failure with hypoxia (H)     Hypotension, unspecified hypotension type     Elevated troponin     Demand ischemia (H)     Pulmonary nodules     Pancreatic mass     Fatigue, unspecified type     Poor nutrition     Fall, subsequent encounter     External ear ulcer, limited to breakdown of skin (H)     Malignant  neoplasm of colon, unspecified part of colon (H)     Thrombocytopenia (H)     Current Outpatient Medications   Medication     acetaminophen (TYLENOL) 500 MG tablet     alum hydroxide-mag carbonate (GENACTON)  MG/15ML SUSP suspension     apixaban ANTICOAGULANT (ELIQUIS) 2.5 MG tablet     Apoaequorin (PREVAGEN) 10 MG CAPS     atorvastatin (LIPITOR) 20 MG tablet     cholecalciferol (VITAMIN D3) 125 mcg (5000 units) capsule     ferrous sulfate (FEROSUL) 325 (65 Fe) MG tablet     loperamide (IMODIUM) 2 MG capsule     meclizine 25 MG CHEW     meloxicam (MOBIC) 7.5 MG tablet     metoprolol succinate ER (TOPROL-XL) 25 MG 24 hr tablet     metoprolol succinate ER (TOPROL-XL) 50 MG 24 hr tablet     Probiotic Product (PROBIOTIC ACIDOPHILUS BEADS) CAPS     simethicone (MYLICON) 125 MG chewable tablet     triamcinolone (KENALOG) 0.1 % external cream     vitamin (B COMPLEX-C) tablet     No current facility-administered medications for this visit.           Review of Systems   Constitutional, HEENT, cardiovascular, pulmonary, gi and gu systems are negative, except as otherwise noted.      Objective    BP (!) 182/86 (BP Location: Left arm, Patient Position: Sitting, Cuff Size: Adult Regular)   Pulse 120   Wt 66.1 kg (145 lb 11.2 oz)   SpO2 96%   BMI 25.01 kg/m    Body mass index is 25.01 kg/m .  Physical Exam   GENERAL: healthy, alert and no distress  NECK: no adenopathy, no asymmetry, masses, or scars and thyroid normal to palpation  RESP: lungs clear to auscultation - no rales, rhonchi or wheezes  CV: regular rate and rhythm, normal S1 S2, no S3 or S4, no murmur, click or rub, no peripheral edema and peripheral pulses strong  ABDOMEN: soft, nontender, no hepatosplenomegaly, no masses and bowel sounds normal  MS: no gross musculoskeletal defects noted, no edema

## 2022-04-09 LAB — TOTAL PROTEIN SERUM FOR ELP: 6.6 G/DL (ref 6–8)

## 2022-04-10 ENCOUNTER — NURSE TRIAGE (OUTPATIENT)
Dept: NURSING | Facility: CLINIC | Age: 87
End: 2022-04-10
Payer: MEDICARE

## 2022-04-10 LAB — DEPRECATED CALCIDIOL+CALCIFEROL SERPL-MC: 69 UG/L (ref 20–75)

## 2022-04-10 NOTE — TELEPHONE ENCOUNTER
"Nurse Triage SBAR    Is this a 2nd Level Triage? YES, LICENSED PRACTITIONER REVIEW IS REQUIRED    Situation: Patient calling reporting \"deep fatigue.\"    Background: Patient received COVID 19 2nd booster 4/8/22.    Assessment:   Patient calling with friend Del on line. Patient provided verbal consent to speak with Del regarding medical information.     Patient reporting she received her 4th COVID 19 injection on 4/8/22.    Reporting \"deep fatigue\" low appetite starting 4/8/22.    Stating she has been sleeping most of the time.     Taking fluids. Reporting she voided this morning. Decreased bowel movements \"I haven't been eating.\"     Denies abdominal pain.    Patient is able to ambulate with her walker.     Reporting she feels similar to when she had COVID 19 in the past.    Denies redness or swelling around injection site.    Stating she had no previous side effects or symptoms following previous 3 COVID 19 injections.     Denies chest pain or pressure/or difficulty breathing.       Protocol Recommended Disposition:   Go to ED Now (Or PCP Triage)    Recommendation:      Paged to provider     923 a.m. paged on call provider Dr Garcia through [employer] on call IM provider  to call Nhung at .    935 a.m. paged on call provider Dr Garcia through [employer] on call IM provider to call Nhung at .    1000 a.m. Dr Garcia returned page. Advised if patient's only symptom is fatigue and she is able to ambulate patient should schedule appointment for 4/11/22 or go into Walk In Clinic/Urgent Care location.  Advised patient should call 911 if she develops any other symptoms including difficulty breathing, chest pain, back pain, urinary symptoms.   Reviewed to have patient call back with any new symptoms.     Placed call back to patient and connected friend Del onto line per patient request to review care advice. Patient reporting she has difficulty " hearing.    Patient prefers to schedule appointment for 4/11/22. Agrees to call back or use 911 with any change or increase in symptoms.     Transferred to Central Scheduling.     Does the patient meet one of the following criteria for ADS visit consideration? 16+ years old, with an [facility] PCP     TIP  Providers, please consider if this condition is appropriate for management at one of our Acute and Diagnostic Services sites.     If patient is a good candidate, please use dotphrase <dot>triageresponse and select Refer to ADS to document.    Provider Recommendation Follow Up:   Reached patient/caregiver. Informed of provider's recommendations. Patient verbalized understanding and agrees with the plan.       Reason for Disposition    Patient sounds very sick or weak to the triager    Sounds like a severe, unusual reaction to the triager    Additional Information    Negative: [1] Difficulty breathing or swallowing AND [2] starts within 2 hours after injection    Negative: Sounds like a life-threatening emergency to the triager    Negative: [1] Symptoms of COVID-19 (e.g., cough, fever, SOB, or others) AND [2] within 14 days of EXPOSURE (close contact) with diagnosed or suspected COVID-19 patient    Negative: [1] Symptoms of COVID-19 (e.g., cough, fever, SOB, or others) AND [2] within 14 days of being at a crowded indoor or outdoor event (e.g., concert, festival, rally, wedding)    Negative: Typical COVID-19 symptoms (e.g., cough, difficulty breathing, loss of taste or smell, runny nose, sore throat) that are NOT expected from vaccine    Negative: [1] COVID-19 exposure AND [2] no symptoms, or symptoms not typical of COVID-19    Negative: Fever > 104 F (40 C)    Negative: Severe difficulty breathing (e.g., struggling for each breath, speaks in single words)    Negative: Shock suspected (e.g., cold/pale/clammy skin, too weak to stand, low BP, rapid pulse)    Negative: Difficult to awaken or acting confused (e.g.,  "disoriented, slurred speech)    Negative: [1] Fainted > 15 minutes ago AND [2] still feels too weak or dizzy to stand    Negative: [1] SEVERE weakness (i.e., unable to walk or barely able to walk, requires support) AND [2] new onset or worsening    Negative: Sounds like a life-threatening emergency to the triager    Negative: Weakness of the face, arm or leg on one side of the body    Negative: [1] Has diabetes (diabetes mellitus) AND [2] weakness from low blood sugar (i.e., < 60 mg/dl or 3.5 mmol/l)    Negative: Heat exhaustion suspected (i.e., dehydration from heat exposure)    Negative: Chest pain    Negative: Vomiting is main symptom    Negative: Diarrhea is main symptom    Negative: Difficulty breathing    Negative: Heart beating < 50 beats per minute OR > 140 beats per minute    Negative: Extra heart beats OR irregular heart beating   (i.e., \"palpitations\")    Negative: Follows bleeding (e.g., from vomiting, rectum, vagina; Exception: small brief weakness from sight of a small amount blood)    Negative: Black or tarry bowel movements    Negative: [1] Drinking very little AND [2] dehydration suspected (e.g., no urine > 12 hours, very dry mouth, very lightheaded)    Protocols used: WEAKNESS (GENERALIZED) AND FATIGUE-[facility], CORONAVIRUS (COVID-19) VACCINE QUESTIONS AND [id number] 1.18.2022      "

## 2022-04-11 ENCOUNTER — OFFICE VISIT (OUTPATIENT)
Dept: INTERNAL MEDICINE | Facility: CLINIC | Age: 87
End: 2022-04-11
Payer: MEDICARE

## 2022-04-11 VITALS
SYSTOLIC BLOOD PRESSURE: 118 MMHG | OXYGEN SATURATION: 98 % | TEMPERATURE: 98.2 F | DIASTOLIC BLOOD PRESSURE: 60 MMHG | HEART RATE: 94 BPM

## 2022-04-11 DIAGNOSIS — T50.B95A ADVERSE EFFECT OF MRNA COVID-19 VACCINE: ICD-10-CM

## 2022-04-11 DIAGNOSIS — Z95.0 CARDIAC PACEMAKER IN SITU: ICD-10-CM

## 2022-04-11 DIAGNOSIS — R53.83 OTHER FATIGUE: Primary | ICD-10-CM

## 2022-04-11 DIAGNOSIS — I48.20 CHRONIC ATRIAL FIBRILLATION (H): ICD-10-CM

## 2022-04-11 PROCEDURE — 99213 OFFICE O/P EST LOW 20 MIN: CPT | Performed by: INTERNAL MEDICINE

## 2022-04-11 NOTE — PROGRESS NOTES
ASSESSMENT:  1. Adverse effect of mRNA COVID-19 vaccine  Deloris Freeman presents complaining of marked fatigue and diminished appetite over the past several days.  She received a fourth Pfizer COVID vaccine on 4/8.  Symptoms began several hours later.  I suspect this is a reaction to the vaccine, and expect symptoms to be self-limited    2. Other fatigue  She has noted increased fatigue which I suspect is related to the vaccination    3. Chronic atrial fibrillation (H)  She is on Eliquis as an anticoagulant and metoprolol for rate control    4. Cardiac pacemaker in situ  She does have a pacemaker    5.  Insomnia:  She has been using Tylenol PM for sleep.  She does have diphenhydramine listed on her allergy list.  She does not recall any previous reaction to it.  Given her age, I would try to avoid using sedating antihistamines.  She was advised to try melatonin as an alternate    6.  Diarrhea:  She reports this is less troublesome than at the time of her appointment with Dr. Steiner.    PLAN:  Patient Instructions   1.  Best to avoid Tylenol PM.  Clarify listed allergy to Diphenydramine on follow-up with Dr. Steiner    2.  Could use regular Tylenol.    3.  Current symptoms  are most likely due to the Covid vaccination and should improve in several days.    4.  Consider Melatonin for insomnia    5.  See Dr. Steiner asa planned    6.  Labs from 4/8 were reviewed      Return if symptoms worsen or fail to improve.    ASSESSED PROBLEMS:  See above    CHIEF COMPLAINT:  Fatigue and myalgias    HISTORY OF PRESENT ILLNESS:  Deloris Freeman is a 89 year old female seen for evaluation of increased fatigue and myalgias.  She last saw Dr. Steiner on 4/8/2022.  She received a Covid vaccination at that time.  She states that the fatigue and myalgias began several hours after returning home.  She had 3 previous Covid vaccinations without significant adverse effects.  She also was previously ill with COVID.   she has not had cough or fevers.  She denies  GI upset but has had a marginal appetite.    She had extensive lab studies done on 4/8 which were reviewed.  She had complained of diarrhea at that time.  She states diarrhea now is less of an issue    REVIEW OF SYSTEMS:  See above  Comprehensive review of systems is otherwise negative.    PFSH:  Seen with a friend    TOBACCO USE:  History   Smoking Status     Former Smoker     Packs/day: 1.50     Years: 25.00     Start date: 1/1/1949     Quit date: 1/1/1974   Smokeless Tobacco     Never Used       VITALS:  Vitals:    04/11/22 0724   BP: 118/60   Pulse: 94   Temp: 98.2  F (36.8  C)   TempSrc: Oral   SpO2: 98%     Wt Readings from Last 3 Encounters:   04/08/22 66.1 kg (145 lb 11.2 oz)   12/08/21 63.5 kg (140 lb)   10/08/21 61.2 kg (135 lb)       PHYSICAL EXAM:  Constitutional:   Reveals an alert pleasant elderly woman with appropriate affect.  She does not seem in acute distress.   Vitals: per nursing notes.  HEENT: Atraumatic  Eyes: No scleral icterus or conjunctival hyperemia.  Neck:  Supple, no carotid bruits or adenopathy.  Back:  No spine or CVA pain.  Moderate thoracic kyphosis  Thorax:  No bony deformities.  Pacemaker left chest  Lungs: Clear to A&P without rales or wheezes.  Respiratory effort normal.  Cardiac:   Regular rate and rhythm, normal S1, S2, no murmur or gallop.  Abdomen:  Soft, active bowel sounds without bruits, mass, or tenderness.  Extremities:   No peripheral edema.    Skin:  No jaundice, peripheral cyanosis or lesions to suggest malignancy.  Neuro:  Alert and oriented.  No gross focal deficits.  Psychiatric:  Memory intact, mood appropriate.    DATA REVIEWED:  Additional History from Old Records Summarized (2): Chart reviewed  Decision to Obtain Records (1): None.   Radiology Tests Summarized or Ordered (1): None.  Labs Reviewed or Ordered (1): Labs reviewed  Medicine Test Summarized or Ordered (1): None.   Independent Review of EKG or X-RAY(2 each): None.    The visit lasted a total of 25  minutes face to face with the patient. Over 50% of the time was spent counseling and educating the patient about evaluation of current symptoms      Dragon dictation was used for this note. Speech recognition errors are a possibility.     MEDICATIONS:  Current Outpatient Medications   Medication Sig Dispense Refill     acetaminophen (TYLENOL) 500 MG tablet Take 1,000 mg by mouth every 8 hours as needed       alum hydroxide-mag carbonate (GENACTON)  MG/15ML SUSP suspension Take 30 mLs by mouth 4 times daily as needed for heartburn        apixaban ANTICOAGULANT (ELIQUIS) 2.5 MG tablet Take 1 tablet (2.5 mg) by mouth 2 times daily 180 tablet 3     Apoaequorin (PREVAGEN) 10 MG CAPS Take 1 capsule by mouth daily        atorvastatin (LIPITOR) 20 MG tablet Take 1 tablet (20 mg) by mouth At Bedtime 90 tablet 3     cholecalciferol (VITAMIN D3) 125 mcg (5000 units) capsule Take 125 mcg by mouth four times a week Mon, Wed, Fri, Sat       ferrous sulfate (FEROSUL) 325 (65 Fe) MG tablet Take 1 tablet (325 mg) by mouth daily (with breakfast)       loperamide (IMODIUM) 2 MG capsule Take 1 capsule (2 mg) by mouth daily before breakfast 90 capsule 3     meclizine 25 MG CHEW Take 25 mg by mouth 3 times daily as needed for dizziness       meloxicam (MOBIC) 7.5 MG tablet Take 1 tablet (7.5 mg) by mouth daily 90 tablet 3     metoprolol succinate ER (TOPROL-XL) 25 MG 24 hr tablet Take 1 tablet (25 mg) by mouth daily 90 tablet 3     metoprolol succinate ER (TOPROL-XL) 50 MG 24 hr tablet Take 1 tablet (50 mg) by mouth daily 90 tablet 3     Probiotic Product (PROBIOTIC ACIDOPHILUS BEADS) CAPS Take 1 capsule by mouth 2 times daily       simethicone (MYLICON) 125 MG chewable tablet Take 1 tablet by mouth       triamcinolone (KENALOG) 0.1 % external cream APPLY TOPICALLY TWICE A DAY TO EAR LEASION.       vitamin (B COMPLEX-C) tablet Take 1 tablet by mouth daily

## 2022-04-11 NOTE — PATIENT INSTRUCTIONS
Best to avoid Tylenol PM.  Clarify listed allergy to Diphenydramine.    2.  Could use regular Tylenol.    3.  Current symptoms  are most likely due to The Covid injection and should improve in several days.    4.  Consider Melatonin for insomnia    5.  See Dr. Jatin alvarado planned

## 2022-04-12 LAB — CCP AB SER IA-ACNC: <0.5 U/ML

## 2022-04-13 LAB
ALBUMIN PERCENT: 68.1 % (ref 51–67)
ALBUMIN SERPL ELPH-MCNC: 4.5 G/DL (ref 3.2–4.7)
ALPHA 1 PERCENT: 3 % (ref 2–4)
ALPHA 2 PERCENT: 8.7 % (ref 5–13)
ALPHA1 GLOB SERPL ELPH-MCNC: 0.2 G/DL (ref 0.1–0.3)
ALPHA2 GLOB SERPL ELPH-MCNC: 0.6 G/DL (ref 0.4–0.9)
B-GLOBULIN SERPL ELPH-MCNC: 0.7 G/DL (ref 0.7–1.2)
BETA PERCENT: 10.3 % (ref 10–17)
GAMMA GLOB SERPL ELPH-MCNC: 0.7 G/DL (ref 0.6–1.4)
GAMMA GLOBULIN PERCENT: 9.9 % (ref 9–20)
PATH ICD:: ABNORMAL
PROT PATTERN SERPL ELPH-IMP: ABNORMAL
REVIEWING PATHOLOGIST: ABNORMAL
TOTAL PROTEIN SERUM FOR ELP (SYNCED VALUE): 6.6 G/DL

## 2022-06-14 DIAGNOSIS — I48.19 PERSISTENT ATRIAL FIBRILLATION (H): Primary | ICD-10-CM

## 2022-07-21 NOTE — TELEPHONE ENCOUNTER
Patient was unable to schedule with dr. Moon and was put on waiting list. Will change to another gastro office for more availability. Follow up 3 months.    Reason for Call: Request for an order or referral:    Order or referral being requested: OT  2 TIMES WEEKLY FOR 3 WEEKS  FOR:  UPPER BODY DEFICIT AND ADL'S    Date needed: as soon as possible    Has the patient been seen by the PCP for this problem? YES    Additional comments: N/A    Phone number Patient can be reached at:  Other phone number:  [phone]    Best Time:  ANYTIME    Can we leave a detailed message on this number?  Yes    Call taken on 3/10/2021 at 8:35 AM by Shefali Roland

## 2022-08-17 ENCOUNTER — ANCILLARY PROCEDURE (OUTPATIENT)
Dept: CARDIOLOGY | Facility: CLINIC | Age: 87
End: 2022-08-17
Attending: INTERNAL MEDICINE
Payer: MEDICARE

## 2022-08-17 VITALS
HEIGHT: 64 IN | HEART RATE: 85 BPM | RESPIRATION RATE: 16 BRPM | BODY MASS INDEX: 23.31 KG/M2 | SYSTOLIC BLOOD PRESSURE: 138 MMHG | WEIGHT: 136.5 LBS | DIASTOLIC BLOOD PRESSURE: 76 MMHG

## 2022-08-17 DIAGNOSIS — Z95.0 CARDIAC PACEMAKER IN SITU: ICD-10-CM

## 2022-08-17 DIAGNOSIS — I49.5 SICK SINUS SYNDROME (H): ICD-10-CM

## 2022-08-17 DIAGNOSIS — I48.19 PERSISTENT ATRIAL FIBRILLATION (H): Primary | ICD-10-CM

## 2022-08-17 DIAGNOSIS — I48.19 PERSISTENT ATRIAL FIBRILLATION (H): ICD-10-CM

## 2022-08-17 DIAGNOSIS — I35.0 NONRHEUMATIC AORTIC VALVE STENOSIS: Primary | ICD-10-CM

## 2022-08-17 LAB
MDC_IDC_EPISODE_DTM: NORMAL
MDC_IDC_EPISODE_ID: NORMAL
MDC_IDC_EPISODE_TYPE: NORMAL
MDC_IDC_LEAD_IMPLANT_DT: NORMAL
MDC_IDC_LEAD_IMPLANT_DT: NORMAL
MDC_IDC_LEAD_LOCATION: NORMAL
MDC_IDC_LEAD_LOCATION: NORMAL
MDC_IDC_LEAD_LOCATION_DETAIL_1: NORMAL
MDC_IDC_LEAD_LOCATION_DETAIL_1: NORMAL
MDC_IDC_LEAD_MFG: NORMAL
MDC_IDC_LEAD_MFG: NORMAL
MDC_IDC_LEAD_MODEL: NORMAL
MDC_IDC_LEAD_MODEL: NORMAL
MDC_IDC_LEAD_POLARITY_TYPE: NORMAL
MDC_IDC_LEAD_POLARITY_TYPE: NORMAL
MDC_IDC_LEAD_SERIAL: NORMAL
MDC_IDC_LEAD_SERIAL: NORMAL
MDC_IDC_LEAD_SPECIAL_FUNCTION: NORMAL
MDC_IDC_LEAD_SPECIAL_FUNCTION: NORMAL
MDC_IDC_MSMT_BATTERY_DTM: NORMAL
MDC_IDC_MSMT_BATTERY_REMAINING_LONGEVITY: 60 MO
MDC_IDC_MSMT_BATTERY_REMAINING_PERCENTAGE: 98 %
MDC_IDC_MSMT_BATTERY_STATUS: NORMAL
MDC_IDC_MSMT_LEADCHNL_RA_IMPEDANCE_VALUE: 757 OHM
MDC_IDC_MSMT_LEADCHNL_RV_IMPEDANCE_VALUE: 648 OHM
MDC_IDC_MSMT_LEADCHNL_RV_PACING_THRESHOLD_AMPLITUDE: 1.1 V
MDC_IDC_MSMT_LEADCHNL_RV_PACING_THRESHOLD_PULSEWIDTH: 0.4 MS
MDC_IDC_MSMT_LEADCHNL_RV_SENSING_INTR_AMPL: 18.5 MV
MDC_IDC_PG_IMPLANT_DTM: NORMAL
MDC_IDC_PG_MFG: NORMAL
MDC_IDC_PG_MODEL: NORMAL
MDC_IDC_PG_SERIAL: NORMAL
MDC_IDC_PG_TYPE: NORMAL
MDC_IDC_SESS_CLINIC_NAME: NORMAL
MDC_IDC_SESS_DTM: NORMAL
MDC_IDC_SESS_TYPE: NORMAL
MDC_IDC_SET_BRADY_LOWRATE: 60 {BEATS}/MIN
MDC_IDC_SET_BRADY_MAX_SENSOR_RATE: 130 {BEATS}/MIN
MDC_IDC_SET_BRADY_MODE: NORMAL
MDC_IDC_SET_LEADCHNL_RA_SENSING_ADAPTATION_MODE: NORMAL
MDC_IDC_SET_LEADCHNL_RA_SENSING_SENSITIVITY: 0.15 MV
MDC_IDC_SET_LEADCHNL_RV_PACING_AMPLITUDE: 2 V
MDC_IDC_SET_LEADCHNL_RV_PACING_CAPTURE_MODE: NORMAL
MDC_IDC_SET_LEADCHNL_RV_PACING_POLARITY: NORMAL
MDC_IDC_SET_LEADCHNL_RV_PACING_PULSEWIDTH: 0.4 MS
MDC_IDC_SET_LEADCHNL_RV_SENSING_ADAPTATION_MODE: NORMAL
MDC_IDC_SET_LEADCHNL_RV_SENSING_POLARITY: NORMAL
MDC_IDC_SET_LEADCHNL_RV_SENSING_SENSITIVITY: 2.5 MV
MDC_IDC_SET_ZONE_DETECTION_INTERVAL: 375 MS
MDC_IDC_SET_ZONE_TYPE: NORMAL
MDC_IDC_SET_ZONE_VENDOR_TYPE: NORMAL
MDC_IDC_STAT_AT_MODE_SW_COUNT: 0
MDC_IDC_STAT_BRADY_DTM_END: NORMAL
MDC_IDC_STAT_BRADY_DTM_START: NORMAL
MDC_IDC_STAT_BRADY_RA_PERCENT_PACED: 0 %
MDC_IDC_STAT_BRADY_RV_PERCENT_PACED: 29 %
MDC_IDC_STAT_EPISODE_RECENT_COUNT: 0
MDC_IDC_STAT_EPISODE_RECENT_COUNT: 8
MDC_IDC_STAT_EPISODE_RECENT_COUNT_DTM_END: NORMAL
MDC_IDC_STAT_EPISODE_RECENT_COUNT_DTM_START: NORMAL
MDC_IDC_STAT_EPISODE_TYPE: NORMAL
MDC_IDC_STAT_EPISODE_VENDOR_TYPE: NORMAL

## 2022-08-17 PROCEDURE — 99214 OFFICE O/P EST MOD 30 MIN: CPT | Performed by: INTERNAL MEDICINE

## 2022-08-17 PROCEDURE — 93280 PM DEVICE PROGR EVAL DUAL: CPT | Performed by: INTERNAL MEDICINE

## 2022-08-17 NOTE — LETTER
"8/17/2022    Kaitlin Steiner MD  [address] [facility] [address]    RE: Deloris Larson       Dear Colleague,     I had the pleasure of seeing Deloris Larson in the [facility].    HEART CARE ENCOUNTER CONSULTATON NOTE      [facility]  [phone]      Assessment/Recommendations   Assessment:  1.  Atrial fibrillation: Chronic and well rate controlled  2.  History of SVT  3.  Tachybradycardia status post dual-chamber pacemaker  4.  Anticoagulation: Maintained on low-dose Eliquis for anticoagulation  5.  Aortic stenosis: Mild on last echocardiogram a few years ago    Plan:  1.  Repeat echocardiogram  2.  Continue metoprolol  3.  Continue Eliquis for anticoagulation  Follow-up in 1 year       History of Present Illness/Subjective    HPI: Deloris Larson is a 89 year old female with history of tachybradycardia syndrome status post dual-chamber pacemaker 6/27/2018, SVT, chronic atrial fibrillation rate controlled, coronary artery disease status post remote PCI of RCA (angiogram 2018 nonobstructive coronary artery disease), mild aortic stenosis who I am seeing today to establish care.  She was previously followed by Dr. Alvarez who has retired.  She is very independent.  She does have some support from her son and daughter who live in the area.  Device check today looks great.  Overall atrial fibrillation is well rate controlled and device is working well.  5 years remaining of battery.  No complaints of shortness of breath or chest pain.  Reports chronic fatigue unchanged.         Physical Examination  Review of Systems   Vitals: /76 (BP Location: Right arm, Patient Position: Sitting, Cuff Size: Adult Small)   Pulse 85   Resp 16   Ht 1.626 m (5' 4\")   Wt 61.9 kg (136 lb 8 oz)   BMI 23.43 kg/m    BMI= Body mass index is 23.43 kg/m .  Wt Readings from Last 3 Encounters:   08/17/22 61.9 kg (136 lb 8 oz)   04/08/22 66.1 kg (145 lb 11.2 oz)   12/08/21 63.5 kg (140 " lb)       General Appearance:   no distress, normal body habitus   ENT/Mouth: membranes moist, no oral lesions or bleeding gums.      EYES:  no scleral icterus, normal conjunctivae   Neck: no carotid bruits or thyromegaly   Chest/Lungs:   lungs are clear to auscultation   Cardiovascular:   Regular. Normal first and second heart sounds with systolic murmur  no edema bilaterally    Abdomen:  no organomegaly, masses, bruits, or tenderness; bowel sounds are present   Extremities: no cyanosis or clubbing   Skin: no xanthelasma, warm.    Neurologic: normal  bilateral, no tremors     Psychiatric: alert and oriented x3, calm        Please refer above for cardiac ROS details.        Medical History  Surgical History Family History Social History   Past Medical History:   Diagnosis Date     Anemia      Atrial fibrillation (H)      CAD (coronary artery disease)      Chronic diarrhea      Clinical diagnosis of COVID-19     1/2021     Coronary artery disease      Former smoker      Hematoma of left iliopsoas muscle 01/19/2020    while on eliquis.     History of skin cancer      Hyperlipidemia      Hypertension      Insomnia      Lumbar spondylosis 2016     Migraine      PAD (peripheral artery disease) (H) 10/19/2015     Paroxysmal SVT (supraventricular tachycardia) (H)     Created by Conversion      Persistent atrial fibrillation (H) 05/09/2018    New diagnosis April 16 18th and found incidentally on physical exam during yearly exam in primary clinic [id number] score of 5-new Eliquis start     Poliomyelitis      Restless legs syndrome (RLS)      RLS (restless legs syndrome) 02/28/2017     Sick sinus syndrome (H) 01/28/2020     Sigmoid diverticulosis 04/04/2007     SSS (sick sinus syndrome) (H)     S/p pacemaker     Past Surgical History:   Procedure Laterality Date     APPENDECTOMY       APPENDECTOMY       CAPSULOTOMY Bilateral 12/2015    YAG capsulotomy surgery. 12/21/15, 12/28/15     CARDIOVERSION  05/24/2018    EP  Cardioversion External     CATARACT EXTRACTION, BILATERAL Bilateral 2012    3/15 and 3/29 by Dr. Barrett at the [facility]     CHOLECYSTECTOMY       CHOLECYSTECTOMY       COLONOSCOPY  2012     COLONOSCOPY W/ BIOPSIES  2007     COLONOSCOPY W/ POLYPECTOMY  2012    2 mm tubular adenoma in the rectum. Hemorrhoids. Diverticulosis.     CORONARY ANGIOPLASTY       CV CORONARY ANGIOGRAM N/A 2018    Procedure: Coronary Angiogram;  Surgeon: Joby Vargas MD;  Location: [facility];  Service:      EP PACEMAKER INSERT N/A 2018    Procedure: EP Pacemaker Insertion;  Surgeon: Osito Alvarez MD;  Location: [facility];  Service:      HERNIA REPAIR Right      HYSTERECTOMY       HYSTERECTOMY TOTAL ABDOMINAL       IMPLANT PACEMAKER       INGUINAL HERNIA REPAIR Right     Indirect- Dr. Pedersen     MASTECTOMY       OOPHORECTOMY       SKIN CANCER EXCISION      Right leg on 10/21/15. Right arm 9/29/15     STRIP VEIN      ligation?     TUBAL LIGATION       TUBAL LIGATION       Family History   Adopted: Yes   Problem Relation Age of Onset     Pulmonary Embolism Mother 32.00     Heart Disease Father      CABG Son      Stomach Cancer Grandson 20.00     Pulmonary Embolism Maternal Grandmother 32.00     No Known Problems Daughter      No Known Problems Daughter      CABG Son 64.00        Social History     Socioeconomic History     Marital status:      Spouse name: Not on file     Number of children: 3     Years of education: Not on file     Highest education level: Not on file   Occupational History     Not on file   Tobacco Use     Smoking status: Former Smoker     Packs/day: 1.50     Years: 25.00     Pack years: 37.50     Start date: 1949     Quit date: 1974     Years since quittin.6     Smokeless tobacco: Never Used   Substance and Sexual Activity     Alcohol use: Yes     Alcohol/week: 7.0 standard drinks     Drug use: No     Sexual activity: Not on file    Other Topics Concern     Not on file   Social History Narrative    She lives alone in a house. She has 3 children and 8 grandchildren and multiple great grandchildren. She is retired. 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 She does not smoke cigarettes and rarely drinks alcoho l.    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  Her [employer] had to close, due to declining numbers.    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 She has never driven a car. Does her own cooking, and ba [employer]. She does not use a computer, never did.    Jayde Diaz MD 5/24/2021         Social Determinants of Health     Financial Resource Strain: Not on file   Food Insecurity: Not on file   Transportation Needs: Not on file   Physical Activity: Not on file   Stress: Not on file   Social Connections: Not on file   Intimate Partner Violence: Not on file   Housing Stability: Not on file           Medications  Allergies   Current Outpatient Medications   Medication Sig Dispense Refill     acetaminophen (TYLENOL) 500 MG tablet Take 1,000 mg by mouth every 8 hours as needed       alum hydroxide-mag carbonate (GENACTON)  MG/15ML SUSP suspension Take 30 mLs by mouth 4 times daily as needed for heartburn        apixaban ANTICOAGULANT (ELIQUIS) 2.5 MG tablet Take 1 tablet (2.5 mg) by mouth 2 times daily 180 tablet 3     Apoaequorin (PREVAGEN) 10 MG CAPS Take 1 capsule by mouth daily        atorvastatin (LIPITOR) 20 MG tablet Take 1 tablet (20 mg) by mouth At Bedtime 90 tablet 3     cholecalciferol (VITAMIN D3) 125 mcg (5000 units) capsule Take 125 mcg by mouth four times a week Mon, Wed, Fri, Sat       ferrous sulfate (FEROSUL) 325 (65 Fe) MG tablet Take 1 tablet (325 mg) by mouth daily (with breakfast)       loperamide (IMODIUM) 2 MG capsule Take 1 capsule (2 mg) by mouth daily before breakfast 90 capsule 3     metoprolol succinate ER  (TOPROL-XL) 25 MG 24 hr tablet Take 1 tablet (25 mg) by mouth daily 90 tablet 3     metoprolol succinate ER (TOPROL-XL) 50 MG 24 hr tablet Take 1 tablet (50 mg) by mouth daily 90 tablet 3     Probiotic Product (PROBIOTIC ACIDOPHILUS BEADS) CAPS Take 1 capsule by mouth 2 times daily       simethicone (MYLICON) 125 MG chewable tablet Take 1 tablet by mouth       triamcinolone (KENALOG) 0.1 % external cream APPLY TOPICALLY TWICE A DAY TO EAR LEASION.       vitamin (B COMPLEX-C) tablet Take 1 tablet by mouth daily         Allergies   Allergen Reactions     Diphenhydramine           Lab Results    Chemistry/lipid CBC Cardiac Enzymes/BNP/TSH/INR   Recent Labs   Lab Test 12/08/21  1342   CHOL 154   HDL 55   LDL 86   TRIG 67     Recent Labs   Lab Test 12/08/21  1342 04/16/18  1238 02/28/17  1314   LDL 86 76 85     Recent Labs   Lab Test 04/08/22  1450      POTASSIUM 4.5   CHLORIDE 106   CO2 26      BUN 10   CR 1.08   GFRESTIMATED 48*   RAMA 9.5     Recent Labs   Lab Test 04/08/22  1450 12/08/21  1342 05/24/21  1241   CR 1.08 0.99 0.97     No results for input(s): A1C in the last 20535 hours.       Recent Labs   Lab Test 04/08/22  1451   WBC 7.7   HGB 11.0*   HCT 35.8   MCV 96        Recent Labs   Lab Test 04/08/22  1451 12/08/21  1342 05/24/21  1241   HGB 11.0* 9.5* 10.2*    Recent Labs   Lab Test 01/29/21  0532 01/29/21  0006 01/28/21  1818   TROPONINI 0.03 0.04 0.04     No results for input(s): BNP, NTBNPI, NTBNP in the last 66759 hours.  Recent Labs   Lab Test 12/08/21  1342   TSH 4.25     Recent Labs   Lab Test 01/30/21  0817 01/30/21  0000 01/29/21  0532   INR 1.23* 1.23* 1.37*        Kristine Chan MD    Thank you for allowing me to participate in the care of your patient.      Sincerely,     Kristine Chan MD     [facility]  cc:   Kristine Chan MD  [address]  [address]  [place] [address]

## 2022-08-17 NOTE — PROGRESS NOTES
"  HEART CARE ENCOUNTER CONSULTATON NOTE      [facility]  [phone]      Assessment/Recommendations   Assessment:  1.  Atrial fibrillation: Chronic and well rate controlled  2.  History of SVT  3.  Tachybradycardia status post dual-chamber pacemaker  4.  Anticoagulation: Maintained on low-dose Eliquis for anticoagulation  5.  Aortic stenosis: Mild on last echocardiogram a few years ago    Plan:  1.  Repeat echocardiogram  2.  Continue metoprolol  3.  Continue Eliquis for anticoagulation  Follow-up in 1 year       History of Present Illness/Subjective    HPI: Deloris Larson is a 89 year old female with history of tachybradycardia syndrome status post dual-chamber pacemaker 6/27/2018, SVT, chronic atrial fibrillation rate controlled, coronary artery disease status post remote PCI of RCA (angiogram 2018 nonobstructive coronary artery disease), mild aortic stenosis who I am seeing today to establish care.  She was previously followed by Dr. Alvarez who has retired.  She is very independent.  She does have some support from her son and daughter who live in the area.  Device check today looks great.  Overall atrial fibrillation is well rate controlled and device is working well.  5 years remaining of battery.  No complaints of shortness of breath or chest pain.  Reports chronic fatigue unchanged.         Physical Examination  Review of Systems   Vitals: /76 (BP Location: Right arm, Patient Position: Sitting, Cuff Size: Adult Small)   Pulse 85   Resp 16   Ht 1.626 m (5' 4\")   Wt 61.9 kg (136 lb 8 oz)   BMI 23.43 kg/m    BMI= Body mass index is 23.43 kg/m .  Wt Readings from Last 3 Encounters:   08/17/22 61.9 kg (136 lb 8 oz)   04/08/22 66.1 kg (145 lb 11.2 oz)   12/08/21 63.5 kg (140 lb)       General Appearance:   no distress, normal body habitus   ENT/Mouth: membranes moist, no oral lesions or bleeding gums.      EYES:  no scleral icterus, normal conjunctivae   Neck: no carotid bruits " or thyromegaly   Chest/Lungs:   lungs are clear to auscultation   Cardiovascular:   Regular. Normal first and second heart sounds with systolic murmur  no edema bilaterally    Abdomen:  no organomegaly, masses, bruits, or tenderness; bowel sounds are present   Extremities: no cyanosis or clubbing   Skin: no xanthelasma, warm.    Neurologic: normal  bilateral, no tremors     Psychiatric: alert and oriented x3, calm        Please refer above for cardiac ROS details.        Medical History  Surgical History Family History Social History   Past Medical History:   Diagnosis Date     Anemia      Atrial fibrillation (H)      CAD (coronary artery disease)      Chronic diarrhea      Clinical diagnosis of COVID-19     1/2021     Coronary artery disease      Former smoker      Hematoma of left iliopsoas muscle 01/19/2020    while on eliquis.     History of skin cancer      Hyperlipidemia      Hypertension      Insomnia      Lumbar spondylosis 2016     Migraine      PAD (peripheral artery disease) (H) 10/19/2015     Paroxysmal SVT (supraventricular tachycardia) (H)     Created by Conversion      Persistent atrial fibrillation (H) 05/09/2018    New diagnosis April 16 18th and found incidentally on physical exam during yearly exam in primary clinic [id number] score of 5-new Eliquis start     Poliomyelitis      Restless legs syndrome (RLS)      RLS (restless legs syndrome) 02/28/2017     Sick sinus syndrome (H) 01/28/2020     Sigmoid diverticulosis 04/04/2007     SSS (sick sinus syndrome) (H)     S/p pacemaker     Past Surgical History:   Procedure Laterality Date     APPENDECTOMY       APPENDECTOMY       CAPSULOTOMY Bilateral 12/2015    YAG capsulotomy surgery. 12/21/15, 12/28/15     CARDIOVERSION  05/24/2018    EP Cardioversion External     CATARACT EXTRACTION, BILATERAL Bilateral 03/2012    3/15 and 3/29 by Dr. Barrett at the [facility]     CHOLECYSTECTOMY       CHOLECYSTECTOMY       COLONOSCOPY  05/17/2012      COLONOSCOPY W/ BIOPSIES  2007     COLONOSCOPY W/ POLYPECTOMY  2012    2 mm tubular adenoma in the rectum. Hemorrhoids. Diverticulosis.     CORONARY ANGIOPLASTY       CV CORONARY ANGIOGRAM N/A 2018    Procedure: Coronary Angiogram;  Surgeon: Joby Vargas MD;  Location: [facility];  Service:      EP PACEMAKER INSERT N/A 2018    Procedure: EP Pacemaker Insertion;  Surgeon: Osito Alvarez MD;  Location: [facility];  Service:      HERNIA REPAIR Right      HYSTERECTOMY       HYSTERECTOMY TOTAL ABDOMINAL       IMPLANT PACEMAKER       INGUINAL HERNIA REPAIR Right     Indirect- Dr. Pedersen     MASTECTOMY       OOPHORECTOMY       SKIN CANCER EXCISION  2015    Right leg on 10/21/15. Right arm 9/29/15     STRIP VEIN      ligation?     TUBAL LIGATION       TUBAL LIGATION       Family History   Adopted: Yes   Problem Relation Age of Onset     Pulmonary Embolism Mother 32.00     Heart Disease Father      CABG Son      Stomach Cancer Grandson 20.00     Pulmonary Embolism Maternal Grandmother 32.00     No Known Problems Daughter      No Known Problems Daughter      CABG Son 64.00        Social History     Socioeconomic History     Marital status:      Spouse name: Not on file     Number of children: 3     Years of education: Not on file     Highest education level: Not on file   Occupational History     Not on file   Tobacco Use     Smoking status: Former Smoker     Packs/day: 1.50     Years: 25.00     Pack years: 37.50     Start date: 1949     Quit date: 1974     Years since quittin.6     Smokeless tobacco: Never Used   Substance and Sexual Activity     Alcohol use: Yes     Alcohol/week: 7.0 standard drinks     Drug use: No     Sexual activity: Not on file   Other Topics Concern     Not on file   Social History Narrative    She lives alone in a house. She has 3 children and 8 grandchildren and multiple great grandchildren. She is retired. 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 She does not smoke cigarettes and rarely drinks alcoho l.    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 She has never driven a car. Does her own cooking, and ba sarika. She does not use a computer, never did.    Jayde Diaz MD 5/24/2021         Social Determinants of Health     Financial Resource Strain: Not on file   Food Insecurity: Not on file   Transportation Needs: Not on file   Physical Activity: Not on file   Stress: Not on file   Social Connections: Not on file   Intimate Partner Violence: Not on file   Housing Stability: Not on file           Medications  Allergies   Current Outpatient Medications   Medication Sig Dispense Refill     acetaminophen (TYLENOL) 500 MG tablet Take 1,000 mg by mouth every 8 hours as needed       alum hydroxide-mag carbonate (GENACTON)  MG/15ML SUSP suspension Take 30 mLs by mouth 4 times daily as needed for heartburn        apixaban ANTICOAGULANT (ELIQUIS) 2.5 MG tablet Take 1 tablet (2.5 mg) by mouth 2 times daily 180 tablet 3     Apoaequorin (PREVAGEN) 10 MG CAPS Take 1 capsule by mouth daily        atorvastatin (LIPITOR) 20 MG tablet Take 1 tablet (20 mg) by mouth At Bedtime 90 tablet 3     cholecalciferol (VITAMIN D3) 125 mcg (5000 units) capsule Take 125 mcg by mouth four times a week Mon, Wed, Fri, Sat       ferrous sulfate (FEROSUL) 325 (65 Fe) MG tablet Take 1 tablet (325 mg) by mouth daily (with breakfast)       loperamide (IMODIUM) 2 MG capsule Take 1 capsule (2 mg) by mouth daily before breakfast 90 capsule 3     metoprolol succinate ER (TOPROL-XL) 25 MG 24 hr tablet Take 1 tablet (25 mg) by mouth daily 90 tablet 3     metoprolol succinate ER (TOPROL-XL) 50 MG 24 hr tablet Take 1 tablet (50 mg) by mouth daily 90 tablet 3     Probiotic Product  (PROBIOTIC ACIDOPHILUS BEADS) CAPS Take 1 capsule by mouth 2 times daily       simethicone (MYLICON) 125 MG chewable tablet Take 1 tablet by mouth       triamcinolone (KENALOG) 0.1 % external cream APPLY TOPICALLY TWICE A DAY TO EAR LEASION.       vitamin (B COMPLEX-C) tablet Take 1 tablet by mouth daily         Allergies   Allergen Reactions     Diphenhydramine           Lab Results    Chemistry/lipid CBC Cardiac Enzymes/BNP/TSH/INR   Recent Labs   Lab Test 12/08/21  1342   CHOL 154   HDL 55   LDL 86   TRIG 67     Recent Labs   Lab Test 12/08/21  1342 04/16/18  1238 02/28/17  1314   LDL 86 76 85     Recent Labs   Lab Test 04/08/22  1450      POTASSIUM 4.5   CHLORIDE 106   CO2 26      BUN 10   CR 1.08   GFRESTIMATED 48*   RAMA 9.5     Recent Labs   Lab Test 04/08/22  1450 12/08/21  1342 05/24/21  1241   CR 1.08 0.99 0.97     No results for input(s): A1C in the last 64521 hours.       Recent Labs   Lab Test 04/08/22  1451   WBC 7.7   HGB 11.0*   HCT 35.8   MCV 96        Recent Labs   Lab Test 04/08/22  1451 12/08/21  1342 05/24/21  1241   HGB 11.0* 9.5* 10.2*    Recent Labs   Lab Test 01/29/21  0532 01/29/21  0006 01/28/21  1818   TROPONINI 0.03 0.04 0.04     No results for input(s): BNP, NTBNPI, NTBNP in the last 22324 hours.  Recent Labs   Lab Test 12/08/21  1342   TSH 4.25     Recent Labs   Lab Test 01/30/21  0817 01/30/21  0000 01/29/21  0532   INR 1.23* 1.23* 1.37*        Kristine Chan MD

## 2022-09-08 ENCOUNTER — HOSPITAL ENCOUNTER (OUTPATIENT)
Dept: CARDIOLOGY | Facility: HOSPITAL | Age: 87
Discharge: HOME | End: 2022-09-08
Attending: INTERNAL MEDICINE | Admitting: INTERNAL MEDICINE
Payer: MEDICARE

## 2022-09-08 DIAGNOSIS — I35.0 NONRHEUMATIC AORTIC VALVE STENOSIS: ICD-10-CM

## 2022-09-08 LAB — LVEF ECHO: NORMAL

## 2022-09-08 PROCEDURE — 93306 TTE W/DOPPLER COMPLETE: CPT

## 2022-09-08 PROCEDURE — 93306 TTE W/DOPPLER COMPLETE: CPT | Mod: 26 | Performed by: INTERNAL MEDICINE

## 2022-09-09 DIAGNOSIS — I48.19 PERSISTENT ATRIAL FIBRILLATION (H): ICD-10-CM

## 2022-09-09 RX ORDER — APIXABAN 2.5 MG/1
TABLET, FILM COATED ORAL
Qty: 60 TABLET | Refills: 3 | Status: SHIPPED | OUTPATIENT
Start: 2022-09-09 | End: 2023-01-12

## 2022-09-10 NOTE — TELEPHONE ENCOUNTER
Routing refill request to provider for review/approval because:  age    Last Written Prescription Date:  10/8/21  Last Fill Quantity: 180,  # refills: 3   Last office visit provider:  4/11/22     Requested Prescriptions   Pending Prescriptions Disp Refills     ELIQUIS ANTICOAGULANT 2.5 MG tablet [Pharmacy Med Name: ELIQUIS 2.5 MG TABLET 2.5 Tablet] 60 tablet 3     Sig: TAKE 1 TABLET (2.5 MG) BY MOUTH 2 TIMES DAILY       Direct Oral Anticoagulant Agents Failed - 9/9/2022 10:35 AM        Failed - Patient is 18-79 years of age        Passed - Normal Platelets on file in past 12 months     Recent Labs   Lab Test 04/08/22  1451                  Passed - Medication is active on med list        Passed - Serum creatinine less than or equal to 1.4 on file in past 12 mos     Recent Labs   Lab Test 04/08/22  1450   CR 1.08       Ok to refill medication if creatinine is low          Passed - Weight is greater than 60 kg for the past year     Wt Readings from Last 3 Encounters:   08/17/22 61.9 kg (136 lb 8 oz)   04/08/22 66.1 kg (145 lb 11.2 oz)   12/08/21 63.5 kg (140 lb)             Passed - No active pregnancy on record        Passed - No positive pregnancy test within past 12 months        Passed - Recent (6 mo) or future (30 days) visit within the authorizing provider's specialty             Hannah Richter RN 09/09/22 8:11 PM

## 2022-09-14 DIAGNOSIS — I35.0 NONRHEUMATIC AORTIC VALVE STENOSIS: Primary | ICD-10-CM

## 2022-09-14 DIAGNOSIS — I48.19 PERSISTENT ATRIAL FIBRILLATION (H): ICD-10-CM

## 2022-09-25 ENCOUNTER — HEALTH MAINTENANCE LETTER (OUTPATIENT)
Age: 87
End: 2022-09-25

## 2022-10-04 DIAGNOSIS — E78.5 HYPERLIPIDEMIA, UNSPECIFIED HYPERLIPIDEMIA TYPE: ICD-10-CM

## 2022-10-05 RX ORDER — ATORVASTATIN CALCIUM 20 MG/1
20 TABLET, FILM COATED ORAL AT BEDTIME
Qty: 90 TABLET | Refills: 0 | Status: SHIPPED | OUTPATIENT
Start: 2022-10-05 | End: 2023-04-13

## 2022-10-05 NOTE — TELEPHONE ENCOUNTER
"Last Written Prescription Date:  10/8/21  Last Fill Quantity: 90,  # refills: 3   Last office visit provider:  4/11/22     Requested Prescriptions   Pending Prescriptions Disp Refills     atorvastatin (LIPITOR) 20 MG tablet [Pharmacy Med Name: ATORVASTATIN 20 MG TABLET** 20 Tablet] 90 tablet 3     Sig: TAKE 1 TABLET (20 MG) BY MOUTH AT BEDTIME       Statins Protocol Passed - 10/5/2022  9:29 AM        Passed - LDL on file in past 12 months     Recent Labs   Lab Test 12/08/21  1342   LDL 86             Passed - No abnormal creatine kinase in past 12 months     Recent Labs   Lab Test 12/08/21  1342   CKT 71                Passed - Recent (12 mo) or future (30 days) visit within the authorizing provider's specialty     Patient has had an office visit with the authorizing provider or a provider within the authorizing providers department within the previous 12 mos or has a future within next 30 days. See \"Patient Info\" tab in inbasket, or \"Choose Columns\" in Meds & Orders section of the refill encounter.              Passed - Medication is active on med list        Passed - Patient is age 18 or older        Passed - No active pregnancy on record        Passed - No positive pregnancy test in past 12 months             Keyshawn Tillman RN 10/05/22 9:29 AM  "

## 2022-11-01 ENCOUNTER — OFFICE VISIT (OUTPATIENT)
Dept: INTERNAL MEDICINE | Facility: CLINIC | Age: 87
End: 2022-11-01
Payer: MEDICARE

## 2022-11-01 VITALS
RESPIRATION RATE: 12 BRPM | SYSTOLIC BLOOD PRESSURE: 128 MMHG | OXYGEN SATURATION: 94 % | HEIGHT: 64 IN | WEIGHT: 142.6 LBS | BODY MASS INDEX: 24.34 KG/M2 | DIASTOLIC BLOOD PRESSURE: 76 MMHG | HEART RATE: 106 BPM

## 2022-11-01 DIAGNOSIS — I24.89 DEMAND ISCHEMIA (H): Primary | ICD-10-CM

## 2022-11-01 DIAGNOSIS — I48.20 CHRONIC ATRIAL FIBRILLATION (H): ICD-10-CM

## 2022-11-01 DIAGNOSIS — G47.00 INSOMNIA, UNSPECIFIED TYPE: ICD-10-CM

## 2022-11-01 DIAGNOSIS — K86.89 PANCREATIC MASS: ICD-10-CM

## 2022-11-01 DIAGNOSIS — R26.9 ABNORMAL GAIT: ICD-10-CM

## 2022-11-01 DIAGNOSIS — D64.9 ANEMIA, UNSPECIFIED TYPE: ICD-10-CM

## 2022-11-01 DIAGNOSIS — I10 ESSENTIAL HYPERTENSION: ICD-10-CM

## 2022-11-01 DIAGNOSIS — Z12.11 ENCOUNTER FOR SCREENING FOR MALIGNANT NEOPLASM OF COLON: ICD-10-CM

## 2022-11-01 DIAGNOSIS — C18.9 MALIGNANT NEOPLASM OF COLON, UNSPECIFIED PART OF COLON (H): ICD-10-CM

## 2022-11-01 DIAGNOSIS — R53.83 FATIGUE, UNSPECIFIED TYPE: ICD-10-CM

## 2022-11-01 DIAGNOSIS — Z95.0 CARDIAC PACEMAKER IN SITU: ICD-10-CM

## 2022-11-01 DIAGNOSIS — Z78.0 MENOPAUSE: ICD-10-CM

## 2022-11-01 DIAGNOSIS — F41.1 GAD (GENERALIZED ANXIETY DISORDER): ICD-10-CM

## 2022-11-01 DIAGNOSIS — R19.5 OCCULT GI BLEEDING: ICD-10-CM

## 2022-11-01 DIAGNOSIS — I35.0 AORTIC VALVE STENOSIS, ETIOLOGY OF CARDIAC VALVE DISEASE UNSPECIFIED: ICD-10-CM

## 2022-11-01 DIAGNOSIS — I95.1 ORTHOSTATIC HYPOTENSION: ICD-10-CM

## 2022-11-01 DIAGNOSIS — J47.0 BRONCHIECTASIS WITH ACUTE LOWER RESPIRATORY INFECTION (H): ICD-10-CM

## 2022-11-01 PROBLEM — H04.123 DRY EYE SYNDROME OF BILATERAL LACRIMAL GLANDS: Status: ACTIVE | Noted: 2020-01-21

## 2022-11-01 PROBLEM — L85.3 XEROSIS CUTIS: Status: ACTIVE | Noted: 2020-01-21

## 2022-11-01 PROBLEM — I73.9 INTERMITTENT CLAUDICATION (H): Status: ACTIVE | Noted: 2022-11-01

## 2022-11-01 PROBLEM — G25.81 RLS (RESTLESS LEGS SYNDROME): Status: ACTIVE | Noted: 2017-02-28

## 2022-11-01 PROBLEM — E55.9 VITAMIN D DEFICIENCY: Status: ACTIVE | Noted: 2017-02-28

## 2022-11-01 PROBLEM — E78.2 MIXED HYPERLIPIDEMIA: Status: ACTIVE | Noted: 2022-11-01

## 2022-11-01 PROBLEM — I49.5 SSS (SICK SINUS SYNDROME) (H): Status: ACTIVE | Noted: 2018-06-24

## 2022-11-01 PROBLEM — K66.1 HEMOPERITONEUM: Status: ACTIVE | Noted: 2020-01-21

## 2022-11-01 PROBLEM — K86.2 PANCREATIC CYST: Status: ACTIVE | Noted: 2021-01-28

## 2022-11-01 PROBLEM — I48.19 PERSISTENT ATRIAL FIBRILLATION (H): Status: ACTIVE | Noted: 2018-05-09

## 2022-11-01 PROBLEM — I47.10 SUPRAVENTRICULAR TACHYCARDIA (H): Status: ACTIVE | Noted: 2020-01-21

## 2022-11-01 PROBLEM — R52 PAIN, UNSPECIFIED: Status: ACTIVE | Noted: 2020-01-21

## 2022-11-01 PROBLEM — E78.5 HYPERLIPIDEMIA, UNSPECIFIED: Status: ACTIVE | Noted: 2020-01-21

## 2022-11-01 PROBLEM — I48.91 UNSPECIFIED ATRIAL FIBRILLATION (H): Status: ACTIVE | Noted: 2020-01-21

## 2022-11-01 PROBLEM — R93.3 ABNORMAL COMPUTED TOMOGRAPHY OF CECUM AND TERMINAL ILEUM: Status: ACTIVE | Noted: 2020-09-29

## 2022-11-01 LAB
ANION GAP SERPL CALCULATED.3IONS-SCNC: 11 MMOL/L (ref 7–15)
BUN SERPL-MCNC: 12 MG/DL (ref 8–23)
CALCIUM SERPL-MCNC: 9.6 MG/DL (ref 8.2–9.6)
CHLORIDE SERPL-SCNC: 107 MMOL/L (ref 98–107)
CREAT SERPL-MCNC: 1.02 MG/DL (ref 0.51–0.95)
DEPRECATED HCO3 PLAS-SCNC: 26 MMOL/L (ref 22–29)
ERYTHROCYTE [DISTWIDTH] IN BLOOD BY AUTOMATED COUNT: 13.8 % (ref 10–15)
GFR SERPL CREATININE-BSD FRML MDRD: 51 ML/MIN/1.73M2
GLUCOSE SERPL-MCNC: 102 MG/DL (ref 70–99)
HCT VFR BLD AUTO: 38.7 % (ref 35–47)
HGB BLD-MCNC: 12.4 G/DL (ref 11.7–15.7)
MCH RBC QN AUTO: 31.3 PG (ref 26.5–33)
MCHC RBC AUTO-ENTMCNC: 32 G/DL (ref 31.5–36.5)
MCV RBC AUTO: 98 FL (ref 78–100)
PLATELET # BLD AUTO: 192 10E3/UL (ref 150–450)
POTASSIUM SERPL-SCNC: 4.7 MMOL/L (ref 3.4–5.3)
RBC # BLD AUTO: 3.96 10E6/UL (ref 3.8–5.2)
SODIUM SERPL-SCNC: 144 MMOL/L (ref 136–145)
WBC # BLD AUTO: 8.1 10E3/UL (ref 4–11)

## 2022-11-01 PROCEDURE — 85027 COMPLETE CBC AUTOMATED: CPT | Performed by: INTERNAL MEDICINE

## 2022-11-01 PROCEDURE — G0439 PPPS, SUBSEQ VISIT: HCPCS | Performed by: INTERNAL MEDICINE

## 2022-11-01 PROCEDURE — 99214 OFFICE O/P EST MOD 30 MIN: CPT | Mod: 25 | Performed by: INTERNAL MEDICINE

## 2022-11-01 PROCEDURE — 82746 ASSAY OF FOLIC ACID SERUM: CPT | Performed by: INTERNAL MEDICINE

## 2022-11-01 PROCEDURE — 83921 ORGANIC ACID SINGLE QUANT: CPT | Performed by: INTERNAL MEDICINE

## 2022-11-01 PROCEDURE — 36415 COLL VENOUS BLD VENIPUNCTURE: CPT | Performed by: INTERNAL MEDICINE

## 2022-11-01 PROCEDURE — 80048 BASIC METABOLIC PNL TOTAL CA: CPT | Performed by: INTERNAL MEDICINE

## 2022-11-01 RX ORDER — MELATONIN 5 MG
5 TABLET,CHEWABLE ORAL
Qty: 90 TABLET | Refills: 3 | Status: SHIPPED | OUTPATIENT
Start: 2022-11-01

## 2022-11-01 ASSESSMENT — ENCOUNTER SYMPTOMS
ARTHRALGIAS: 0
COUGH: 0
DYSURIA: 0
FATIGUE: 1
WEAKNESS: 0
DIZZINESS: 0
NAUSEA: 0
SHORTNESS OF BREATH: 0
HEMATOCHEZIA: 0
PALPITATIONS: 0
JOINT SWELLING: 0
ABDOMINAL PAIN: 0
NERVOUS/ANXIOUS: 0
FEVER: 0
SORE THROAT: 0
PARESTHESIAS: 0
BREAST MASS: 0
CONSTIPATION: 1
HEARTBURN: 0
MYALGIAS: 0
FREQUENCY: 0
EYE PAIN: 0
DIARRHEA: 0
HEADACHES: 0
CHILLS: 0
HEMATURIA: 0

## 2022-11-01 ASSESSMENT — ACTIVITIES OF DAILY LIVING (ADL)
CURRENT_FUNCTION: SHOPPING REQUIRES ASSISTANCE
CURRENT_FUNCTION: MONEY MANAGEMENT REQUIRES ASSISTANCE
CURRENT_FUNCTION: TRANSPORTATION REQUIRES ASSISTANCE

## 2022-11-01 ASSESSMENT — PAIN SCALES - GENERAL: PAINLEVEL: NO PAIN (0)

## 2022-11-01 NOTE — PROGRESS NOTES
"SUBJECTIVE:   Deloris Freeman is a 89 year old very pleasant who presents for Preventive Visit.  Son lives in [place] and works nights .  But is available for her if needed.  She has a large family but she says they are not very helpful  She feels that she is doing quite well for her age except she has significant fatigue.  Has to sit down more frequently   No specific shortness of breath.  Had a bad reaction to the covid vaccine fourth booster.  Patient has been advised of split billing requirements and indicates understanding: Yes  Are you in the first 12 months of your Medicare coverage?  No    Healthy Habits:     In general, how would you rate your overall health?  Fair    Frequency of exercise:  4-5 days/week    Duration of exercise:  15-30 minutes    Do you usually eat at least 4 servings of fruit and vegetables a day, include whole grains    & fiber and avoid regularly eating high fat or \"junk\" foods?  No    Taking medications regularly:  Yes    Medication side effects:  None    Ability to successfully perform activities of daily living:  Transportation requires assistance, shopping requires assistance and money management requires assistance    Home Safety:  No safety concerns identified    Hearing Impairment:  Feel that people are mumbling or not speaking clearly    In the past 6 months, have you been bothered by leaking of urine?  No    In general, how would you rate your overall mental or emotional health?  Fair      PHQ-2 Total Score: 0    Additional concerns today:  No  Fatigue  Associated symptoms include fatigue.     Do you feel safe in your environment? Yes    Have you ever done Advance Care Planning? (For example, a Health Directive, POLST, or a discussion with a medical provider or your loved ones about your wishes): Yes, advance care planning is on file.       Fall risk  Fallen 2 or more times in the past year?: No  Any fall with injury in the past year?: No    Cognitive Screening   1) Repeat 3 items " (Leader, Season, Table)    2) Clock draw: NORMAL  3) 3 item recall: Recalls 2 objects   Results: NORMAL clock, 1-2 items recalled: COGNITIVE IMPAIRMENT LESS LIKELY    Mini-CogTM Copyright YSABEL Chaparro. Licensed by the author for use in [facility]; reprinted with permission (shilpa@[facility]). All rights reserved.      Do you have sleep apnea, excessive snoring or daytime drowsiness?: no    Reviewed and updated as needed this visit by clinical staff   Tobacco  Allergies  Meds              Reviewed and updated as needed this visit by Provider                 Social History     Tobacco Use     Smoking status: Former     Packs/day: 1.50     Years: 25.00     Pack years: 37.50     Types: Cigarettes     Start date: 1949     Quit date: 1974     Years since quittin.8     Smokeless tobacco: Never   Substance Use Topics     Alcohol use: Yes     Alcohol/week: 7.0 standard drinks         Alcohol Use 2022   Prescreen: >3 drinks/day or >7 drinks/week? No               Current providers sharing in care for this patient include:   Patient Care Team:  Kaitlin Steiner MD as PCP - General (Internal Medicine)  Yasmine Benítez MD as Assigned Surgical Provider  Kaitlin Steiner MD as Assigned PCP  Kristine Chan MD as Assigned Heart and Vascular Provider    The following health maintenance items are reviewed in Epic and correct as of today:  Health Maintenance   Topic Date Due     HEPATITIS B IMMUNIZATION (1 of 3 - 3-dose series) Never done     ZOSTER IMMUNIZATION (2 of 2) 2011     MEDICARE ANNUAL WELLNESS VISIT  2021     COVID-19 Vaccine (5 - Booster for Pfizer series) 2022     ANNUAL REVIEW OF HM ORDERS  10/08/2022     DTAP/TDAP/TD IMMUNIZATION (2 - Td or Tdap) 2023     FALL RISK ASSESSMENT  2023     ADVANCE CARE PLANNING  2026     PHQ-2 (once per calendar year)  Completed     INFLUENZA VACCINE  Completed     Pneumococcal Vaccine: 65+ Years  Completed     IPV  "IMMUNIZATION  Aged Out     MENINGITIS IMMUNIZATION  Aged Out     MAMMO SCREENING  Discontinued             Pertinent mammograms are reviewed under the imaging tab.    Review of Systems   Constitutional: Positive for fatigue.     Current Outpatient Medications   Medication     acetaminophen (TYLENOL) 500 MG tablet     alum hydroxide-mag carbonate (GENACTON)  MG/15ML SUSP suspension     Apoaequorin (PREVAGEN) 10 MG CAPS     atorvastatin (LIPITOR) 20 MG tablet     ELIQUIS ANTICOAGULANT 2.5 MG tablet     metoprolol succinate ER (TOPROL-XL) 25 MG 24 hr tablet     metoprolol succinate ER (TOPROL-XL) 50 MG 24 hr tablet     Probiotic Product (PROBIOTIC ACIDOPHILUS BEADS) CAPS     No current facility-administered medications for this visit.     no shortness of breath ,no  chest pain ,no  Falls, no urinary incontinence , no weight changes , sleep and moodhave been good . Independent in ADLS and IADLS         OBJECTIVE:   /76 (BP Location: Left arm, Patient Position: Sitting, Cuff Size: Adult Regular)   Pulse 106   Resp 12   Ht 1.626 m (5' 4\")   Wt 64.7 kg (142 lb 9.6 oz)   SpO2 94%   BMI 24.48 kg/m   Estimated body mass index is 24.48 kg/m  as calculated from the following:    Height as of this encounter: 1.626 m (5' 4\").    Weight as of this encounter: 64.7 kg (142 lb 9.6 oz).  Physical Exam  GENERAL: healthy, alert and no distress  EYES: Eyes grossly normal to inspection, PERRL and conjunctivae and sclerae normal  HENT: ear canals and TM's normal, nose and mouth without ulcers or lesions  NECK: no adenopathy, no asymmetry, masses, or scars and thyroid normal to palpation  RESP: lungs clear to auscultation - no rales, rhonchi or wheezes  BREAST: normal without masses, tenderness or nipple discharge and no palpable axillary masses or adenopathy  CV: regular rate and rhythm, normal S1 S2, no S3 or S4, no murmur, click or rub, no peripheral edema and peripheral pulses strong  ABDOMEN: soft, nontender, no " hepatosplenomegaly, no masses and bowel sounds normal  MS: no gross musculoskeletal defects noted, no edema  SKIN: no suspicious lesions or rashes  NEURO: Normal strength and tone, mentation intact and speech normal  PSYCH: mentation appears normal, affect normal/bright    Heel lift is done well but she needs support.  Romberg's is positive    ASSESSMENT / PLAN:   (I24.8) Demand ischemia (H)  (primary encounter diagnosis)  Comment: Has no chest pain right  Plan:     (K86.89) Pancreatic mass  Comment: CT scan done in 2/2021 showed a benign cyst that does not need follow-up  Plan:     (I10) Essential hypertension  Comment: Pressure control is optimal  Plan: CBC with platelets, Basic metabolic panel  (Ca,        Cl, CO2, Creat, Gluc, K, Na, BUN)            (I48.20) Chronic atrial fibrillation (H)  Comment: Rate is slightly elevated her metoprolol had been increased she will check if she is taking the higher dose.  Plan:     (Z95.0) Cardiac pacemaker in situ  Comment:   Plan:     (I35.0) Aortic valve stenosis, etiology of cardiac valve disease unspecified  Comment:   Plan: Recent echo done this year shows that the aortic stenosis has gone from mild to moderate but ejection fraction is preserved    (F41.1) SHARON (generalized anxiety disorder)  Comment:   Plan:     (J47.0) Bronchiectasis with acute lower respiratory infection (H)  Comment:   Plan: Symptomatic    (C18.9) Malignant neoplasm of colon, unspecified part of colon (H)  Comment:   Plan: She has a remote history, she continues to have some dark stools she would like a fit test.  Her anemia is actually improved she has no nutritional deficiencies on testing.  Baseline hemoglobin now is 11    (R53.83) Fatigue, unspecified type  Comment: Unspecific causes we will do repeat labs today she has no other red flags like weight loss or diarrhea or dysphagia.  Plan:     (G47.00) Insomnia, unspecified type  Comment: I will of melatonin chewable tablets instead of Tylenol PM  "that she cannot swallow.  She cannot take ibuprofen due to Eliquis use  Plan: Melatonin 5 MG CHEW            (R26.9) Abnormal gait  Comment:   Plan: Cane Order for DME - ONLY FOR DME            (D64.9) Anemia, unspecified type  Comment:   Plan: Methylmalonic Acid, Folate            (R19.5) Occult GI bleeding  Comment:   Plan: Fecal colorectal cancer screen (FIT)            (Z12.11) Encounter for screening for malignant neoplasm of colon  Comment:   Plan: Fecal colorectal cancer screen (FIT)            (Z78.0) Menopause  Comment:   Plan: DX Hip/Pelvis/Spine        He had a normal bone density scan done 5 years ago we will repeat the 1 more just to make sure it is doing okay  COUNSELING:  Reviewed preventive health counseling, as reflected in patient instructions    Estimated body mass index is 24.48 kg/m  as calculated from the following:    Height as of this encounter: 1.626 m (5' 4\").    Weight as of this encounter: 64.7 kg (142 lb 9.6 oz).        She reports that she quit smoking about 48 years ago. Her smoking use included cigarettes. She started smoking about 73 years ago. She has a 37.50 pack-year smoking history. She has never used smokeless tobacco.      Appropriate preventive services were discussed with this patient, including applicable screening as appropriate for cardiovascular disease, diabetes, osteopenia/osteoporosis, and glaucoma.  As appropriate for age/gender, discussed screening for colorectal cancer, prostate cancer, breast cancer, and cervical cancer. Checklist reviewing preventive services available has been given to the patient.    Reviewed patients plan of care and provided an AVS. The Basic Care Plan (routine screening as documented in Health Maintenance) for Deloris Freeman meets the Care Plan requirement. This Care Plan has been established and reviewed with the Patient.    Counseling Resources:  ATP IV Guidelines  Pooled Cohorts Equation Calculator  Breast Cancer Risk Calculator  Breast Cancer: " Medication to Reduce Risk  FRAX Risk Assessment  ICSI Preventive Guidelines  Dietary Guidelines for Americans, 2010  USDA's MyPlate  ASA Prophylaxis  Lung CA Screening    Kaitlin Steiner MD  [facility]    Identified Health Risks:

## 2022-11-01 NOTE — PATIENT INSTRUCTIONS
Try miralax 1 tbsp a day as needed for constipation this is a mild laxative . If it causes diarrhea then reduce that    Trial of metamucil ( soluble ) 1 tbsp a day   Melatonin is over the counter and can be found in chewable form and try the 5mg tablet  Take two tums a day for calcium    Vit D 2000iu  aday   Xarelto is the equivalnet to elliquis but I am not sure if it will be cheaper ( the pharmacist may know )   The only other option is coumadin but that needs a blood INR checked OPTIONAL vaccine   ; tetanus next year    The NEW SHINGLES vaccine ( which is two shots ) you did get the old vaccine which is still good      stool kit and mail back

## 2022-11-02 DIAGNOSIS — I48.19 PERSISTENT ATRIAL FIBRILLATION (H): ICD-10-CM

## 2022-11-02 LAB — FOLATE SERPL-MCNC: >40 NG/ML (ref 4.6–34.8)

## 2022-11-03 RX ORDER — METOPROLOL SUCCINATE 25 MG/1
25 TABLET, EXTENDED RELEASE ORAL DAILY
Qty: 90 TABLET | Refills: 0 | OUTPATIENT
Start: 2022-11-03

## 2022-11-03 NOTE — TELEPHONE ENCOUNTER
Outpatient Medication Detail     Disp Refills Start End JORGITO   metoprolol succinate ER (TOPROL-XL) 25 MG 24 hr tablet (Discontinued) 90 tablet 3 10/8/2021 11/1/2022 No   Sig - Route: Take 1 tablet (25 mg) by mouth daily - Oral   Sent to pharmacy as: Metoprolol Succinate ER 25 MG Oral Tablet Extended Release 24 Hour (TOPROL-XL)   Class: E-Prescribe   Order: [id number]   E-Prescribing Status: Receipt confirmed by pharmacy (10/8/2021 11:59 AM CDT)

## 2022-11-10 LAB — METHYLMALONATE SERPL-SCNC: <0.1 UMOL/L (ref 0–0.4)

## 2022-11-17 ENCOUNTER — ANCILLARY PROCEDURE (OUTPATIENT)
Dept: CARDIOLOGY | Facility: CLINIC | Age: 87
End: 2022-11-17
Attending: INTERNAL MEDICINE
Payer: MEDICARE

## 2022-11-17 DIAGNOSIS — I49.5 SICK SINUS SYNDROME (H): ICD-10-CM

## 2022-11-17 DIAGNOSIS — Z95.0 PACEMAKER: ICD-10-CM

## 2022-11-20 LAB
MDC_IDC_EPISODE_DTM: NORMAL
MDC_IDC_EPISODE_DURATION: 13 S
MDC_IDC_EPISODE_DURATION: 14 S
MDC_IDC_EPISODE_DURATION: 23 S
MDC_IDC_EPISODE_ID: NORMAL
MDC_IDC_EPISODE_TYPE: NORMAL
MDC_IDC_LEAD_IMPLANT_DT: NORMAL
MDC_IDC_LEAD_IMPLANT_DT: NORMAL
MDC_IDC_LEAD_LOCATION: NORMAL
MDC_IDC_LEAD_LOCATION: NORMAL
MDC_IDC_LEAD_LOCATION_DETAIL_1: NORMAL
MDC_IDC_LEAD_LOCATION_DETAIL_1: NORMAL
MDC_IDC_LEAD_MFG: NORMAL
MDC_IDC_LEAD_MFG: NORMAL
MDC_IDC_LEAD_MODEL: NORMAL
MDC_IDC_LEAD_MODEL: NORMAL
MDC_IDC_LEAD_POLARITY_TYPE: NORMAL
MDC_IDC_LEAD_POLARITY_TYPE: NORMAL
MDC_IDC_LEAD_SERIAL: NORMAL
MDC_IDC_LEAD_SERIAL: NORMAL
MDC_IDC_LEAD_SPECIAL_FUNCTION: NORMAL
MDC_IDC_LEAD_SPECIAL_FUNCTION: NORMAL
MDC_IDC_MSMT_BATTERY_DTM: NORMAL
MDC_IDC_MSMT_BATTERY_REMAINING_LONGEVITY: 60 MO
MDC_IDC_MSMT_BATTERY_REMAINING_PERCENTAGE: 94 %
MDC_IDC_MSMT_BATTERY_STATUS: NORMAL
MDC_IDC_MSMT_LEADCHNL_RA_IMPEDANCE_VALUE: 733 OHM
MDC_IDC_MSMT_LEADCHNL_RV_IMPEDANCE_VALUE: 589 OHM
MDC_IDC_MSMT_LEADCHNL_RV_PACING_THRESHOLD_AMPLITUDE: 1.3 V
MDC_IDC_MSMT_LEADCHNL_RV_PACING_THRESHOLD_PULSEWIDTH: 0.4 MS
MDC_IDC_PG_IMPLANT_DTM: NORMAL
MDC_IDC_PG_MFG: NORMAL
MDC_IDC_PG_MODEL: NORMAL
MDC_IDC_PG_SERIAL: NORMAL
MDC_IDC_PG_TYPE: NORMAL
MDC_IDC_SESS_CLINIC_NAME: NORMAL
MDC_IDC_SESS_DTM: NORMAL
MDC_IDC_SESS_TYPE: NORMAL
MDC_IDC_SET_BRADY_AT_MODE_SWITCH_RATE: 170 {BEATS}/MIN
MDC_IDC_SET_BRADY_LOWRATE: 60 {BEATS}/MIN
MDC_IDC_SET_BRADY_MAX_SENSOR_RATE: 130 {BEATS}/MIN
MDC_IDC_SET_BRADY_MODE: NORMAL
MDC_IDC_SET_LEADCHNL_RA_SENSING_ADAPTATION_MODE: NORMAL
MDC_IDC_SET_LEADCHNL_RA_SENSING_SENSITIVITY: 0.15 MV
MDC_IDC_SET_LEADCHNL_RV_PACING_AMPLITUDE: 2 V
MDC_IDC_SET_LEADCHNL_RV_PACING_CAPTURE_MODE: NORMAL
MDC_IDC_SET_LEADCHNL_RV_PACING_POLARITY: NORMAL
MDC_IDC_SET_LEADCHNL_RV_PACING_PULSEWIDTH: 0.4 MS
MDC_IDC_SET_LEADCHNL_RV_SENSING_ADAPTATION_MODE: NORMAL
MDC_IDC_SET_LEADCHNL_RV_SENSING_POLARITY: NORMAL
MDC_IDC_SET_LEADCHNL_RV_SENSING_SENSITIVITY: 2.5 MV
MDC_IDC_SET_ZONE_DETECTION_INTERVAL: 375 MS
MDC_IDC_SET_ZONE_TYPE: NORMAL
MDC_IDC_SET_ZONE_VENDOR_TYPE: NORMAL
MDC_IDC_STAT_BRADY_DTM_END: NORMAL
MDC_IDC_STAT_BRADY_DTM_START: NORMAL
MDC_IDC_STAT_BRADY_RA_PERCENT_PACED: 0 %
MDC_IDC_STAT_BRADY_RV_PERCENT_PACED: 37 %
MDC_IDC_STAT_EPISODE_RECENT_COUNT: 0
MDC_IDC_STAT_EPISODE_RECENT_COUNT: 3
MDC_IDC_STAT_EPISODE_RECENT_COUNT_DTM_END: NORMAL
MDC_IDC_STAT_EPISODE_RECENT_COUNT_DTM_START: NORMAL
MDC_IDC_STAT_EPISODE_TYPE: NORMAL
MDC_IDC_STAT_EPISODE_VENDOR_TYPE: NORMAL

## 2022-11-20 PROCEDURE — 93294 REM INTERROG EVL PM/LDLS PM: CPT | Performed by: INTERNAL MEDICINE

## 2022-11-20 PROCEDURE — 93296 REM INTERROG EVL PM/IDS: CPT | Performed by: INTERNAL MEDICINE

## 2022-12-29 ENCOUNTER — NURSE TRIAGE (OUTPATIENT)
Dept: NURSING | Facility: CLINIC | Age: 87
End: 2022-12-29

## 2022-12-29 ENCOUNTER — APPOINTMENT (OUTPATIENT)
Dept: CT IMAGING | Facility: HOSPITAL | Age: 87
End: 2022-12-29
Attending: EMERGENCY MEDICINE
Payer: MEDICARE

## 2022-12-29 ENCOUNTER — HOSPITAL ENCOUNTER (EMERGENCY)
Facility: HOSPITAL | Age: 87
Discharge: HOME | End: 2022-12-29
Attending: EMERGENCY MEDICINE | Admitting: EMERGENCY MEDICINE
Payer: MEDICARE

## 2022-12-29 ENCOUNTER — APPOINTMENT (OUTPATIENT)
Dept: RADIOLOGY | Facility: HOSPITAL | Age: 87
End: 2022-12-29
Attending: EMERGENCY MEDICINE
Payer: MEDICARE

## 2022-12-29 VITALS
TEMPERATURE: 98.1 F | BODY MASS INDEX: 21.69 KG/M2 | OXYGEN SATURATION: 94 % | HEIGHT: 66 IN | DIASTOLIC BLOOD PRESSURE: 59 MMHG | RESPIRATION RATE: 16 BRPM | WEIGHT: 135 LBS | SYSTOLIC BLOOD PRESSURE: 117 MMHG | HEART RATE: 82 BPM

## 2022-12-29 DIAGNOSIS — T30.0 BURN: ICD-10-CM

## 2022-12-29 DIAGNOSIS — R55 SYNCOPE, UNSPECIFIED SYNCOPE TYPE: ICD-10-CM

## 2022-12-29 LAB
ALBUMIN SERPL BCG-MCNC: 3.6 G/DL (ref 3.5–5.2)
ALP SERPL-CCNC: 76 U/L (ref 35–104)
ALT SERPL W P-5'-P-CCNC: 12 U/L (ref 10–35)
ANION GAP SERPL CALCULATED.3IONS-SCNC: 10 MMOL/L (ref 7–15)
AST SERPL W P-5'-P-CCNC: 27 U/L (ref 10–35)
BASOPHILS # BLD AUTO: 0 10E3/UL (ref 0–0.2)
BASOPHILS NFR BLD AUTO: 0 %
BILIRUB SERPL-MCNC: 0.8 MG/DL
BUN SERPL-MCNC: 14.4 MG/DL (ref 8–23)
CALCIUM SERPL-MCNC: 9.2 MG/DL (ref 8.2–9.6)
CHLORIDE SERPL-SCNC: 101 MMOL/L (ref 98–107)
CREAT SERPL-MCNC: 1.09 MG/DL (ref 0.51–0.95)
DEPRECATED HCO3 PLAS-SCNC: 24 MMOL/L (ref 22–29)
EOSINOPHIL # BLD AUTO: 0 10E3/UL (ref 0–0.7)
EOSINOPHIL NFR BLD AUTO: 0 %
ERYTHROCYTE [DISTWIDTH] IN BLOOD BY AUTOMATED COUNT: 14.5 % (ref 10–15)
GFR SERPL CREATININE-BSD FRML MDRD: 47 ML/MIN/1.73M2
GLUCOSE SERPL-MCNC: 207 MG/DL (ref 70–99)
HCT VFR BLD AUTO: 37 % (ref 35–47)
HGB BLD-MCNC: 11.9 G/DL (ref 11.7–15.7)
HOLD SPECIMEN: NORMAL
HOLD SPECIMEN: NORMAL
IMM GRANULOCYTES # BLD: 0.1 10E3/UL
IMM GRANULOCYTES NFR BLD: 1 %
LYMPHOCYTES # BLD AUTO: 0.7 10E3/UL (ref 0.8–5.3)
LYMPHOCYTES NFR BLD AUTO: 8 %
MCH RBC QN AUTO: 30.9 PG (ref 26.5–33)
MCHC RBC AUTO-ENTMCNC: 32.2 G/DL (ref 31.5–36.5)
MCV RBC AUTO: 96 FL (ref 78–100)
MONOCYTES # BLD AUTO: 0.7 10E3/UL (ref 0–1.3)
MONOCYTES NFR BLD AUTO: 8 %
NEUTROPHILS # BLD AUTO: 7.4 10E3/UL (ref 1.6–8.3)
NEUTROPHILS NFR BLD AUTO: 83 %
NRBC # BLD AUTO: 0 10E3/UL
NRBC BLD AUTO-RTO: 0 /100
PLATELET # BLD AUTO: 163 10E3/UL (ref 150–450)
POTASSIUM SERPL-SCNC: 3.6 MMOL/L (ref 3.4–5.3)
PROT SERPL-MCNC: 6.3 G/DL (ref 6.4–8.3)
RBC # BLD AUTO: 3.85 10E6/UL (ref 3.8–5.2)
SODIUM SERPL-SCNC: 135 MMOL/L (ref 136–145)
WBC # BLD AUTO: 8.9 10E3/UL (ref 4–11)

## 2022-12-29 PROCEDURE — 258N000003 HC RX IP 258 OP 636: Performed by: EMERGENCY MEDICINE

## 2022-12-29 PROCEDURE — 36415 COLL VENOUS BLD VENIPUNCTURE: CPT | Performed by: EMERGENCY MEDICINE

## 2022-12-29 PROCEDURE — 70450 CT HEAD/BRAIN W/O DYE: CPT | Mod: MA

## 2022-12-29 PROCEDURE — 73590 X-RAY EXAM OF LOWER LEG: CPT | Mod: LT

## 2022-12-29 PROCEDURE — 72170 X-RAY EXAM OF PELVIS: CPT

## 2022-12-29 PROCEDURE — 96361 HYDRATE IV INFUSION ADD-ON: CPT

## 2022-12-29 PROCEDURE — 96360 HYDRATION IV INFUSION INIT: CPT

## 2022-12-29 PROCEDURE — 85025 COMPLETE CBC W/AUTO DIFF WBC: CPT | Performed by: EMERGENCY MEDICINE

## 2022-12-29 PROCEDURE — 80053 COMPREHEN METABOLIC PANEL: CPT | Performed by: EMERGENCY MEDICINE

## 2022-12-29 PROCEDURE — 93005 ELECTROCARDIOGRAM TRACING: CPT | Performed by: EMERGENCY MEDICINE

## 2022-12-29 PROCEDURE — 99285 EMERGENCY DEPT VISIT HI MDM: CPT | Mod: 25

## 2022-12-29 RX ADMIN — SODIUM CHLORIDE 1000 ML: 9 INJECTION, SOLUTION INTRAVENOUS at 10:15

## 2022-12-29 ASSESSMENT — ENCOUNTER SYMPTOMS
SHORTNESS OF BREATH: 0
VOMITING: 0
BACK PAIN: 1
DYSURIA: 0
BRUISES/BLEEDS EASILY: 1
HEADACHES: 0
CHILLS: 0
ARTHRALGIAS: 1
WEAKNESS: 0
WOUND: 1
FATIGUE: 0
ABDOMINAL PAIN: 0
FEVER: 0
COUGH: 0
LIGHT-HEADEDNESS: 0

## 2022-12-29 ASSESSMENT — ACTIVITIES OF DAILY LIVING (ADL): ADLS_ACUITY_SCORE: 35

## 2022-12-29 NOTE — ED NOTES
Pt able to walk using walker down hallway in Fast track without difficulty. Gait steady, no dizziness and no c/o pain

## 2022-12-29 NOTE — ED PROVIDER NOTES
EMERGENCY DEPARTMENT ENCOUNTER      NAME: Deloris Larson  AGE: 89 year old female  YOB: 1930  MRN: [id number]  EVALUATION DATE & TIME: 12/29/2022  9:42 AM    PCP: Kaitlin Steiner    ED PROVIDER: Kristine Holley DO      Chief Complaint   Patient presents with     Fall         FINAL IMPRESSION:  1. Syncope, unspecified syncope type    2. Burn          ED COURSE & MEDICAL DECISION MAKING:    Pertinent Labs & Imaging studies reviewed. (See chart for details)  9:56 AM I met the patient and performed my initial interview and exam.  11:40 AM Rechecked and updated the patient. Will trial ambulation prior to discharge.   12:07 PM Ambulation trial successful without any symptoms.   12:23 PM We discussed the plan for discharge and the patient is agreeable. Reviewed supportive cares, symptomatic treatment, outpatient follow up, and reasons to return to the Emergency Department. Patient to be discharged by ED RN.     89 year old female presents to the Emergency Department for evaluation of a fall and syncope.  Patient reports she got her COVID shot earlier this week.  She felt ill for 2 days, reports she did not eat or drink much.  She felt better this morning.  Got up to make her self some tea and food, and reports somehow the tea fell out of her hand, hit her shin which was very painful and then she fell to the ground.  Perhaps of brief loss of consciousness.  She denies any chest pain or dizziness.  Blood pressure on the lower end here today.  Daughter helped her off the ground.  At that time he noted some right hip pain.  Most of her pain here is to her shin.  She reports chronic wound so that it healed with some soothing ointment.  She does have a little bit of blistering over top there from the tea.  Really all of her complaint is to the area.  She has some mild ischial tuberosity tenderness bilaterally, will obtain x-ray of the hip.  She is on anticoagulation and CT of the head obtained without evidence of  bleed or fracture.  Labs otherwise reassuring.  She was given IV fluids.  We discussed symptomatic care.  Patient was ambulatory with her walker at baseline without any dizziness.  We discussed symptomatic care, treatment recommendations and return precautions.    At the conclusion of the encounter I discussed the results of all of the tests and the disposition. The questions were answered. The patient or family acknowledged understanding and was agreeable with the care plan.     Medical Decision Making    History:    Supplemental history from: Documented in HPI, if applicable    External Record(s) reviewed: Documented in HPI, if applicable.    Work Up:    Chart documentation includes differential considered and any EKGs or imaging independently interpreted by provider.    In additional to work up documented, I considered the following work up: See chart documentation, if applicable.    External consultation:    Discussion of management with another provider: See chart documentation, if applicable    Complicating factors:    Care impacted by chronic illness: Chronic Lung Disease, Heart Disease, Hyperlipidemia, Hypertension and Peripheral Vascular Disease    Care affected by social determinants of health: N/A    Disposition considerations: Discharge. No recommendations on prescription strength medication(s). I considered admission, but discharged patient after significant clinical improvement.      MEDICATIONS GIVEN IN THE EMERGENCY:  Medications   0.9% sodium chloride BOLUS (0 mLs Intravenous Stopped 12/29/22 1203)       NEW PRESCRIPTIONS STARTED AT TODAY'S ER VISIT  Discharge Medication List as of 12/29/2022 12:32 PM             =================================================================    [facility]    Patient information was obtained from: Patient, Daughter    Use of : N/A       Deloris Larson is a 89 year old female with a pertinent history of Afib, HTN, HLD, SVT, PAD, SSS, and bronchiectasis who  presents to this ED by private car with her daughter for evaluation after a fall.    The patient reports she got a COVID booster yesterday and was feeling unwell Tuesday and Wednesday with fatigue and decreased appetite. This morning the patient was getting breakfast ready when she had a syncopal episode. She fell to the ground landing on her knees and in the process dropped hot tea on her left lower leg. She was unable to get up on her own so she called her daughter for help. She did not have any lightheadedness or chest pain associated with the fall. She denies any headache. She does not recall hitting her head when she fell. She is anticoagulated on Eliquis.    She reports she had some sores in the area where she dropped hot teat that she has been treating for some time at home with topical creams. They had been getting better and scabbed over until today when she spilt the tea on them.    The patient currently endorses diffuse low back pain. She currently denies knee pain.    The patient's daughter reports the patient was on the ground when she got to the patient's home. When she was getting the patient up the patient complained of bilateral knee pain and right hip pain.    REVIEW OF SYSTEMS   Review of Systems   Constitutional: Negative for chills, fatigue and fever.   Respiratory: Negative for cough and shortness of breath.    Cardiovascular: Negative for chest pain.   Gastrointestinal: Negative for abdominal pain and vomiting.   Genitourinary: Negative for dysuria.   Musculoskeletal: Positive for arthralgias (right hip) and back pain (low).   Skin: Positive for wound (left lower leg).   Neurological: Positive for syncope. Negative for weakness, light-headedness and headaches.   Hematological: Bruises/bleeds easily (Eliquis).   All other systems reviewed and are negative.      PAST MEDICAL HISTORY:  Past Medical History:   Diagnosis Date     Anemia      Atrial fibrillation (H)      CAD (coronary artery  disease)      Chronic diarrhea      Clinical diagnosis of COVID-19     1/2021     Coronary artery disease      Former smoker      Hematoma of left iliopsoas muscle 01/19/2020    while on eliquis.     History of skin cancer      Hyperlipidemia      Hypertension      Insomnia      Lumbar spondylosis 2016     Migraine      PAD (peripheral artery disease) (H) 10/19/2015     Paroxysmal SVT (supraventricular tachycardia) (H)     Created by Conversion      Persistent atrial fibrillation (H) 05/09/2018    New diagnosis April 16 18th and found incidentally on physical exam during yearly exam in primary clinic [id number] score of 5-new Eliquis start     Poliomyelitis      Restless legs syndrome (RLS)      RLS (restless legs syndrome) 02/28/2017     Sick sinus syndrome (H) 01/28/2020     Sigmoid diverticulosis 04/04/2007     SSS (sick sinus syndrome) (H)     S/p pacemaker       PAST SURGICAL HISTORY:  Past Surgical History:   Procedure Laterality Date     APPENDECTOMY       APPENDECTOMY       CAPSULOTOMY Bilateral 12/2015    YAG capsulotomy surgery. 12/21/15, 12/28/15     CARDIOVERSION  05/24/2018    EP Cardioversion External     CATARACT EXTRACTION, BILATERAL Bilateral 03/2012    3/15 and 3/29 by Dr. Barrett at the [facility]     CHOLECYSTECTOMY       CHOLECYSTECTOMY       COLONOSCOPY  05/17/2012     COLONOSCOPY W/ BIOPSIES  04/04/2007     COLONOSCOPY W/ POLYPECTOMY  05/07/2012    2 mm tubular adenoma in the rectum. Hemorrhoids. Diverticulosis.     CORONARY ANGIOPLASTY       CV CORONARY ANGIOGRAM N/A 06/26/2018    Procedure: Coronary Angiogram;  Surgeon: Joby Vargas MD;  Location: [facility];  Service:      EP PACEMAKER INSERT N/A 06/27/2018    Procedure: EP Pacemaker Insertion;  Surgeon: Osito Alvarez MD;  Location: [facility];  Service:      HERNIA REPAIR Right      HYSTERECTOMY       HYSTERECTOMY TOTAL ABDOMINAL       IMPLANT PACEMAKER       INGUINAL HERNIA REPAIR Right     Indirect-   Nuvia     MASTECTOMY       OOPHORECTOMY       SKIN CANCER EXCISION  2015    Right leg on 10/21/15. Right arm 9/29/15     STRIP VEIN      ligation?     TUBAL LIGATION       TUBAL LIGATION             CURRENT MEDICATIONS:    acetaminophen (TYLENOL) 500 MG tablet  alum hydroxide-mag carbonate (GENACTON)  MG/15ML SUSP suspension  Apoaequorin (PREVAGEN) 10 MG CAPS  atorvastatin (LIPITOR) 20 MG tablet  ELIQUIS ANTICOAGULANT 2.5 MG tablet  Melatonin 5 MG CHEW  metoprolol succinate ER (TOPROL-XL) 50 MG 24 hr tablet  Probiotic Product (PROBIOTIC ACIDOPHILUS BEADS) CAPS         ALLERGIES:  Allergies   Allergen Reactions     Diphenhydramine        FAMILY HISTORY:  Family History   Adopted: Yes   Problem Relation Age of Onset     Pulmonary Embolism Mother 32.00     Heart Disease Father      CABG Son      Stomach Cancer Grandson 20.00     Pulmonary Embolism Maternal Grandmother 32.00     No Known Problems Daughter      No Known Problems Daughter      CABG Son 64.00       SOCIAL HISTORY:   Social History     Socioeconomic History     Marital status:      Number of children: 3   Tobacco Use     Smoking status: Former     Packs/day: 1.50     Years: 25.00     Pack years: 37.50     Types: Cigarettes     Start date: 1949     Quit date: 1974     Years since quittin.0     Smokeless tobacco: Never   Vaping Use     Vaping Use: Never used   Substance and Sexual Activity     Alcohol use: Yes     Alcohol/week: 7.0 standard drinks     Drug use: No   Social History Narrative    She lives alone in a house. She has 3 children and 8 grandchildren and multiple great grandchildren. She is retired. 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 She does not smoke cigarettes and rarely drinks alcoho l.    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  Her [employer] had to close, due to declining numbers.    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 She has never driven a car. Does her own cooking, and ba sarika. She does not use a computer, never did.    Jayde Diaz MD 5/24/2021           VITALS:  /59   Pulse 82   Temp 98.1  F (36.7  C) (Oral)   Resp 16   Ht 1.676 m (5' 6\")   Wt 61.2 kg (135 lb)   SpO2 94%   BMI 21.79 kg/m      PHYSICAL EXAM    Physical Exam  Constitutional:       General: She is not in acute distress.  HENT:      Head: Normocephalic and atraumatic.      Mouth/Throat:      Pharynx: Oropharynx is clear.   Eyes:      Pupils: Pupils are equal, round, and reactive to light.   Cardiovascular:      Rate and Rhythm: Normal rate and regular rhythm.      Pulses: Normal pulses.      Heart sounds: Normal heart sounds.   Pulmonary:      Effort: Pulmonary effort is normal.      Breath sounds: Normal breath sounds.   Abdominal:      General: Abdomen is flat. Bowel sounds are normal.      Palpations: Abdomen is soft.      Tenderness: There is no abdominal tenderness.   Musculoskeletal:         General: Normal range of motion.      Cervical back: No spinous process tenderness or muscular tenderness.      Comments: Bilateral IT tenderness. Tenderness to left distal tibia.   Skin:     General: Skin is warm and dry.      Capillary Refill: Capillary refill takes less than 2 seconds.      Comments: Hyperpigmentation and plaques to the left lower leg with a small area of blistering   Neurological:      General: No focal deficit present.      Mental Status: She is alert and oriented to person, place, and time.      Cranial Nerves: Cranial nerves 2-12 are intact.      Sensory: Sensation is intact.      Motor: Motor function is intact.      Coordination: Coordination is intact.       LAB:  All pertinent labs reviewed and interpreted.  Labs Ordered and Resulted from Time of ED Arrival to Time of ED Departure   COMPREHENSIVE METABOLIC PANEL - Abnormal       Result Value    Sodium 135 (*)     Potassium 3.6      Chloride 101      Carbon " Dioxide (CO2) 24      Anion Gap 10      Urea Nitrogen 14.4      Creatinine 1.09 (*)     Calcium 9.2      Glucose 207 (*)     Alkaline Phosphatase 76      AST 27      ALT 12      Protein Total 6.3 (*)     Albumin 3.6      Bilirubin Total 0.8      GFR Estimate 47 (*)    CBC WITH PLATELETS AND DIFFERENTIAL - Abnormal    WBC Count 8.9      RBC Count 3.85      Hemoglobin 11.9      Hematocrit 37.0      MCV 96      MCH 30.9      MCHC 32.2      RDW 14.5      Platelet Count 163      % Neutrophils 83      % Lymphocytes 8      % Monocytes 8      % Eosinophils 0      % Basophils 0      % Immature Granulocytes 1      NRBCs per 100 WBC 0      Absolute Neutrophils 7.4      Absolute Lymphocytes 0.7 (*)     Absolute Monocytes 0.7      Absolute Eosinophils 0.0      Absolute Basophils 0.0      Absolute Immature Granulocytes 0.1      Absolute NRBCs 0.0         RADIOLOGY:  Reviewed all pertinent imaging. Please see official radiology report.  XR Pelvis 1/2 Views   Final Result   IMPRESSION: No acute fracture or malalignment. There is normal hip and sacroiliac joint spacing bilaterally. Mild to moderate degenerative changes of the lower lumbar spine and pubic symphysis. Osteopenia. Atherosclerosis.      XR Tibia and Fibula Left 2 Views   Final Result   IMPRESSION: Normal tibia and fibula. No fracture.      Head CT w/o contrast   Final Result   IMPRESSION:   1.  No acute intracranial process.   2.  Mild parieto-occipital scalp soft tissue swelling. No calvarial fracture.   3.  Stable mild to moderate chronic small vessel ischemic disease and mild generalized brain parenchymal volume loss since 01/28/2021.          EKG:    Performed at: 10:22, reviewed by me at 10:45    Impression: Atrial fibrillation. Left anterior fascicular block. Nonspecific ST & T wave abnormality. Prolonged QT. Abnormal ECG.    Rate: 82 bpm  Rhythm: Atrial fibrillation  Axis: -47  MT Interval: Irregular  QRS Interval: 88 ms  QTc Interval: 495 ms  ST Changes: No  ischemic changes  Comparison: No change from previous ECG from 2/5/21.    I have independently reviewed and interpreted the EKG(s) documented above.      I, Christian Diamond, am serving as a scribe to document services personally performed by Dr. Kristine Holley based on my observation and the provider's statements to me. I, Kristine Holley, DO attest that Christian Diamond is acting in a scribe capacity, has observed my performance of the services and has documented them in accordance with my direction.    Kristine Holley DO  Emergency Medicine  [facility] EMERGENCY DEPARTMENT  [facility] [facility] [address]  [phone]  Dept: [phone]       Kristine Holley DO  12/29/22 1544

## 2022-12-29 NOTE — ED TRIAGE NOTES
Pt with c/o falling this am.  Pt reports received Covid shot on Monday and and Monday night became sick all the way into Wednesday.  Pt had not eaten for several days and this am decided to try and make breakfast when she was leaning again kitchen sink and she fell.  Pt states it happened so fast.  Pt reports no loc, didn't hit her head but on thinners.  Pt spilled hot tea onto her left leg.  Red band across leg.     Triage Assessment     Row Name 12/29/22 0935       Triage Assessment (Adult)    Airway WDL WDL       Respiratory WDL    Respiratory WDL WDL       Skin Circulation/Temperature WDL    Skin Circulation/Temperature WDL X  redness to lower leg s/p burn

## 2022-12-29 NOTE — DISCHARGE INSTRUCTIONS
As discussed, your passing out episode is likely from decreased oral intake in the setting of illness with your recent COVID booster  I am glad you are feeling better  Your labs are reassuring here today as well as your x-rays and head CT  As discussed, you may continue your ointment to your lower leg, may also use Aquaphor or Vaseline  If a blister opens up I would cover it with a clean bandage  Return if any recurrent episodes

## 2022-12-29 NOTE — TELEPHONE ENCOUNTER
"\"Well she fell this morning and called me. When I came over, I found her on the floor. It doesn't seem that anything is broken. She had the Covid shot on Monday and was sick in bed all week.\" Ginna (daughter) calling in regard to Deloris Freeman who is currently with her. Consent to communicate on file. Caller states that Deloris Freeman is awake, alert, and responding appropriately. Patient is unsure if she fainted or if she hit her head, but appeared to have fallen on her right hip. At the time of the fall patient was making hot tea and ended up spilling on lower left leg by ankle. The area is red, blistering and a little swollen. Patient is able to bear weight while using her walker (baseline) and no other bleeding/ bruising noted. Patient is also taking Eliquis.     Triage guidelines recommend to be seen in the ED (per nursing judgement). FNA RN reviewed care advice per protocol and advised on closest ED to be seen (discussed [facility]). RN advised to call back with any changes, worsening of symptoms, and questions or concerns. Ginna (daughter) verbalized understanding of and agreement with plan and had no further questions.     Reason for Disposition    Unable to get up until help (e.g., caregiver, family, friend) arrived and on the ground 1 hour or more    Additional Information    Negative: Major injury from dangerous force (e.g., fall > 10 feet or 3 meters)    Negative: Major bleeding (e.g., actively dripping or spurting) and can't be stopped    Negative: Shock suspected (e.g., cold/pale/clammy skin, too weak to stand)    Negative: Difficult to awaken or acting confused (e.g., disoriented, slurred speech)    Negative: SEVERE weakness (i.e., unable to walk or barely able to walk, requires support) and new-onset or worsening    Negative: Can't stand (bear weight) or walk and new-onset after fall    Negative: Sounds like a life-threatening emergency to the triager    Negative: Injury (or injuries) that need emergency care    " Negative: Sounds like a serious injury to the triager    Negative: Muscle pain and dark (cola colored) or red-colored urine    Protocols used: FALLS AND LFBLIFC-H-IV    Arabella Lewis, RN-BSN  [facility]

## 2023-01-01 ENCOUNTER — PATIENT OUTREACH (OUTPATIENT)
Dept: CARE COORDINATION | Facility: CLINIC | Age: 88
End: 2023-01-01

## 2023-01-01 ENCOUNTER — OFFICE VISIT (OUTPATIENT)
Dept: NEUROLOGY | Facility: CLINIC | Age: 88
End: 2023-01-01
Payer: MEDICARE

## 2023-01-01 ENCOUNTER — ANCILLARY PROCEDURE (OUTPATIENT)
Dept: CARDIOLOGY | Facility: CLINIC | Age: 88
End: 2023-01-01
Attending: INTERNAL MEDICINE
Payer: MEDICARE

## 2023-01-01 ENCOUNTER — PATIENT OUTREACH (OUTPATIENT)
Dept: CARE COORDINATION | Facility: CLINIC | Age: 88
End: 2023-01-01
Payer: COMMERCIAL

## 2023-01-01 ENCOUNTER — OFFICE VISIT (OUTPATIENT)
Dept: INTERNAL MEDICINE | Facility: CLINIC | Age: 88
End: 2023-01-01
Payer: MEDICARE

## 2023-01-01 ENCOUNTER — APPOINTMENT (OUTPATIENT)
Dept: PHYSICAL THERAPY | Facility: HOSPITAL | Age: 88
DRG: 177 | End: 2023-01-01
Payer: MEDICARE

## 2023-01-01 ENCOUNTER — APPOINTMENT (OUTPATIENT)
Dept: CARDIOLOGY | Facility: HOSPITAL | Age: 88
DRG: 394 | End: 2023-01-01
Attending: STUDENT IN AN ORGANIZED HEALTH CARE EDUCATION/TRAINING PROGRAM
Payer: MEDICARE

## 2023-01-01 ENCOUNTER — APPOINTMENT (OUTPATIENT)
Dept: RADIOLOGY | Facility: HOSPITAL | Age: 88
DRG: 394 | End: 2023-01-01
Attending: EMERGENCY MEDICINE
Payer: MEDICARE

## 2023-01-01 ENCOUNTER — TELEPHONE (OUTPATIENT)
Dept: INTERNAL MEDICINE | Facility: CLINIC | Age: 88
End: 2023-01-01
Payer: MEDICARE

## 2023-01-01 ENCOUNTER — TELEPHONE (OUTPATIENT)
Dept: INTERNAL MEDICINE | Facility: CLINIC | Age: 88
End: 2023-01-01

## 2023-01-01 ENCOUNTER — APPOINTMENT (OUTPATIENT)
Dept: CT IMAGING | Facility: HOSPITAL | Age: 88
DRG: 394 | End: 2023-01-01
Attending: INTERNAL MEDICINE
Payer: MEDICARE

## 2023-01-01 ENCOUNTER — NURSE TRIAGE (OUTPATIENT)
Dept: NURSING | Facility: CLINIC | Age: 88
End: 2023-01-01
Payer: COMMERCIAL

## 2023-01-01 ENCOUNTER — TELEPHONE (OUTPATIENT)
Dept: INTERNAL MEDICINE | Facility: CLINIC | Age: 88
End: 2023-01-01
Payer: COMMERCIAL

## 2023-01-01 ENCOUNTER — APPOINTMENT (OUTPATIENT)
Dept: PHYSICAL THERAPY | Facility: HOSPITAL | Age: 88
DRG: 177 | End: 2023-01-01
Attending: STUDENT IN AN ORGANIZED HEALTH CARE EDUCATION/TRAINING PROGRAM
Payer: MEDICARE

## 2023-01-01 ENCOUNTER — PATIENT OUTREACH (OUTPATIENT)
Dept: CARE COORDINATION | Facility: CLINIC | Age: 88
End: 2023-01-01
Payer: MEDICARE

## 2023-01-01 ENCOUNTER — OFFICE VISIT (OUTPATIENT)
Dept: CARDIOLOGY | Facility: CLINIC | Age: 88
End: 2023-01-01
Payer: MEDICARE

## 2023-01-01 ENCOUNTER — APPOINTMENT (OUTPATIENT)
Dept: RADIOLOGY | Facility: HOSPITAL | Age: 88
DRG: 177 | End: 2023-01-01
Attending: EMERGENCY MEDICINE
Payer: MEDICARE

## 2023-01-01 ENCOUNTER — APPOINTMENT (OUTPATIENT)
Dept: CT IMAGING | Facility: HOSPITAL | Age: 88
DRG: 177 | End: 2023-01-01
Attending: EMERGENCY MEDICINE
Payer: MEDICARE

## 2023-01-01 ENCOUNTER — MEDICAL CORRESPONDENCE (OUTPATIENT)
Dept: INTERNAL MEDICINE | Facility: CLINIC | Age: 88
End: 2023-01-01

## 2023-01-01 ENCOUNTER — MYC MEDICAL ADVICE (OUTPATIENT)
Dept: INTERNAL MEDICINE | Facility: CLINIC | Age: 88
End: 2023-01-01
Payer: MEDICARE

## 2023-01-01 ENCOUNTER — APPOINTMENT (OUTPATIENT)
Dept: OCCUPATIONAL THERAPY | Facility: HOSPITAL | Age: 88
DRG: 177 | End: 2023-01-01
Attending: STUDENT IN AN ORGANIZED HEALTH CARE EDUCATION/TRAINING PROGRAM
Payer: MEDICARE

## 2023-01-01 ENCOUNTER — APPOINTMENT (OUTPATIENT)
Dept: OCCUPATIONAL THERAPY | Facility: HOSPITAL | Age: 88
DRG: 177 | End: 2023-01-01
Payer: MEDICARE

## 2023-01-01 ENCOUNTER — MEDICAL CORRESPONDENCE (OUTPATIENT)
Dept: HEALTH INFORMATION MANAGEMENT | Facility: CLINIC | Age: 88
End: 2023-01-01
Payer: MEDICARE

## 2023-01-01 ENCOUNTER — OFFICE VISIT (OUTPATIENT)
Dept: PODIATRY | Facility: CLINIC | Age: 88
End: 2023-01-01
Payer: MEDICARE

## 2023-01-01 ENCOUNTER — MEDICAL CORRESPONDENCE (OUTPATIENT)
Dept: HEALTH INFORMATION MANAGEMENT | Facility: CLINIC | Age: 88
End: 2023-01-01

## 2023-01-01 ENCOUNTER — MYC MEDICAL ADVICE (OUTPATIENT)
Dept: INTERNAL MEDICINE | Facility: CLINIC | Age: 88
End: 2023-01-01

## 2023-01-01 ENCOUNTER — MEDICAL CORRESPONDENCE (OUTPATIENT)
Dept: HEALTH INFORMATION MANAGEMENT | Facility: CLINIC | Age: 88
End: 2023-01-01
Payer: COMMERCIAL

## 2023-01-01 ENCOUNTER — TELEPHONE (OUTPATIENT)
Dept: CARDIOLOGY | Facility: CLINIC | Age: 88
End: 2023-01-01

## 2023-01-01 ENCOUNTER — HOSPITAL ENCOUNTER (INPATIENT)
Facility: HOSPITAL | Age: 88
LOS: 3 days | Discharge: HOME | DRG: 394 | End: 2023-09-22
Attending: EMERGENCY MEDICINE | Admitting: STUDENT IN AN ORGANIZED HEALTH CARE EDUCATION/TRAINING PROGRAM
Payer: MEDICARE

## 2023-01-01 ENCOUNTER — OFFICE VISIT (OUTPATIENT)
Dept: NEUROLOGY | Facility: CLINIC | Age: 88
End: 2023-01-01
Payer: COMMERCIAL

## 2023-01-01 ENCOUNTER — APPOINTMENT (OUTPATIENT)
Dept: PHYSICAL THERAPY | Facility: HOSPITAL | Age: 88
DRG: 394 | End: 2023-01-01
Attending: HOSPITALIST
Payer: MEDICARE

## 2023-01-01 ENCOUNTER — HEALTH MAINTENANCE LETTER (OUTPATIENT)
Age: 88
End: 2023-01-01

## 2023-01-01 ENCOUNTER — APPOINTMENT (OUTPATIENT)
Dept: OCCUPATIONAL THERAPY | Facility: HOSPITAL | Age: 88
DRG: 394 | End: 2023-01-01
Attending: HOSPITALIST
Payer: MEDICARE

## 2023-01-01 ENCOUNTER — DOCUMENTATION ONLY (OUTPATIENT)
Dept: OTHER | Facility: CLINIC | Age: 88
End: 2023-01-01

## 2023-01-01 ENCOUNTER — HOSPITAL ENCOUNTER (INPATIENT)
Facility: HOSPITAL | Age: 88
LOS: 6 days | Discharge: NURSING, REHAB OR LONG-TERM CARE | DRG: 177 | End: 2023-11-01
Attending: EMERGENCY MEDICINE | Admitting: STUDENT IN AN ORGANIZED HEALTH CARE EDUCATION/TRAINING PROGRAM
Payer: MEDICARE

## 2023-01-01 VITALS
SYSTOLIC BLOOD PRESSURE: 164 MMHG | BODY MASS INDEX: 23.75 KG/M2 | OXYGEN SATURATION: 95 % | DIASTOLIC BLOOD PRESSURE: 74 MMHG | WEIGHT: 147.8 LBS | HEIGHT: 66 IN | RESPIRATION RATE: 18 BRPM | HEART RATE: 82 BPM | TEMPERATURE: 98.1 F

## 2023-01-01 VITALS
RESPIRATION RATE: 20 BRPM | WEIGHT: 139.8 LBS | SYSTOLIC BLOOD PRESSURE: 134 MMHG | BODY MASS INDEX: 22.47 KG/M2 | OXYGEN SATURATION: 98 % | DIASTOLIC BLOOD PRESSURE: 69 MMHG | TEMPERATURE: 98.2 F | HEIGHT: 66 IN | HEART RATE: 71 BPM

## 2023-01-01 VITALS
DIASTOLIC BLOOD PRESSURE: 76 MMHG | HEART RATE: 60 BPM | SYSTOLIC BLOOD PRESSURE: 137 MMHG | OXYGEN SATURATION: 96 % | BODY MASS INDEX: 23.64 KG/M2 | WEIGHT: 138.5 LBS | TEMPERATURE: 97.7 F | HEIGHT: 64 IN | RESPIRATION RATE: 18 BRPM

## 2023-01-01 VITALS — WEIGHT: 142 LBS | BODY MASS INDEX: 24.24 KG/M2 | HEIGHT: 64 IN | OXYGEN SATURATION: 93 % | HEART RATE: 100 BPM

## 2023-01-01 VITALS
DIASTOLIC BLOOD PRESSURE: 60 MMHG | WEIGHT: 142 LBS | RESPIRATION RATE: 16 BRPM | BODY MASS INDEX: 24.37 KG/M2 | HEART RATE: 80 BPM | SYSTOLIC BLOOD PRESSURE: 138 MMHG

## 2023-01-01 VITALS
HEIGHT: 64 IN | HEART RATE: 76 BPM | DIASTOLIC BLOOD PRESSURE: 76 MMHG | TEMPERATURE: 97.6 F | SYSTOLIC BLOOD PRESSURE: 130 MMHG | OXYGEN SATURATION: 98 % | WEIGHT: 141.8 LBS | BODY MASS INDEX: 24.21 KG/M2

## 2023-01-01 VITALS
BODY MASS INDEX: 24.3 KG/M2 | DIASTOLIC BLOOD PRESSURE: 77 MMHG | TEMPERATURE: 98 F | OXYGEN SATURATION: 97 % | SYSTOLIC BLOOD PRESSURE: 139 MMHG | WEIGHT: 142.3 LBS | HEART RATE: 67 BPM | RESPIRATION RATE: 20 BRPM | HEIGHT: 64 IN

## 2023-01-01 VITALS
WEIGHT: 146.4 LBS | HEART RATE: 83 BPM | OXYGEN SATURATION: 99 % | SYSTOLIC BLOOD PRESSURE: 144 MMHG | DIASTOLIC BLOOD PRESSURE: 79 MMHG | BODY MASS INDEX: 25 KG/M2 | TEMPERATURE: 97.9 F | HEIGHT: 64 IN | RESPIRATION RATE: 20 BRPM

## 2023-01-01 DIAGNOSIS — I35.0 NONRHEUMATIC AORTIC VALVE STENOSIS: ICD-10-CM

## 2023-01-01 DIAGNOSIS — I10 ESSENTIAL HYPERTENSION: ICD-10-CM

## 2023-01-01 DIAGNOSIS — E78.2 MIXED HYPERLIPIDEMIA: Primary | ICD-10-CM

## 2023-01-01 DIAGNOSIS — G31.84 MILD COGNITIVE IMPAIRMENT: ICD-10-CM

## 2023-01-01 DIAGNOSIS — H81.13 BENIGN PAROXYSMAL POSITIONAL VERTIGO DUE TO BILATERAL VESTIBULAR DISORDER: ICD-10-CM

## 2023-01-01 DIAGNOSIS — I48.20 CHRONIC ATRIAL FIBRILLATION (H): ICD-10-CM

## 2023-01-01 DIAGNOSIS — D69.6 THROMBOCYTOPENIA (H): ICD-10-CM

## 2023-01-01 DIAGNOSIS — H92.01 CHRONIC RIGHT EAR PAIN: ICD-10-CM

## 2023-01-01 DIAGNOSIS — M62.81 GENERALIZED MUSCLE WEAKNESS: ICD-10-CM

## 2023-01-01 DIAGNOSIS — G89.29 CHRONIC RIGHT EAR PAIN: ICD-10-CM

## 2023-01-01 DIAGNOSIS — R06.09 DOE (DYSPNEA ON EXERTION): ICD-10-CM

## 2023-01-01 DIAGNOSIS — M20.42 HAMMER TOES OF BOTH FEET: ICD-10-CM

## 2023-01-01 DIAGNOSIS — Z23 ENCOUNTER FOR VACCINATION: ICD-10-CM

## 2023-01-01 DIAGNOSIS — I48.19 PERSISTENT ATRIAL FIBRILLATION (H): Primary | ICD-10-CM

## 2023-01-01 DIAGNOSIS — R26.89 IMPAIRED GAIT AND MOBILITY: ICD-10-CM

## 2023-01-01 DIAGNOSIS — C18.0 MALIGNANT NEOPLASM OF CECUM (H): ICD-10-CM

## 2023-01-01 DIAGNOSIS — N18.31 STAGE 3A CHRONIC KIDNEY DISEASE (H): Primary | ICD-10-CM

## 2023-01-01 DIAGNOSIS — U07.1 COVID-19 VIRUS INFECTION: ICD-10-CM

## 2023-01-01 DIAGNOSIS — R07.89 CHEST PRESSURE: Primary | ICD-10-CM

## 2023-01-01 DIAGNOSIS — E78.2 MIXED HYPERLIPIDEMIA: ICD-10-CM

## 2023-01-01 DIAGNOSIS — Z95.0 CARDIAC PACEMAKER IN SITU: ICD-10-CM

## 2023-01-01 DIAGNOSIS — R07.9 CHEST PAIN, UNSPECIFIED TYPE: ICD-10-CM

## 2023-01-01 DIAGNOSIS — D50.0 IRON DEFICIENCY ANEMIA DUE TO CHRONIC BLOOD LOSS: ICD-10-CM

## 2023-01-01 DIAGNOSIS — I50.9 NEW ONSET OF CONGESTIVE HEART FAILURE (H): Primary | ICD-10-CM

## 2023-01-01 DIAGNOSIS — I73.9 PAD (PERIPHERAL ARTERY DISEASE) (H): ICD-10-CM

## 2023-01-01 DIAGNOSIS — D50.0 IRON DEFICIENCY ANEMIA DUE TO CHRONIC BLOOD LOSS: Primary | ICD-10-CM

## 2023-01-01 DIAGNOSIS — B35.1 ONYCHOMYCOSIS: Primary | ICD-10-CM

## 2023-01-01 DIAGNOSIS — N18.31 STAGE 3A CHRONIC KIDNEY DISEASE (H): ICD-10-CM

## 2023-01-01 DIAGNOSIS — I25.10 CAD (CORONARY ARTERY DISEASE): ICD-10-CM

## 2023-01-01 DIAGNOSIS — D64.9 NORMOCYTIC ANEMIA: ICD-10-CM

## 2023-01-01 DIAGNOSIS — C44.212 BCC (BASAL CELL CARCINOMA), EAR, RIGHT: ICD-10-CM

## 2023-01-01 DIAGNOSIS — I10 HYPERTENSION, UNSPECIFIED TYPE: ICD-10-CM

## 2023-01-01 DIAGNOSIS — F06.70 MILD NEUROCOGNITIVE DISORDER DUE TO MULTIPLE ETIOLOGIES, WITHOUT BEHAVIORAL DISTURBANCE: Primary | ICD-10-CM

## 2023-01-01 DIAGNOSIS — J47.9 BRONCHIECTASIS WITHOUT COMPLICATION (H): ICD-10-CM

## 2023-01-01 DIAGNOSIS — I49.5 SICK SINUS SYNDROME (H): ICD-10-CM

## 2023-01-01 DIAGNOSIS — Z53.9 DIAGNOSIS NOT YET DEFINED: Primary | ICD-10-CM

## 2023-01-01 DIAGNOSIS — R55 SYNCOPE, UNSPECIFIED SYNCOPE TYPE: ICD-10-CM

## 2023-01-01 DIAGNOSIS — T24.202D: ICD-10-CM

## 2023-01-01 DIAGNOSIS — L02.212 ABSCESS OF BACK: Primary | ICD-10-CM

## 2023-01-01 DIAGNOSIS — D64.9 ACUTE ANEMIA: ICD-10-CM

## 2023-01-01 DIAGNOSIS — L03.116 CELLULITIS OF LEFT LOWER EXTREMITY: ICD-10-CM

## 2023-01-01 DIAGNOSIS — M20.41 HAMMER TOES OF BOTH FEET: ICD-10-CM

## 2023-01-01 DIAGNOSIS — C18.0 MALIGNANT NEOPLASM OF CECUM (H): Primary | ICD-10-CM

## 2023-01-01 LAB
ABO/RH(D): NORMAL
ALBUMIN SERPL BCG-MCNC: 3.2 G/DL (ref 3.5–5.2)
ALBUMIN SERPL BCG-MCNC: 3.3 G/DL (ref 3.5–5.2)
ALBUMIN SERPL BCG-MCNC: 3.5 G/DL (ref 3.5–5.2)
ALBUMIN SERPL BCG-MCNC: 3.7 G/DL (ref 3.5–5.2)
ALBUMIN SERPL BCG-MCNC: 3.7 G/DL (ref 3.5–5.2)
ALP SERPL-CCNC: 52 U/L (ref 35–104)
ALP SERPL-CCNC: 55 U/L (ref 35–104)
ALP SERPL-CCNC: 63 U/L (ref 35–104)
ALP SERPL-CCNC: 67 U/L (ref 35–104)
ALP SERPL-CCNC: 74 U/L (ref 40–150)
ALT SERPL W P-5'-P-CCNC: 10 U/L (ref 0–50)
ALT SERPL W P-5'-P-CCNC: 15 U/L (ref 0–50)
ALT SERPL W P-5'-P-CCNC: 7 U/L (ref 0–50)
ALT SERPL W P-5'-P-CCNC: 7 U/L (ref 0–50)
ALT SERPL W P-5'-P-CCNC: 9 U/L (ref 0–50)
ANION GAP SERPL CALCULATED.3IONS-SCNC: 10 MMOL/L (ref 7–15)
ANION GAP SERPL CALCULATED.3IONS-SCNC: 10 MMOL/L (ref 7–15)
ANION GAP SERPL CALCULATED.3IONS-SCNC: 11 MMOL/L (ref 7–15)
ANION GAP SERPL CALCULATED.3IONS-SCNC: 11 MMOL/L (ref 7–15)
ANION GAP SERPL CALCULATED.3IONS-SCNC: 12 MMOL/L (ref 7–15)
ANION GAP SERPL CALCULATED.3IONS-SCNC: 6 MMOL/L (ref 7–15)
ANION GAP SERPL CALCULATED.3IONS-SCNC: 7 MMOL/L (ref 7–15)
ANION GAP SERPL CALCULATED.3IONS-SCNC: 7 MMOL/L (ref 7–15)
ANION GAP SERPL CALCULATED.3IONS-SCNC: 8 MMOL/L (ref 7–15)
ANION GAP SERPL CALCULATED.3IONS-SCNC: 9 MMOL/L (ref 7–15)
ANTIBODY SCREEN: NEGATIVE
AST SERPL W P-5'-P-CCNC: 15 U/L (ref 0–45)
AST SERPL W P-5'-P-CCNC: 19 U/L (ref 0–45)
AST SERPL W P-5'-P-CCNC: 21 U/L (ref 0–45)
ATRIAL RATE - MUSE: 129 BPM
ATRIAL RATE - MUSE: 258 BPM
BASO+EOS+MONOS # BLD AUTO: ABNORMAL 10*3/UL
BASO+EOS+MONOS NFR BLD AUTO: ABNORMAL %
BASOPHILS # BLD AUTO: 0 10E3/UL (ref 0–0.2)
BASOPHILS # BLD AUTO: 0 10E3/UL (ref 0–0.2)
BASOPHILS # BLD AUTO: 0.1 10E3/UL (ref 0–0.2)
BASOPHILS NFR BLD AUTO: 0 %
BASOPHILS NFR BLD AUTO: 1 %
BILIRUB DIRECT SERPL-MCNC: 0.24 MG/DL (ref 0–0.3)
BILIRUB DIRECT SERPL-MCNC: <0.2 MG/DL (ref 0–0.3)
BILIRUB SERPL-MCNC: 0.4 MG/DL
BILIRUB SERPL-MCNC: 0.4 MG/DL
BILIRUB SERPL-MCNC: 0.5 MG/DL
BILIRUB SERPL-MCNC: 0.8 MG/DL
BILIRUB SERPL-MCNC: 0.8 MG/DL
BLD PROD TYP BPU: NORMAL
BLD PROD TYP BPU: NORMAL
BLOOD COMPONENT TYPE: NORMAL
BLOOD COMPONENT TYPE: NORMAL
BUN SERPL-MCNC: 10.4 MG/DL (ref 8–23)
BUN SERPL-MCNC: 11.1 MG/DL (ref 8–23)
BUN SERPL-MCNC: 11.4 MG/DL (ref 8–23)
BUN SERPL-MCNC: 16.5 MG/DL (ref 8–23)
BUN SERPL-MCNC: 17.5 MG/DL (ref 8–23)
BUN SERPL-MCNC: 19.6 MG/DL (ref 8–23)
BUN SERPL-MCNC: 20.9 MG/DL (ref 8–23)
BUN SERPL-MCNC: 21 MG/DL (ref 8–23)
BUN SERPL-MCNC: 23.8 MG/DL (ref 8–23)
BUN SERPL-MCNC: 26 MG/DL (ref 8–23)
CALCIUM SERPL-MCNC: 8.5 MG/DL (ref 8.2–9.6)
CALCIUM SERPL-MCNC: 8.7 MG/DL (ref 8.2–9.6)
CALCIUM SERPL-MCNC: 8.8 MG/DL (ref 8.2–9.6)
CALCIUM SERPL-MCNC: 8.9 MG/DL (ref 8.2–9.6)
CALCIUM SERPL-MCNC: 9 MG/DL (ref 8.2–9.6)
CALCIUM SERPL-MCNC: 9.1 MG/DL (ref 8.2–9.6)
CEA SERPL-MCNC: 1.6 NG/ML
CHLORIDE SERPL-SCNC: 102 MMOL/L (ref 98–107)
CHLORIDE SERPL-SCNC: 103 MMOL/L (ref 98–107)
CHLORIDE SERPL-SCNC: 105 MMOL/L (ref 98–107)
CHLORIDE SERPL-SCNC: 108 MMOL/L (ref 98–107)
CHLORIDE SERPL-SCNC: 109 MMOL/L (ref 98–107)
CHLORIDE SERPL-SCNC: 110 MMOL/L (ref 98–107)
CHLORIDE SERPL-SCNC: 110 MMOL/L (ref 98–107)
CHLORIDE SERPL-SCNC: 111 MMOL/L (ref 98–107)
CHOLEST SERPL-MCNC: 154 MG/DL
CODING SYSTEM: NORMAL
CODING SYSTEM: NORMAL
CREAT SERPL-MCNC: 0.88 MG/DL (ref 0.51–0.95)
CREAT SERPL-MCNC: 0.9 MG/DL (ref 0.51–0.95)
CREAT SERPL-MCNC: 0.91 MG/DL (ref 0.51–0.95)
CREAT SERPL-MCNC: 0.92 MG/DL (ref 0.51–0.95)
CREAT SERPL-MCNC: 0.94 MG/DL (ref 0.51–0.95)
CREAT SERPL-MCNC: 0.95 MG/DL (ref 0.51–0.95)
CREAT SERPL-MCNC: 0.96 MG/DL (ref 0.51–0.95)
CREAT SERPL-MCNC: 0.97 MG/DL (ref 0.51–0.95)
CREAT SERPL-MCNC: 0.98 MG/DL (ref 0.51–0.95)
CREAT SERPL-MCNC: 0.99 MG/DL (ref 0.51–0.95)
CREAT SERPL-MCNC: 1.01 MG/DL (ref 0.51–0.95)
CROSSMATCH: NORMAL
CROSSMATCH: NORMAL
CRP SERPL-MCNC: 23.2 MG/L
DEPRECATED HCO3 PLAS-SCNC: 23 MMOL/L (ref 22–29)
DEPRECATED HCO3 PLAS-SCNC: 23 MMOL/L (ref 22–29)
DEPRECATED HCO3 PLAS-SCNC: 24 MMOL/L (ref 22–29)
DEPRECATED HCO3 PLAS-SCNC: 25 MMOL/L (ref 22–29)
DEPRECATED HCO3 PLAS-SCNC: 26 MMOL/L (ref 22–29)
DEPRECATED HCO3 PLAS-SCNC: 26 MMOL/L (ref 22–29)
DEPRECATED HCO3 PLAS-SCNC: 27 MMOL/L (ref 22–29)
DIASTOLIC BLOOD PRESSURE - MUSE: 72 MMHG
DIASTOLIC BLOOD PRESSURE - MUSE: NORMAL MMHG
EGFRCR SERPLBLD CKD-EPI 2021: 53 ML/MIN/1.73M2
EGFRCR SERPLBLD CKD-EPI 2021: 54 ML/MIN/1.73M2
EGFRCR SERPLBLD CKD-EPI 2021: 54 ML/MIN/1.73M2
EGFRCR SERPLBLD CKD-EPI 2021: 55 ML/MIN/1.73M2
EGFRCR SERPLBLD CKD-EPI 2021: 56 ML/MIN/1.73M2
EGFRCR SERPLBLD CKD-EPI 2021: 56 ML/MIN/1.73M2
EGFRCR SERPLBLD CKD-EPI 2021: 58 ML/MIN/1.73M2
EGFRCR SERPLBLD CKD-EPI 2021: 59 ML/MIN/1.73M2
EGFRCR SERPLBLD CKD-EPI 2021: 59 ML/MIN/1.73M2
EGFRCR SERPLBLD CKD-EPI 2021: 61 ML/MIN/1.73M2
EOSINOPHIL # BLD AUTO: 0 10E3/UL (ref 0–0.7)
EOSINOPHIL # BLD AUTO: 0.2 10E3/UL (ref 0–0.7)
EOSINOPHIL # BLD AUTO: 0.2 10E3/UL (ref 0–0.7)
EOSINOPHIL # BLD AUTO: 0.3 10E3/UL (ref 0–0.7)
EOSINOPHIL # BLD AUTO: 0.3 10E3/UL (ref 0–0.7)
EOSINOPHIL NFR BLD AUTO: 0 %
EOSINOPHIL NFR BLD AUTO: 2 %
EOSINOPHIL NFR BLD AUTO: 2 %
EOSINOPHIL NFR BLD AUTO: 4 %
EOSINOPHIL NFR BLD AUTO: 4 %
ERYTHROCYTE [DISTWIDTH] IN BLOOD BY AUTOMATED COUNT: 16.3 % (ref 10–15)
ERYTHROCYTE [DISTWIDTH] IN BLOOD BY AUTOMATED COUNT: 16.6 % (ref 10–15)
ERYTHROCYTE [DISTWIDTH] IN BLOOD BY AUTOMATED COUNT: 16.7 % (ref 10–15)
ERYTHROCYTE [DISTWIDTH] IN BLOOD BY AUTOMATED COUNT: 17.5 % (ref 10–15)
ERYTHROCYTE [DISTWIDTH] IN BLOOD BY AUTOMATED COUNT: 18 % (ref 10–15)
ERYTHROCYTE [DISTWIDTH] IN BLOOD BY AUTOMATED COUNT: 21.6 % (ref 10–15)
ERYTHROCYTE [DISTWIDTH] IN BLOOD BY AUTOMATED COUNT: 21.7 % (ref 10–15)
ERYTHROCYTE [DISTWIDTH] IN BLOOD BY AUTOMATED COUNT: 22 % (ref 10–15)
ERYTHROCYTE [DISTWIDTH] IN BLOOD BY AUTOMATED COUNT: 22.2 % (ref 10–15)
FERRITIN SERPL-MCNC: 10 NG/ML (ref 11–328)
FERRITIN SERPL-MCNC: 20 NG/ML (ref 11–328)
FERRITIN SERPL-MCNC: 25 NG/ML (ref 11–328)
GFR SERPL CREATININE-BSD FRML MDRD: 52 ML/MIN/1.73M2
GLUCOSE BLDC GLUCOMTR-MCNC: 123 MG/DL (ref 70–99)
GLUCOSE BLDC GLUCOMTR-MCNC: 158 MG/DL (ref 70–99)
GLUCOSE BLDC GLUCOMTR-MCNC: 234 MG/DL (ref 70–99)
GLUCOSE SERPL-MCNC: 112 MG/DL (ref 70–99)
GLUCOSE SERPL-MCNC: 136 MG/DL (ref 70–99)
GLUCOSE SERPL-MCNC: 141 MG/DL (ref 70–99)
GLUCOSE SERPL-MCNC: 151 MG/DL (ref 70–99)
GLUCOSE SERPL-MCNC: 89 MG/DL (ref 70–99)
GLUCOSE SERPL-MCNC: 89 MG/DL (ref 70–99)
GLUCOSE SERPL-MCNC: 93 MG/DL (ref 70–99)
GLUCOSE SERPL-MCNC: 95 MG/DL (ref 70–99)
GLUCOSE SERPL-MCNC: 97 MG/DL (ref 70–99)
GLUCOSE SERPL-MCNC: 97 MG/DL (ref 70–99)
HCT VFR BLD AUTO: 22.9 % (ref 35–47)
HCT VFR BLD AUTO: 23.6 % (ref 35–47)
HCT VFR BLD AUTO: 24.1 % (ref 35–47)
HCT VFR BLD AUTO: 28.1 % (ref 35–47)
HCT VFR BLD AUTO: 31.2 % (ref 35–47)
HCT VFR BLD AUTO: 32.3 % (ref 35–47)
HCT VFR BLD AUTO: 34.2 % (ref 35–47)
HCT VFR BLD AUTO: 34.3 % (ref 35–47)
HCT VFR BLD AUTO: 36.3 % (ref 35–47)
HDLC SERPL-MCNC: 46 MG/DL
HEMOCCULT STL QL: POSITIVE
HGB BLD-MCNC: 10.3 G/DL (ref 11.7–15.7)
HGB BLD-MCNC: 10.6 G/DL (ref 11.7–15.7)
HGB BLD-MCNC: 10.7 G/DL (ref 11.7–15.7)
HGB BLD-MCNC: 6.6 G/DL (ref 11.7–15.7)
HGB BLD-MCNC: 6.7 G/DL (ref 11.7–15.7)
HGB BLD-MCNC: 6.8 G/DL (ref 11.7–15.7)
HGB BLD-MCNC: 7.1 G/DL (ref 11.7–15.7)
HGB BLD-MCNC: 7.1 G/DL (ref 11.7–15.7)
HGB BLD-MCNC: 7.3 G/DL (ref 11.7–15.7)
HGB BLD-MCNC: 7.7 G/DL (ref 11.7–15.7)
HGB BLD-MCNC: 7.8 G/DL (ref 11.7–15.7)
HGB BLD-MCNC: 8 G/DL (ref 11.7–15.7)
HGB BLD-MCNC: 8.5 G/DL (ref 11.7–15.7)
HGB BLD-MCNC: 8.7 G/DL (ref 11.7–15.7)
HGB BLD-MCNC: 8.9 G/DL (ref 11.7–15.7)
HGB BLD-MCNC: 9.4 G/DL (ref 11.7–15.7)
HGB BLD-MCNC: 9.9 G/DL (ref 11.7–15.7)
HOLD SPECIMEN: NORMAL
IMM GRANULOCYTES # BLD: 0 10E3/UL
IMM GRANULOCYTES # BLD: 0.1 10E3/UL
IMM GRANULOCYTES NFR BLD: 0 %
IMM GRANULOCYTES NFR BLD: 1 %
INR PPP: 1.45 (ref 0.85–1.15)
INTERPRETATION ECG - MUSE: NORMAL
INTERPRETATION ECG - MUSE: NORMAL
IRON BINDING CAPACITY (ROCHE): 298 UG/DL (ref 240–430)
IRON BINDING CAPACITY (ROCHE): 335 UG/DL (ref 240–430)
IRON BINDING CAPACITY (ROCHE): 340 UG/DL (ref 240–430)
IRON SATN MFR SERPL: 4 % (ref 15–46)
IRON SATN MFR SERPL: 5 % (ref 15–46)
IRON SATN MFR SERPL: 8 % (ref 15–46)
IRON SERPL-MCNC: 12 UG/DL (ref 37–145)
IRON SERPL-MCNC: 14 UG/DL (ref 37–145)
IRON SERPL-MCNC: 28 UG/DL (ref 37–145)
ISSUE DATE AND TIME: NORMAL
ISSUE DATE AND TIME: NORMAL
LDLC SERPL CALC-MCNC: 88 MG/DL
LYMPHOCYTES # BLD AUTO: 0.5 10E3/UL (ref 0.8–5.3)
LYMPHOCYTES # BLD AUTO: 1.1 10E3/UL (ref 0.8–5.3)
LYMPHOCYTES # BLD AUTO: 1.3 10E3/UL (ref 0.8–5.3)
LYMPHOCYTES # BLD AUTO: 1.4 10E3/UL (ref 0.8–5.3)
LYMPHOCYTES # BLD AUTO: 1.6 10E3/UL (ref 0.8–5.3)
LYMPHOCYTES NFR BLD AUTO: 14 %
LYMPHOCYTES NFR BLD AUTO: 16 %
LYMPHOCYTES NFR BLD AUTO: 18 %
LYMPHOCYTES NFR BLD AUTO: 22 %
LYMPHOCYTES NFR BLD AUTO: 5 %
MCH RBC QN AUTO: 21.5 PG (ref 26.5–33)
MCH RBC QN AUTO: 21.6 PG (ref 26.5–33)
MCH RBC QN AUTO: 22.3 PG (ref 26.5–33)
MCH RBC QN AUTO: 23.7 PG (ref 26.5–33)
MCH RBC QN AUTO: 24.3 PG (ref 26.5–33)
MCH RBC QN AUTO: 24.4 PG (ref 26.5–33)
MCH RBC QN AUTO: 24.5 PG (ref 26.5–33)
MCH RBC QN AUTO: 24.9 PG (ref 26.5–33)
MCH RBC QN AUTO: 26.1 PG (ref 26.5–33)
MCHC RBC AUTO-ENTMCNC: 28.4 G/DL (ref 31.5–36.5)
MCHC RBC AUTO-ENTMCNC: 28.8 G/DL (ref 31.5–36.5)
MCHC RBC AUTO-ENTMCNC: 29.5 G/DL (ref 31.5–36.5)
MCHC RBC AUTO-ENTMCNC: 29.5 G/DL (ref 31.5–36.5)
MCHC RBC AUTO-ENTMCNC: 30.1 G/DL (ref 31.5–36.5)
MCHC RBC AUTO-ENTMCNC: 30.1 G/DL (ref 31.5–36.5)
MCHC RBC AUTO-ENTMCNC: 30.2 G/DL (ref 31.5–36.5)
MCHC RBC AUTO-ENTMCNC: 30.7 G/DL (ref 31.5–36.5)
MCHC RBC AUTO-ENTMCNC: 30.9 G/DL (ref 31.5–36.5)
MCV RBC AUTO: 75 FL (ref 78–100)
MCV RBC AUTO: 76 FL (ref 78–100)
MCV RBC AUTO: 76 FL (ref 78–100)
MCV RBC AUTO: 79 FL (ref 78–100)
MCV RBC AUTO: 80 FL (ref 78–100)
MCV RBC AUTO: 80 FL (ref 78–100)
MCV RBC AUTO: 81 FL (ref 78–100)
MCV RBC AUTO: 83 FL (ref 78–100)
MCV RBC AUTO: 86 FL (ref 78–100)
MDC_IDC_EPISODE_DTM: NORMAL
MDC_IDC_EPISODE_DTM: NORMAL
MDC_IDC_EPISODE_ID: NORMAL
MDC_IDC_EPISODE_ID: NORMAL
MDC_IDC_EPISODE_TYPE: NORMAL
MDC_IDC_EPISODE_TYPE: NORMAL
MDC_IDC_LEAD_CONNECTION_STATUS: NORMAL
MDC_IDC_LEAD_IMPLANT_DT: NORMAL
MDC_IDC_LEAD_LOCATION: NORMAL
MDC_IDC_LEAD_LOCATION_DETAIL_1: NORMAL
MDC_IDC_LEAD_MFG: NORMAL
MDC_IDC_LEAD_MODEL: NORMAL
MDC_IDC_LEAD_POLARITY_TYPE: NORMAL
MDC_IDC_LEAD_SERIAL: NORMAL
MDC_IDC_LEAD_SPECIAL_FUNCTION: NORMAL
MDC_IDC_MSMT_BATTERY_DTM: NORMAL
MDC_IDC_MSMT_BATTERY_DTM: NORMAL
MDC_IDC_MSMT_BATTERY_REMAINING_LONGEVITY: 42 MO
MDC_IDC_MSMT_BATTERY_REMAINING_LONGEVITY: 42 MO
MDC_IDC_MSMT_BATTERY_REMAINING_PERCENTAGE: 70 %
MDC_IDC_MSMT_BATTERY_REMAINING_PERCENTAGE: 72 %
MDC_IDC_MSMT_BATTERY_STATUS: NORMAL
MDC_IDC_MSMT_BATTERY_STATUS: NORMAL
MDC_IDC_MSMT_LEADCHNL_RA_IMPEDANCE_VALUE: 709 OHM
MDC_IDC_MSMT_LEADCHNL_RV_IMPEDANCE_VALUE: 580 OHM
MDC_IDC_MSMT_LEADCHNL_RV_IMPEDANCE_VALUE: 600 OHM
MDC_IDC_MSMT_LEADCHNL_RV_LEAD_CHANNEL_STATUS: NORMAL
MDC_IDC_MSMT_LEADCHNL_RV_PACING_THRESHOLD_AMPLITUDE: 1.2 V
MDC_IDC_MSMT_LEADCHNL_RV_PACING_THRESHOLD_AMPLITUDE: 1.4 V
MDC_IDC_MSMT_LEADCHNL_RV_PACING_THRESHOLD_PULSEWIDTH: 0.4 MS
MDC_IDC_MSMT_LEADCHNL_RV_PACING_THRESHOLD_PULSEWIDTH: 0.4 MS
MDC_IDC_PG_IMPLANT_DTM: NORMAL
MDC_IDC_PG_IMPLANT_DTM: NORMAL
MDC_IDC_PG_MFG: NORMAL
MDC_IDC_PG_MFG: NORMAL
MDC_IDC_PG_MODEL: NORMAL
MDC_IDC_PG_MODEL: NORMAL
MDC_IDC_PG_SERIAL: NORMAL
MDC_IDC_PG_SERIAL: NORMAL
MDC_IDC_PG_TYPE: NORMAL
MDC_IDC_PG_TYPE: NORMAL
MDC_IDC_SESS_CLINIC_NAME: NORMAL
MDC_IDC_SESS_CLINIC_NAME: NORMAL
MDC_IDC_SESS_DTM: NORMAL
MDC_IDC_SESS_DTM: NORMAL
MDC_IDC_SESS_TYPE: NORMAL
MDC_IDC_SESS_TYPE: NORMAL
MDC_IDC_SET_BRADY_AT_MODE_SWITCH_RATE: 170 {BEATS}/MIN
MDC_IDC_SET_BRADY_LOWRATE: 60 {BEATS}/MIN
MDC_IDC_SET_BRADY_LOWRATE: 60 {BEATS}/MIN
MDC_IDC_SET_BRADY_MAX_SENSOR_RATE: 130 {BEATS}/MIN
MDC_IDC_SET_BRADY_MAX_SENSOR_RATE: 130 {BEATS}/MIN
MDC_IDC_SET_BRADY_MODE: NORMAL
MDC_IDC_SET_BRADY_MODE: NORMAL
MDC_IDC_SET_LEADCHNL_RA_SENSING_ADAPTATION_MODE: NORMAL
MDC_IDC_SET_LEADCHNL_RA_SENSING_ADAPTATION_MODE: NORMAL
MDC_IDC_SET_LEADCHNL_RA_SENSING_SENSITIVITY: 0.15 MV
MDC_IDC_SET_LEADCHNL_RA_SENSING_SENSITIVITY: 0.15 MV
MDC_IDC_SET_LEADCHNL_RV_PACING_AMPLITUDE: 2 V
MDC_IDC_SET_LEADCHNL_RV_PACING_AMPLITUDE: 2 V
MDC_IDC_SET_LEADCHNL_RV_PACING_CAPTURE_MODE: NORMAL
MDC_IDC_SET_LEADCHNL_RV_PACING_CAPTURE_MODE: NORMAL
MDC_IDC_SET_LEADCHNL_RV_PACING_POLARITY: NORMAL
MDC_IDC_SET_LEADCHNL_RV_PACING_POLARITY: NORMAL
MDC_IDC_SET_LEADCHNL_RV_PACING_PULSEWIDTH: 0.4 MS
MDC_IDC_SET_LEADCHNL_RV_PACING_PULSEWIDTH: 0.4 MS
MDC_IDC_SET_LEADCHNL_RV_SENSING_ADAPTATION_MODE: NORMAL
MDC_IDC_SET_LEADCHNL_RV_SENSING_ADAPTATION_MODE: NORMAL
MDC_IDC_SET_LEADCHNL_RV_SENSING_POLARITY: NORMAL
MDC_IDC_SET_LEADCHNL_RV_SENSING_POLARITY: NORMAL
MDC_IDC_SET_LEADCHNL_RV_SENSING_SENSITIVITY: 2.5 MV
MDC_IDC_SET_LEADCHNL_RV_SENSING_SENSITIVITY: 2.5 MV
MDC_IDC_SET_ZONE_DETECTION_INTERVAL: 375 MS
MDC_IDC_SET_ZONE_DETECTION_INTERVAL: 375 MS
MDC_IDC_SET_ZONE_STATUS: NORMAL
MDC_IDC_SET_ZONE_STATUS: NORMAL
MDC_IDC_SET_ZONE_TYPE: NORMAL
MDC_IDC_SET_ZONE_TYPE: NORMAL
MDC_IDC_SET_ZONE_VENDOR_TYPE: NORMAL
MDC_IDC_SET_ZONE_VENDOR_TYPE: NORMAL
MDC_IDC_STAT_BRADY_DTM_END: NORMAL
MDC_IDC_STAT_BRADY_DTM_END: NORMAL
MDC_IDC_STAT_BRADY_DTM_START: NORMAL
MDC_IDC_STAT_BRADY_DTM_START: NORMAL
MDC_IDC_STAT_BRADY_RA_PERCENT_PACED: 0 %
MDC_IDC_STAT_BRADY_RA_PERCENT_PACED: 0 %
MDC_IDC_STAT_BRADY_RV_PERCENT_PACED: 31 %
MDC_IDC_STAT_BRADY_RV_PERCENT_PACED: 33 %
MDC_IDC_STAT_EPISODE_RECENT_COUNT: 0
MDC_IDC_STAT_EPISODE_RECENT_COUNT: 2
MDC_IDC_STAT_EPISODE_RECENT_COUNT: 2
MDC_IDC_STAT_EPISODE_RECENT_COUNT_DTM_END: NORMAL
MDC_IDC_STAT_EPISODE_RECENT_COUNT_DTM_START: NORMAL
MDC_IDC_STAT_EPISODE_TYPE: NORMAL
MDC_IDC_STAT_EPISODE_VENDOR_TYPE: NORMAL
MONOCYTES # BLD AUTO: 0.5 10E3/UL (ref 0–1.3)
MONOCYTES # BLD AUTO: 0.5 10E3/UL (ref 0–1.3)
MONOCYTES # BLD AUTO: 0.6 10E3/UL (ref 0–1.3)
MONOCYTES # BLD AUTO: 0.7 10E3/UL (ref 0–1.3)
MONOCYTES # BLD AUTO: 0.7 10E3/UL (ref 0–1.3)
MONOCYTES NFR BLD AUTO: 6 %
MONOCYTES NFR BLD AUTO: 7 %
MONOCYTES NFR BLD AUTO: 7 %
MONOCYTES NFR BLD AUTO: 9 %
MONOCYTES NFR BLD AUTO: 9 %
NEUTROPHILS # BLD AUTO: 4.8 10E3/UL (ref 1.6–8.3)
NEUTROPHILS # BLD AUTO: 5 10E3/UL (ref 1.6–8.3)
NEUTROPHILS # BLD AUTO: 5.8 10E3/UL (ref 1.6–8.3)
NEUTROPHILS # BLD AUTO: 6.2 10E3/UL (ref 1.6–8.3)
NEUTROPHILS # BLD AUTO: 9.2 10E3/UL (ref 1.6–8.3)
NEUTROPHILS NFR BLD AUTO: 66 %
NEUTROPHILS NFR BLD AUTO: 68 %
NEUTROPHILS NFR BLD AUTO: 74 %
NEUTROPHILS NFR BLD AUTO: 74 %
NEUTROPHILS NFR BLD AUTO: 88 %
NONHDLC SERPL-MCNC: 108 MG/DL
NRBC # BLD AUTO: 0 10E3/UL
NRBC BLD AUTO-RTO: 0 /100
NT-PROBNP SERPL-MCNC: 3285 PG/ML (ref 0–1800)
NT-PROBNP SERPL-MCNC: 4367 PG/ML (ref 0–1800)
P AXIS - MUSE: NORMAL DEGREES
P AXIS - MUSE: NORMAL DEGREES
PLATELET # BLD AUTO: 146 10E3/UL (ref 150–450)
PLATELET # BLD AUTO: 150 10E3/UL (ref 150–450)
PLATELET # BLD AUTO: 157 10E3/UL (ref 150–450)
PLATELET # BLD AUTO: 160 10E3/UL (ref 150–450)
PLATELET # BLD AUTO: 180 10E3/UL (ref 150–450)
PLATELET # BLD AUTO: 182 10E3/UL (ref 150–450)
PLATELET # BLD AUTO: 202 10E3/UL (ref 150–450)
PLATELET # BLD AUTO: 206 10E3/UL (ref 150–450)
PLATELET # BLD AUTO: 222 10E3/UL (ref 150–450)
PLATELET # BLD AUTO: 247 10E3/UL (ref 150–450)
PLATELET # BLD AUTO: 281 10E3/UL (ref 150–450)
POTASSIUM SERPL-SCNC: 3.4 MMOL/L (ref 3.4–5.3)
POTASSIUM SERPL-SCNC: 3.7 MMOL/L (ref 3.4–5.3)
POTASSIUM SERPL-SCNC: 3.8 MMOL/L (ref 3.4–5.3)
POTASSIUM SERPL-SCNC: 3.9 MMOL/L (ref 3.4–5.3)
POTASSIUM SERPL-SCNC: 4 MMOL/L (ref 3.4–5.3)
POTASSIUM SERPL-SCNC: 4.2 MMOL/L (ref 3.4–5.3)
POTASSIUM SERPL-SCNC: 4.3 MMOL/L (ref 3.4–5.3)
POTASSIUM SERPL-SCNC: 4.6 MMOL/L (ref 3.4–5.3)
POTASSIUM SERPL-SCNC: 4.9 MMOL/L (ref 3.4–5.3)
POTASSIUM SERPL-SCNC: 4.9 MMOL/L (ref 3.4–5.3)
PR INTERVAL - MUSE: NORMAL MS
PR INTERVAL - MUSE: NORMAL MS
PROT SERPL-MCNC: 5.1 G/DL (ref 6.4–8.3)
PROT SERPL-MCNC: 5.3 G/DL (ref 6.4–8.3)
PROT SERPL-MCNC: 5.8 G/DL (ref 6.4–8.3)
PROT SERPL-MCNC: 5.9 G/DL (ref 6.4–8.3)
PROT SERPL-MCNC: 6.3 G/DL (ref 6.4–8.3)
QRS DURATION - MUSE: 90 MS
QRS DURATION - MUSE: 96 MS
QT - MUSE: 394 MS
QT - MUSE: 410 MS
QTC - MUSE: 465 MS
QTC - MUSE: 470 MS
R AXIS - MUSE: -24 DEGREES
R AXIS - MUSE: -61 DEGREES
RBC # BLD AUTO: 3.05 10E6/UL (ref 3.8–5.2)
RBC # BLD AUTO: 3.12 10E6/UL (ref 3.8–5.2)
RBC # BLD AUTO: 3.19 10E6/UL (ref 3.8–5.2)
RBC # BLD AUTO: 3.26 10E6/UL (ref 3.8–5.2)
RBC # BLD AUTO: 3.77 10E6/UL (ref 3.8–5.2)
RBC # BLD AUTO: 4.05 10E6/UL (ref 3.8–5.2)
RBC # BLD AUTO: 4.23 10E6/UL (ref 3.8–5.2)
RBC # BLD AUTO: 4.33 10E6/UL (ref 3.8–5.2)
RBC # BLD AUTO: 4.52 10E6/UL (ref 3.8–5.2)
SODIUM SERPL-SCNC: 138 MMOL/L (ref 135–145)
SODIUM SERPL-SCNC: 139 MMOL/L (ref 135–145)
SODIUM SERPL-SCNC: 140 MMOL/L (ref 135–145)
SODIUM SERPL-SCNC: 141 MMOL/L (ref 135–145)
SODIUM SERPL-SCNC: 141 MMOL/L (ref 136–145)
SODIUM SERPL-SCNC: 141 MMOL/L (ref 136–145)
SODIUM SERPL-SCNC: 142 MMOL/L (ref 136–145)
SODIUM SERPL-SCNC: 143 MMOL/L (ref 135–145)
SODIUM SERPL-SCNC: 144 MMOL/L (ref 136–145)
SODIUM SERPL-SCNC: 145 MMOL/L (ref 135–145)
SPECIMEN EXPIRATION DATE: NORMAL
SYSTOLIC BLOOD PRESSURE - MUSE: 149 MMHG
SYSTOLIC BLOOD PRESSURE - MUSE: NORMAL MMHG
T AXIS - MUSE: -78 DEGREES
T AXIS - MUSE: 75 DEGREES
T4 FREE SERPL-MCNC: 1.07 NG/DL (ref 0.9–1.7)
TRIGL SERPL-MCNC: 102 MG/DL
TROPONIN T SERPL HS-MCNC: 16 NG/L
TROPONIN T SERPL HS-MCNC: 18 NG/L
TROPONIN T SERPL HS-MCNC: 18 NG/L
TROPONIN T SERPL HS-MCNC: 19 NG/L
TSH SERPL DL<=0.005 MIU/L-ACNC: 4.28 UIU/ML (ref 0.3–4.2)
UNIT ABO/RH: NORMAL
UNIT ABO/RH: NORMAL
UNIT NUMBER: NORMAL
UNIT NUMBER: NORMAL
UNIT STATUS: NORMAL
UNIT STATUS: NORMAL
UNIT TYPE ISBT: 5100
UNIT TYPE ISBT: 5100
VENTRICULAR RATE- MUSE: 79 BPM
VENTRICULAR RATE- MUSE: 84 BPM
VIT B12 SERPL-MCNC: 778 PG/ML (ref 232–1245)
WBC # BLD AUTO: 10.4 10E3/UL (ref 4–11)
WBC # BLD AUTO: 6.4 10E3/UL (ref 4–11)
WBC # BLD AUTO: 6.5 10E3/UL (ref 4–11)
WBC # BLD AUTO: 7.2 10E3/UL (ref 4–11)
WBC # BLD AUTO: 7.4 10E3/UL (ref 4–11)
WBC # BLD AUTO: 7.8 10E3/UL (ref 4–11)
WBC # BLD AUTO: 8.3 10E3/UL (ref 4–11)
WBC # BLD AUTO: 8.6 10E3/UL (ref 4–11)
WBC # BLD AUTO: 9.3 10E3/UL (ref 4–11)

## 2023-01-01 PROCEDURE — 74177 CT ABD & PELVIS W/CONTRAST: CPT | Mod: MG

## 2023-01-01 PROCEDURE — C9113 INJ PANTOPRAZOLE SODIUM, VIA: HCPCS | Mod: JZ | Performed by: EMERGENCY MEDICINE

## 2023-01-01 PROCEDURE — 99232 SBSQ HOSP IP/OBS MODERATE 35: CPT | Performed by: HOSPITALIST

## 2023-01-01 PROCEDURE — 97110 THERAPEUTIC EXERCISES: CPT | Mod: GP

## 2023-01-01 PROCEDURE — 250N000012 HC RX MED GY IP 250 OP 636 PS 637: Performed by: STUDENT IN AN ORGANIZED HEALTH CARE EDUCATION/TRAINING PROGRAM

## 2023-01-01 PROCEDURE — 36415 COLL VENOUS BLD VENIPUNCTURE: CPT | Performed by: INTERNAL MEDICINE

## 2023-01-01 PROCEDURE — 83540 ASSAY OF IRON: CPT | Performed by: INTERNAL MEDICINE

## 2023-01-01 PROCEDURE — 71045 X-RAY EXAM CHEST 1 VIEW: CPT

## 2023-01-01 PROCEDURE — 85049 AUTOMATED PLATELET COUNT: CPT | Performed by: STUDENT IN AN ORGANIZED HEALTH CARE EDUCATION/TRAINING PROGRAM

## 2023-01-01 PROCEDURE — 86140 C-REACTIVE PROTEIN: CPT | Performed by: STUDENT IN AN ORGANIZED HEALTH CARE EDUCATION/TRAINING PROGRAM

## 2023-01-01 PROCEDURE — 258N000003 HC RX IP 258 OP 636: Performed by: STUDENT IN AN ORGANIZED HEALTH CARE EDUCATION/TRAINING PROGRAM

## 2023-01-01 PROCEDURE — 80048 BASIC METABOLIC PNL TOTAL CA: CPT | Performed by: EMERGENCY MEDICINE

## 2023-01-01 PROCEDURE — 83550 IRON BINDING TEST: CPT | Performed by: INTERNAL MEDICINE

## 2023-01-01 PROCEDURE — 83880 ASSAY OF NATRIURETIC PEPTIDE: CPT | Performed by: EMERGENCY MEDICINE

## 2023-01-01 PROCEDURE — 250N000011 HC RX IP 250 OP 636: Mod: JZ | Performed by: STUDENT IN AN ORGANIZED HEALTH CARE EDUCATION/TRAINING PROGRAM

## 2023-01-01 PROCEDURE — 36415 COLL VENOUS BLD VENIPUNCTURE: CPT | Performed by: STUDENT IN AN ORGANIZED HEALTH CARE EDUCATION/TRAINING PROGRAM

## 2023-01-01 PROCEDURE — 250N000013 HC RX MED GY IP 250 OP 250 PS 637: Performed by: STUDENT IN AN ORGANIZED HEALTH CARE EDUCATION/TRAINING PROGRAM

## 2023-01-01 PROCEDURE — 97110 THERAPEUTIC EXERCISES: CPT | Mod: GO

## 2023-01-01 PROCEDURE — 120N000004 HC R&B MS OVERFLOW

## 2023-01-01 PROCEDURE — 82378 CARCINOEMBRYONIC ANTIGEN: CPT | Performed by: INTERNAL MEDICINE

## 2023-01-01 PROCEDURE — 97535 SELF CARE MNGMENT TRAINING: CPT | Mod: GO

## 2023-01-01 PROCEDURE — 96133 NRPSYC TST EVAL PHYS/QHP EA: CPT | Performed by: CLINICAL NEUROPSYCHOLOGIST

## 2023-01-01 PROCEDURE — 99239 HOSP IP/OBS DSCHRG MGMT >30: CPT | Performed by: INTERNAL MEDICINE

## 2023-01-01 PROCEDURE — 99207 CARDIAC DEVICE CHECK - REMOTE: CPT | Performed by: INTERNAL MEDICINE

## 2023-01-01 PROCEDURE — 82310 ASSAY OF CALCIUM: CPT | Performed by: STUDENT IN AN ORGANIZED HEALTH CARE EDUCATION/TRAINING PROGRAM

## 2023-01-01 PROCEDURE — 96138 PSYCL/NRPSYC TECH 1ST: CPT | Performed by: CLINICAL NEUROPSYCHOLOGIST

## 2023-01-01 PROCEDURE — 85027 COMPLETE CBC AUTOMATED: CPT | Performed by: INTERNAL MEDICINE

## 2023-01-01 PROCEDURE — 82374 ASSAY BLOOD CARBON DIOXIDE: CPT | Performed by: STUDENT IN AN ORGANIZED HEALTH CARE EDUCATION/TRAINING PROGRAM

## 2023-01-01 PROCEDURE — 82728 ASSAY OF FERRITIN: CPT | Performed by: INTERNAL MEDICINE

## 2023-01-01 PROCEDURE — 97116 GAIT TRAINING THERAPY: CPT | Mod: GP

## 2023-01-01 PROCEDURE — 85014 HEMATOCRIT: CPT | Performed by: STUDENT IN AN ORGANIZED HEALTH CARE EDUCATION/TRAINING PROGRAM

## 2023-01-01 PROCEDURE — 36430 TRANSFUSION BLD/BLD COMPNT: CPT

## 2023-01-01 PROCEDURE — 70450 CT HEAD/BRAIN W/O DYE: CPT | Mod: MG

## 2023-01-01 PROCEDURE — 82565 ASSAY OF CREATININE: CPT | Performed by: STUDENT IN AN ORGANIZED HEALTH CARE EDUCATION/TRAINING PROGRAM

## 2023-01-01 PROCEDURE — 250N000011 HC RX IP 250 OP 636: Mod: JZ | Performed by: INTERNAL MEDICINE

## 2023-01-01 PROCEDURE — 99215 OFFICE O/P EST HI 40 MIN: CPT | Performed by: INTERNAL MEDICINE

## 2023-01-01 PROCEDURE — 84484 ASSAY OF TROPONIN QUANT: CPT | Performed by: STUDENT IN AN ORGANIZED HEALTH CARE EDUCATION/TRAINING PROGRAM

## 2023-01-01 PROCEDURE — 99233 SBSQ HOSP IP/OBS HIGH 50: CPT | Performed by: STUDENT IN AN ORGANIZED HEALTH CARE EDUCATION/TRAINING PROGRAM

## 2023-01-01 PROCEDURE — 36415 COLL VENOUS BLD VENIPUNCTURE: CPT | Performed by: EMERGENCY MEDICINE

## 2023-01-01 PROCEDURE — 84484 ASSAY OF TROPONIN QUANT: CPT | Performed by: EMERGENCY MEDICINE

## 2023-01-01 PROCEDURE — 250N000013 HC RX MED GY IP 250 OP 250 PS 637: Performed by: INTERNAL MEDICINE

## 2023-01-01 PROCEDURE — 80053 COMPREHEN METABOLIC PANEL: CPT | Performed by: INTERNAL MEDICINE

## 2023-01-01 PROCEDURE — 80048 BASIC METABOLIC PNL TOTAL CA: CPT | Performed by: STUDENT IN AN ORGANIZED HEALTH CARE EDUCATION/TRAINING PROGRAM

## 2023-01-01 PROCEDURE — 210N000001 HC R&B IMCU HEART CARE

## 2023-01-01 PROCEDURE — 97112 NEUROMUSCULAR REEDUCATION: CPT | Mod: GP

## 2023-01-01 PROCEDURE — C9113 INJ PANTOPRAZOLE SODIUM, VIA: HCPCS | Mod: JZ | Performed by: STUDENT IN AN ORGANIZED HEALTH CARE EDUCATION/TRAINING PROGRAM

## 2023-01-01 PROCEDURE — 96139 PSYCL/NRPSYC TST TECH EA: CPT | Performed by: CLINICAL NEUROPSYCHOLOGIST

## 2023-01-01 PROCEDURE — 85018 HEMOGLOBIN: CPT | Performed by: INTERNAL MEDICINE

## 2023-01-01 PROCEDURE — 96374 THER/PROPH/DIAG INJ IV PUSH: CPT | Mod: 59

## 2023-01-01 PROCEDURE — 99232 SBSQ HOSP IP/OBS MODERATE 35: CPT | Performed by: STUDENT IN AN ORGANIZED HEALTH CARE EDUCATION/TRAINING PROGRAM

## 2023-01-01 PROCEDURE — 99214 OFFICE O/P EST MOD 30 MIN: CPT | Performed by: INTERNAL MEDICINE

## 2023-01-01 PROCEDURE — 86923 COMPATIBILITY TEST ELECTRIC: CPT | Performed by: HOSPITALIST

## 2023-01-01 PROCEDURE — 85027 COMPLETE CBC AUTOMATED: CPT | Performed by: STUDENT IN AN ORGANIZED HEALTH CARE EDUCATION/TRAINING PROGRAM

## 2023-01-01 PROCEDURE — G1010 CDSM STANSON: HCPCS

## 2023-01-01 PROCEDURE — 250N000011 HC RX IP 250 OP 636: Mod: JZ | Performed by: HOSPITALIST

## 2023-01-01 PROCEDURE — 97162 PT EVAL MOD COMPLEX 30 MIN: CPT | Mod: GP

## 2023-01-01 PROCEDURE — P9016 RBC LEUKOCYTES REDUCED: HCPCS | Performed by: HOSPITALIST

## 2023-01-01 PROCEDURE — 84439 ASSAY OF FREE THYROXINE: CPT | Performed by: INTERNAL MEDICINE

## 2023-01-01 PROCEDURE — 11721 DEBRIDE NAIL 6 OR MORE: CPT | Mod: Q8 | Performed by: PODIATRIST

## 2023-01-01 PROCEDURE — XW033E5 INTRODUCTION OF REMDESIVIR ANTI-INFECTIVE INTO PERIPHERAL VEIN, PERCUTANEOUS APPROACH, NEW TECHNOLOGY GROUP 5: ICD-10-PCS | Performed by: STUDENT IN AN ORGANIZED HEALTH CARE EDUCATION/TRAINING PROGRAM

## 2023-01-01 PROCEDURE — 999N000127 HC STATISTIC PERIPHERAL IV START W US GUIDANCE

## 2023-01-01 PROCEDURE — P9016 RBC LEUKOCYTES REDUCED: HCPCS | Performed by: EMERGENCY MEDICINE

## 2023-01-01 PROCEDURE — 99214 OFFICE O/P EST MOD 30 MIN: CPT | Mod: 25 | Performed by: INTERNAL MEDICINE

## 2023-01-01 PROCEDURE — 80053 COMPREHEN METABOLIC PANEL: CPT | Performed by: HOSPITALIST

## 2023-01-01 PROCEDURE — 85610 PROTHROMBIN TIME: CPT | Performed by: EMERGENCY MEDICINE

## 2023-01-01 PROCEDURE — 258N000003 HC RX IP 258 OP 636: Performed by: HOSPITALIST

## 2023-01-01 PROCEDURE — 82248 BILIRUBIN DIRECT: CPT | Performed by: STUDENT IN AN ORGANIZED HEALTH CARE EDUCATION/TRAINING PROGRAM

## 2023-01-01 PROCEDURE — 85025 COMPLETE CBC W/AUTO DIFF WBC: CPT | Performed by: EMERGENCY MEDICINE

## 2023-01-01 PROCEDURE — 72125 CT NECK SPINE W/O DYE: CPT | Mod: ME

## 2023-01-01 PROCEDURE — 99203 OFFICE O/P NEW LOW 30 MIN: CPT | Mod: 25 | Performed by: PODIATRIST

## 2023-01-01 PROCEDURE — 99233 SBSQ HOSP IP/OBS HIGH 50: CPT | Performed by: INTERNAL MEDICINE

## 2023-01-01 PROCEDURE — 82272 OCCULT BLD FECES 1-3 TESTS: CPT | Performed by: EMERGENCY MEDICINE

## 2023-01-01 PROCEDURE — 84443 ASSAY THYROID STIM HORMONE: CPT | Performed by: INTERNAL MEDICINE

## 2023-01-01 PROCEDURE — 82607 VITAMIN B-12: CPT | Performed by: INTERNAL MEDICINE

## 2023-01-01 PROCEDURE — 80061 LIPID PANEL: CPT | Performed by: INTERNAL MEDICINE

## 2023-01-01 PROCEDURE — 86901 BLOOD TYPING SEROLOGIC RH(D): CPT | Performed by: EMERGENCY MEDICINE

## 2023-01-01 PROCEDURE — 96116 NUBHVL XM PHYS/QHP 1ST HR: CPT | Performed by: CLINICAL NEUROPSYCHOLOGIST

## 2023-01-01 PROCEDURE — 93005 ELECTROCARDIOGRAM TRACING: CPT | Performed by: EMERGENCY MEDICINE

## 2023-01-01 PROCEDURE — 80053 COMPREHEN METABOLIC PANEL: CPT | Performed by: STUDENT IN AN ORGANIZED HEALTH CARE EDUCATION/TRAINING PROGRAM

## 2023-01-01 PROCEDURE — 97530 THERAPEUTIC ACTIVITIES: CPT | Mod: GP

## 2023-01-01 PROCEDURE — 86850 RBC ANTIBODY SCREEN: CPT | Performed by: EMERGENCY MEDICINE

## 2023-01-01 PROCEDURE — G0008 ADMIN INFLUENZA VIRUS VAC: HCPCS | Performed by: INTERNAL MEDICINE

## 2023-01-01 PROCEDURE — 85018 HEMOGLOBIN: CPT | Performed by: STUDENT IN AN ORGANIZED HEALTH CARE EDUCATION/TRAINING PROGRAM

## 2023-01-01 PROCEDURE — 80048 BASIC METABOLIC PNL TOTAL CA: CPT | Performed by: INTERNAL MEDICINE

## 2023-01-01 PROCEDURE — 85025 COMPLETE CBC W/AUTO DIFF WBC: CPT | Performed by: INTERNAL MEDICINE

## 2023-01-01 PROCEDURE — 250N000011 HC RX IP 250 OP 636: Mod: JZ | Performed by: EMERGENCY MEDICINE

## 2023-01-01 PROCEDURE — 99285 EMERGENCY DEPT VISIT HI MDM: CPT | Mod: 25

## 2023-01-01 PROCEDURE — 86923 COMPATIBILITY TEST ELECTRIC: CPT | Performed by: EMERGENCY MEDICINE

## 2023-01-01 PROCEDURE — 93306 TTE W/DOPPLER COMPLETE: CPT

## 2023-01-01 PROCEDURE — 97165 OT EVAL LOW COMPLEX 30 MIN: CPT | Mod: GO

## 2023-01-01 PROCEDURE — 99233 SBSQ HOSP IP/OBS HIGH 50: CPT | Performed by: HOSPITALIST

## 2023-01-01 PROCEDURE — 96132 NRPSYC TST EVAL PHYS/QHP 1ST: CPT | Performed by: CLINICAL NEUROPSYCHOLOGIST

## 2023-01-01 PROCEDURE — 97161 PT EVAL LOW COMPLEX 20 MIN: CPT | Mod: GP

## 2023-01-01 PROCEDURE — 99223 1ST HOSP IP/OBS HIGH 75: CPT | Mod: AI | Performed by: STUDENT IN AN ORGANIZED HEALTH CARE EDUCATION/TRAINING PROGRAM

## 2023-01-01 PROCEDURE — 90662 IIV NO PRSV INCREASED AG IM: CPT | Performed by: INTERNAL MEDICINE

## 2023-01-01 PROCEDURE — 93306 TTE W/DOPPLER COMPLETE: CPT | Mod: 26 | Performed by: INTERNAL MEDICINE

## 2023-01-01 PROCEDURE — 71046 X-RAY EXAM CHEST 2 VIEWS: CPT

## 2023-01-01 PROCEDURE — 93279 PRGRMG DEV EVAL PM/LDLS PM: CPT | Performed by: INTERNAL MEDICINE

## 2023-01-01 PROCEDURE — 250N000013 HC RX MED GY IP 250 OP 250 PS 637: Performed by: HOSPITALIST

## 2023-01-01 PROCEDURE — G0179 MD RECERTIFICATION HHA PT: HCPCS | Performed by: INTERNAL MEDICINE

## 2023-01-01 PROCEDURE — 99223 1ST HOSP IP/OBS HIGH 75: CPT | Performed by: STUDENT IN AN ORGANIZED HEALTH CARE EDUCATION/TRAINING PROGRAM

## 2023-01-01 RX ORDER — UREA 10 %
500 LOTION (ML) TOPICAL DAILY
COMMUNITY

## 2023-01-01 RX ORDER — METHYLPREDNISOLONE SODIUM SUCCINATE 125 MG/2ML
125 INJECTION, POWDER, LYOPHILIZED, FOR SOLUTION INTRAMUSCULAR; INTRAVENOUS
Status: CANCELLED
Start: 2023-01-01

## 2023-01-01 RX ORDER — ALBUTEROL SULFATE 0.83 MG/ML
2.5 SOLUTION RESPIRATORY (INHALATION)
Status: CANCELLED | OUTPATIENT
Start: 2023-01-01

## 2023-01-01 RX ORDER — FUROSEMIDE 10 MG/ML
20 INJECTION INTRAMUSCULAR; INTRAVENOUS ONCE
Status: COMPLETED | OUTPATIENT
Start: 2023-01-01 | End: 2023-01-01

## 2023-01-01 RX ORDER — LIDOCAINE 40 MG/G
CREAM TOPICAL
Status: DISCONTINUED | OUTPATIENT
Start: 2023-01-01 | End: 2023-01-01 | Stop reason: HOSPADM

## 2023-01-01 RX ORDER — ACETAMINOPHEN 650 MG/1
650 SUPPOSITORY RECTAL EVERY 6 HOURS PRN
Status: DISCONTINUED | OUTPATIENT
Start: 2023-01-01 | End: 2023-01-01 | Stop reason: HOSPADM

## 2023-01-01 RX ORDER — METOPROLOL SUCCINATE 50 MG/1
50 TABLET, EXTENDED RELEASE ORAL DAILY
Qty: 90 TABLET | Refills: 3 | Status: SHIPPED | OUTPATIENT
Start: 2023-01-01

## 2023-01-01 RX ORDER — MEPERIDINE HYDROCHLORIDE 25 MG/ML
25 INJECTION INTRAMUSCULAR; INTRAVENOUS; SUBCUTANEOUS EVERY 30 MIN PRN
Status: CANCELLED | OUTPATIENT
Start: 2023-01-01

## 2023-01-01 RX ORDER — RESPIRATORY SYNCYTIAL VIRUS VACCINE 120MCG/0.5
0.5 KIT INTRAMUSCULAR ONCE
Qty: 1 EACH | Refills: 0 | Status: CANCELLED | OUTPATIENT
Start: 2023-01-01 | End: 2023-01-01

## 2023-01-01 RX ORDER — HEPARIN SODIUM (PORCINE) LOCK FLUSH IV SOLN 100 UNIT/ML 100 UNIT/ML
5 SOLUTION INTRAVENOUS
Status: CANCELLED | OUTPATIENT
Start: 2023-01-01

## 2023-01-01 RX ORDER — ATORVASTATIN CALCIUM 20 MG/1
20 TABLET, FILM COATED ORAL AT BEDTIME
Qty: 90 TABLET | Refills: 3 | Status: SHIPPED | OUTPATIENT
Start: 2023-01-01

## 2023-01-01 RX ORDER — ATORVASTATIN CALCIUM 10 MG/1
20 TABLET, FILM COATED ORAL AT BEDTIME
Status: DISCONTINUED | OUTPATIENT
Start: 2023-01-01 | End: 2023-01-01 | Stop reason: HOSPADM

## 2023-01-01 RX ORDER — ALBUTEROL SULFATE 90 UG/1
1-2 AEROSOL, METERED RESPIRATORY (INHALATION)
Status: CANCELLED
Start: 2023-01-01

## 2023-01-01 RX ORDER — ATORVASTATIN CALCIUM 20 MG/1
20 TABLET, FILM COATED ORAL AT BEDTIME
Qty: 90 TABLET | Refills: 3 | Status: SHIPPED | OUTPATIENT
Start: 2023-01-01 | End: 2023-01-01

## 2023-01-01 RX ORDER — METOPROLOL SUCCINATE 50 MG/1
50 TABLET, EXTENDED RELEASE ORAL DAILY
Status: DISCONTINUED | OUTPATIENT
Start: 2023-01-01 | End: 2023-01-01 | Stop reason: HOSPADM

## 2023-01-01 RX ORDER — FERROUS SULFATE 325(65) MG
325 TABLET ORAL
Status: DISCONTINUED | OUTPATIENT
Start: 2023-01-01 | End: 2023-01-01 | Stop reason: HOSPADM

## 2023-01-01 RX ORDER — FLUTICASONE PROPIONATE 50 MCG
1 SPRAY, SUSPENSION (ML) NASAL DAILY
Qty: 9.9 ML | Refills: 0 | Status: SHIPPED | OUTPATIENT
Start: 2023-01-01

## 2023-01-01 RX ORDER — DIPHENHYDRAMINE HYDROCHLORIDE 50 MG/ML
50 INJECTION INTRAMUSCULAR; INTRAVENOUS
Status: CANCELLED
Start: 2023-01-01

## 2023-01-01 RX ORDER — PROCHLORPERAZINE MALEATE 5 MG
5 TABLET ORAL EVERY 6 HOURS PRN
Status: DISCONTINUED | OUTPATIENT
Start: 2023-01-01 | End: 2023-01-01 | Stop reason: HOSPADM

## 2023-01-01 RX ORDER — PROCHLORPERAZINE 25 MG
12.5 SUPPOSITORY, RECTAL RECTAL EVERY 12 HOURS PRN
Status: DISCONTINUED | OUTPATIENT
Start: 2023-01-01 | End: 2023-01-01 | Stop reason: HOSPADM

## 2023-01-01 RX ORDER — IOPAMIDOL 755 MG/ML
75 INJECTION, SOLUTION INTRAVASCULAR ONCE
Status: COMPLETED | OUTPATIENT
Start: 2023-01-01 | End: 2023-01-01

## 2023-01-01 RX ORDER — MECLIZINE HYDROCHLORIDE 25 MG/1
25 TABLET ORAL 3 TIMES DAILY PRN
Status: DISCONTINUED | OUTPATIENT
Start: 2023-01-01 | End: 2023-01-01 | Stop reason: HOSPADM

## 2023-01-01 RX ORDER — HEPARIN SODIUM,PORCINE 10 UNIT/ML
5-20 VIAL (ML) INTRAVENOUS DAILY PRN
Status: CANCELLED | OUTPATIENT
Start: 2023-01-01

## 2023-01-01 RX ORDER — POLYETHYLENE GLYCOL 3350 17 G/17G
17 POWDER, FOR SOLUTION ORAL DAILY PRN
Status: DISCONTINUED | OUTPATIENT
Start: 2023-01-01 | End: 2023-01-01 | Stop reason: HOSPADM

## 2023-01-01 RX ORDER — DEXAMETHASONE 6 MG/1
6 TABLET ORAL DAILY
Start: 2023-01-01 | End: 2023-01-01

## 2023-01-01 RX ORDER — EPINEPHRINE 1 MG/ML
0.3 INJECTION, SOLUTION, CONCENTRATE INTRAVENOUS EVERY 5 MIN PRN
Status: CANCELLED | OUTPATIENT
Start: 2023-01-01

## 2023-01-01 RX ORDER — FERROUS SULFATE 325(65) MG
325 TABLET ORAL
Qty: 30 TABLET | Refills: 3 | Status: SHIPPED | OUTPATIENT
Start: 2023-01-01

## 2023-01-01 RX ORDER — CEPHALEXIN 500 MG/1
500 CAPSULE ORAL 3 TIMES DAILY
Qty: 15 CAPSULE | Refills: 0 | Status: SHIPPED | OUTPATIENT
Start: 2023-01-01 | End: 2023-01-01

## 2023-01-01 RX ORDER — ACETAMINOPHEN 325 MG/1
650 TABLET ORAL EVERY 6 HOURS PRN
Status: DISCONTINUED | OUTPATIENT
Start: 2023-01-01 | End: 2023-01-01 | Stop reason: HOSPADM

## 2023-01-01 RX ORDER — ENOXAPARIN SODIUM 100 MG/ML
40 INJECTION SUBCUTANEOUS EVERY 24 HOURS
Status: DISCONTINUED | OUTPATIENT
Start: 2023-01-01 | End: 2023-01-01

## 2023-01-01 RX ORDER — CEPHALEXIN 500 MG/1
500 CAPSULE ORAL EVERY 12 HOURS SCHEDULED
Status: DISCONTINUED | OUTPATIENT
Start: 2023-01-01 | End: 2023-01-01 | Stop reason: HOSPADM

## 2023-01-01 RX ADMIN — METOPROLOL SUCCINATE 50 MG: 50 TABLET, EXTENDED RELEASE ORAL at 08:14

## 2023-01-01 RX ADMIN — IRON SUCROSE 200 MG: 20 INJECTION, SOLUTION INTRAVENOUS at 16:58

## 2023-01-01 RX ADMIN — SODIUM CHLORIDE 50 ML: 9 INJECTION, SOLUTION INTRAVENOUS at 20:51

## 2023-01-01 RX ADMIN — ATORVASTATIN CALCIUM 20 MG: 10 TABLET, FILM COATED ORAL at 22:04

## 2023-01-01 RX ADMIN — METOPROLOL SUCCINATE 50 MG: 50 TABLET, EXTENDED RELEASE ORAL at 08:36

## 2023-01-01 RX ADMIN — DEXAMETHASONE 6 MG: 2 TABLET ORAL at 07:03

## 2023-01-01 RX ADMIN — SODIUM CHLORIDE 50 ML: 9 INJECTION, SOLUTION INTRAVENOUS at 22:19

## 2023-01-01 RX ADMIN — METOPROLOL SUCCINATE 50 MG: 50 TABLET, EXTENDED RELEASE ORAL at 08:55

## 2023-01-01 RX ADMIN — CEPHALEXIN 500 MG: 500 CAPSULE ORAL at 08:03

## 2023-01-01 RX ADMIN — PANTOPRAZOLE SODIUM 40 MG: 40 INJECTION, POWDER, FOR SOLUTION INTRAVENOUS at 08:04

## 2023-01-01 RX ADMIN — APIXABAN 2.5 MG: 2.5 TABLET, FILM COATED ORAL at 08:33

## 2023-01-01 RX ADMIN — FERROUS SULFATE TAB 325 MG (65 MG ELEMENTAL FE) 325 MG: 325 (65 FE) TAB at 08:55

## 2023-01-01 RX ADMIN — PANTOPRAZOLE SODIUM 40 MG: 40 INJECTION, POWDER, FOR SOLUTION INTRAVENOUS at 08:33

## 2023-01-01 RX ADMIN — APIXABAN 2.5 MG: 2.5 TABLET, FILM COATED ORAL at 08:46

## 2023-01-01 RX ADMIN — REMDESIVIR 200 MG: 100 INJECTION, POWDER, LYOPHILIZED, FOR SOLUTION INTRAVENOUS at 13:16

## 2023-01-01 RX ADMIN — Medication 5 MG: at 21:55

## 2023-01-01 RX ADMIN — ATORVASTATIN CALCIUM 20 MG: 10 TABLET, FILM COATED ORAL at 20:44

## 2023-01-01 RX ADMIN — ATORVASTATIN CALCIUM 20 MG: 10 TABLET, FILM COATED ORAL at 21:32

## 2023-01-01 RX ADMIN — ATORVASTATIN CALCIUM 20 MG: 10 TABLET, FILM COATED ORAL at 22:18

## 2023-01-01 RX ADMIN — Medication 1 MG: at 22:02

## 2023-01-01 RX ADMIN — METOPROLOL SUCCINATE 50 MG: 50 TABLET, EXTENDED RELEASE ORAL at 08:32

## 2023-01-01 RX ADMIN — PANTOPRAZOLE SODIUM 40 MG: 40 INJECTION, POWDER, FOR SOLUTION INTRAVENOUS at 20:44

## 2023-01-01 RX ADMIN — FERROUS SULFATE TAB 325 MG (65 MG ELEMENTAL FE) 325 MG: 325 (65 FE) TAB at 08:46

## 2023-01-01 RX ADMIN — METOPROLOL SUCCINATE 50 MG: 50 TABLET, EXTENDED RELEASE ORAL at 08:46

## 2023-01-01 RX ADMIN — REMDESIVIR 100 MG: 100 INJECTION, POWDER, LYOPHILIZED, FOR SOLUTION INTRAVENOUS at 20:50

## 2023-01-01 RX ADMIN — ATORVASTATIN CALCIUM 20 MG: 10 TABLET, FILM COATED ORAL at 21:54

## 2023-01-01 RX ADMIN — APIXABAN 2.5 MG: 2.5 TABLET, FILM COATED ORAL at 08:04

## 2023-01-01 RX ADMIN — METOPROLOL SUCCINATE 50 MG: 50 TABLET, EXTENDED RELEASE ORAL at 08:04

## 2023-01-01 RX ADMIN — DEXAMETHASONE 6 MG: 2 TABLET ORAL at 05:12

## 2023-01-01 RX ADMIN — ATORVASTATIN CALCIUM 20 MG: 10 TABLET, FILM COATED ORAL at 21:44

## 2023-01-01 RX ADMIN — Medication 1 MG: at 21:02

## 2023-01-01 RX ADMIN — FERROUS SULFATE TAB 325 MG (65 MG ELEMENTAL FE) 325 MG: 325 (65 FE) TAB at 08:36

## 2023-01-01 RX ADMIN — CEPHALEXIN 500 MG: 500 CAPSULE ORAL at 08:33

## 2023-01-01 RX ADMIN — REMDESIVIR 100 MG: 100 INJECTION, POWDER, LYOPHILIZED, FOR SOLUTION INTRAVENOUS at 21:02

## 2023-01-01 RX ADMIN — FERROUS SULFATE TAB 325 MG (65 MG ELEMENTAL FE) 325 MG: 325 (65 FE) TAB at 08:33

## 2023-01-01 RX ADMIN — SODIUM CHLORIDE 50 ML: 9 INJECTION, SOLUTION INTRAVENOUS at 15:43

## 2023-01-01 RX ADMIN — PANTOPRAZOLE SODIUM 40 MG: 40 INJECTION, POWDER, FOR SOLUTION INTRAVENOUS at 07:36

## 2023-01-01 RX ADMIN — DEXAMETHASONE 6 MG: 2 TABLET ORAL at 07:38

## 2023-01-01 RX ADMIN — DEXAMETHASONE 6 MG: 2 TABLET ORAL at 06:53

## 2023-01-01 RX ADMIN — APIXABAN 2.5 MG: 2.5 TABLET, FILM COATED ORAL at 22:04

## 2023-01-01 RX ADMIN — PANTOPRAZOLE SODIUM 40 MG: 40 INJECTION, POWDER, FOR SOLUTION INTRAVENOUS at 18:38

## 2023-01-01 RX ADMIN — FERROUS SULFATE TAB 325 MG (65 MG ELEMENTAL FE) 325 MG: 325 (65 FE) TAB at 08:04

## 2023-01-01 RX ADMIN — ATORVASTATIN CALCIUM 20 MG: 10 TABLET, FILM COATED ORAL at 21:02

## 2023-01-01 RX ADMIN — DEXAMETHASONE 6 MG: 2 TABLET ORAL at 03:21

## 2023-01-01 RX ADMIN — Medication 5 MG: at 20:44

## 2023-01-01 RX ADMIN — REMDESIVIR 100 MG: 100 INJECTION, POWDER, LYOPHILIZED, FOR SOLUTION INTRAVENOUS at 20:32

## 2023-01-01 RX ADMIN — FERROUS SULFATE TAB 325 MG (65 MG ELEMENTAL FE) 325 MG: 325 (65 FE) TAB at 08:14

## 2023-01-01 RX ADMIN — APIXABAN 2.5 MG: 2.5 TABLET, FILM COATED ORAL at 08:35

## 2023-01-01 RX ADMIN — FUROSEMIDE 20 MG: 10 INJECTION, SOLUTION INTRAMUSCULAR; INTRAVENOUS at 03:18

## 2023-01-01 RX ADMIN — Medication 5 MG: at 21:44

## 2023-01-01 RX ADMIN — APIXABAN 2.5 MG: 2.5 TABLET, FILM COATED ORAL at 08:39

## 2023-01-01 RX ADMIN — ATORVASTATIN CALCIUM 20 MG: 10 TABLET, FILM COATED ORAL at 21:55

## 2023-01-01 RX ADMIN — METOPROLOL SUCCINATE 50 MG: 50 TABLET, EXTENDED RELEASE ORAL at 08:33

## 2023-01-01 RX ADMIN — DEXAMETHASONE 6 MG: 2 TABLET ORAL at 07:20

## 2023-01-01 RX ADMIN — APIXABAN 2.5 MG: 2.5 TABLET, FILM COATED ORAL at 08:55

## 2023-01-01 RX ADMIN — METOPROLOL SUCCINATE 50 MG: 50 TABLET, EXTENDED RELEASE ORAL at 08:08

## 2023-01-01 RX ADMIN — SODIUM CHLORIDE 50 ML: 9 INJECTION, SOLUTION INTRAVENOUS at 20:33

## 2023-01-01 RX ADMIN — METOPROLOL SUCCINATE 50 MG: 50 TABLET, EXTENDED RELEASE ORAL at 08:40

## 2023-01-01 RX ADMIN — APIXABAN 2.5 MG: 2.5 TABLET, FILM COATED ORAL at 20:50

## 2023-01-01 RX ADMIN — PANTOPRAZOLE SODIUM 40 MG: 40 INJECTION, POWDER, FOR SOLUTION INTRAVENOUS at 21:32

## 2023-01-01 RX ADMIN — APIXABAN 2.5 MG: 2.5 TABLET, FILM COATED ORAL at 21:44

## 2023-01-01 RX ADMIN — FERROUS SULFATE TAB 325 MG (65 MG ELEMENTAL FE) 325 MG: 325 (65 FE) TAB at 08:39

## 2023-01-01 RX ADMIN — IOPAMIDOL 75 ML: 755 INJECTION, SOLUTION INTRAVENOUS at 15:19

## 2023-01-01 RX ADMIN — APIXABAN 2.5 MG: 2.5 TABLET, FILM COATED ORAL at 21:02

## 2023-01-01 RX ADMIN — ATORVASTATIN CALCIUM 20 MG: 10 TABLET, FILM COATED ORAL at 22:02

## 2023-01-01 RX ADMIN — DEXAMETHASONE 6 MG: 2 TABLET ORAL at 06:56

## 2023-01-01 RX ADMIN — APIXABAN 2.5 MG: 2.5 TABLET, FILM COATED ORAL at 22:19

## 2023-01-01 RX ADMIN — APIXABAN 2.5 MG: 2.5 TABLET, FILM COATED ORAL at 20:32

## 2023-01-01 RX ADMIN — CEPHALEXIN 500 MG: 500 CAPSULE ORAL at 20:44

## 2023-01-01 RX ADMIN — Medication 1 MG: at 21:45

## 2023-01-01 RX ADMIN — CEPHALEXIN 500 MG: 500 CAPSULE ORAL at 21:32

## 2023-01-01 RX ADMIN — REMDESIVIR 100 MG: 100 INJECTION, POWDER, LYOPHILIZED, FOR SOLUTION INTRAVENOUS at 22:18

## 2023-01-01 RX ADMIN — APIXABAN 2.5 MG: 2.5 TABLET, FILM COATED ORAL at 08:14

## 2023-01-01 RX ADMIN — METOPROLOL SUCCINATE 50 MG: 50 TABLET, EXTENDED RELEASE ORAL at 08:03

## 2023-01-01 ASSESSMENT — ACTIVITIES OF DAILY LIVING (ADL)
ADLS_ACUITY_SCORE: 30
ADLS_ACUITY_SCORE: 24
ADLS_ACUITY_SCORE: 40
ADLS_ACUITY_SCORE: 26
ADLS_ACUITY_SCORE: 44
ADLS_ACUITY_SCORE: 37
WALKING_OR_CLIMBING_STAIRS_DIFFICULTY: NO
ADLS_ACUITY_SCORE: 39
ADLS_ACUITY_SCORE: 24
ADLS_ACUITY_SCORE: 39
ADLS_ACUITY_SCORE: 26
ADLS_ACUITY_SCORE: 44
ADLS_ACUITY_SCORE: 35
ADLS_ACUITY_SCORE: 44
ADLS_ACUITY_SCORE: 40
ADLS_ACUITY_SCORE: 24
ADLS_ACUITY_SCORE: 27
ADLS_ACUITY_SCORE: 30
ADLS_ACUITY_SCORE: 39
ADLS_ACUITY_SCORE: 25
ADLS_ACUITY_SCORE: 44
DRESSING/BATHING_DIFFICULTY: NO
ADLS_ACUITY_SCORE: 26
ADLS_ACUITY_SCORE: 44
DOING_ERRANDS_INDEPENDENTLY_DIFFICULTY: YES
ADLS_ACUITY_SCORE: 35
ADLS_ACUITY_SCORE: 39
FALL_HISTORY_WITHIN_LAST_SIX_MONTHS: NO
ADLS_ACUITY_SCORE: 39
ADLS_ACUITY_SCORE: 37
ADLS_ACUITY_SCORE: 37
ADLS_ACUITY_SCORE: 44
ADLS_ACUITY_SCORE: 37
ADLS_ACUITY_SCORE: 24
ADLS_ACUITY_SCORE: 30
ADLS_ACUITY_SCORE: 39
ADLS_ACUITY_SCORE: 35
ADLS_ACUITY_SCORE: 24
ADLS_ACUITY_SCORE: 40
ADLS_ACUITY_SCORE: 39
ADLS_ACUITY_SCORE: 35
ADLS_ACUITY_SCORE: 40
ADLS_ACUITY_SCORE: 39
CHANGE_IN_FUNCTIONAL_STATUS_SINCE_ONSET_OF_CURRENT_ILLNESS/INJURY: NO
ADLS_ACUITY_SCORE: 40
ADLS_ACUITY_SCORE: 40
ADLS_ACUITY_SCORE: 30
ADLS_ACUITY_SCORE: 40
ADLS_ACUITY_SCORE: 30
ADLS_ACUITY_SCORE: 44
ADLS_ACUITY_SCORE: 40
ADLS_ACUITY_SCORE: 40
ADLS_ACUITY_SCORE: 26
ADLS_ACUITY_SCORE: 39
ADLS_ACUITY_SCORE: 26
ADLS_ACUITY_SCORE: 30
ADLS_ACUITY_SCORE: 24
ADLS_ACUITY_SCORE: 26
ADLS_ACUITY_SCORE: 37
ADLS_ACUITY_SCORE: 27
WEAR_GLASSES_OR_BLIND: YES
EQUIPMENT_CURRENTLY_USED_AT_HOME: WALKER, STANDARD
ADLS_ACUITY_SCORE: 25
ADLS_ACUITY_SCORE: 26
ADLS_ACUITY_SCORE: 24
ADLS_ACUITY_SCORE: 30
ADLS_ACUITY_SCORE: 40
TOILETING_ISSUES: NO
ADLS_ACUITY_SCORE: 27
ADLS_ACUITY_SCORE: 40
ADLS_ACUITY_SCORE: 39
ADLS_ACUITY_SCORE: 39
ADLS_ACUITY_SCORE: 40
ADLS_ACUITY_SCORE: 27
ADLS_ACUITY_SCORE: 40
ADLS_ACUITY_SCORE: 35
ADLS_ACUITY_SCORE: 27
ADLS_ACUITY_SCORE: 30
ADLS_ACUITY_SCORE: 39
ADLS_ACUITY_SCORE: 26
ADLS_ACUITY_SCORE: 24
ADLS_ACUITY_SCORE: 26
ADLS_ACUITY_SCORE: 39
ADLS_ACUITY_SCORE: 30
ADLS_ACUITY_SCORE: 30
ADLS_ACUITY_SCORE: 40
ADLS_ACUITY_SCORE: 35
ADLS_ACUITY_SCORE: 30
ADLS_ACUITY_SCORE: 44
ADLS_ACUITY_SCORE: 39
ADLS_ACUITY_SCORE: 24
ADLS_ACUITY_SCORE: 37
ADLS_ACUITY_SCORE: 24
CONCENTRATING,_REMEMBERING_OR_MAKING_DECISIONS_DIFFICULTY: NO
ADLS_ACUITY_SCORE: 39
ADLS_ACUITY_SCORE: 37
ADLS_ACUITY_SCORE: 40
ADLS_ACUITY_SCORE: 24
ADLS_ACUITY_SCORE: 24
ADLS_ACUITY_SCORE: 30
ADLS_ACUITY_SCORE: 40
ADLS_ACUITY_SCORE: 24
ADLS_ACUITY_SCORE: 30
ADLS_ACUITY_SCORE: 26
ADLS_ACUITY_SCORE: 40
ADLS_ACUITY_SCORE: 24
PREVIOUS_RESPONSIBILITIES: MEAL PREP;HOUSEKEEPING;LAUNDRY;MEDICATION MANAGEMENT
ADLS_ACUITY_SCORE: 40
ADLS_ACUITY_SCORE: 35
ADLS_ACUITY_SCORE: 37
ADLS_ACUITY_SCORE: 44
ADLS_ACUITY_SCORE: 40
ADLS_ACUITY_SCORE: 26
DIFFICULTY_EATING/SWALLOWING: NO
ADLS_ACUITY_SCORE: 44
ADLS_ACUITY_SCORE: 37
ADLS_ACUITY_SCORE: 39
ADLS_ACUITY_SCORE: 40
ADLS_ACUITY_SCORE: 44
ADLS_ACUITY_SCORE: 27
ADLS_ACUITY_SCORE: 44

## 2023-01-01 ASSESSMENT — ENCOUNTER SYMPTOMS: FATIGUE: 1

## 2023-01-01 ASSESSMENT — PAIN SCALES - GENERAL
PAINLEVEL: NO PAIN (0)

## 2023-01-05 ENCOUNTER — OFFICE VISIT (OUTPATIENT)
Dept: FAMILY MEDICINE | Facility: CLINIC | Age: 88
End: 2023-01-05
Payer: MEDICARE

## 2023-01-05 ENCOUNTER — NURSE TRIAGE (OUTPATIENT)
Dept: NURSING | Facility: CLINIC | Age: 88
End: 2023-01-05

## 2023-01-05 VITALS
SYSTOLIC BLOOD PRESSURE: 167 MMHG | WEIGHT: 141.9 LBS | DIASTOLIC BLOOD PRESSURE: 84 MMHG | BODY MASS INDEX: 22.9 KG/M2 | HEART RATE: 80 BPM | OXYGEN SATURATION: 95 % | TEMPERATURE: 98.4 F

## 2023-01-05 DIAGNOSIS — T30.0 BURN: ICD-10-CM

## 2023-01-05 DIAGNOSIS — L03.116 CELLULITIS OF LEFT LOWER EXTREMITY: Primary | ICD-10-CM

## 2023-01-05 PROCEDURE — 99213 OFFICE O/P EST LOW 20 MIN: CPT | Performed by: NURSE PRACTITIONER

## 2023-01-05 RX ORDER — CEPHALEXIN 500 MG/1
500 CAPSULE ORAL 3 TIMES DAILY
Qty: 21 CAPSULE | Refills: 0 | Status: ON HOLD | OUTPATIENT
Start: 2023-01-05 | End: 2023-01-17

## 2023-01-05 ASSESSMENT — ENCOUNTER SYMPTOMS: FEVER: 0

## 2023-01-05 NOTE — PATIENT INSTRUCTIONS
On the areas that used to have blisters, I would use bacitracin ointment once daily after gently cleaning with soap and water instead of the lotion.  Keep areas covered with nonstick dressings for now (available at the pharmacy).     Start cephalexin 3 times daily for infection.  Take 2 doses today.    Watch the area carefully.  Recommend recheck in your clinic early next week.    Come back or go to emergency room with worsening redness and ER only if fevers.

## 2023-01-05 NOTE — TELEPHONE ENCOUNTER
Consent to communicate on file for daughter.  Patient was seen in the ER last week for a burn.    The blisters have popped, and there is possible red streaking from the wound.  No fever and area is not hot to the touch.  Daughter thinks there may be pus in the wound.    Per protocol daughter advised to have patient seen in the Urgent care. Daughter agrees with the plan.    Gifty Marcelo RN on 1/5/2023 at 12:33 PM      Reason for Disposition    Looks infected (e.g., fever, red streaks, spreading red area, pus)    Additional Information    Negative: Difficulty breathing after exposure to fire, smoke, or fumes    Negative: Difficult to awaken or acting confused (e.g., disoriented, slurred speech)    Negative: Burn area larger than 20 palms of hand (> 10% BSA) with blisters    Negative: Sounds like a life-threatening emergency to the triager    Negative: Chemical gets into the eye from fingers, contaminated object, spray or splash    Negative: Sunburn    Negative: Burn area larger than 8 palms of hand (> 4% BSA)    Negative: Burn completely circles an arm or leg    Negative: Caused by explosion or gunpowder    Negative: Headache or nausea after exposure to fire and smoke    Negative: Hoarseness or cough after exposure to fire and smoke    Negative: Blister (intact or ruptured) and larger than 2 inches (5 cm)    Negative: Blister (intact or ruptured) on the hand and larger than 1 inch (2.5 cm)    Negative: Blisters (intact or ruptured) on the face, neck, or genitals    Negative: Caused by very hot substance and center of burn is white (or charred)    Negative: Sounds like a serious burn to the triager    Negative: SEVERE pain (e.g., excruciating)    Negative: Acid or alkali (lye) burn    Negative: Chemical on skin that causes a blister    Protocols used: BURNS-A-OH

## 2023-01-05 NOTE — PROGRESS NOTES
Assessment & Plan     Burn      Cellulitis of left lower extremity    - cephALEXin (KEFLEX) 500 MG capsule  Dispense: 21 capsule; Refill: 0     Patient with large area of first-degree burn with some blistering which is ruptured in portion of this area.  Patient and daughter agree that there is streaking that has started in the last 24 hours.     No fevers.     Did use sterile scissors and sterile tweezer to remove remnants of old blistering.  Patient's areas of skin loss related to the burn were dressed with wound spray, bacitracin and nonstick dressings.     Advised:    On the areas that used to have blisters, I would use bacitracin ointment once daily after gently cleaning with soap and water instead of the lotion.  Keep areas covered with nonstick dressings for now (available at the pharmacy).     Start cephalexin 3 times daily for infection.  Take 2 doses today.    Watch the area carefully.  Recommend recheck in your clinic early next week.    Come back or go to emergency room with worsening redness and ER only if fevers.    Calculated creatinine clearance based on last creatinine done on December 29 is 33 mL/min - no dose adj needed.           Return in about 3 days (around 1/8/2023) for If no better.    Nereida Sterling [facility]     Deloris Freeman is a 89 year old female who presents to clinic today for the following health issues:  Chief Complaint   Patient presents with     Burn     ER follow up for burn. Went to ER on last Thursday and was evaluated for burn from hot tea after fainting. Left leg. Look really red and inflamed, blistering. Applied ointment to area with a lotion that has aloe in it.        Fall     Last Thursday.      HPI    Patient was seen in ER on December 29 and discharged.  Associated with fainting, she sustained burn from some hot tea on the left leg.  This was blistering.  The blisters have popped and are oozing.  Concern for red streaking from  this area that started in the last 24 hours.     Is using a [employer] lotion with aloe on the open areas.           Review of Systems   Constitutional: Negative for fever.           Objective    BP (!) 167/84   Pulse 80   Temp 98.4  F (36.9  C) (Oral)   Wt 64.4 kg (141 lb 14.4 oz)   SpO2 95%   BMI 22.90 kg/m    Physical Exam  Constitutional:       Appearance: Normal appearance.   Eyes:      Conjunctiva/sclera: Conjunctivae normal.   Pulmonary:      Effort: Pulmonary effort is normal.   Skin:     Findings: Erythema (Very tender) present.      Comments: Large rectangular reddened area that has been present since burn on LLE/shin Two open areas, 1 in the upper right corner and 1 in the lower left corner of this when looking directly at it with evidence of dead tissue from popped blister.  See photos taken after blister removed.      2 areas with dark erythematous streaking approximately the width of a pencil extending from this to either side.    Neurological:      Mental Status: She is alert.   Psychiatric:         Mood and Affect: Mood normal.

## 2023-01-06 ENCOUNTER — OFFICE VISIT (OUTPATIENT)
Dept: INTERNAL MEDICINE | Facility: CLINIC | Age: 88
End: 2023-01-06
Payer: MEDICARE

## 2023-01-06 VITALS
RESPIRATION RATE: 14 BRPM | OXYGEN SATURATION: 97 % | DIASTOLIC BLOOD PRESSURE: 62 MMHG | HEART RATE: 77 BPM | SYSTOLIC BLOOD PRESSURE: 138 MMHG | TEMPERATURE: 98.2 F

## 2023-01-06 DIAGNOSIS — R55 SYNCOPE, UNSPECIFIED SYNCOPE TYPE: Primary | ICD-10-CM

## 2023-01-06 PROCEDURE — 99214 OFFICE O/P EST MOD 30 MIN: CPT | Performed by: INTERNAL MEDICINE

## 2023-01-06 NOTE — PROGRESS NOTES
Assessment & Plan     Syncope, unspecified syncope type  Differential diagnosis of syncope is most likely due to dehydration post influenza weakness and frailty.  She continues to take her metoprolol and is unable to check her blood pressure.  At home there is no orthostatic drop today.  Will defer added testing like ultrasound carotids.  She had a recent echo in September showing moderate aortic stenosis.  We will send a message to cardiology to see if she needs repeat echo or if the stenosis needs to be addressed in the face of syncopal episodes.  And she also has a pacemaker that may need to be interrogated.  Will refer for care coordination to she is not keen on a community paramedic or assisted living at this stage but given her age and general frailty I think further assistance is necessary we will start with the primary care referral.  - Primary Care - Care Coordination Referral             MED REC REQUIRED  Post Medication Reconciliation Status: discharge medications reconciled, continue medications without change  MEDICATIONS:  Continue current medications without change    Return in about 4 weeks (around 2/3/2023) for Follow up.  Close follow-up to check on wound.  Kaitlin Steiner MD  [facility]   Deloris Freeman is a 89 year old accompanied by her daughter, presenting for the following health issues:  Hospital F/U (12/29 /Burn and syncope )  Did receive her influenza vaccine and then had a flulike illness on 1 December and tested positive for influenza she was not treated with Tamiflu I believe this was because she was outside the timeframe the test was done at the [facility] clinic.  About 10 days later she went to the ER with ongoing calf issues  Was given Tessalon chest x-ray was clear.  She continued to feel very weak and had not been eating properly and then had a syncopal episode while making herself some tea and the heartburning water then fell on her leg and  caused a burn on her left shin.  She will had a true syncopal episode where she lost consciousness unknown time.  But was assisted by neighbors and was coherent and oriented afterwards.  She went to the ER and head CT was negative hip CT was x-rays were negative.  She has been treating the burn with nonadhesive dressings but yesterday started on Keflex by another physician due to some erythema and streaking noted  History of Present Illness       Reason for visit:  ER follow up visit.    She eats 0-1 servings of fruits and vegetables daily.She consumes 1 sweetened beverage(s) daily.She exercises with enough effort to increase her heart rate 9 or less minutes per day.  She exercises with enough effort to increase her heart rate 3 or less days per week.   She is taking medications regularly.         Hospital Follow-up Visit:    Hospital/Nursing Home/IP Rehab Facility: [facility]  Date of Admission: 12/29/22  Date of Discharge: 12/29/22  Reason(s) for Admission: Syncope and Burn    Was your hospitalization related to COVID-19? No   Problems taking medications regularly:  None  Medication changes since discharge: None  Problems adhering to non-medication therapy:  None    Summary of hospitalization:  [facility] discharge summary reviewed  Diagnostic Tests/Treatments reviewed.  Follow up needed: none  Other Healthcare Providers Involved in Patient s Care:         None  Update since discharge: improved.     year old female presents to the Emergency Department for evaluation of a fall and syncope.  Patient reports she got her COVID shot earlier this week.  She felt ill for 2 days, reports she did not eat or drink much.  She felt better this morning.  Got up to make her self some tea and food, and reports somehow the tea fell out of her hand, hit her shin which was very painful and then she fell to the ground.  Perhaps of brief loss of consciousness.  She denies any chest pain or  dizziness.  Blood pressure on the lower end here today.  Daughter helped her off the ground.  At that time he noted some right hip pain.  Most of her pain here is to her shin.  She reports chronic wound so that it healed with some soothing ointment.  She does have a little bit of blistering over top there from the tea.  Really all of her complaint is to the area.  She has some mild ischial tuberosity tenderness bilaterally, will obtain x-ray of the hip.  She is on anticoagulation and CT of the head obtained without evidence of bleed or fracture.  Labs otherwise reassuring.  She was given IV fluids  Ct head , ekg and hip xrays were done   Plan of care communicated with patient           Patient Active Problem List   Diagnosis     2019 novel coronavirus disease (COVID-19)     Syncope, unspecified syncope type     Orthostatic hypotension     Bronchiectasis without complication (H)     Essential hypertension     Chronic atrial fibrillation (H)     Cardiac pacemaker in situ     Aortic valve stenosis, etiology of cardiac valve disease unspecified     Chronic diarrhea     Restless legs syndrome (RLS)     SHARON (generalized anxiety disorder)     Insomnia, unspecified type     Debility     Acute respiratory failure with hypoxia (H)     Hypotension, unspecified hypotension type     Elevated troponin     Demand ischemia (H)     Pulmonary nodules     Pancreatic mass     Fatigue, unspecified type     Poor nutrition     Fall, subsequent encounter     External ear ulcer, limited to breakdown of skin (H)     Malignant neoplasm of colon, unspecified part of colon (H)     Thrombocytopenia (H)     Abnormal computed tomography of cecum and terminal ileum     Coronary atherosclerosis     Dry eye syndrome of bilateral lacrimal glands     Hemoperitoneum     Intermittent claudication (H)     Malignant neoplasm of skin     Hyperlipidemia, unspecified     Mixed hyperlipidemia     Pain, unspecified     Special screening for malignant neoplasms,  colon     Vitamin D deficiency     Nonrheumatic aortic valve stenosis     Bronchiectasis with acute lower respiratory infection (H)     Persistent atrial fibrillation (H)     Supraventricular tachycardia (H)     Unspecified atrial fibrillation (H)     Xerosis cutis     Insomnia     Pancreatic cyst     RLS (restless legs syndrome)     SSS (sick sinus syndrome) (H)     Current Outpatient Medications   Medication     acetaminophen (TYLENOL) 500 MG tablet     alum hydroxide-mag carbonate (GENACTON)  MG/15ML SUSP suspension     Apoaequorin (PREVAGEN) 10 MG CAPS     atorvastatin (LIPITOR) 20 MG tablet     cephALEXin (KEFLEX) 500 MG capsule     ELIQUIS ANTICOAGULANT 2.5 MG tablet     Melatonin 5 MG CHEW     metoprolol succinate ER (TOPROL-XL) 50 MG 24 hr tablet     Probiotic Product (PROBIOTIC ACIDOPHILUS BEADS) CAPS     No current facility-administered medications for this visit.           Review of Systems   Constitutional, HEENT, cardiovascular, pulmonary, gi and gu systems are negative, except as otherwise noted.      Objective    /62 (BP Location: Left arm, Patient Position: Sitting, Cuff Size: Adult Small)   Pulse 77   Temp 98.2  F (36.8  C) (Oral)   Resp 14   SpO2 97%   There is no height or weight on file to calculate BMI.  Physical Exam   GENERAL: healthy, alert and no distress  NECK: no adenopathy, no asymmetry, masses, or scars and thyroid normal to palpation  RESP: lungs clear to auscultation - no rales, rhonchi or wheezes  CV: regular rate and rhythm, normal S1 S2, no S3 or S4, no murmur, click or rub, no peripheral edema and peripheral pulses strong  MS: no gross musculoskeletal defects noted, no edema  No orthostatic drop.    Leg wound gauze dressing removed she has healing to blisters that have ruptured with good granulation tissue underneath.  There is generalized erythema around the 2 blisters without warmth or discharge.  I believe this is bleeding and not due to infection.  Calfs are  supple

## 2023-01-07 ENCOUNTER — NURSE TRIAGE (OUTPATIENT)
Dept: NURSING | Facility: CLINIC | Age: 88
End: 2023-01-07

## 2023-01-07 NOTE — TELEPHONE ENCOUNTER
The patient is complaining of swelling and pain in the left lower leg  Onset today on 01/07/23    The patient was diagnosed with cellulitis on 01/05/23 and was prescribed antibiotic    The patient was unaware and had not picked up the Keflex    The patient says she will  the medication today on 01/07/23 and start

## 2023-01-09 ENCOUNTER — PATIENT OUTREACH (OUTPATIENT)
Dept: CARE COORDINATION | Facility: CLINIC | Age: 88
End: 2023-01-09

## 2023-01-09 NOTE — PROGRESS NOTES
Clinic Care Coordination Contact  Community Health Worker Initial Outreach    CHW Note:    CHW contacted patient regarding a referral to CCC. CHW introduced self and role of CCC.    Patient shared that she is doing good at this time and declined CCC. Patient shared that she is independent and would like to see how she can manage without assistance. CHW informed patient of CCC introduction letter that would be sent via [employer] and encouraged patient to reach out to CCC if needed in the future. Patient requested CCC introduction letter be sent via mail. Patient stated she would contact CCC if/when needed.     Patient accepts CC: No, patient states she is doing good at this time. Patient will be sent Care Coordination introduction letter for future reference.       Eduarda Burgess  Community Health Worker   [facility] Care Coordination  [facility]   [email or web address]  Office: [phone]

## 2023-01-09 NOTE — LETTER
[facility] CARE COORDINATION  [facility]  January 9, 2023    Deloris Freeman [email or web address]  [address] [place] [address]      Dear Deloris Freeman,        I am a clinic care coordinator who works with Kaitlin Steiner MD with the [facility]. I wanted to thank you for spending the time to talk with me.  Below is a description of clinic care coordination and how I can further assist you.       The clinic care coordination team is made up of a registered nurse, , financial resource worker and community health worker who understand the health care system. The goal of clinic care coordination is to help you manage your health and improve access to the health care system. Our team works alongside your provider to assist you in determining your health and social needs. We can help you obtain health care and community resources, providing you with necessary information and education. We can work with you through any barriers and develop a care plan that helps coordinate and strengthen the communication between you and your care team.    Please feel free to contact me with any questions or concerns regarding care coordination and what we can offer.      We are focused on providing you with the highest-quality healthcare experience possible.    Sincerely,     Eduarda Burgess  Community Health Worker   [facility] Care Coordination  [facility]   [email or web address]  Office: [phone]

## 2023-01-11 DIAGNOSIS — I48.19 PERSISTENT ATRIAL FIBRILLATION (H): ICD-10-CM

## 2023-01-11 DIAGNOSIS — R19.7 DIARRHEA, UNSPECIFIED TYPE: ICD-10-CM

## 2023-01-12 ENCOUNTER — APPOINTMENT (OUTPATIENT)
Dept: RADIOLOGY | Facility: HOSPITAL | Age: 88
DRG: 603 | End: 2023-01-12
Attending: EMERGENCY MEDICINE
Payer: MEDICARE

## 2023-01-12 ENCOUNTER — APPOINTMENT (OUTPATIENT)
Dept: ULTRASOUND IMAGING | Facility: HOSPITAL | Age: 88
DRG: 603 | End: 2023-01-12
Attending: EMERGENCY MEDICINE
Payer: MEDICARE

## 2023-01-12 ENCOUNTER — HOSPITAL ENCOUNTER (INPATIENT)
Facility: HOSPITAL | Age: 88
LOS: 5 days | Discharge: HOME HEALTH | DRG: 603 | End: 2023-01-17
Attending: EMERGENCY MEDICINE | Admitting: INTERNAL MEDICINE
Payer: MEDICARE

## 2023-01-12 DIAGNOSIS — T24.202A PARTIAL THICKNESS BURN OF LEFT LOWER EXTREMITY, INITIAL ENCOUNTER: ICD-10-CM

## 2023-01-12 DIAGNOSIS — R19.7 DIARRHEA, UNSPECIFIED TYPE: ICD-10-CM

## 2023-01-12 DIAGNOSIS — L03.116 CELLULITIS OF LEG, LEFT: Primary | ICD-10-CM

## 2023-01-12 PROBLEM — I48.20 CHRONIC ATRIAL FIBRILLATION (H): Status: ACTIVE | Noted: 2021-02-01

## 2023-01-12 PROBLEM — I95.1 ORTHOSTATIC HYPOTENSION: Status: ACTIVE | Noted: 2021-02-01

## 2023-01-12 PROBLEM — G25.81 RESTLESS LEGS SYNDROME (RLS): Status: ACTIVE | Noted: 2021-02-01

## 2023-01-12 PROBLEM — I25.10 CORONARY ATHEROSCLEROSIS: Status: ACTIVE | Noted: 2022-11-01

## 2023-01-12 PROBLEM — N18.30 CKD (CHRONIC KIDNEY DISEASE) STAGE 3, GFR 30-59 ML/MIN (H): Status: ACTIVE | Noted: 2023-01-12

## 2023-01-12 PROBLEM — K52.9 CHRONIC DIARRHEA: Status: ACTIVE | Noted: 2021-02-01

## 2023-01-12 PROBLEM — F41.1 GAD (GENERALIZED ANXIETY DISORDER): Status: ACTIVE | Noted: 2021-02-01

## 2023-01-12 PROBLEM — I10 ESSENTIAL HYPERTENSION: Status: ACTIVE | Noted: 2021-02-01

## 2023-01-12 PROBLEM — Z95.0 CARDIAC PACEMAKER IN SITU: Status: ACTIVE | Noted: 2021-02-01

## 2023-01-12 PROBLEM — J47.9 BRONCHIECTASIS WITHOUT COMPLICATION (H): Status: ACTIVE | Noted: 2021-02-01

## 2023-01-12 LAB
ANION GAP SERPL CALCULATED.3IONS-SCNC: 6 MMOL/L (ref 7–15)
BUN SERPL-MCNC: 14.3 MG/DL (ref 8–23)
CALCIUM SERPL-MCNC: 9.7 MG/DL (ref 8.2–9.6)
CHLORIDE SERPL-SCNC: 106 MMOL/L (ref 98–107)
CREAT SERPL-MCNC: 1.02 MG/DL (ref 0.51–0.95)
CRP SERPL-MCNC: 10.7 MG/L
DEPRECATED HCO3 PLAS-SCNC: 28 MMOL/L (ref 22–29)
ERYTHROCYTE [DISTWIDTH] IN BLOOD BY AUTOMATED COUNT: 14.4 % (ref 10–15)
GFR SERPL CREATININE-BSD FRML MDRD: 51 ML/MIN/1.73M2
GLUCOSE SERPL-MCNC: 107 MG/DL (ref 70–99)
HCT VFR BLD AUTO: 34.8 % (ref 35–47)
HGB BLD-MCNC: 10.9 G/DL (ref 11.7–15.7)
MCH RBC QN AUTO: 30.5 PG (ref 26.5–33)
MCHC RBC AUTO-ENTMCNC: 31.3 G/DL (ref 31.5–36.5)
MCV RBC AUTO: 98 FL (ref 78–100)
PLATELET # BLD AUTO: 245 10E3/UL (ref 150–450)
POTASSIUM SERPL-SCNC: 4.3 MMOL/L (ref 3.4–5.3)
RBC # BLD AUTO: 3.57 10E6/UL (ref 3.8–5.2)
SODIUM SERPL-SCNC: 140 MMOL/L (ref 136–145)
WBC # BLD AUTO: 9.9 10E3/UL (ref 4–11)

## 2023-01-12 PROCEDURE — 80048 BASIC METABOLIC PNL TOTAL CA: CPT | Performed by: EMERGENCY MEDICINE

## 2023-01-12 PROCEDURE — 120N000001 HC R&B MED SURG/OB

## 2023-01-12 PROCEDURE — 99285 EMERGENCY DEPT VISIT HI MDM: CPT | Mod: 25

## 2023-01-12 PROCEDURE — 250N000011 HC RX IP 250 OP 636: Performed by: EMERGENCY MEDICINE

## 2023-01-12 PROCEDURE — 99222 1ST HOSP IP/OBS MODERATE 55: CPT | Performed by: INTERNAL MEDICINE

## 2023-01-12 PROCEDURE — 73590 X-RAY EXAM OF LOWER LEG: CPT | Mod: LT

## 2023-01-12 PROCEDURE — 250N000013 HC RX MED GY IP 250 OP 250 PS 637: Performed by: INTERNAL MEDICINE

## 2023-01-12 PROCEDURE — 96365 THER/PROPH/DIAG IV INF INIT: CPT

## 2023-01-12 PROCEDURE — 36415 COLL VENOUS BLD VENIPUNCTURE: CPT | Performed by: EMERGENCY MEDICINE

## 2023-01-12 PROCEDURE — 85027 COMPLETE CBC AUTOMATED: CPT | Performed by: EMERGENCY MEDICINE

## 2023-01-12 PROCEDURE — 86140 C-REACTIVE PROTEIN: CPT | Performed by: EMERGENCY MEDICINE

## 2023-01-12 PROCEDURE — 93971 EXTREMITY STUDY: CPT | Mod: LT

## 2023-01-12 RX ORDER — AMOXICILLIN 250 MG
1 CAPSULE ORAL 2 TIMES DAILY PRN
Status: DISCONTINUED | OUTPATIENT
Start: 2023-01-12 | End: 2023-01-17 | Stop reason: HOSPADM

## 2023-01-12 RX ORDER — MAGNESIUM HYDROXIDE/ALUMINUM HYDROXICE/SIMETHICONE 120; 1200; 1200 MG/30ML; MG/30ML; MG/30ML
30 SUSPENSION ORAL 4 TIMES DAILY PRN
Status: DISCONTINUED | OUTPATIENT
Start: 2023-01-12 | End: 2023-01-17 | Stop reason: HOSPADM

## 2023-01-12 RX ORDER — METOPROLOL SUCCINATE 50 MG/1
50 TABLET, EXTENDED RELEASE ORAL DAILY
Status: DISCONTINUED | OUTPATIENT
Start: 2023-01-13 | End: 2023-01-17 | Stop reason: HOSPADM

## 2023-01-12 RX ORDER — LANOLIN ALCOHOL/MO/W.PET/CERES
3 CREAM (GRAM) TOPICAL
Status: DISCONTINUED | OUTPATIENT
Start: 2023-01-12 | End: 2023-01-15

## 2023-01-12 RX ORDER — ONDANSETRON 4 MG/1
4 TABLET, ORALLY DISINTEGRATING ORAL EVERY 6 HOURS PRN
Status: DISCONTINUED | OUTPATIENT
Start: 2023-01-12 | End: 2023-01-17 | Stop reason: HOSPADM

## 2023-01-12 RX ORDER — CALCIUM CARBONATE 500 MG/1
1000 TABLET, CHEWABLE ORAL 4 TIMES DAILY PRN
Status: DISCONTINUED | OUTPATIENT
Start: 2023-01-12 | End: 2023-01-17 | Stop reason: HOSPADM

## 2023-01-12 RX ORDER — ONDANSETRON 2 MG/ML
4 INJECTION INTRAMUSCULAR; INTRAVENOUS EVERY 6 HOURS PRN
Status: DISCONTINUED | OUTPATIENT
Start: 2023-01-12 | End: 2023-01-17 | Stop reason: HOSPADM

## 2023-01-12 RX ORDER — LOPERAMIDE HCL 2 MG
2 CAPSULE ORAL DAILY PRN
Status: DISCONTINUED | OUTPATIENT
Start: 2023-01-12 | End: 2023-01-17 | Stop reason: HOSPADM

## 2023-01-12 RX ORDER — DOXYCYCLINE 100 MG/1
100 CAPSULE ORAL ONCE
Status: DISCONTINUED | OUTPATIENT
Start: 2023-01-12 | End: 2023-01-12

## 2023-01-12 RX ORDER — APIXABAN 2.5 MG/1
TABLET, FILM COATED ORAL
Qty: 60 TABLET | Refills: 3 | Status: SHIPPED | OUTPATIENT
Start: 2023-01-12 | End: 2023-02-28

## 2023-01-12 RX ORDER — ATORVASTATIN CALCIUM 10 MG/1
20 TABLET, FILM COATED ORAL AT BEDTIME
Status: DISCONTINUED | OUTPATIENT
Start: 2023-01-13 | End: 2023-01-17 | Stop reason: HOSPADM

## 2023-01-12 RX ORDER — LOPERAMIDE HCL 2 MG
2 CAPSULE ORAL
Qty: 90 CAPSULE | Refills: 0 | Status: ON HOLD | OUTPATIENT
Start: 2023-01-12 | End: 2023-01-17

## 2023-01-12 RX ORDER — AMOXICILLIN 250 MG
2 CAPSULE ORAL 2 TIMES DAILY PRN
Status: DISCONTINUED | OUTPATIENT
Start: 2023-01-12 | End: 2023-01-17 | Stop reason: HOSPADM

## 2023-01-12 RX ORDER — HYDRALAZINE HYDROCHLORIDE 10 MG/1
10 TABLET, FILM COATED ORAL 4 TIMES DAILY PRN
Status: DISCONTINUED | OUTPATIENT
Start: 2023-01-12 | End: 2023-01-17 | Stop reason: HOSPADM

## 2023-01-12 RX ORDER — ACETAMINOPHEN 650 MG/1
650 SUPPOSITORY RECTAL EVERY 6 HOURS PRN
Status: DISCONTINUED | OUTPATIENT
Start: 2023-01-12 | End: 2023-01-17 | Stop reason: HOSPADM

## 2023-01-12 RX ORDER — CLINDAMYCIN PHOSPHATE 900 MG/50ML
900 INJECTION, SOLUTION INTRAVENOUS EVERY 8 HOURS
Status: DISCONTINUED | OUTPATIENT
Start: 2023-01-13 | End: 2023-01-17

## 2023-01-12 RX ORDER — AMPICILLIN AND SULBACTAM 2; 1 G/1; G/1
3 INJECTION, POWDER, FOR SOLUTION INTRAMUSCULAR; INTRAVENOUS ONCE
Status: COMPLETED | OUTPATIENT
Start: 2023-01-12 | End: 2023-01-12

## 2023-01-12 RX ORDER — ACETAMINOPHEN 325 MG/1
650 TABLET ORAL EVERY 6 HOURS PRN
Status: DISCONTINUED | OUTPATIENT
Start: 2023-01-12 | End: 2023-01-17 | Stop reason: HOSPADM

## 2023-01-12 RX ADMIN — ACETAMINOPHEN 650 MG: 325 TABLET ORAL at 22:35

## 2023-01-12 RX ADMIN — AMPICILLIN SODIUM AND SULBACTAM SODIUM 3 G: 2; 1 INJECTION, POWDER, FOR SOLUTION INTRAMUSCULAR; INTRAVENOUS at 18:57

## 2023-01-12 RX ADMIN — APIXABAN 2.5 MG: 2.5 TABLET, FILM COATED ORAL at 21:22

## 2023-01-12 ASSESSMENT — ACTIVITIES OF DAILY LIVING (ADL)
ADLS_ACUITY_SCORE: 35
ADLS_ACUITY_SCORE: 35
ADLS_ACUITY_SCORE: 33

## 2023-01-12 NOTE — TELEPHONE ENCOUNTER
Routing refill request to provider for review/approval because:  Drug not on the [facility] refill protocol   Drug not active on patient's medication list    Last Written Prescription Date:  9/9/22  Last Fill Quantity: 60,  # refills: 3   Last office visit provider:  1/6/23     Requested Prescriptions   Pending Prescriptions Disp Refills     loperamide (IMODIUM) 2 MG capsule [Pharmacy Med Name: LOPERAMIDE HCL 2 MG CAPS 2 Capsule] 90 capsule 0     Sig: TAKE 1 CAPSULE (2 MG) BY MOUTH DAILY BEFORE BREAKFAST       There is no refill protocol information for this order        ELIQUIS ANTICOAGULANT 2.5 MG tablet [Pharmacy Med Name: ELIQUIS 2.5 MG TABLET 2.5 Tablet] 60 tablet 3     Sig: TAKE 1 TABLET (2.5 MG) BY MOUTH 2 TIMES DAILY       Direct Oral Anticoagulant Agents Failed - 1/11/2023  8:47 AM        Failed - Patient is 18-79 years of age        Passed - Normal Platelets on file in past 12 months     Recent Labs   Lab Test 12/29/22  1013                  Passed - Medication is active on med list        Passed - Serum creatinine less than or equal to 1.4 on file in past 12 mos     Recent Labs   Lab Test 12/29/22  1013   CR 1.09*       Ok to refill medication if creatinine is low          Passed - Weight is greater than 60 kg for the past year     Wt Readings from Last 3 Encounters:   01/05/23 64.4 kg (141 lb 14.4 oz)   12/29/22 61.2 kg (135 lb)   11/01/22 64.7 kg (142 lb 9.6 oz)             Passed - No active pregnancy on record        Passed - No positive pregnancy test within past 12 months        Passed - Recent (6 mo) or future (30 days) visit within the authorizing provider's specialty             Hannah Richter RN 01/12/23 10:42 AM

## 2023-01-13 PROCEDURE — 250N000013 HC RX MED GY IP 250 OP 250 PS 637: Performed by: INTERNAL MEDICINE

## 2023-01-13 PROCEDURE — 97602 WOUND(S) CARE NON-SELECTIVE: CPT

## 2023-01-13 PROCEDURE — 99232 SBSQ HOSP IP/OBS MODERATE 35: CPT | Performed by: INTERNAL MEDICINE

## 2023-01-13 PROCEDURE — G0463 HOSPITAL OUTPT CLINIC VISIT: HCPCS | Mod: 25

## 2023-01-13 PROCEDURE — 120N000001 HC R&B MED SURG/OB

## 2023-01-13 PROCEDURE — 250N000011 HC RX IP 250 OP 636: Performed by: INTERNAL MEDICINE

## 2023-01-13 PROCEDURE — 250N000009 HC RX 250: Performed by: INTERNAL MEDICINE

## 2023-01-13 RX ORDER — SILVER SULFADIAZINE 10 MG/G
CREAM TOPICAL DAILY
Status: DISCONTINUED | OUTPATIENT
Start: 2023-01-13 | End: 2023-01-17 | Stop reason: HOSPADM

## 2023-01-13 RX ADMIN — CLINDAMYCIN IN 5 PERCENT DEXTROSE 900 MG: 18 INJECTION, SOLUTION INTRAVENOUS at 16:56

## 2023-01-13 RX ADMIN — ACETAMINOPHEN 650 MG: 325 TABLET ORAL at 16:54

## 2023-01-13 RX ADMIN — ACETAMINOPHEN 650 MG: 325 TABLET ORAL at 05:17

## 2023-01-13 RX ADMIN — CLINDAMYCIN IN 5 PERCENT DEXTROSE 900 MG: 18 INJECTION, SOLUTION INTRAVENOUS at 08:44

## 2023-01-13 RX ADMIN — ACETAMINOPHEN 650 MG: 325 TABLET ORAL at 22:49

## 2023-01-13 RX ADMIN — METOPROLOL SUCCINATE 50 MG: 50 TABLET, EXTENDED RELEASE ORAL at 08:43

## 2023-01-13 RX ADMIN — CLINDAMYCIN IN 5 PERCENT DEXTROSE 900 MG: 18 INJECTION, SOLUTION INTRAVENOUS at 01:18

## 2023-01-13 RX ADMIN — APIXABAN 2.5 MG: 2.5 TABLET, FILM COATED ORAL at 21:19

## 2023-01-13 RX ADMIN — ATORVASTATIN CALCIUM 20 MG: 10 TABLET, FILM COATED ORAL at 21:19

## 2023-01-13 RX ADMIN — APIXABAN 2.5 MG: 2.5 TABLET, FILM COATED ORAL at 08:43

## 2023-01-13 RX ADMIN — SILVER SULFADIAZINE: 10 CREAM TOPICAL at 12:54

## 2023-01-13 ASSESSMENT — ACTIVITIES OF DAILY LIVING (ADL)
ADLS_ACUITY_SCORE: 41
FALL_HISTORY_WITHIN_LAST_SIX_MONTHS: YES
DIFFICULTY_EATING/SWALLOWING: NO
WEAR_GLASSES_OR_BLIND: YES
TOILETING_ISSUES: NO
WALKING_OR_CLIMBING_STAIRS_DIFFICULTY: NO
EQUIPMENT_CURRENTLY_USED_AT_HOME: WALKER, ROLLING
ADLS_ACUITY_SCORE: 35
CONCENTRATING,_REMEMBERING_OR_MAKING_DECISIONS_DIFFICULTY: NO
ADLS_ACUITY_SCORE: 39
ADLS_ACUITY_SCORE: 26
ADLS_ACUITY_SCORE: 39
ADLS_ACUITY_SCORE: 39
DOING_ERRANDS_INDEPENDENTLY_DIFFICULTY: YES
VISION_MANAGEMENT: READING GLASSES
ADLS_ACUITY_SCORE: 39
ADLS_ACUITY_SCORE: 35
NUMBER_OF_TIMES_PATIENT_HAS_FALLEN_WITHIN_LAST_SIX_MONTHS: 3
ADLS_ACUITY_SCORE: 41
DRESSING/BATHING_DIFFICULTY: NO
ADLS_ACUITY_SCORE: 43
ADLS_ACUITY_SCORE: 39
CHANGE_IN_FUNCTIONAL_STATUS_SINCE_ONSET_OF_CURRENT_ILLNESS/INJURY: NO
ADLS_ACUITY_SCORE: 35
DEPENDENT_IADLS:: SHOPPING;TRANSPORTATION

## 2023-01-13 NOTE — DISCHARGE INSTRUCTIONS
lle wound(s): Daily    Apply vashe moistened gauze to wound bed and let sit x 10 minutes   Apply silvadene cream to adaptic/oil emulsion gauze   Apply cream side down to wound beds only  Cover with abd pads and kerlix and tape   Needs wound clinic follow up appointment  Home care also if possible     Home care services have been arranged for you.  Agency: [facility]  Services:  Home RN and Home PT  Phone: [phone]  Instructions: Home Care will contact you within 24 hours to arrange the first visit.

## 2023-01-13 NOTE — PHARMACY-ADMISSION MEDICATION HISTORY
Pharmacy Note - Admission Medication History    _____________________________________________________________________    Prior To Admission (PTA) med list completed and updated in EMR.       PTA Med List   Medication Sig Note Last Dose     acetaminophen (TYLENOL) 500 MG tablet Take 1,000 mg by mouth every 8 hours as needed       alum hydroxide-mag carbonate (GENACTON)  MG/15ML SUSP suspension Take 30 mLs by mouth 4 times daily as needed for heartburn        Apoaequorin (PREVAGEN) 10 MG CAPS Take 1 capsule by mouth daily   1/12/2023     atorvastatin (LIPITOR) 20 MG tablet Take 1 tablet (20 mg) by mouth At Bedtime  1/12/2023     cephALEXin (KEFLEX) 500 MG capsule Take 1 capsule (500 mg) by mouth 3 times daily for 7 days 1/12/2023: Through 1/13/23 1/12/2023 at x3     ELIQUIS ANTICOAGULANT 2.5 MG tablet TAKE 1 TABLET (2.5 MG) BY MOUTH 2 TIMES DAILY  1/12/2023 at am     Melatonin 5 MG CHEW Take 5 mg by mouth at bedtime as needed, may repeat once       metoprolol succinate ER (TOPROL-XL) 50 MG 24 hr tablet Take 1 tablet (50 mg) by mouth daily  1/12/2023     Probiotic Product (PROBIOTIC ACIDOPHILUS BEADS) CAPS Take 1 capsule by mouth daily  1/11/2023       Information source(s): Patient, Family member and CareEverywhere/[facility]  Method of interview communication: in-person    Summary of Changes to PTA Med List  New: -  Discontinued: loperamide  Changed: probiotic    Patient was asked about OTC/herbal products specifically.  PTA med list reflects this.    In the past week, patient estimated taking medication this percent of the time:  greater than 90%.    Allergies were reviewed, assessed, and updated with the patient.      Patient does not use any multi-dose medications prior to admission.    The information provided in this note is only as accurate as the sources available at the time of the update(s).    Thank you for the opportunity to participate in the care of this patient.    Yudi Gallagher, PharmD, BCPS  01/12/23 8:01 PM

## 2023-01-13 NOTE — PLAN OF CARE
Problem: Plan of Care - These are the overarching goals to be used throughout the patient stay.    Goal: Optimal Comfort and Wellbeing  Outcome: Progressing     Problem: Plan of Care - These are the overarching goals to be used throughout the patient stay.    Goal: Optimal Comfort and Wellbeing  Outcome: Progressing     Problem: Risk for Delirium  Goal: Improved Behavioral Control  Intervention: Minimize Safety Risk  Recent Flowsheet Documentation  Taken 1/13/2023 0500 by Gi Samuel RN  Enhanced Safety Measures: room near unit station  Taken 1/13/2023 0000 by Gi Samuel RN  Enhanced Safety Measures: room near unit station   Goal Outcome Evaluation:    Patient alert, oriented x 4. Pleasant and co-operative with cares. Patient complained of left leg pain rating 5/10, aching, dull and constant in nature, administered Tylenol prn, reported some relief. Administered Clindamycin antibiotic and tolerated well. Left leg wound open to air and dry. needed assist x 1 with cares overnight.

## 2023-01-13 NOTE — ED TRIAGE NOTES
Pt states 3 weeks ago she had a syncopal episode and on her way down to the floor, she accidentally spilled some fresh hot tea on her left lower leg. Pt has been seen for this and placed on Cephalexin. Pt and her daughter feel the wound is getting worse. Pt denies pain and denies fever.     Triage Assessment     Row Name 01/12/23 2953       Triage Assessment (Adult)    Airway WDL WDL       Respiratory WDL    Respiratory WDL WDL       Skin Circulation/Temperature WDL    Skin Circulation/Temperature WDL WDL       Cardiac WDL    Cardiac WDL WDL       Peripheral/Neurovascular WDL    Peripheral Neurovascular WDL WDL       Cognitive/Neuro/Behavioral WDL    Cognitive/Neuro/Behavioral WDL WDL

## 2023-01-13 NOTE — PLAN OF CARE
Problem: Skin or Soft Tissue Infection  Goal: Absence of Infection Signs and Symptoms  Outcome: Progressing   Goal Outcome Evaluation:         Patient is afebrile.  Patient states tenderness to LLE when touched and has some random shooting pains in wound area.  Dressing change done per WOC orders.  IV antibiotics given per MD order.  Will continue to monitor.

## 2023-01-13 NOTE — ED NOTES
ED Provider In Triage Note  [facility]  Encounter Date: Jan 12, 2023    Chief Complaint   Patient presents with     Left lower leg wound       Brief HPI:   Deloris Larson is a 89 year old female presenting to the Emergency Department with a chief complaint of left lower leg burn. Burn on 12/29, seen here in the ED, started on keflex afterwards, still on this. Also using antibiotic ointment.    Brief Physical Exam:  BP (!) 166/77   Pulse 94   Temp 98.6  F (37  C) (Oral)   Resp 16   Wt 64.4 kg (141 lb 15.6 oz)   SpO2 97%   BMI 22.92 kg/m    General: Non-toxic appearing  HEENT: Atraumatic  Resp: No respiratory distress  Abdomen: Non-peritoneal  Neuro: Alert, oriented, answers questions appropriately  Psych: Behavior appropriate  Skin: left lower leg base of erythema with granulation tissue. Surrounding edema.    Plan Initiated in Triage:  Ddx includes: subacute burn with overlying cellulitis, not responding well enough to keflex    unasyn and doxy ordered to allow for easy transition to oral Augmentin/doxy if pt is able to discharge this evening.    PIT Dispo:   Return to lobby while awaiting workup and ED bed availability    Darrin Isaacs MD on 1/12/2023 at 6:07 PM    Patient was evaluated by the Physician in Triage due to a limitation of available rooms in the Emergency Department. A plan of care was discussed based on the information obtained on the initial evaluation and patient was consuled to return back to the Emergency Department lobby after this initial evalutaiton until results were obtained or a room became available in the Emergency Department. Patient was counseled not to leave prior to receiving the results of their workup.     Darrin Isaacs MD  [facility] EMERGENCY DEPARTMENT  [address] [place] [id number]  [phone]       Darrin sIaacs MD  01/12/23 5224

## 2023-01-13 NOTE — PLAN OF CARE
Problem: Plan of Care - These are the overarching goals to be used throughout the patient stay.    Goal: Optimal Comfort and Wellbeing  Outcome: Progressing   Goal Outcome Evaluation:    Pt spilled hot tea on lower extremity last weekend as was discharged with antibiotics; pt returned today with large, red wounds on LE, VSS yet elevated BP to watch to see if lowers after transfer to room from hallway and admin of Tylenol; hydralazine if BP above 160; ambulatory and will use call light for assistance to toilet; able to make needs known; reg diet; US showed no clots; on IV ampicillin; pain rated 4/10 managed well w Tylenol;

## 2023-01-13 NOTE — CONSULTS
[facility]  WOC Nurse Inpatient Assessment     Consulted for: LLE Burn   Patient History (according to provider note(s):      Deloris Larson is a 89 year old female admitted on 1/12/2023. She has history of orthostatic hypotension, chronic A. fib with pacemaker in place, CAD, RLS, anxiety, and CKD 3  and presents with cellulitis of the left lower extremity      Cellulitis left lower extremity: Patient presents with cellulitis of the left lower extremity after suffering a burn of this area 3 weeks ago that was related to syncopal event.  She has failed outpatient Keflex for the past week  --Start IV clindamycin  --Monitor for improvement  --ultrasound left lower extremity ordered by ED to exclude underlying DVT   -- WOC consult in the AM    Areas Assessed:      Areas visualized during today's visit: Focused: and Lower extremities          Wound Location:  LLE   Date of last photo 1/13  Wound History: fainted and burned her leg two wounds in the area  One U shaped on her shin  Wound Base: 50 50% dermis, eschar and slough   Lateral leg wound 100% eschar      Palpation of the wound bed: firm over eschar spots and soft where there is slough and demis       Drainage: none     Description of drainage: none     Measurements (length x width x depth, in cm) 40  x 10  x  0.2 cm      Tunneling N/A     Undermining N/A  Periwound skin: edematous and irritant dermatitis      Color: pink      Temperature: hot  Odor: none  Pain: severe, tension to hands, feet and body and she was getting zingers which is common with infection, shooting, radiating, stabbing and unbearable    Treatment Plan:     lle wound(s): Daily    Apply vashe moistened gauze to wound bed and let sit x 10 minutes   Apply silvadene cream to adaptic/oil emulsion gauze   Apply cream side down to wound beds only  Cover with abd pads and kerlix  Once leg is less painful compression is warranted   Recommend wound clinic at discharge       Orders: Written    RECOMMEND PRIMARY TEAM ORDER: wound clinic   Education provided: plan of care and Infection prevention   Discussed plan of care with: Patient and Nurse  WOC nurse follow-up plan: weekly  Notify WOC if wound(s) deteriorate.  Nursing to notify the Provider(s) and re-consult the WOC Nurse if new skin concern.    DATA:     Current support surface: Standard  ED cart  Containment of urine/stool: Incontinence Protocol  BMI: Body mass index is 22.92 kg/m .   Active diet order: Orders Placed This Encounter      Regular Diet Adult     Output: I/O last 3 completed shifts:  In: -   Out: 3 [Urine:3]     Labs: Recent Labs   Lab 01/12/23  1849   HGB 10.9*   WBC 9.9   CRP 10.70*     Pressure injury risk assessment:   Sensory Perception: 4-->no impairment  Moisture: 4-->rarely moist  Activity: 3-->walks occasionally  Mobility: 3-->slightly limited  Nutrition: 3-->adequate  Friction and Shear: 3-->no apparent problem  Kwame Score: 20    Chelita Wright RN BS CWOCN
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
## 2023-01-13 NOTE — PROGRESS NOTES
[facility]    Medicine Progress Note - Hospitalist Service    Date of Admission:  1/12/2023    Assessment & Plan     Deloris Larson is a 89 year old female admitted on 1/12/2023. She has history of orthostatic hypotension, chronic A. fib with pacemaker in place, CAD, RLS, anxiety, and CKD 3. Admitted for cellulitis of the left lower extremity.       Cellulitis left lower extremity: Patient presents with cellulitis of the left lower extremity after suffering a burn of this area 3 weeks ago that was related to syncopal event.  She has failed outpatient Keflex for the past week. US ruled out DVT.  - Continue IV clindamycin  - Monitor for improvement  - [facility] care      Fall, recent syncope: Patient had syncopal event 3 weeks prior to admission resulting in a fall.  Patient with known history of orthostatic hypotension and chronic diarrhea. Denies any recurrent episodes. Plain film left lower extremity negative for fracture  - PT/OT     CAD, chronic A. fib with pacemaker in place:  - Continue home metoprolol, Eliquis and statin      History of chronic diarrhea: Continue home scheduled loperamide      CKD 3: GFR baseline, trend         Diet: Regular Diet Adult    DVT Prophylaxis: DOAC  Car Catheter: Not present  Lines: None     Cardiac Monitoring: None  Code Status: No CPR- Do NOT Intubate        Disposition Plan      Expected Discharge Date: 01/14/2023                  Gonzales Villalobos MD  Hospitalist Service  [facility]  Securely message with [employer] (more info)  Text page via [employer] Paging/Directory   ______________________________________________________________________    Interval History   Patient reports feeling well, not much pain from her left leg.     Physical Exam   Vital Signs: Temp: 98.1  F (36.7  C) Temp src: Oral BP: (!) 163/84 (RN Notified) Pulse: 75   Resp: 18 SpO2: 99 % O2 Device: None (Room air)    Weight: 141 lbs 15.62 oz    General appearance: not in  acute distress  HEENT: PERRL, EOMI  Lungs: Clear breath sounds in bilateral lung fields  Cardiovascular: Regular rate and rhythm, normal S1-S2  Abdomen: Soft, non tender, no distension, normal bowel sound  Musculoskeletal: No joint swelling  Skin: Left lower leg has superficial wound with surrounding erythema, swelling and warmth to touch  Neurology: AAO ×3.  Cranial nerves II - XII normal.  Normal muscle strength in all four extremities.    Medical Decision Making       40 MINUTES SPENT BY ME on the date of service doing chart review, history, exam, documentation & further activities per the note.      Data     I have personally reviewed the following data over the past 24 hrs:    9.9  \   10.9 (L)   / 245     140 106 14.3 /  107 (H)   4.3 28 1.02 (H) \       Procal: N/A CRP: 10.70 (H) Lactic Acid: N/A         Imaging results reviewed over the past 24 hrs:   Recent Results (from the past 24 hour(s))   XR Tibia and Fibula Left 2 Views    Narrative    EXAM: XR TIBIA AND FIBULA LEFT 2 VIEWS  LOCATION: [facility]  DATE/TIME: 1/12/2023 7:25 PM    INDICATION: Left leg pain, swelling, infection.  COMPARISON: None.      Impression    IMPRESSION: Soft tissue swelling in the left leg. Normal knee, ankle, and tibiofibular joint alignment. No fracture, periostitis, or bone lesion. No radiopaque foreign body. No soft tissue gas visualized.   US Lower Extremity Venous Duplex Left    Narrative    EXAM: US LOWER EXTREMITY VENOUS DUPLEX LEFT  LOCATION: [facility]  DATE/TIME: 1/12/2023 8:21 PM    INDICATION: Left calf swelling.  COMPARISON: None.  TECHNIQUE: Venous Duplex ultrasound of the left lower extremity with and without compression, augmentation and duplex. Color flow and spectral Doppler with waveform analysis performed.    FINDINGS: Exam includes the common femoral, femoral, popliteal, and contralateral common femoral veins as well as segmentally visualized deep calf veins  and greater saphenous vein.     LEFT: No deep vein thrombosis. No superficial thrombophlebitis. No popliteal cyst.      Impression    IMPRESSION:  1.  No deep venous thrombosis in the left lower extremity.

## 2023-01-13 NOTE — CONSULTS
Care Management Initial Consult       Concerns to be Addressed:   Workup in progress; IV Clindamycin; WOCN following.   Patient plan of care discussed at interdisciplinary rounds: Yes    Anticipated Discharge Disposition:  Discharge goal is home pending response to treatment, medical needs and mobility closer to discharge.      Anticipated Discharge Services:  To be determined by destination, patient/family preferences, medical needs and mobility status closer to the time of discharge.  Anticipated Discharge DME:  To be determined.     Patient/family educated on Medicare website which has current facility and service quality ratings:   NA  Education Provided on the Discharge Plan:   Per team  Patient/Family in Agreement with the Plan:   Yes    Referrals Placed by CM/SW:   None  Private pay costs discussed: Not applicable at this time.     General Information  Assessment completed with:    Type of CM/SW Visit: Chart Assessment    Primary Care Provider verified and updated as needed: Yes   Readmission within the last 30 days:        Reason for Consult: discharge planning  Advance Care Planning: Advance Care Planning Reviewed: present on chart  Daughter Ginna primary HCA, daughter Bonnie jimenez alternate.  General Information Comments: RN RAMSEY watch plan for Abx and wound care    Communication Assessment  Patient's communication style: spoken language (English or Bilingual)        Cognitive  Cognitive/Neuro/Behavioral: WDL                      Living Environment:   People in home:       Current living Arrangements: house      Able to return to prior arrangements:      Family/Social Support:  Care provided by: self, child(kathi)  Provides care for: no one  Marital Status:   Children          Description of Support System: Supportive      Current Resources:   Patient receiving home care services: No     Community Resources:  (Had clinic care coordination but 'did not feel she needed it' as she is independent.)  Equipment  currently used at home: walker, rolling  Supplies currently used at home: Wound Care Supplies    Employment/Financial:  Employment Status: retired        Financial Concerns: No concerns identified   Referral to Financial Worker: No     Lifestyle & Psychosocial Needs:  Social Determinants of Health     Tobacco Use: Medium Risk     Smoking Tobacco Use: Former     Smokeless Tobacco Use: Never     Passive Exposure: Not on file   Alcohol Use: Not on file   Financial Resource Strain: Not on file   Food Insecurity: Not on file   Transportation Needs: Not on file   Physical Activity: Not on file   Stress: Not on file   Social Connections: Not on file   Intimate Partner Violence: Not on file   Depression: Not at risk     PHQ-2 Score: 0   Housing Stability: Not on file     Functional Status:  Prior to admission patient needed assistance:   Dependent ADLs:: Ambulation-walker  Dependent IADLs:: Shopping, Transportation     Mental Health Status:  Mental Health Status: No Current Concerns       Chemical Dependency Status:  Chemical Dependency Status: No Current Concerns           Values/Beliefs:  Spiritual, Cultural Beliefs, Quaker Practices, Values that affect care: no          Values/Beliefs Comment: Presbyterian.    Additional Information:  Per chart review, patient is largely independent with activities of daily living at baseline and uses a walker for mobility. She recently had a syncopal episode during which hot tea was spilled on her lower leg and failed outpatient antibiotics.  CM will continue to monitor progression of care, review team recommendations and provide discharge planning assist as needed.      Nhung Wasserman RN

## 2023-01-14 PROCEDURE — 99232 SBSQ HOSP IP/OBS MODERATE 35: CPT | Performed by: INTERNAL MEDICINE

## 2023-01-14 PROCEDURE — 250N000009 HC RX 250: Performed by: INTERNAL MEDICINE

## 2023-01-14 PROCEDURE — 250N000011 HC RX IP 250 OP 636: Performed by: INTERNAL MEDICINE

## 2023-01-14 PROCEDURE — 250N000013 HC RX MED GY IP 250 OP 250 PS 637: Performed by: INTERNAL MEDICINE

## 2023-01-14 PROCEDURE — 120N000001 HC R&B MED SURG/OB

## 2023-01-14 RX ADMIN — APIXABAN 2.5 MG: 2.5 TABLET, FILM COATED ORAL at 19:34

## 2023-01-14 RX ADMIN — CLINDAMYCIN IN 5 PERCENT DEXTROSE 900 MG: 18 INJECTION, SOLUTION INTRAVENOUS at 01:30

## 2023-01-14 RX ADMIN — METOPROLOL SUCCINATE 50 MG: 50 TABLET, EXTENDED RELEASE ORAL at 08:53

## 2023-01-14 RX ADMIN — APIXABAN 2.5 MG: 2.5 TABLET, FILM COATED ORAL at 08:53

## 2023-01-14 RX ADMIN — CLINDAMYCIN IN 5 PERCENT DEXTROSE 900 MG: 18 INJECTION, SOLUTION INTRAVENOUS at 10:21

## 2023-01-14 RX ADMIN — CLINDAMYCIN IN 5 PERCENT DEXTROSE 900 MG: 18 INJECTION, SOLUTION INTRAVENOUS at 17:15

## 2023-01-14 RX ADMIN — ATORVASTATIN CALCIUM 20 MG: 10 TABLET, FILM COATED ORAL at 21:15

## 2023-01-14 RX ADMIN — SILVER SULFADIAZINE: 10 CREAM TOPICAL at 14:36

## 2023-01-14 RX ADMIN — Medication 3 MG: at 21:15

## 2023-01-14 ASSESSMENT — ACTIVITIES OF DAILY LIVING (ADL)
ADLS_ACUITY_SCORE: 26
ADLS_ACUITY_SCORE: 27
ADLS_ACUITY_SCORE: 27
ADLS_ACUITY_SCORE: 26
ADLS_ACUITY_SCORE: 27
ADLS_ACUITY_SCORE: 26
ADLS_ACUITY_SCORE: 26
ADLS_ACUITY_SCORE: 27
ADLS_ACUITY_SCORE: 27

## 2023-01-14 NOTE — PROGRESS NOTES
[facility]    Medicine Progress Note - Hospitalist Service    Date of Admission:  1/12/2023    Assessment & Plan     Deloris Larson is a 89 year old female admitted on 1/12/2023. She has history of orthostatic hypotension, chronic A. fib with pacemaker in place, CAD, RLS, anxiety, and CKD 3. Admitted for cellulitis of the left lower extremity.       Cellulitis left lower extremity: Patient presents with cellulitis of the left lower extremity after suffering a burn of this area 3 weeks ago that was related to syncopal event.  She has failed outpatient Keflex for the past week. US ruled out DVT.  - Continue IV clindamycin  - Monitor for improvement  - [facility] care      Fall, recent syncope: Patient had syncopal event 3 weeks prior to admission resulting in a fall.  Patient with known history of orthostatic hypotension and chronic diarrhea. Denies any recurrent episodes. Plain film left lower extremity negative for fracture  - PT/OT     CAD, chronic A. fib with pacemaker in place:  - Continue home metoprolol, Eliquis and statin      History of chronic diarrhea: Continue home scheduled loperamide      CKD 3a: GFR baseline, trend      Plan of care was discussed with patient and her daughter by the bedside today.         Diet: Regular Diet Adult  Room Service    DVT Prophylaxis: DOAC  Car Catheter: Not present  Lines: None     Cardiac Monitoring: None  Code Status: No CPR- Do NOT Intubate        Disposition Plan      Expected Discharge Date: 01/15/2023    Discharge Delays: IV Medication - consider oral or Home Infusion  OT Disposition recs needed  PT New Consult needed              Gonzales Villalobos MD  Hospitalist Service  [facility]  Securely message with [employer] (more info)  Text page via [employer] Paging/Directory   ______________________________________________________________________    Interval History   Patient reports that her left leg remains red and swollen. She reports  living alone and wants her leg infection controlled before going home.    Physical Exam   Vital Signs: Temp: 97.6  F (36.4  C) Temp src: Oral BP: 115/54 Pulse: 91   Resp: 18 SpO2: 93 % O2 Device: None (Room air)    Weight: 138 lbs 12.8 oz    General appearance: not in acute distress  HEENT: PERRL, EOMI  Lungs: Clear breath sounds in bilateral lung fields  Cardiovascular: Regular rate and rhythm, normal S1-S2  Abdomen: Soft, non tender, no distension, normal bowel sound  Musculoskeletal: No joint swelling  Skin: Left lower leg has superficial wound with surrounding erythema, swelling and warmth to touch, slight improvement compared to yesterday  Neurology: AAO ×3.  Cranial nerves II - XII normal.  Normal muscle strength in all four extremities.    Medical Decision Making       40 MINUTES SPENT BY ME on the date of service doing chart review, history, exam, documentation & further activities per the note.      Data         Imaging results reviewed over the past 24 hrs:   No results found for this or any previous visit (from the past 24 hour(s)).

## 2023-01-14 NOTE — PLAN OF CARE
Problem: Fall Injury Risk  Goal: Absence of Fall and Fall-Related Injury  Outcome: Progressing  Intervention: Identify and Manage Contributors  Recent Flowsheet Documentation  Taken 1/14/2023 0015 by Jayde Diego RN  Medication Review/Management: medications reviewed  Intervention: Promote Injury-Free Environment  Recent Flowsheet Documentation  Taken 1/14/2023 0015 by Jayde Diego RN  Safety Promotion/Fall Prevention:    activity supervised    nonskid shoes/slippers when out of bed    safety round/check completed    bed alarm on    fall prevention program maintained    clutter free environment maintained    lighting adjusted    mobility aid in reach     Problem: Hypertension Comorbidity  Goal: Blood Pressure in Desired Range  Outcome: Progressing  Intervention: Maintain Blood Pressure Management  Recent Flowsheet Documentation  Taken 1/14/2023 0015 by Jayde Diego RN  Medication Review/Management: medications reviewed   Goal Outcome Evaluation:   Pt is alert and oriented x 4 but forgetful. Lung sounds clear, diminished, on RA. HR in the 70's, non tele, heart murmur noted.BP was 176/86 at the start of the shift, when recheck it came down to 142/79. On IV Clindamycin for left leg cellulitis, she denies pain.  Assist of 1 with the walker. Had a BM early this morning.  Possible discharge today.

## 2023-01-14 NOTE — PROGRESS NOTES
"1920: Handoff report received from ETHAN Hernandez. Dual skin and safety check completed at bedside. Pt currently resting in bed. No c/o of pain at this time. No acute concerns noted.     2050: Pt very dizzy when ambulating to bathroom. Once sitting pt verbally stated \"oh its happening\", closed her eyes and jerked forward and back for about 5 seconds, than cleared. She then stated \"that's what happened when I fell at home\". Orthostatics taken- see flowsheets.     2250: PRN tylenol given-see flowsheets    No other acute changes. See flowsheet for full assessment. Will report to oncoming RN.   "

## 2023-01-15 PROCEDURE — 250N000013 HC RX MED GY IP 250 OP 250 PS 637: Performed by: HOSPITALIST

## 2023-01-15 PROCEDURE — 250N000011 HC RX IP 250 OP 636: Performed by: INTERNAL MEDICINE

## 2023-01-15 PROCEDURE — 120N000001 HC R&B MED SURG/OB

## 2023-01-15 PROCEDURE — 250N000013 HC RX MED GY IP 250 OP 250 PS 637: Performed by: INTERNAL MEDICINE

## 2023-01-15 PROCEDURE — 99232 SBSQ HOSP IP/OBS MODERATE 35: CPT | Performed by: INTERNAL MEDICINE

## 2023-01-15 RX ADMIN — Medication 5 MG: at 00:42

## 2023-01-15 RX ADMIN — APIXABAN 2.5 MG: 2.5 TABLET, FILM COATED ORAL at 21:03

## 2023-01-15 RX ADMIN — CLINDAMYCIN IN 5 PERCENT DEXTROSE 900 MG: 18 INJECTION, SOLUTION INTRAVENOUS at 18:03

## 2023-01-15 RX ADMIN — CLINDAMYCIN IN 5 PERCENT DEXTROSE 900 MG: 18 INJECTION, SOLUTION INTRAVENOUS at 00:43

## 2023-01-15 RX ADMIN — APIXABAN 2.5 MG: 2.5 TABLET, FILM COATED ORAL at 09:00

## 2023-01-15 RX ADMIN — METOPROLOL SUCCINATE 50 MG: 50 TABLET, EXTENDED RELEASE ORAL at 09:00

## 2023-01-15 RX ADMIN — CLINDAMYCIN IN 5 PERCENT DEXTROSE 900 MG: 18 INJECTION, SOLUTION INTRAVENOUS at 08:57

## 2023-01-15 RX ADMIN — ATORVASTATIN CALCIUM 20 MG: 10 TABLET, FILM COATED ORAL at 21:03

## 2023-01-15 RX ADMIN — SILVER SULFADIAZINE: 10 CREAM TOPICAL at 10:24

## 2023-01-15 RX ADMIN — ACETAMINOPHEN 650 MG: 325 TABLET ORAL at 09:01

## 2023-01-15 ASSESSMENT — ACTIVITIES OF DAILY LIVING (ADL)
ADLS_ACUITY_SCORE: 27

## 2023-01-15 NOTE — PLAN OF CARE
A/0x4 but forgetful at times. Vital signs stable. Denies of pain . 5mg PRN melatonin given and been effective. Left lower leg elevated and dressing intact. Will continue to monitor.

## 2023-01-15 NOTE — PLAN OF CARE
Problem: Plan of Care - These are the overarching goals to be used throughout the patient stay.    Goal: Plan of Care Review  Description: The Plan of Care Review/Shift note should be completed every shift.  The Outcome Evaluation is a brief statement about your assessment that the patient is improving, declining, or no change.  This information will be displayed automatically on your shift note.  Outcome: Progressing     Problem: Pain Acute  Goal: Optimal Pain Control and Function  Outcome: Progressing  Intervention: Develop Pain Management Plan  Recent Flowsheet Documentation  Taken 1/15/2023 0901 by David Alvarez RN  Pain Management Interventions: medication (see MAR)   Goal Outcome Evaluation:         Patient was having some pain in her lower back and a little in her leg this morning. Tylenol was given and we repositioned to the chair and patient reported the pain was much improved.

## 2023-01-15 NOTE — PROGRESS NOTES
[facility]    Medicine Progress Note - Hospitalist Service    Date of Admission:  1/12/2023    Assessment & Plan     Deloris Larson is a 89 year old female admitted on 1/12/2023. She has history of orthostatic hypotension, chronic A. fib with pacemaker in place, CAD, RLS, anxiety, and CKD 3. Admitted for cellulitis of the left lower extremity.       Cellulitis left lower extremity: Patient presents with cellulitis of the left lower extremity after suffering a burn of this area 3 weeks ago that was related to syncopal event.  She has failed outpatient Keflex for the past week. US ruled out DVT.   - Continue IV clindamycin  - Monitor for improvement. Currently only has slight improvement  - [facility] care      Fall, recent syncope: Patient had syncopal event 3 weeks prior to admission resulting in a fall.  Patient with known history of orthostatic hypotension and chronic diarrhea. Denies any recurrent episodes. Plain film left lower extremity negative for fracture  - PT/OT     CAD, chronic A. fib with pacemaker in place:  - Continue home metoprolol, Eliquis and statin      History of chronic diarrhea: Continue home scheduled loperamide      CKD 3a: GFR baseline, trend      Plan of care was discussed with patient and her daughter by the bedside today.         Diet: Regular Diet Adult  Room Service    DVT Prophylaxis: DOAC  Car Catheter: Not present  Lines: None     Cardiac Monitoring: None  Code Status: No CPR- Do NOT Intubate        Disposition Plan      Expected Discharge Date: 01/16/2023    Discharge Delays: IV Medication - consider oral or Home Infusion  OT Disposition recs needed  PT New Consult needed              Gonzales Villalobos MD  Hospitalist Service  [facility]  Securely message with [employer] (more info)  Text page via [employer] Paging/Directory   ______________________________________________________________________    Interval History    No overnight event.  Patient  reports that her left leg remains red, swollen and painful. She is not able to walk due to her leg pain.    Physical Exam   Vital Signs: Temp: 98.5  F (36.9  C) Temp src: Oral BP: 123/58 Pulse: 69   Resp: 18 SpO2: 96 % O2 Device: None (Room air)    Weight: 138 lbs 12.8 oz    General appearance: not in acute distress  HEENT: PERRL, EOMI  Lungs: Clear breath sounds in bilateral lung fields  Cardiovascular: Regular rate and rhythm, normal S1-S2  Abdomen: Soft, non tender, no distension, normal bowel sound  Musculoskeletal: No joint swelling  Skin: Left lower leg has superficial wound with surrounding erythema, swelling and warmth to touch, slight improvement compared to yesterday  Neurology: AAO ×3.  Cranial nerves II - XII normal.  Normal muscle strength in all four extremities.    Medical Decision Making     30 minutes spend by me on the date of service doing chart review, history, exam, documentation and further activities per the note.       Data         Imaging results reviewed over the past 24 hrs:   No results found for this or any previous visit (from the past 24 hour(s)).

## 2023-01-15 NOTE — PLAN OF CARE
Problem: Plan of Care - These are the overarching goals to be used throughout the patient stay.    Goal: Plan of Care Review  Description: The Plan of Care Review/Shift note should be completed every shift.  The Outcome Evaluation is a brief statement about your assessment that the patient is improving, declining, or no change.  This information will be displayed automatically on your shift note.  Outcome: Progressing   Goal Outcome Evaluation:         Patient is alert and orient but forgetful at times. SBA with patient to the bathroom. Patient did endorse dizziness when up this morning but patient stated that improved as the day progressed. She had a new IV placed today. Still receiving IV antibiotics. Very pleasant.

## 2023-01-16 ENCOUNTER — APPOINTMENT (OUTPATIENT)
Dept: OCCUPATIONAL THERAPY | Facility: HOSPITAL | Age: 88
DRG: 603 | End: 2023-01-16
Attending: INTERNAL MEDICINE
Payer: MEDICARE

## 2023-01-16 ENCOUNTER — APPOINTMENT (OUTPATIENT)
Dept: PHYSICAL THERAPY | Facility: HOSPITAL | Age: 88
DRG: 603 | End: 2023-01-16
Attending: INTERNAL MEDICINE
Payer: MEDICARE

## 2023-01-16 LAB
ANION GAP SERPL CALCULATED.3IONS-SCNC: 9 MMOL/L (ref 7–15)
BUN SERPL-MCNC: 14.4 MG/DL (ref 8–23)
CALCIUM SERPL-MCNC: 8.5 MG/DL (ref 8.2–9.6)
CHLORIDE SERPL-SCNC: 104 MMOL/L (ref 98–107)
CREAT SERPL-MCNC: 0.97 MG/DL (ref 0.51–0.95)
DEPRECATED HCO3 PLAS-SCNC: 27 MMOL/L (ref 22–29)
ERYTHROCYTE [DISTWIDTH] IN BLOOD BY AUTOMATED COUNT: 14 % (ref 10–15)
GFR SERPL CREATININE-BSD FRML MDRD: 55 ML/MIN/1.73M2
GLUCOSE SERPL-MCNC: 95 MG/DL (ref 70–99)
HCT VFR BLD AUTO: 30.9 % (ref 35–47)
HGB BLD-MCNC: 9.7 G/DL (ref 11.7–15.7)
MCH RBC QN AUTO: 29.9 PG (ref 26.5–33)
MCHC RBC AUTO-ENTMCNC: 31.4 G/DL (ref 31.5–36.5)
MCV RBC AUTO: 95 FL (ref 78–100)
PLATELET # BLD AUTO: 194 10E3/UL (ref 150–450)
POTASSIUM SERPL-SCNC: 4.2 MMOL/L (ref 3.4–5.3)
RBC # BLD AUTO: 3.24 10E6/UL (ref 3.8–5.2)
SODIUM SERPL-SCNC: 140 MMOL/L (ref 136–145)
WBC # BLD AUTO: 6.5 10E3/UL (ref 4–11)

## 2023-01-16 PROCEDURE — 250N000011 HC RX IP 250 OP 636: Performed by: INTERNAL MEDICINE

## 2023-01-16 PROCEDURE — 97116 GAIT TRAINING THERAPY: CPT | Mod: GP | Performed by: PHYSICAL THERAPIST

## 2023-01-16 PROCEDURE — 85014 HEMATOCRIT: CPT | Performed by: INTERNAL MEDICINE

## 2023-01-16 PROCEDURE — 250N000013 HC RX MED GY IP 250 OP 250 PS 637: Performed by: INTERNAL MEDICINE

## 2023-01-16 PROCEDURE — 80048 BASIC METABOLIC PNL TOTAL CA: CPT | Performed by: INTERNAL MEDICINE

## 2023-01-16 PROCEDURE — 97165 OT EVAL LOW COMPLEX 30 MIN: CPT | Mod: GO

## 2023-01-16 PROCEDURE — 97530 THERAPEUTIC ACTIVITIES: CPT | Mod: GO

## 2023-01-16 PROCEDURE — 99232 SBSQ HOSP IP/OBS MODERATE 35: CPT | Performed by: INTERNAL MEDICINE

## 2023-01-16 PROCEDURE — 97162 PT EVAL MOD COMPLEX 30 MIN: CPT | Mod: GP | Performed by: PHYSICAL THERAPIST

## 2023-01-16 PROCEDURE — 36415 COLL VENOUS BLD VENIPUNCTURE: CPT | Performed by: INTERNAL MEDICINE

## 2023-01-16 PROCEDURE — 120N000001 HC R&B MED SURG/OB

## 2023-01-16 PROCEDURE — 97530 THERAPEUTIC ACTIVITIES: CPT | Mod: GP | Performed by: PHYSICAL THERAPIST

## 2023-01-16 RX ADMIN — CLINDAMYCIN IN 5 PERCENT DEXTROSE 900 MG: 18 INJECTION, SOLUTION INTRAVENOUS at 01:48

## 2023-01-16 RX ADMIN — CLINDAMYCIN IN 5 PERCENT DEXTROSE 900 MG: 18 INJECTION, SOLUTION INTRAVENOUS at 16:48

## 2023-01-16 RX ADMIN — METOPROLOL SUCCINATE 50 MG: 50 TABLET, EXTENDED RELEASE ORAL at 09:26

## 2023-01-16 RX ADMIN — ACETAMINOPHEN 650 MG: 325 TABLET ORAL at 09:34

## 2023-01-16 RX ADMIN — APIXABAN 2.5 MG: 2.5 TABLET, FILM COATED ORAL at 09:26

## 2023-01-16 RX ADMIN — SILVER SULFADIAZINE: 10 CREAM TOPICAL at 09:27

## 2023-01-16 RX ADMIN — ATORVASTATIN CALCIUM 20 MG: 10 TABLET, FILM COATED ORAL at 22:11

## 2023-01-16 RX ADMIN — CLINDAMYCIN IN 5 PERCENT DEXTROSE 900 MG: 18 INJECTION, SOLUTION INTRAVENOUS at 09:34

## 2023-01-16 RX ADMIN — ACETAMINOPHEN 650 MG: 325 TABLET ORAL at 20:31

## 2023-01-16 RX ADMIN — APIXABAN 2.5 MG: 2.5 TABLET, FILM COATED ORAL at 20:32

## 2023-01-16 ASSESSMENT — ACTIVITIES OF DAILY LIVING (ADL)
ADLS_ACUITY_SCORE: 27

## 2023-01-16 NOTE — PLAN OF CARE
Goal Outcome Evaluation:      Plan of Care Reviewed With: patient        Problem: Skin or Soft Tissue Infection  Goal: Absence of Infection Signs and Symptoms  Outcome: Progressing  Dressing to left shin changed daily. No drainage on dressing. Pt. Declines pain. As needed tylenol available for pain.     Sahara Tse RN

## 2023-01-16 NOTE — PROGRESS NOTES
Care Management Follow Up    Length of Stay (days): 4    Expected Discharge Date: 01/17/2023     Concerns to be Addressed:     Discharge planning  Patient plan of care discussed at interdisciplinary rounds: Yes    Anticipated Discharge Disposition:  Home   Anticipated Discharge Services:  Home RN  Anticipated Discharge DME:  Walker at baseline    Patient/family educated on Medicare website which has current facility and service quality ratings:    Education Provided on the Discharge Plan:  yes  Patient/Family in Agreement with the Plan:  yes    Referrals Placed by CM/SW:  Home care referrals- home RN  Private pay costs discussed: Not applicable    Additional Information:  Chart reviewed. SW met with patient in her room- 2 daughters present- daughter Ginna main contact.  Patient lives in a house - she is independent at baseline. Uses a walker.   Her son and his partner live below - however son works nights and they are minimally supportive.     Patient and her family are in agreement with a home RN and home PT. Daughter and daughter's  willing to learn wound care as needed to supplement home nursing and wound clinic.  SW also discussed additional needs in the home, daughters concerned about patient falling. SW encouraged patient and her daughters to explore the senior linkage line for resources and possible private duty home care if needed. Daughter states she was given information about senior linkage line and she has patient's permission to make a referral.  Referrals pending for home care.    Criss Gauthier, SILVIA

## 2023-01-16 NOTE — PLAN OF CARE
Goal Outcome Evaluation:       Patient alert with some forgetfulness. [place]. Reports having 5/10 pain to her left leg. Administered PRN tylenol for pain management. Wound care done on LLE. She participated in PT and OT today.

## 2023-01-16 NOTE — PROGRESS NOTES
[facility]    Medicine Progress Note - Hospitalist Service    Date of Admission:  1/12/2023    Assessment & Plan     Deloris Larson is a 89 year old female admitted on 1/12/2023. She has history of orthostatic hypotension, chronic A. fib with pacemaker in place, CAD, RLS, anxiety, and CKD 3. Admitted for cellulitis of the left lower extremity.       Cellulitis left lower extremity: Patient presents with cellulitis of the left lower extremity after suffering a burn of this area 3 weeks ago that was related to syncopal event.  She has failed outpatient Keflex for the past week. US ruled out DVT.   - Continue IV clindamycin. Symptoms start to improve.   - [facility] care      Fall, recent syncope: Patient had syncopal event 3 weeks prior to admission resulting in a fall.  Patient with known history of orthostatic hypotension and chronic diarrhea. Denies any recurrent episodes. Plain film left lower extremity negative for fracture  - PT/OT     CAD, chronic A. fib with pacemaker in place:  - Continue home metoprolol, Eliquis and statin      History of chronic diarrhea: Continue home scheduled loperamide      CKD 3a: GFR baseline, trend      Plan of care was discussed with patient and her daughter by the bedside today.         Diet: Regular Diet Adult  Room Service    DVT Prophylaxis: DOAC  Car Catheter: Not present  Lines: None     Cardiac Monitoring: None  Code Status: No CPR- Do NOT Intubate        Disposition Plan       Plan to discharge home tomorrow if continues to have clinical improvement    Gonzales Villalobos MD  Hospitalist Service  [facility]  Securely message with [employer] (more info)  Text page via [employer] Paging/Directory   ______________________________________________________________________    Interval History    No overnight event.  Patient reports that her left leg erythema and swelling start to improve. She is able to walk with PT in the hallway.    Physical Exam    Vital Signs: Temp: 97.7  F (36.5  C) Temp src: Oral BP: 125/68 Pulse: 88   Resp: 17 SpO2: 96 % O2 Device: None (Room air)    Weight: 138 lbs 12.8 oz    General appearance: not in acute distress  HEENT: PERRL, EOMI  Lungs: Clear breath sounds in bilateral lung fields  Cardiovascular: Regular rate and rhythm, normal S1-S2  Abdomen: Soft, non tender, no distension  Musculoskeletal: No joint swelling  Skin: Left lower leg has superficial wound with surrounding erythema, swelling and warmth to touch, improved compared to yesterday  Neurology: AAO ×3.  Cranial nerves II - XII normal.  Normal muscle strength in all four extremities.    Medical Decision Making     30 minutes spend by me on the date of service doing chart review, history, exam, documentation and further activities per the note.       Data     I have personally reviewed the following data over the past 24 hrs:    6.5  \   9.7 (L)   / 194     140 104 14.4 /  95   4.2 27 0.97 (H) \       Imaging results reviewed over the past 24 hrs:   No results found for this or any previous visit (from the past 24 hour(s)).

## 2023-01-16 NOTE — PROGRESS NOTES
01/16/23 1526   Appointment Info   Signing Clinician's Name / Credentials (OT) Gi Braxton, OTR/L   Living Environment   People in Home child(kathi), adult  (pt's son and partner)   Current Living Arrangements house   Home Accessibility stairs to enter home   Number of Stairs, Main Entrance greater than 10 stairs   Stair Railings, Main Entrance railings safe and in good condition   Transportation Anticipated family or friend will provide   Living Environment Comments Pt lives in a house with her son and his partner. Pt only has to go to the basement for laundry. Pt has a tub shower with grab bars and a shower chair. Pt has slightly higher toilet with one grab bar.   Self-Care   Equipment Currently Used at Home walker, rolling  (FWW)   Fall history within last six months yes   Number of times patient has fallen within last six months 1  (per pt, just 1 fall d/t syncope)   Activity/Exercise/Self-Care Comment Pt is IND with all ADLs.   Instrumental Activities of Daily Living (IADL)   IADL Comments Pt is independent with laundry, medication management, simple meal prep. Pt does some household cleaning, states it has been harder lately and she is in the process of setting up housekeeping services. Pt states the deacon at her [employer] drives her to get groceries.   General Information   Onset of Illness/Injury or Date of Surgery 01/12/23   Referring Physician Gonzales Villalobos MD   Patient/Family Therapy Goal Statement (OT) none stated   Additional Occupational Profile Info/Pertinent History of Current Problem Per chart review, pt is a 89 year old female admitted on 1/12/2023. She has history of orthostatic hypotension, chronic A. fib with pacemaker in place, CAD, RLS, anxiety, and CKD 3. Admitted for cellulitis of the left lower extremity.   Existing Precautions/Restrictions fall   General Observations and Info Pt slightly [place].   Cognitive Status Examination   Orientation Status orientation to person, place and time   Pain  Assessment   Patient Currently in Pain No   Range of Motion Comprehensive   General Range of Motion no range of motion deficits identified   Strength Comprehensive (MMT)   General Manual Muscle Testing (MMT) Assessment no strength deficits identified   Bed Mobility   Bed Mobility supine-sit   Supine-Sit [place] (Bed Mobility) supervision   Assistive Device (Bed Mobility) bed rails   Transfers   Transfers sit-stand transfer;toilet transfer   Sit-Stand Transfer   Sit-Stand [place] (Transfers) supervision   Assistive Device (Sit-Stand Transfers) walker, front-wheeled   Toilet Transfer   Type (Toilet Transfer) sit-stand;stand-sit   [place] Level (Toilet Transfer) supervision   Assistive Device (Toilet Transfer) walker, front-wheeled;grab bars/safety frame   Balance   Balance Comments Good balance with SBA using FWW.   Activities of Daily Living   BADL Assessment/Intervention grooming;lower body dressing   Lower Body Dressing Assessment/Training   Comment, (Lower Body Dressing) socks seated   [place] Level (Lower Body Dressing) supervision   Grooming Assessment/Training   Position (Grooming) unsupported standing   [place] Level (Grooming) supervision   Clinical Impression   Criteria for Skilled Therapeutic Interventions Met (OT) Yes, treatment indicated   OT Diagnosis Impaired ability to perform ADLs, IADLs, and functional mobility.   Influenced by the following impairments LLE cellulitis   OT Problem List-Impairments impacting ADL problems related to;activity tolerance impaired;balance;mobility   Assessment of Occupational Performance 1-3 Performance Deficits   Identified Performance Deficits functional mobility, transfers   Clinical Decision Making Complexity (OT) low complexity   Risk & Benefits of therapy have been explained evaluation/treatment results reviewed;care plan/treatment goals reviewed;risks/benefits reviewed;current/potential barriers reviewed;participants voiced agreement with  care plan;participants included;patient   Clinical Impression Comments Pt moving well with SBA, education provided on techniques to improve safety with functional mobility and transfers. Overall independent with ADLs and functional mobility, appears to be at baseline. Will defer mobility to PT and discharge pt from OT.   OT Total Evaluation Time   OT Eval, Low Complexity Minutes (08566) 15   OT Goals   Therapy Frequency (OT) One time eval and treatment   OT Predicted Duration/Target Date for Goal Attainment 01/17/23   OT Goals Transfers;Toilet Transfer/Toileting   OT: Transfer Modified independent;with assistive device;Goal Met   OT: Toilet Transfer/Toileting Modified independent;toilet transfer;cleaning and garment management;using adaptive equipment;Goal Met   Therapeutic Activities   Therapeutic Activity Minutes (06547) 8   Symptoms noted during/after treatment none   Treatment Detail/Skilled Intervention Pt noted to attempt to reach to sink to pull to stand, educated pt that distance too far to pull up on safely, cued instead to use grab bar. Pt completed transfer Mod Ind with safe tech after education. Educated pt to push up from armrests to stand, as pt intially pulling on FWW handles. After education, pt completed transfer with SBA. Occasional cues needed for walker proximity during functional mobility with SBA provided.   OT Discharge Planning   OT Plan discharge OT   OT Discharge Recommendation (DC Rec) home with assist   OT Rationale for DC Rec Pt moving well with SBA, recommend daily check ins with pt and continued assistance with IADLs at this time. Recommend assistance with shopping and laundry as laundry is in the basement. Also recommend pt use shower chair while showering, pt already owns one.   OT Brief overview of current status SBA/Mod IND with mobility and ADLs   Total Session Time   Timed Code Treatment Minutes 8   Total Session Time (sum of timed and untimed services) 23

## 2023-01-16 NOTE — PROGRESS NOTES
"   01/16/23 0851   Appointment Info   Signing Clinician's Name / Credentials (PT) Tanisha Betts, PT, DPT   Living Environment   People in Home child(kathi), adult  (adult son and his partner)   Current Living Arrangements house   Home Accessibility stairs to enter home   Number of Stairs, Main Entrance greater than 10 stairs   Stair Railings, Main Entrance railings safe and in good condition   Transportation Anticipated family or friend will provide   Self-Care   Equipment Currently Used at Home walker, rolling   Fall history within last six months yes   Number of times patient has fallen within last six months   (3)   Activity/Exercise/Self-Care Comment Pt independent with all ADLs at baseline. Reports daughters are in the process of setting up housekeeping services.   General Information   Onset of Illness/Injury or Date of Surgery 01/12/23   Referring Physician Gonzales Villalobos MD   Patient/Family Therapy Goals Statement (PT) None stated.   Pertinent History of Current Problem (include personal factors and/or comorbidities that impact the POC) Per H&P: \"89 year old female admitted on 1/12/2023. She has history of orthostatic hypotension, chronic A. fib with pacemaker in place, CAD, RLS, anxiety, and CKD 3  and presents with cellulitis of the left lower extremity\"   Existing Precautions/Restrictions fall   Pain Assessment   Patient Currently in Pain Yes, see Vital Sign flowsheet  (L LE)   Range of Motion (ROM)   Range of Motion ROM is WFL   Strength (Manual Muscle Testing)   Strength (Manual Muscle Testing) Deficits observed during functional mobility   Bed Mobility   Bed Mobility supine-sit   Supine-Sit [place] (Bed Mobility) supervision;verbal cues   Impairments Contributing to Impaired Bed Mobility decreased strength   Assistive Device (Bed Mobility) bed rails   Transfers   Transfers sit-stand transfer   Sit-Stand Transfer   Sit-Stand [place] (Transfers) supervision;verbal cues   Assistive Device " (Sit-Stand Transfers) walker, front-wheeled   Gait/Stairs (Locomotion)   [place] Level (Gait) contact guard;verbal cues   Assistive Device (Gait) walker, front-wheeled   Distance in Feet 75'   Distance in Feet (Gait) additional 120'   Pattern (Gait) step-to   Deviations/Abnormal Patterns (Gait) scott decreased;gait speed decreased   Clinical Impression   Criteria for Skilled Therapeutic Intervention Yes, treatment indicated   PT Diagnosis (PT) impaired functional mobility   Influenced by the following impairments impaired balance, decreased strength   Functional limitations due to impairments transfers, ambulation, stairs   Clinical Presentation (PT Evaluation Complexity) Evolving/Changing   Clinical Presentation Rationale Pt presents as medically diagnosed.   Clinical Decision Making (Complexity) moderate complexity   Planned Therapy Interventions (PT) balance training;bed mobility training;gait training;home exercise program;neuromuscular re-education;patient/family education;strengthening;transfer training   Risk & Benefits of therapy have been explained evaluation/treatment results reviewed;participants voiced agreement with care plan;participants included;patient   PT Total Evaluation Time   PT Eval, Moderate Complexity Minutes (91893) 15   Physical Therapy Goals   PT Frequency Daily   PT Predicted Duration/Target Date for Goal Attainment 01/23/23   PT Goals Bed Mobility;Transfers;Gait   PT: Bed Mobility Supervision/stand-by assist;Supine to/from sit   PT: Transfers Supervision/stand-by assist;Sit to/from stand;Assistive device   PT: Gait Supervision/stand-by assist;Assistive device;Rolling walker;Greater than 200 feet   Interventions   Interventions Quick Adds Therapeutic Activity;Gait Training   Therapeutic Activity   Therapeutic Activities: dynamic activities to improve functional performance Minutes (36045) 10   Symptoms Noted During/After Treatment Increased pain   Treatment Detail/Skilled  Intervention Pt supine in bed at start of session. Pt transfers supine > sit, SBA, with use of bed rail. Sit <> stand with FWW, CGA. Cues for safe hand placement. Pt ambulates 8' to bathroom with FWW, CGA. Educated pt on pausing between transitions. Pt transfers sit <> stand from toilet, mod I, with FWW. Demonstrates good awareness of options for hand placement and safety with transfer. Patient stands at sink to wash hands, 60 seconds, no LOB. Pt ambulates 8' back to bed with FWW, SBA.   Gait Training   Gait Training Minutes (68196) 10   Symptoms Noted During/After Treatment (Gait Training) increased pain   Treatment Detail/Skilled Intervention Patient ambulates additional 120' with FWW, CGA. Reports onset of dizziness toward end of walk and increase in L LE pain. Cues for walker proximity for posture and safety.   [place] Level (Gait Training) contact guard   Physical Assistance Level (Gait Training) verbal cues;1 person assist   Assistive Device (Gait Training) rolling walker   Gait Analysis Deviations decreased scott;decreased step length   Impairments (Gait Analysis/Training) balance impaired;strength decreased   PT Discharge Planning   PT Plan stairs, gait with FWW   PT Discharge Recommendation (DC Rec) home with assist   PT Rationale for DC Rec Patient is moving safely with stand-by to contact guard assist. Recommend daily check-ins from family.   PT Brief overview of current status Supine > sit, SBA. Sit <> stand, SBA. Ambulates total 195' with FWW, SBA.   Total Session Time   Timed Code Treatment Minutes 20   Total Session Time (sum of timed and untimed services) 35     Tanisha Betts, PT  1/16/2023

## 2023-01-16 NOTE — PLAN OF CARE
Problem: Hypertension Comorbidity  Goal: Blood Pressure in Desired Range  Outcome: Progressing  Intervention: Maintain Blood Pressure Management  Recent Flowsheet Documentation  Taken 1/16/2023 0430 by Jeri Ga, RN  Medication Review/Management: medications reviewed  Taken 1/16/2023 0025 by Jeri Ga, RN  Medication Review/Management: medications reviewed     Problem: Skin or Soft Tissue Infection  Goal: Absence of Infection Signs and Symptoms  Outcome: Progressing     Problem: Pain Acute  Goal: Optimal Pain Control and Function  Outcome: Met  Intervention: Prevent or Manage Pain  Recent Flowsheet Documentation  Taken 1/16/2023 0430 by Jeri Ga, RN  Sensory Stimulation Regulation:   care clustered   lighting decreased   quiet environment promoted  Medication Review/Management: medications reviewed  Taken 1/16/2023 0025 by Jeri Ga, RN  Sensory Stimulation Regulation:   care clustered   lighting decreased   quiet environment promoted  Medication Review/Management: medications reviewed

## 2023-01-17 ENCOUNTER — PATIENT OUTREACH (OUTPATIENT)
Dept: CARE COORDINATION | Facility: CLINIC | Age: 88
End: 2023-01-17

## 2023-01-17 ENCOUNTER — APPOINTMENT (OUTPATIENT)
Dept: PHYSICAL THERAPY | Facility: HOSPITAL | Age: 88
DRG: 603 | End: 2023-01-17
Payer: MEDICARE

## 2023-01-17 VITALS
TEMPERATURE: 98.1 F | HEIGHT: 66 IN | WEIGHT: 134.2 LBS | RESPIRATION RATE: 16 BRPM | DIASTOLIC BLOOD PRESSURE: 74 MMHG | SYSTOLIC BLOOD PRESSURE: 141 MMHG | OXYGEN SATURATION: 94 % | HEART RATE: 68 BPM | BODY MASS INDEX: 21.57 KG/M2

## 2023-01-17 PROCEDURE — 97116 GAIT TRAINING THERAPY: CPT | Mod: GP | Performed by: PHYSICAL THERAPIST

## 2023-01-17 PROCEDURE — 99239 HOSP IP/OBS DSCHRG MGMT >30: CPT | Performed by: INTERNAL MEDICINE

## 2023-01-17 PROCEDURE — 250N000011 HC RX IP 250 OP 636: Performed by: INTERNAL MEDICINE

## 2023-01-17 PROCEDURE — 97530 THERAPEUTIC ACTIVITIES: CPT | Mod: GP | Performed by: PHYSICAL THERAPIST

## 2023-01-17 PROCEDURE — 250N000013 HC RX MED GY IP 250 OP 250 PS 637: Performed by: INTERNAL MEDICINE

## 2023-01-17 RX ORDER — CLINDAMYCIN HCL 150 MG
450 CAPSULE ORAL 3 TIMES DAILY
Status: DISCONTINUED | OUTPATIENT
Start: 2023-01-17 | End: 2023-01-17 | Stop reason: HOSPADM

## 2023-01-17 RX ORDER — CLINDAMYCIN HCL 300 MG
300 CAPSULE ORAL 3 TIMES DAILY
Qty: 15 CAPSULE | Refills: 0 | Status: SHIPPED | OUTPATIENT
Start: 2023-01-17 | End: 2023-01-22

## 2023-01-17 RX ORDER — LOPERAMIDE HCL 2 MG
2 CAPSULE ORAL DAILY PRN
Qty: 90 CAPSULE | Refills: 0
Start: 2023-01-17 | End: 2023-01-01

## 2023-01-17 RX ADMIN — CLINDAMYCIN HYDROCHLORIDE 450 MG: 150 CAPSULE ORAL at 16:00

## 2023-01-17 RX ADMIN — METOPROLOL SUCCINATE 50 MG: 50 TABLET, EXTENDED RELEASE ORAL at 09:01

## 2023-01-17 RX ADMIN — CLINDAMYCIN IN 5 PERCENT DEXTROSE 900 MG: 18 INJECTION, SOLUTION INTRAVENOUS at 01:56

## 2023-01-17 RX ADMIN — CLINDAMYCIN IN 5 PERCENT DEXTROSE 900 MG: 18 INJECTION, SOLUTION INTRAVENOUS at 09:01

## 2023-01-17 RX ADMIN — APIXABAN 2.5 MG: 2.5 TABLET, FILM COATED ORAL at 09:01

## 2023-01-17 RX ADMIN — SILVER SULFADIAZINE: 10 CREAM TOPICAL at 09:01

## 2023-01-17 ASSESSMENT — ACTIVITIES OF DAILY LIVING (ADL)
ADLS_ACUITY_SCORE: 27
ADLS_ACUITY_SCORE: 30
ADLS_ACUITY_SCORE: 30
ADLS_ACUITY_SCORE: 27

## 2023-01-17 NOTE — PLAN OF CARE
Discussed wound care and demonstrated for daughter. Reviewed wound care, medication and follow up appointment with daughter. Will  clindamycin from home pharmacy and will make own follow up appt with PCP.Daughter Ginna transported home. Home with home care and PT.

## 2023-01-17 NOTE — PROGRESS NOTES
"SPIRITUAL HEALTH SERVICES NOTE  [facility]; P1    PLAN OF CARE:   No plans for follow-up at this time due to patient's anticipated discharge today.  available by patient or staff request.     FULL SPIRITUAL CARE NOTE:   Saw Deloris Freeman due to admission screening response/patient request upon admission. She welcomed a visit and engaged easily in conversation. She shares that she is here in the hospital because she fainted while at home, making tea. She describes \"sliding down to the floor\" and says \"I know the difference between a fall and fainting. This was not a fall.\" Deloris Freeman lives independently and has adult children who check in on her from time to time. She wants to continue living in her own home and is hopeful that, together with her family, they will be able to come up with a plan that will allow her to continue doing this.     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 This has brought much satisfaction to her life. Deloris Freeman expresses confidence that God is with her. She welcomes a prayer and offered a prayer for me as well.     Time Spent with Patient/Family: 15 minutes    Carrie Quevedo M.Div.      Office: [phone] (for non-urgent requests)  Please Vocera or page through [facility] for time-sensitive requests      "

## 2023-01-17 NOTE — PROGRESS NOTES
Clinic Care Coordination Contact  Community Health Worker Initial Outreach    CHW Initial Information Gathering:  Referral Source: PCP  Preferred Hospital: [facility]  [phone]  Current living arrangement:: I live in a private home with family  Type of residence:: Private home - stairs  Community Resources: None  Supplies Currently Used at Home: None  Equipment Currently Used at Home: walker, standard  Informal Support system:: Family  No PCP office visit in Past Year: No  Transportation means:: Family  CHW Additional Questions  MyChart active?: Yes  Patient sent Social Determinants of Health questionnaire?: Yes    CHW Note:    CHW received VM from patients daughter Hannah requesting return call. CHW contacted Hannah back as requested.    CHW introduced self and role of CCC. Hannah shared that her mother is currently in the hospital due to the wound on her leg. Hannah shared that the patient will be needing in home care to assist when she discharges.     CHW scheduled Hannah with CCC ETHAN Azevedo on 1/19/23 at 9AM to discuss in home care resources.     Patient accepts CC: Yes. Patient scheduled for assessment with CCC ETHAN Azevedo  on 1/19/23 at 9AM. Patient noted desire to discuss in home care resources.     Eduarda Burgess  Community Health Worker   [facility]  Clinic Care Coordination  [facility]   [email or web address]  Office: [phone]

## 2023-01-17 NOTE — PROGRESS NOTES
Care Management Discharge Note    Discharge Date: 01/17/2023       Discharge Disposition:  Home    Discharge Services:  Home RN and home PT    Discharge DME:  walker    Discharge Transportation: family or friend will provide    Private pay costs discussed: Not applicable  Education Provided on the Discharge Plan: yes   Persons Notified of Discharge Plans: patient, daughter Ginna  Patient/Family in Agreement with the Plan:      Handoff Referral Completed: Yes    Additional Information:  Patient to discharge home today. She lives alone. (her son and his partner live on the lower level, are available only on an emergency basis as they work).    Patient has been accepted by [facility] for RN and PT. RN can come to the home 2-3x a week and pt's daughter Ginna reports she and her  are able to supplement the other days and are teachable parties for wound care.    Daughter Ginna will transport.     Criss Gauthier, ALEJANDROSW

## 2023-01-17 NOTE — PLAN OF CARE
Physical Therapy Discharge Summary    Reason for therapy discharge:    Discharged to home with home therapy.    Progress towards therapy goal(s). See goals on Care Plan in [facility] electronic health record for goal details.  Goals partially met.  Barriers to achieving goals:   discharge from facility.    Therapy recommendation(s):    Continued therapy is recommended.  Rationale/Recommendations:  recommend home PT.     Tanisha Betts, PT  1/17/2023

## 2023-01-17 NOTE — PLAN OF CARE
Problem: Plan of Care - These are the overarching goals to be used throughout the patient stay.    Goal: Optimal Comfort and Wellbeing  Intervention: Monitor Pain and Promote Comfort  Recent Flowsheet Documentation  Taken 1/16/2023 2031 by Saundra Mcclendon RN  Pain Management Interventions: medication (see MAR)     Problem: Pain Acute  Goal: Optimal Pain Control and Function  Intervention: Develop Pain Management Plan  Recent Flowsheet Documentation  Taken 1/16/2023 2031 by Saundra Mcclendon RN  Pain Management Interventions: medication (see MAR)  Intervention: Prevent or Manage Pain  Recent Flowsheet Documentation  Taken 1/17/2023 0140 by Saundra Mcclendon RN  Medication Review/Management: medications reviewed  Taken 1/16/2023 2040 by Saundra Mcclendon RN  Medication Review/Management: medications reviewed   Goal Outcome Evaluation:       Patient alert and oriented with occasional confusion. Able to interact and makes her needs known. Continues on IV abx therapy for left lower cellulitis. Afebrile. Patient denies pain in this shift. Dressing in place and intact. Assist of one person with toilet. Gait appeared steady with rolling walker in use.
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
## 2023-01-19 ENCOUNTER — TELEPHONE (OUTPATIENT)
Dept: INTERNAL MEDICINE | Facility: CLINIC | Age: 88
End: 2023-01-19

## 2023-01-19 ENCOUNTER — MEDICAL CORRESPONDENCE (OUTPATIENT)
Dept: HEALTH INFORMATION MANAGEMENT | Facility: CLINIC | Age: 88
End: 2023-01-19

## 2023-01-19 NOTE — TELEPHONE ENCOUNTER
Yudy calling to request verbal orders for the following:    Skilled nursinx a week for 2 weeks.  1x per week for 2 weeks.  Every other week until recert period.  For wound care of left leg.  Educate on healing process.    PT: eval and treat    Please call Yudy at [phone].  Secure VM if needed.

## 2023-01-19 NOTE — TELEPHONE ENCOUNTER
The Home Care/Assisted Living/Nursing Facility is calling regarding an established patient.  Has the patient seen Home Care in the past or is currently residing in Assisted Living or Nursing Facility? Yes.     Yudy calling from [facility] requesting the following orders that are within the Home Care, Assisted Living or Nursing Home Eval and Treatment standing order and can be signed as standing order signature required by RN.    Preferred Call Back Number: [phone]    PT/OT/Speech Therapy and wound care by skilled nursing visits    Any additional Orders:  Are there any orders requested, not stated above, that are outside of the standing order and must be routed to a licensed practitioner for approval?    No    Left voicemail for Yudy with verbal orders as well as clinic phone / fax number for order follow up.

## 2023-01-20 NOTE — TELEPHONE ENCOUNTER
The Home Care/Assisted Living/Nursing Facility is calling regarding an established patient.  Has the patient seen Home Care in the past or is currently residing in Assisted Living or Nursing Facility? Yes.     Yudy calling from [facility] requesting the following orders that are within the Home Care, Assisted Living or Nursing Home Eval and Treatment standing order and can be signed as standing order signature required by RN.    Preferred Call Back Number: [phone]    Wound care supplies per Wound Care Specialist's recommendation   Clean wound with cleanser, dry, apply sylvadine ointment and adaptic, cover with abd pad and secure with tape.    Any additional Orders:  Are there any orders requested, not stated above, that are outside of the standing order and must be routed to a licensed practitioner for approval?    No    Writer has verified Requestor will send fax to have orders signed.   To CT scan     Beau Shin, RN  11/21/19 [id number]

## 2023-01-24 ENCOUNTER — TELEPHONE (OUTPATIENT)
Dept: INTERNAL MEDICINE | Facility: CLINIC | Age: 88
End: 2023-01-24

## 2023-01-24 ENCOUNTER — OFFICE VISIT (OUTPATIENT)
Dept: INTERNAL MEDICINE | Facility: CLINIC | Age: 88
End: 2023-01-24
Payer: MEDICARE

## 2023-01-24 VITALS
BODY MASS INDEX: 22.07 KG/M2 | RESPIRATION RATE: 16 BRPM | WEIGHT: 137.3 LBS | DIASTOLIC BLOOD PRESSURE: 76 MMHG | SYSTOLIC BLOOD PRESSURE: 118 MMHG | HEART RATE: 87 BPM | HEIGHT: 66 IN | OXYGEN SATURATION: 97 % | TEMPERATURE: 97.9 F

## 2023-01-24 DIAGNOSIS — Z95.0 CARDIAC PACEMAKER IN SITU: Primary | ICD-10-CM

## 2023-01-24 DIAGNOSIS — R26.89 IMPAIRED GAIT AND MOBILITY: ICD-10-CM

## 2023-01-24 DIAGNOSIS — T30.0 BURN, FIRST DEGREE: ICD-10-CM

## 2023-01-24 DIAGNOSIS — N18.31 STAGE 3A CHRONIC KIDNEY DISEASE (H): ICD-10-CM

## 2023-01-24 PROCEDURE — 99213 OFFICE O/P EST LOW 20 MIN: CPT | Performed by: INTERNAL MEDICINE
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
## 2023-01-24 NOTE — TELEPHONE ENCOUNTER
General Call    Contacts       Type Contact Phone/Fax    01/24/2023 08:15 AM CST Phone (Incoming) Ginna (Home Care) [phone]     [facility]        Reason for Call:  Ginna calling for verbal orders    What are your questions or concerns:    Physical Therapy 1 times 3 week and every other week for 2 visits.     Okay to leave a detailed message?: Yes at Other phone number:  [phone]

## 2023-01-24 NOTE — TELEPHONE ENCOUNTER
The Home Care/Assisted Living/Nursing Facility is calling regarding an established patient.  Has the patient seen Home Care in the past or is currently residing in Assisted Living or Nursing Facility? Yes.     Ginna calling from [facility] requesting the following orders that are within the Home Care, Assisted Living or Nursing Home Eval and Treatment standing order and can be signed as standing order signature required by RN.    Preferred Call Back Number: [phone]    PT/OT/Speech Therapy    Any additional Orders:  Are there any orders requested, not stated above, that are outside of the standing order and must be routed to a licensed practitioner for approval?    No    Writer has verified Requestor will send fax to have orders signed.

## 2023-01-25 ENCOUNTER — TELEPHONE (OUTPATIENT)
Dept: CARDIOLOGY | Facility: CLINIC | Age: 88
End: 2023-01-25

## 2023-01-25 ENCOUNTER — PATIENT OUTREACH (OUTPATIENT)
Dept: NURSING | Facility: CLINIC | Age: 88
End: 2023-01-25
Payer: MEDICARE

## 2023-01-25 ASSESSMENT — ACTIVITIES OF DAILY LIVING (ADL): DEPENDENT_IADLS:: CLEANING;LAUNDRY;MEAL PREPARATION
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
## 2023-01-25 NOTE — TELEPHONE ENCOUNTER
[facility] Call Center    Phone Message    May a detailed message be left on voicemail: yes     Reason for Call: Appointment Intake    Referring Provider Name: Kaitlin Steiner MD  Diagnosis and/or Symptoms: already seen in pacemaker clinic but needs device interrogation as had a syncopal episode. Patient scheduled next available in March 31st. Please review if she needs to be seen sooner due to her syncopal episode.     Action Taken: Other: Cardio    Travel Screening: Not Applicable

## 2023-01-26 ENCOUNTER — TELEPHONE (OUTPATIENT)
Dept: CARE COORDINATION | Facility: CLINIC | Age: 88
End: 2023-01-26

## 2023-01-26 ENCOUNTER — OFFICE VISIT (OUTPATIENT)
Dept: FAMILY MEDICINE | Facility: CLINIC | Age: 88
End: 2023-01-26
Payer: MEDICARE

## 2023-01-26 VITALS
BODY MASS INDEX: 22.28 KG/M2 | WEIGHT: 137 LBS | SYSTOLIC BLOOD PRESSURE: 121 MMHG | OXYGEN SATURATION: 96 % | DIASTOLIC BLOOD PRESSURE: 76 MMHG | HEART RATE: 77 BPM | RESPIRATION RATE: 16 BRPM | TEMPERATURE: 97.6 F

## 2023-01-26 DIAGNOSIS — L08.89 SECONDARY INFECTION OF SKIN: ICD-10-CM

## 2023-01-26 DIAGNOSIS — T30.0 BURN: Primary | ICD-10-CM

## 2023-01-26 DIAGNOSIS — L03.116 CELLULITIS OF LEFT LOWER EXTREMITY: Primary | ICD-10-CM

## 2023-01-26 PROCEDURE — 99213 OFFICE O/P EST LOW 20 MIN: CPT

## 2023-01-26 RX ORDER — CLINDAMYCIN HCL 150 MG
450 CAPSULE ORAL 3 TIMES DAILY
Qty: 45 CAPSULE | Refills: 0 | Status: SHIPPED | OUTPATIENT
Start: 2023-01-26 | End: 2023-01-31

## 2023-01-26 RX ORDER — CEPHALEXIN 500 MG/1
500 CAPSULE ORAL 2 TIMES DAILY
Qty: 10 CAPSULE | Refills: 0 | Status: SHIPPED | OUTPATIENT
Start: 2023-01-26 | End: 2023-01-26

## 2023-01-26 NOTE — TELEPHONE ENCOUNTER
Any Steiner,     I received a call from patient's daughter Ginna this afternoon. Patient was seen by her home care RN today. Home Care RN called Ginna and informed her her mother's leg wound was looking worse and she needed to take patient to urgent care. Ginna did take patient to Urgent Care and patient was prescribed oral Clindamycin. Ginna said the CNP who looked at the wound, mahin a line around the wound and told Ginna she needs to take patient to the ED if the redness and swelling spreads outside the line. Ginna said she would monitor the wound closely. She said the CNP said a patient's PCP could have the patient directly hospitalized if the patient's symptoms become worse, bypassing the ED. Ginna is wondering if you can do this if the wound becomes worse. Please advise.     Thanks,     Dave Myhre, RN  [facility] RN

## 2023-01-26 NOTE — PATIENT INSTRUCTIONS
Take the antibiotic as prescribed.  Try yogurt with active cultures or probiotics such as Culturelle daily to help prevent diarrhea while using antibiotics.  Monitor for any increase in redness, swelling, pain, warmth, drainage, fever/chills and/or nausea/vomiting.  If any of these occur, go to the emergency department.

## 2023-01-26 NOTE — TELEPHONE ENCOUNTER
No charge. No Epic interface required.  Type: add on remote per pt, reports syncope back at end of December, around 5 AM on 12/28 or 12/29.  Presenting rhythm: ventricular sensing and pacing rate 90 bpm.  Battery/lead status: stable  Arrhythmias: since 11/17/22, no new episodes detected. Comments: normal magnet and pacemaker function. Routed to device RN.  ESTEE Romero, Device Specialist    Remote reviewed, no significant arrhythmias noted to correlate with reported syncope. Arithmatica message sent with results.     Will update Dr. Chan to see if any other recommendations. Next appointment 3/31/23.   Neetu Quijano RN
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
## 2023-01-27 NOTE — TELEPHONE ENCOUNTER
Spoke with Ginna, there is an area with puss/draining and there is swelling as well in the area. She is going to see pt later today to look at the site again.   Ginna does not feel that the oral antibiotics are working.     The wound care clinic referral is appreciated by Ginna.
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
## 2023-01-27 NOTE — TELEPHONE ENCOUNTER
I had instructed son to take pictures if they have some they can send them to me . I don'think the redness is cellulitis but skin discoloration from bleeding ., hospitalization will probably not help as the IC antibiotics did not change that. Will recommend sending to wound surgeon in the clinic to take a look and they can decide . I can make referral

## 2023-01-30 ENCOUNTER — TELEPHONE (OUTPATIENT)
Dept: INTERNAL MEDICINE | Facility: CLINIC | Age: 88
End: 2023-01-30
Payer: MEDICARE

## 2023-01-30 ENCOUNTER — OFFICE VISIT (OUTPATIENT)
Dept: VASCULAR SURGERY | Facility: CLINIC | Age: 88
End: 2023-01-30
Attending: INTERNAL MEDICINE
Payer: MEDICARE

## 2023-01-30 VITALS
HEART RATE: 87 BPM | RESPIRATION RATE: 14 BRPM | WEIGHT: 139 LBS | DIASTOLIC BLOOD PRESSURE: 82 MMHG | BODY MASS INDEX: 22.61 KG/M2 | SYSTOLIC BLOOD PRESSURE: 144 MMHG | TEMPERATURE: 98.2 F | OXYGEN SATURATION: 97 %

## 2023-01-30 DIAGNOSIS — Z79.01 CHRONIC ANTICOAGULATION: ICD-10-CM

## 2023-01-30 DIAGNOSIS — I87.8 VENOUS STASIS: ICD-10-CM

## 2023-01-30 DIAGNOSIS — Z53.9 DIAGNOSIS NOT YET DEFINED: Primary | ICD-10-CM

## 2023-01-30 DIAGNOSIS — I87.2 VENOUS (PERIPHERAL) INSUFFICIENCY: ICD-10-CM

## 2023-01-30 DIAGNOSIS — L08.89 SECONDARY INFECTION OF SKIN: ICD-10-CM

## 2023-01-30 DIAGNOSIS — E63.9 POOR NUTRITION: ICD-10-CM

## 2023-01-30 DIAGNOSIS — R60.9 DEPENDENT EDEMA: ICD-10-CM

## 2023-01-30 DIAGNOSIS — I87.309 VENOUS HYPERTENSION: ICD-10-CM

## 2023-01-30 DIAGNOSIS — N18.31 STAGE 3A CHRONIC KIDNEY DISEASE (H): ICD-10-CM

## 2023-01-30 DIAGNOSIS — B35.1 ONYCHOMYCOSIS: ICD-10-CM

## 2023-01-30 DIAGNOSIS — T24.202A BURN OF LEFT LEG, SECOND DEGREE, INITIAL ENCOUNTER: Primary | ICD-10-CM

## 2023-01-30 PROCEDURE — 16020 DRESS/DEBRID P-THICK BURN S: CPT | Performed by: NURSE PRACTITIONER

## 2023-01-30 PROCEDURE — G0463 HOSPITAL OUTPT CLINIC VISIT: HCPCS | Mod: 25 | Performed by: NURSE PRACTITIONER

## 2023-01-30 PROCEDURE — 99204 OFFICE O/P NEW MOD 45 MIN: CPT | Mod: 25 | Performed by: NURSE PRACTITIONER

## 2023-01-30 PROCEDURE — G0180 MD CERTIFICATION HHA PATIENT: HCPCS | Performed by: INTERNAL MEDICINE

## 2023-01-30 PROCEDURE — 11721 DEBRIDE NAIL 6 OR MORE: CPT | Performed by: NURSE PRACTITIONER

## 2023-01-30 ASSESSMENT — PAIN SCALES - GENERAL: PAINLEVEL: SEVERE PAIN (6)

## 2023-01-30 NOTE — NURSING NOTE
Compression Applied to Left  2-Layer Coban Lite: I Applied the inner foam layer with the foot dorsiflexed and started atthe base of the fifth metatarsal head. I Left the bottom of the heel exposed, and proceed by winding the foam up the leg using minimal overlap to just below the fibular head. I then applied the compression layer with the foot dorsiflexed and startingat the base of the fifth metatarsal head. I applied at full stretch and proceeded up the leg using 50% overlap. The bottom of the heel is covered with the compression layer up to the end at the fibular head just below the back of the knee and levelwith the top edge of the foam layer.  I gently pressed and conformed the entire surface of the system to ensurethat the two layers are firmly bound together

## 2023-01-30 NOTE — PATIENT INSTRUCTIONS
"Wound care supplies were not ordered or needed today.        Wound Care Instructions    2 TIMES PER WEEK and as needed, Cleanse your left leg  wound(s) with Dilute hibiclens 30cc in 500cc NS.    Pat Dry with non-sterile gauze    Apply Lotion to the intact skin surrounding your wound and other dry skin locations. Some good lotions include: Remedy Skin Repair Cream, Sarna, Vanicream or Cetaphil    Primary Dressing: Apply medihoney alginate into/onto the wounds; cut to fit to the size of the wound    Secondary dressing: Cover with ABD    Secure with non-sterile roll gauze (4\" x 75\" roll) and tape (1\" roll tape) as needed; avoid adhesive directly on the skin    Compression: 2 layer lite    It is not ok to get your wound wet in the bath or shower      If for some reason you are not able to get your dressing(s) changed as outlined above (due to illness, lack of supplies, lack of help) please do the following: remove old, soiled dressings; wash the wounds with saline; pat dry; apply ABD pad or other absorbant pad and secure with rolled gauze; avoid tape directly on your skin; Call the clinic as soon as possible to let us know what the current issues are in receiving wound care [phone].      SEEK MEDICAL CARE IF:  You have an increase in swelling, pain, or redness around the wound.  You have an increase in the amount of pus coming from the wound.  There is a bad smell coming from the wound.  The wound appears to be worsening/enlarging  You have a fever greater than 101.5 F      It is ok to continue current wound care treatment/products for the next 2-3 days until new wound care supplies are ordered and arrive. If longer than this please contact our office at [phone].    If you have a 2 layer or 4 layer compression wrap on these are safe to have on for ONLY 7 days. If for some reason you are not able to get the wrap(s) changed (due to illness; lack of supplies, lack of help, lack of transportation) please do the " following: unwrap the old 2 or 4 layer compression wrap; avoid using scissors as you could cut your skin and cause wounds; use tubular compression when available. Call to reschedule your home care or clinic visit appointment as soon as possible.    Please NOTE: if you are 15 minutes late to your clinic appointment you will have to be rescheduled. Please call our clinic as soon as possible if you know you will not be able to get to your appointment at [phone].    If you fail to show up to 3 scheduled clinic appointments you will be dismissed from our clinic.              We want to hear from you!  In the next few weeks, you should receive a call or email to complete a survey about your visit at [facility]. Please help us improve your appointment experience by letting us know how we did today. We strive to make your experience good and value any ways in which we could do better.      We value your input and suggestions.    Thank you for choosing the [facility]!           It is recommended that you do not get your ulcer wet when showering.  Listed below are several ways of keeping it dry when you shower.     1. Wrap it with Press and Seal plastic wrap.  It can be found in the stores where the plastic wraps or tin foil is kept.               2.  Some people take a bath and hang their leg/foot out of the tub.                        3  Put your leg in a plastic bag and tape it on.           4. You can purchase a shower cover for casts at some pharmacies and through the Internet.            5. Take a Bed Bath or wash up at the sink         High Protein Foods  Chicken  -Chicken breast, 3.5oz.-30 grams protein  -Chicken thigh-10 grams(average size)  -Drumstick-11 grams  -Wing- 6 grams  -Chicken meat, cooked, 4 oz.  Beef  -Hamburger uday, 4 oz-28 grams protein  -Steak, 6 oz-42 grams  -Most cuts of beef- 7 grams of protein per ounce  Fish  -Most fish fillets or steaks are about 22  grams of protein for 3 1/2 oz(100 grams) of cooked fish, or 6 grams per ounce  -Tuna, 6 oz can-40 grams of protein  Pork  -Pork chop, average-22 grams protein  -Pork loin or tenderloin, 4 oz.-29 grams  -Ham, 3oz serving- 19 grams  -Ground pork 3oz cooked-22 grams  -Damian, 1 slice-3 grams  -French-style damian(black damian), slice-5-6 grams  Eggs and Dairy  -Egg, large-7 grams  -Milk, 1 cup-8 grams  -Cottage cheese, 1/2 cup-15 grams  -Greek yogurt, 1 cup-usually 8-12 grams, check label  -Soft cheeses (Mozzarella, Brie, Camembert)- 6 grams  -Medium cheeses(cheddar, swiss)- 7 or 8 grams per oz  -Hard cheeses(parmesan)- 10 grams per oz  Beans  -Tofu, 1/2 cup 20 grams  -Tofu, 1 oz., 2.3 grams  -Soy milk, 1 cup-6-10 grams  -Most beans(black, ochoa, lentils, etc.) about 7-10 grams protein per half cup of cooked beans  -soy beans, 1/2 cup cooked-14 grams  -Split peas, 1/2 cup cooked- 8 grams  Nuts and Seeds  -Peanut butter, 2 Tablespoons- 8 grams protein  -Almonds, 1/4 cup- 8 grams  -Peanuts, 1/4 cup-9 grams  -Cashews, 1/4 cup- 5 grams  -Pecans, 1/4 cup- 2.5 grams  -Sunflower seeds, 1/4 cup- 6 grams  -Pumpkin seeds, 1/4 cup-8 grams  -Flax seeds- 1/4 cup- 8 grams  Protein Supplements  -Ensure  -Boost  -Glucerna, if diabetic  When you have an open ulcer, your bodies protein needs are much higher, so it is recommended eat good sources of protein

## 2023-01-30 NOTE — PROGRESS NOTES
[facility] Consult Note    Date of Service: January 30, 2023     Requesting Provider: Dr. Kaitlin Steiner    Chief Complaint: leg burn and edema    History of Present Illness: Deloris Larson is being seen at [facility] today regarding leg burn and edema. They arrive to the clinic today with daughter Ginna. She was able to walk to the exam room. The patient reports that at the end of December she fainted and dropped a freshly boiled cup of tea onto the leg she immediately woke up; she laid on the floor for 5 minutes; they went to the ER; she was given bacitracin on the blistering; the blisters ruptured and she was unable to care for the area and it became infected was seen by ER started on clindamycin; continued to worsen; she went back to ER and was admitted for 5 days; was give IV antibiotics and then transitioned back onto oral clindamycin; tolerating well; no diarrhea; taking probiotics. She believes she passed out because she reacted poorly to the flu/covid vaccine and was not eating or drinking for 3 days. She is currently on clindamycin. Was currently/previously using silvercel; telfa gauze; she has home care 3 days per week. Reports pain of 6/10 intermittant; currently using apap for pain. Has used nothing as compression in the past, is currently using nothing for compression. Denies any fevers, chills, or generalized ill feeling. Denies history of cancer with exception of skin cancer. Sleeps in a bed with legs elevated . Denies history of DVT and Joint Replacement. Positive history of Cellulitis and Vein Procedures. I personally reviewed outside imaging, lab work, and progress noted through Care Everywhere and outside records. Weight stable. Former smoker; quit 50 years; smoked for about 20 years; smoked 1.5 ppd. Denies diabetes. She lives alone      Review of Systems:   Constitutional:  Negative   EENTM: negative for glasses;  positive Stebbins  GI:  negative for  nausea/vomiting;   negative for constipation   negative diarrhea;   negative for fecal incontinence  negative weight loss  :   negative dysuria,  negative incontinence  MS: patient is ambulatory;  does not use assistive devices  Cardiac: positive afib, CAD, SSS s/p pace maker, +murmur  Respiratory:  negative SOB  Endocrine:  negative  diabetes  Psych: negative  depression/anxiety    Past Medical History:   Past Medical History:   Diagnosis Date     Anemia      Atrial fibrillation (H)      CAD (coronary artery disease)      Chronic diarrhea      Clinical diagnosis of COVID-19     1/2021     Coronary artery disease      Former smoker      Hematoma of left iliopsoas muscle 01/19/2020    while on eliquis.     History of skin cancer      Hyperlipidemia      Hypertension      Insomnia      Lumbar spondylosis 2016     Migraine      PAD (peripheral artery disease) (H) 10/19/2015     Paroxysmal SVT (supraventricular tachycardia) (H)     Created by Conversion      Persistent atrial fibrillation (H) 05/09/2018    New diagnosis April 16 18th and found incidentally on physical exam during yearly exam in primary clinic [id number] score of 5-new Eliquis start     Poliomyelitis      Restless legs syndrome (RLS)      RLS (restless legs syndrome) 02/28/2017     Sick sinus syndrome (H) 01/28/2020     Sigmoid diverticulosis 04/04/2007     SSS (sick sinus syndrome) (H)     S/p pacemaker        Surgical History:   Past Surgical History:   Procedure Laterality Date     APPENDECTOMY       APPENDECTOMY       CAPSULOTOMY Bilateral 12/2015    YAG capsulotomy surgery. 12/21/15, 12/28/15     CARDIOVERSION  05/24/2018    EP Cardioversion External     CATARACT EXTRACTION, BILATERAL Bilateral 03/2012    3/15 and 3/29 by Dr. Barrett at the [facility]     CHOLECYSTECTOMY       CHOLECYSTECTOMY       COLONOSCOPY  05/17/2012     COLONOSCOPY W/ BIOPSIES  04/04/2007     COLONOSCOPY W/ POLYPECTOMY  05/07/2012    2 mm tubular adenoma in the rectum.  Hemorrhoids. Diverticulosis.     CORONARY ANGIOPLASTY       CV CORONARY ANGIOGRAM N/A 06/26/2018    Procedure: Coronary Angiogram;  Surgeon: Joby Vargas MD;  Location: [facility];  Service:      EP PACEMAKER INSERT N/A 06/27/2018    Procedure: EP Pacemaker Insertion;  Surgeon: Osito Alvarez MD;  Location: [facility];  Service:      HERNIA REPAIR Right      HYSTERECTOMY       HYSTERECTOMY TOTAL ABDOMINAL       IMPLANT PACEMAKER       INGUINAL HERNIA REPAIR Right     Indirect- Dr. Pedersen     MASTECTOMY       OOPHORECTOMY       SKIN CANCER EXCISION  2015    Right leg on 10/21/15. Right arm 9/29/15     STRIP VEIN      ligation?     TUBAL LIGATION       TUBAL LIGATION          Medications:   Current Outpatient Medications   Medication Sig     acetaminophen (TYLENOL) 500 MG tablet Take 1,000 mg by mouth every 8 hours as needed     alum hydroxide-mag carbonate (GENACTON)  MG/15ML SUSP suspension Take 30 mLs by mouth 4 times daily as needed for heartburn      Apoaequorin (PREVAGEN) 10 MG CAPS Take 1 capsule by mouth daily      atorvastatin (LIPITOR) 20 MG tablet Take 1 tablet (20 mg) by mouth At Bedtime     clindamycin (CLEOCIN) 150 MG capsule Take 3 capsules (450 mg) by mouth 3 times daily for 5 days     ELIQUIS ANTICOAGULANT 2.5 MG tablet TAKE 1 TABLET (2.5 MG) BY MOUTH 2 TIMES DAILY     loperamide (IMODIUM) 2 MG capsule Take 1 capsule (2 mg) by mouth daily as needed for diarrhea     Melatonin 5 MG CHEW Take 5 mg by mouth at bedtime as needed, may repeat once     metoprolol succinate ER (TOPROL-XL) 50 MG 24 hr tablet Take 1 tablet (50 mg) by mouth daily     Probiotic Product (PROBIOTIC ACIDOPHILUS BEADS) CAPS Take 1 capsule by mouth daily     No current facility-administered medications for this visit.       Allergies:   Allergies   Allergen Reactions     Diphenhydramine        Family history:   Family History   Adopted: Yes   Problem Relation Age of Onset     Pulmonary Embolism  Mother 32.00     Heart Disease Father      CABG Son      Stomach Cancer Grandson 20.00     Pulmonary Embolism Maternal Grandmother 32.00     No Known Problems Daughter      No Known Problems Daughter      CABG Son 64.00        Social History:   Social History     Socioeconomic History     Marital status:      Spouse name: Not on file     Number of children: 3     Years of education: Not on file     Highest education level: Not on file   Occupational History     Not on file   Tobacco Use     Smoking status: Former     Packs/day: 1.50     Years: 25.00     Pack years: 37.50     Types: Cigarettes     Start date: 1949     Quit date: 1974     Years since quittin.1     Smokeless tobacco: Never   Vaping Use     Vaping Use: Never used   Substance and Sexual Activity     Alcohol use: Yes     Alcohol/week: 7.0 standard drinks     Drug use: No     Sexual activity: Not on file   Other Topics Concern     Not on file   Social History Narrative    She lives alone in a house. She has 3 children and 8 grandchildren and multiple great grandchildren. She is retired. 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 She does not smoke cigarettes and rarely drinks alcoho l.    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  Her [employer] had to close, due to declining numbers.    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 She has never driven a car. Does her own cooking, and ba sarika. She does not use a computer, never did.    Jayde Diaz MD 2021         Social Determinants of Health     Financial Resource Strain: Low Risk      Difficulty of Paying Living Expenses: Not hard at all   Food Insecurity: No Food Insecurity     Worried About Running Out of Food in the Last Year: Never true     Ran Out of Food in the Last Year: Never true   Transportation Needs: Unmet Transportation Needs     Lack of Transportation (Medical): No      Lack of Transportation (Non-Medical): Yes   Physical Activity: Inactive     Days of Exercise per Week: 0 days     Minutes of Exercise per Session: 0 min   Stress: Stress Concern Present     Feeling of Stress : To some extent   Social Connections: Socially Isolated     Frequency of Communication with Friends and Family: More than three times a week     Frequency of Social Gatherings with Friends and Family: Once a week     Attends Congregation Services: Never     Active Member of Clubs or Organizations: No     Attends Club or Organization Meetings: Not on file     Marital Status:    Intimate Partner Violence: Not on file   Housing Stability: High Risk     Unable to Pay for Housing in the Last Year: Yes     Number of Places Lived in the Last Year: 1     Unstable Housing in the Last Year: No        Physical Exam  Vitals: BP (!) 144/82   Pulse 87   Temp 98.2  F (36.8  C)   Resp 14   Wt 139 lb (63 kg)   SpO2 97%   BMI 22.61 kg/m    Weight is 139 lbs 0 oz          Body mass index is 22.61 kg/m .  General: This is a 89 year old female who appears their reported age, not in acute distress  Head: normocephalic, Atraumatic; not wearing glasses; non-icteric sclera; no exudate; mild hearing loss   Respiratory: Clear throughout all lung fields; unlabored breathing; no cough   Cardiac: Regular, Rate and Rhythm, +murmurs appreciated   Skin: Uniformly warm and dry  Psych: Alert and oriented x4; calm and cooperative throughout exam  Abdomen: Normal bowel sounds. Soft, symmetric, no guarding or rigidity, nontender with palpation.  No organomegaly or masses palpated.   Wound #1 Location: Left shin  Size: 10.4L x 9W x 0.1depth.  No sinus tract present, Wound base: within this 10.4x9cm area there are scattered irregular shaped necrotic ulcers  nounderminingpresent. Wound is full thickness. There is moderate drainage. Periwound: no denudement, erythema, induration, maceration or warmth.  +2 edema    Sensation: Intact to  pinprick and light touch throughout lower extremities bilaterally.    Peripheral Vascular: normal dorsalis pedis, posterior tibial pulses to bilateral feet , using a handheld doppler these were strong and biphasic in nature.  Good capillary refill. No unusual venous distention. Positive for spider veins, telangiectasias, hemosiderin deposition or hyperpigmentation and fibrosis or scarring      Nails 1-5 bilaterally were thickened; elongated, and discolored        Circumferential volume measures:        Circumferential Measures 1/30/2023   Right just above MTP 20.9   Right Ankle 22.4   Right Widest Calf 32.5   Left - just above MTP 21.3   Left Ankle 23.8   Left Widest Calf 34.6       Ulceration(s)/Wound(s):     VASC Wound Left shin (Active)   Pre Size Length 10.4 01/30/23 0800   Pre Size Width 9 01/30/23 0800   Pre Size Depth 0.1 01/30/23 0800   Pre Total Sq cm 93.6 01/30/23 0800   Description burn, scattered 01/30/23 0800       Laboratory studies:     I personally reviewed the following lab results today and those on care everywhere, if indicated     CRP   Date Value Ref Range Status   04/08/2022 0.2 0.0-<0.8 mg/dL Final      Erythrocyte Sedimentation Rate   Date Value Ref Range Status   04/08/2022 9 0 - 20 mm/hr Final      Last Renal Panel:  Sodium   Date Value Ref Range Status   01/16/2023 140 136 - 145 mmol/L Final   02/06/2021 138 133 - 144 mmol/L Final     Potassium   Date Value Ref Range Status   01/16/2023 4.2 3.4 - 5.3 mmol/L Final   04/08/2022 4.5 3.5 - 5.0 mmol/L Final   02/06/2021 3.7 3.4 - 5.3 mmol/L Final     Chloride   Date Value Ref Range Status   01/16/2023 104 98 - 107 mmol/L Final   04/08/2022 106 98 - 107 mmol/L Final   02/06/2021 105 94 - 109 mmol/L Final     Carbon Dioxide   Date Value Ref Range Status   02/06/2021 26 20 - 32 mmol/L Final     Carbon Dioxide (CO2)   Date Value Ref Range Status   01/16/2023 27 22 - 29 mmol/L Final   04/08/2022 26 22 - 31 mmol/L Final     Anion Gap   Date Value Ref  Range Status   01/16/2023 9 7 - 15 mmol/L Final   04/08/2022 13 5 - 18 mmol/L Final   02/06/2021 7 3 - 14 mmol/L Final     Glucose   Date Value Ref Range Status   01/16/2023 95 70 - 99 mg/dL Final   04/08/2022 125 70 - 125 mg/dL Final   02/06/2021 78 70 - 99 mg/dL Final     Urea Nitrogen   Date Value Ref Range Status   01/16/2023 14.4 8.0 - 23.0 mg/dL Final   04/08/2022 10 8 - 28 mg/dL Final   02/06/2021 18 7 - 30 mg/dL Final     Creatinine   Date Value Ref Range Status   01/16/2023 0.97 (H) 0.51 - 0.95 mg/dL Final   02/06/2021 0.96 0.52 - 1.04 mg/dL Final     GFR Estimate   Date Value Ref Range Status   01/16/2023 55 (L) >60 mL/min/1.73m2 Final     Comment:     Effective December 21, 2021 eGFRcr in adults is calculated using the 2021 CKD-EPI creatinine equation which includes age and gender (Jenn et al., NEJM, DOI: 10.1056/WFKXht8836637)   05/24/2021 54 (L) >60 mL/min/1.73m2 Final   02/06/2021 52 (L) >60 mL/min/[1.73_m2] Final     Comment:     Non  GFR Calc  Starting 12/18/2018, serum creatinine based estimated GFR (eGFR) will be   calculated using the Chronic Kidney Disease Epidemiology Collaboration   (CKD-EPI) equation.       Calcium   Date Value Ref Range Status   01/16/2023 8.5 8.2 - 9.6 mg/dL Final   02/06/2021 8.0 (L) 8.5 - 10.1 mg/dL Final     Albumin   Date Value Ref Range Status   12/29/2022 3.6 3.5 - 5.2 g/dL Final   04/08/2022 4.0 3.5 - 5.0 g/dL Final   02/05/2021 2.7 (L) 3.4 - 5.0 g/dL Final      Lab Results   Component Value Date    WBC 6.5 01/16/2023    WBC 4.9 02/06/2021     Lab Results   Component Value Date    RBC 3.24 01/16/2023    RBC 2.80 02/06/2021     Lab Results   Component Value Date    HGB 9.7 01/16/2023    HGB 8.4 02/06/2021     Lab Results   Component Value Date    HCT 30.9 01/16/2023    HCT 26.7 02/06/2021     No components found for: MCT  Lab Results   Component Value Date    MCV 95 01/16/2023    MCV 95 02/06/2021     Lab Results   Component Value Date    MCH 29.9  01/16/2023    MCH 30.0 02/06/2021     Lab Results   Component Value Date    MCHC 31.4 01/16/2023    MCHC 31.5 02/06/2021     Lab Results   Component Value Date    RDW 14.0 01/16/2023    RDW 13.7 02/06/2021     Lab Results   Component Value Date     01/16/2023     02/06/2021      No results found for: A1C   TSH   Date Value Ref Range Status   12/08/2021 4.25 0.30 - 5.00 uIU/mL Final   01/28/2021 2.06 0.30 - 5.00 uIU/mL Final      Lab Results   Component Value Date    VITDT 69 04/08/2022          Impression:    Encounter Diagnoses   Name Primary?     Burn of left leg, second degree, initial encounter Yes     Secondary infection of skin      Onychomycosis      Chronic anticoagulation      Venous (peripheral) insufficiency      Venous hypertension      Dependent edema      Venous stasis      Stage 3a chronic kidney disease (H)      Poor nutrition                      Assessment/Plan:  1. Debridement: After discussion of risk factors and verbal consent was obtained 2% Lidocaine HCL jelly was applied, under clean conditions, the LLE burn ulceration(s) were debrided using currette or #15 blade scalpel. Devitalized and nonviable tissue, along with any fibrin and slough, was removed to improve granulation tissue formation, stimulate wound healing, decrease overall bacteria load, disrupt biofilm formation and decrease edge senescence.  Total excisional debridement was approx 10 sq cm from the epidermis/dermis area or into the subcutaneous tissue with a depth of 0.1 cm.   Ulcers were improved afterwards and .  Measures were unchanged after debridement. TBSA was less than 5%    2. Treatment: wound treatment will include irrigation and dressings to promote autolytic debridement which will include: will use medihoney; gauze; rolled gauze; change twice per week by home care    If for some reason the patient is not able to get your dressing(s) changed as outlined above (due to illness, lack of supplies, lack of  help) please do the following: remove old, soiled dressings; wash the wounds with saline; pat dry; apply ABD pad or other absorbant pad and secure with rolled gauze; avoid tape directly on your skin; Patient instructed to call the clinic as soon as possible to let us know what the current issues are in receiving wound care.    3. Edema. Will wrap the LLE with 2 layer lite    If a 2 layer or 4 layer compression wrap is being used; these are safe to have on for ONLY 7 days. If for some reason the patient is not able to get the wrap(s) changed (due to illness; lack of supplies, lack of help, lack of transportation) please do the following: unwrap the old 2 or 4 layer compression wrap; avoid using scissors as you could cut your skin and cause wounds; use tubular compression when available. Call to reschedule your home care or clinic visit appointment as soon as possible.    4. Offloading: na    5. Nutrition: na    6. Nails: nails 1-5 bilaterally were debrided and filed smooth; tolerated well, no complications.       Patient to return to clinic in 3 week(s) for re-evaluation. They were instructed to call the clinic sooner with any further questions or concerns. Answered all questions.          Haylie Coreas NP   [facility]  [phone]      This note was electronically signed by Haylie Coreas NP

## 2023-01-30 NOTE — TELEPHONE ENCOUNTER
January 30, 2023    [facility] Forms: [facility] health care orders were received via fax for [facility] Providers: Dr. Steiner to sign.  Patient label was attached to paperwork and placed in provider's inbox to be signed.    Adriano Diane III

## 2023-01-30 NOTE — TELEPHONE ENCOUNTER
January 30, 2023    [facility] Forms: [facility] Home health care orders were received via fax for [facility] Providers: Dr. Steiner to sign.  Patient label was attached to paperwork and placed in provider's inbox to be signed.    Adriano Diane III

## 2023-01-31 NOTE — TELEPHONE ENCOUNTER
January 31, 2023    [facility] Forms: [facility] order number:  [id number]  received from outbox of [facility] Providers: Dr. Steiner.  Paperwork has been reviewed and is complete.  Per initial initial request, this was sent via fax to [phone]    Shefali Roland

## 2023-01-31 NOTE — TELEPHONE ENCOUNTER
Kristine Chan MD  You 1 hour ago (1:49 PM)     CL  Per the notes that she was feeling unwell with decreased p.o. intake a few days prior to the fall and the fall was felt to be related to orthostatic hypotension.  We will hold on any further work-up at this time.      Above noted. Call placed to patient's daugther, no answer. Left detailed VM that no further workup is needed and to call with any concerns.     Neetu Quijano RN

## 2023-01-31 NOTE — TELEPHONE ENCOUNTER
January 31, 2023    [facility] Forms: [facility] order number:  [id number]  received from outbox of [facility] Providers: Dr. Steiner.  Paperwork has been reviewed and is complete.  Per initial initial request, this was sent via fax to [phone]     Shefali Roland

## 2023-02-01 ENCOUNTER — MEDICAL CORRESPONDENCE (OUTPATIENT)
Dept: HEALTH INFORMATION MANAGEMENT | Facility: CLINIC | Age: 88
End: 2023-02-01
Payer: MEDICARE

## 2023-02-02 ENCOUNTER — TELEPHONE (OUTPATIENT)
Dept: INTERNAL MEDICINE | Facility: CLINIC | Age: 88
End: 2023-02-02
Payer: MEDICARE

## 2023-02-02 ENCOUNTER — TELEPHONE (OUTPATIENT)
Dept: INTERNAL MEDICINE | Facility: CLINIC | Age: 88
End: 2023-02-02

## 2023-02-02 NOTE — TELEPHONE ENCOUNTER
February 2, 2023    [facility] Forms: [facility] order number:  [id number] Home health care orders were received via fax for [facility] Providers: Dr. Steiner to sign.  Patient label was attached to paperwork and placed in provider's inbox to be signed.    Shefali Roland

## 2023-02-02 NOTE — TELEPHONE ENCOUNTER
February 2, 2023    [facility] Forms: [facility] order number:  [id number]  Home health care orders were received via fax for [facility] Providers: Dr. Steiner to sign.  Patient label was attached to paperwork and placed in provider's inbox to be signed.    Shefali Roland

## 2023-02-03 ENCOUNTER — TELEPHONE (OUTPATIENT)
Dept: VASCULAR SURGERY | Facility: CLINIC | Age: 88
End: 2023-02-03
Payer: MEDICARE

## 2023-02-03 ENCOUNTER — PATIENT OUTREACH (OUTPATIENT)
Dept: CARE COORDINATION | Facility: CLINIC | Age: 88
End: 2023-02-03
Payer: MEDICARE

## 2023-02-03 NOTE — CONFIDENTIAL NOTE
"Spoke with patient.  She is unsure which home care company she has, she stated she thought it was [facility].  She also states she is feeling a \"little confused\" and asked writer to reach out to her daughter Ginna.  [facility]      Called [facility] and verified that patient is under their care and is on their scheduled for Tuesday 2/7.  They do not give patients times until the day before.  Reminded them the compression wraps cannot be on for longer than 7 days.        "

## 2023-02-03 NOTE — CONFIDENTIAL NOTE
Spoke with daughter Ginna.  She states the appt for 2/7 is on the patient's calendar and she will call and remind patient of this.  She states the home care nurse last changed the compression wraps this past Tuesday.  No other needs at this time.

## 2023-02-03 NOTE — PROGRESS NOTES
Clinic Care Coordination Contact    Community Health Worker Follow Up    Care Gaps:     Health Maintenance Due   Topic Date Due     MICROALBUMIN  Never done     ZOSTER IMMUNIZATION (2 of 3) 03/16/2011     LIPID  12/08/2022         Care Plan:   Care Plan: Social Support     Problem: Inadequate social support     Goal: I would like to have in-home resources to assist me with my cares and other duties so I can stay in my home for as long as possible.     Start Date: 1/25/2023 Expected End Date: 4/25/2023    This Visit's Progress: 10%    Priority: High    Note:     Barriers: Needing additional assistance at home.   Strengths: Strong advocate for herself.   Patient expressed understanding of goal: Yes   Action steps to achieve this goal:  1. I will understand the [facility] RN Roberto Carlos will send my dar Chacko a list of agency who provide the care I am looking for at this time.   2. We will look over the resources provided to me by Roberto Carlos and will decide which one best suits my needs.   3.  We will contact our agency of choice directly.  4. I will report progress towards this goal at scheduled outreach telephone calls from my CCC team.                         Intervention and Education during outreach: CHW scheduled Ginna with [facility] ETHAN Azevedo on 2/7/23 at 3PM to discuss guidance and next steps. CHW gave patients dar Chacko contact information and encouraged her to reach out with any questions or concerns.    CHW Note:    CHW received VM from patients dar Chacko requesting return call. CHW contacted patients dar Chacko back as requested.     Ginna shared the patient has become more confused and when she tries to explain things the patient becomes upset. Ginna stated she is looking for guidance on how to handle these episodes moving forward.      CHW scheduled Ginna with [facility] ETHAN Azevedo on 2/7/23 at 3PM to discuss confusion concerns. Ginna shared she thinks its best not to have the patient at this appointment as she does not want her to get  upset. Ginna shared she would be interested in having another appointment with the patient present after a plan has been made.     Ginna denied any other needs or concerns at this time but will reach out to CCC as needed.     CHW Plan: CHW will continue to support patient with goals through routine scheduled outreach. CHW will outreach in one month on 3/7/23.      Eduarda Burgess  Community Health Worker   [facility] Care Coordination  [facility]   [email or web address]  Office: [phone]

## 2023-02-03 NOTE — TELEPHONE ENCOUNTER
Caller: Deloris Freeman    Provider: ONIEL Coreas    Detailed reason for call: Deloris Freeman is calling stating she doesn't have any future apt scheduled with [facility]. She is concerned because on Monday it will be 1 weeks she has had the compressions on.  Please advise how to proceed.     Best phone number to contact: [phone]    Best time to contact: anytime    Ok to leave a detailed message: Yes    Ok to speak to authorized person if needed: No      (Noted to patient if reason is related to wound or incision, to please send a photo via email or [employer].)

## 2023-02-06 NOTE — TELEPHONE ENCOUNTER
February 6, 2023    [facility] Forms: [facility] order number:  [id number] received from outbox of [facility] Providers: Dr. Steiner.  Paperwork has been reviewed and is complete.  Per initial initial request, this was sent via fax to [phone].     Shefali Roland

## 2023-02-06 NOTE — TELEPHONE ENCOUNTER
February 6, 2023    [facility] Forms: [id number] received from outbox of [facility] Providers: Dr. Steiner.  Paperwork has been reviewed and is complete.  Per initial initial request, this was sent via fax to [phone].     Pam J. Behr

## 2023-02-07 ENCOUNTER — PATIENT OUTREACH (OUTPATIENT)
Dept: NURSING | Facility: CLINIC | Age: 88
End: 2023-02-07
Payer: MEDICARE

## 2023-02-07 DIAGNOSIS — F41.1 GAD (GENERALIZED ANXIETY DISORDER): Primary | ICD-10-CM

## 2023-02-08 ENCOUNTER — TELEPHONE (OUTPATIENT)
Dept: INTERNAL MEDICINE | Facility: CLINIC | Age: 88
End: 2023-02-08
Payer: MEDICARE

## 2023-02-08 NOTE — TELEPHONE ENCOUNTER
February 8, 2023    [facility] Forms: [facility] order number:  [id number] were received via fax for [facility] Providers: Dr. Steiner to sign.  Patient label was attached to paperwork and placed in provider's inbox to be signed.    Shefali Roland

## 2023-02-09 ENCOUNTER — TELEPHONE (OUTPATIENT)
Dept: INTERNAL MEDICINE | Facility: CLINIC | Age: 88
End: 2023-02-09
Payer: MEDICARE

## 2023-02-09 ENCOUNTER — TELEPHONE (OUTPATIENT)
Dept: CARE COORDINATION | Facility: CLINIC | Age: 88
End: 2023-02-09
Payer: MEDICARE

## 2023-02-09 NOTE — TELEPHONE ENCOUNTER
Any Steiner,     I spoke with Ginna Ya, patient's daughter. Ginna said patient is experiencing more episodes of anxiety Ginna feels may be related to her advancing dementia. She wondered if you would be willing to prescribe a low dose anti-depressant like Lexapro 10 mg q day to assist her with her anxiety. Ginna said patient is willing to try an anti-depressant. Please advise.     Thanks,     Dave Myhre, RN  CCC RN

## 2023-02-09 NOTE — TELEPHONE ENCOUNTER
General Call    Contacts       Type Contact Phone/Fax    02/09/2023 01:50 PM CST Phone (Incoming) Ginna [phone]     [facility]        Reason for Call:  PT was taking below medication incorrectly.  Pt was taking 1 capsule 3 times daily, vs. How the script was written below.    Pt has 9 capsules remaining, and would like to know if they should continue taking the medication and if so, follow the correct sig below?    Pt also experiencing some itchiness, not sure if that is due to the medication or not      Could we send this information to you in [employer] or would you prefer to receive a phone call?:   Patient would prefer a phone call       Okay to leave a detailed message?: Yes at Other phone number: [phone]                    clindamycin (CLEOCIN) 150 MG capsule 45 capsule 0 1/26/2023 1/31/2023 --   Sig - Route: Take 3 capsules (450 mg) by mouth 3 times daily for 5 days - Oral   Sent to pharmacy as: Clindamycin HCl 150 MG Oral Capsule (CLEOCIN)   Class: E-Prescribe   Order: [id number]   E-Prescribing Status: Receipt confirmed by pharmacy (1/26/2023  2:59 PM CST)

## 2023-02-10 NOTE — TELEPHONE ENCOUNTER
Per Dr. Steiner's directive, ok to stop the clindamycin related to itching and wound healing. Voicemail was left with her daughter Ginna with this directive.

## 2023-02-10 NOTE — TELEPHONE ENCOUNTER
"Spoke with patient to gather more information. Patient states she did not know she was supposed to take \"that much medication.\" States \"I am not capable of taking that much medication.\" Script was written for     clindamycin (CLEOCIN) 150 MG capsule Sig - Route: Take 3 capsules (450 mg) by mouth 3 times daily for 5 days - Oral    Patient states that she was only taking 1 pill at a time instead of 3. States her last dose was yesterday morning (2/9) and wonders if she should finish the course or stop.    States her cellulitis is improving but is itchy. States she is itching on bilateral arms from elbows to writs and on her bilateral legs from hips to knees. States she can control the itching with moisturizing lotion.  "

## 2023-02-11 NOTE — PROGRESS NOTES
Follow Up Progress Note      Assessment: [facility] RN spoke with patient's daughter Ginna today per her request. Ginna said her mother appears to be experiencing more episodes of anxiety lately. She feels this is possibly related to her increasing dementia. Discussed requesting a low dose anti-depressant from her PCP. Ginna said she would discuss with his mother and sister and would get back to writer.   Patient said she received the resources for in-home services for her mother. She stated she and her mother have not yet made a decision about what services would be needed, but will make that decision in the near future. She said it is patient's goal to stay in her home for as long as possible.   No other needs or concerns at this time.     Care Gaps:    Health Maintenance Due   Topic Date Due     MICROALBUMIN  Never done     ZOSTER IMMUNIZATION (2 of 3) 03/16/2011     LIPID  12/08/2022       Scheduled with PCP on 3/14/23      Care Plans  Care Plan: Social Support     Problem: Inadequate social support     Goal: I would like to have in-home resources to assist me with my cares and other duties so I can stay in my home for as long as possible.     Start Date: 1/25/2023 Expected End Date: 4/25/2023    Recent Progress: 10%    Priority: High    Note:     Barriers: Needing additional assistance at home.   Strengths: Strong advocate for herself.   Patient expressed understanding of goal: Yes   Action steps to achieve this goal:  1. I will understand the [facility] RN Roberto Carlos will send my daughter Ginna a list of agency who provide the care I am looking for at this time.   2. We will look over the resources provided to me by Roberto Carlos and will decide which one best suits my needs.   3.  We will contact our agency of choice directly.  4. I will report progress towards this goal at scheduled outreach telephone calls from my CCC team.     Discussed on 2/7/23                        Intervention/Education provided during outreach: Discussed the  importance of patient taking her medications as directed. Encouraged attendance at all scheduled follow up appointments. Encouraged calling CCC for any additional needs or concerns.      Outreach Frequency: monthly      Plan: CCC RN will continue to monitor, support patient with current goal and will be available to assist as nursing needs arise.     Care Coordinator will perform Chart Review in 45 days.

## 2023-02-13 RX ORDER — ESCITALOPRAM OXALATE 5 MG/1
5 TABLET ORAL DAILY
Qty: 90 TABLET | Refills: 3 | Status: SHIPPED | OUTPATIENT
Start: 2023-02-13 | End: 2023-03-14

## 2023-02-13 NOTE — TELEPHONE ENCOUNTER
Spoke to daughter Ginna on consent to communicate and updated her Rx was sent to pharmacy.  Daughter/patient will follow up with clinic on how patient does with medication.

## 2023-02-14 NOTE — TELEPHONE ENCOUNTER
February 14, 2023    [facility] Forms: [facility] received from outbox of [facility] Providers: Dr. Steiner.  Paperwork has been reviewed and is complete.  Per initial initial request, this was sent via fax to [phone].     Joseline Magana

## 2023-02-20 ENCOUNTER — OFFICE VISIT (OUTPATIENT)
Dept: VASCULAR SURGERY | Facility: CLINIC | Age: 88
End: 2023-02-20
Attending: NURSE PRACTITIONER
Payer: MEDICARE

## 2023-02-20 VITALS
DIASTOLIC BLOOD PRESSURE: 76 MMHG | RESPIRATION RATE: 14 BRPM | TEMPERATURE: 97.6 F | WEIGHT: 139 LBS | SYSTOLIC BLOOD PRESSURE: 118 MMHG | BODY MASS INDEX: 22.61 KG/M2 | OXYGEN SATURATION: 96 % | HEART RATE: 64 BPM

## 2023-02-20 DIAGNOSIS — T24.202D BURN OF LEFT LEG, SECOND DEGREE, SUBSEQUENT ENCOUNTER: Primary | ICD-10-CM

## 2023-02-20 DIAGNOSIS — N18.31 STAGE 3A CHRONIC KIDNEY DISEASE (H): ICD-10-CM

## 2023-02-20 DIAGNOSIS — R60.9 DEPENDENT EDEMA: ICD-10-CM

## 2023-02-20 DIAGNOSIS — I87.309 VENOUS HYPERTENSION: ICD-10-CM

## 2023-02-20 DIAGNOSIS — I87.8 VENOUS STASIS: ICD-10-CM

## 2023-02-20 DIAGNOSIS — Z79.01 CHRONIC ANTICOAGULATION: ICD-10-CM

## 2023-02-20 DIAGNOSIS — I87.2 VENOUS (PERIPHERAL) INSUFFICIENCY: ICD-10-CM

## 2023-02-20 PROCEDURE — 11042 DBRDMT SUBQ TIS 1ST 20SQCM/<: CPT | Performed by: NURSE PRACTITIONER

## 2023-02-20 PROCEDURE — 16020 DRESS/DEBRID P-THICK BURN S: CPT | Performed by: NURSE PRACTITIONER

## 2023-02-20 ASSESSMENT — PAIN SCALES - GENERAL: PAINLEVEL: NO PAIN (0)
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
## 2023-02-20 NOTE — PATIENT INSTRUCTIONS
"Wound care supplies were not ordered or needed today.        Wound Care Instructions    Once per week and as needed, Cleanse your left leg  wound(s) with Dilute hibiclens 30cc in 500cc NS.    Pat Dry with non-sterile gauze    Apply Lotion to the intact skin surrounding your wound and other dry skin locations. Some good lotions include: Remedy Skin Repair Cream, Sarna, Vanicream or Cetaphil    Primary Dressing: Apply medihoney alginate into/onto the wounds; cut to fit to the size of the wound    Secondary dressing: Cover with ABD    Secure with non-sterile roll gauze (4\" x 75\" roll) and tape (1\" roll tape) as needed; avoid adhesive directly on the skin    Compression: 2 layer lite    It is not ok to get your wound wet in the bath or shower      If for some reason you are not able to get your dressing(s) changed as outlined above (due to illness, lack of supplies, lack of help) please do the following: remove old, soiled dressings; wash the wounds with saline; pat dry; apply ABD pad or other absorbant pad and secure with rolled gauze; avoid tape directly on your skin; Call the clinic as soon as possible to let us know what the current issues are in receiving wound care [phone].      SEEK MEDICAL CARE IF:  You have an increase in swelling, pain, or redness around the wound.  You have an increase in the amount of pus coming from the wound.  There is a bad smell coming from the wound.  The wound appears to be worsening/enlarging  You have a fever greater than 101.5 F      It is ok to continue current wound care treatment/products for the next 2-3 days until new wound care supplies are ordered and arrive. If longer than this please contact our office at [phone].    If you have a 2 layer or 4 layer compression wrap on these are safe to have on for ONLY 7 days. If for some reason you are not able to get the wrap(s) changed (due to illness; lack of supplies, lack of help, lack of transportation) please do the " following: unwrap the old 2 or 4 layer compression wrap; avoid using scissors as you could cut your skin and cause wounds; use tubular compression when available. Call to reschedule your home care or clinic visit appointment as soon as possible.    Please NOTE: if you are 15 minutes late to your clinic appointment you will have to be rescheduled. Please call our clinic as soon as possible if you know you will not be able to get to your appointment at [phone].    If you fail to show up to 3 scheduled clinic appointments you will be dismissed from our clinic.              We want to hear from you!  In the next few weeks, you should receive a call or email to complete a survey about your visit at [facility]. Please help us improve your appointment experience by letting us know how we did today. We strive to make your experience good and value any ways in which we could do better.      We value your input and suggestions.    Thank you for choosing the [facility]!           It is recommended that you do not get your ulcer wet when showering.  Listed below are several ways of keeping it dry when you shower.     1. Wrap it with Press [employer] Seal plastic wrap.  It can be found in the stores where the plastic wraps or tin foil is kept.               2.  Some people take a bath and hang their leg/foot out of the tub.                        3  Put your leg in a plastic bag and tape it on.           4. You can purchase a shower cover for casts at some pharmacies and through the Internet.            5. Take a Bed Bath or wash up at the sink         High Protein Foods  Chicken  -Chicken breast, 3.5oz.-30 grams protein  -Chicken thigh-10 grams(average size)  -Drumstick-11 grams  -Wing- 6 grams  -Chicken meat, cooked, 4 oz.  Beef  -Hamburger uday, 4 oz-28 grams protein  -Steak, 6 oz-42 grams  -Most cuts of beef- 7 grams of protein per ounce  Fish  -Most fish fillets or steaks are about 22  grams of protein for 3 1/2 oz(100 grams) of cooked fish, or 6 grams per ounce  -Tuna, 6 oz can-40 grams of protein  Pork  -Pork chop, average-22 grams protein  -Pork loin or tenderloin, 4 oz.-29 grams  -Ham, 3oz serving- 19 grams  -Ground pork 3oz cooked-22 grams  -Damian, 1 slice-3 grams  -South Korean-style damian(black damian), slice-5-6 grams  Eggs and Dairy  -Egg, large-7 grams  -Milk, 1 cup-8 grams  -Cottage cheese, 1/2 cup-15 grams  -Greek yogurt, 1 cup-usually 8-12 grams, check label  -Soft cheeses (Mozzarella, Brie, Camembert)- 6 grams  -Medium cheeses(cheddar, swiss)- 7 or 8 grams per oz  -Hard cheeses(parmesan)- 10 grams per oz  Beans  -Tofu, 1/2 cup 20 grams  -Tofu, 1 oz., 2.3 grams  -Soy milk, 1 cup-6-10 grams  -Most beans(black, ochoa, lentils, etc.) about 7-10 grams protein per half cup of cooked beans  -soy beans, 1/2 cup cooked-14 grams  -Split peas, 1/2 cup cooked- 8 grams  Nuts and Seeds  -Peanut butter, 2 Tablespoons- 8 grams protein  -Almonds, 1/4 cup- 8 grams  -Peanuts, 1/4 cup-9 grams  -Cashews, 1/4 cup- 5 grams  -Pecans, 1/4 cup- 2.5 grams  -Sunflower seeds, 1/4 cup- 6 grams  -Pumpkin seeds, 1/4 cup-8 grams  -Flax seeds- 1/4 cup- 8 grams  Protein Supplements  -Ensure  -Boost  -Glucerna, if diabetic  When you have an open ulcer, your bodies protein needs are much higher, so it is recommended eat good sources of protein

## 2023-02-27 ENCOUNTER — ANCILLARY PROCEDURE (OUTPATIENT)
Dept: CARDIOLOGY | Facility: CLINIC | Age: 88
End: 2023-02-27
Attending: INTERNAL MEDICINE
Payer: MEDICARE

## 2023-02-27 DIAGNOSIS — Z95.0 PACEMAKER: ICD-10-CM

## 2023-02-27 DIAGNOSIS — I49.5 SICK SINUS SYNDROME (H): ICD-10-CM

## 2023-02-28 DIAGNOSIS — I48.19 PERSISTENT ATRIAL FIBRILLATION (H): ICD-10-CM

## 2023-02-28 LAB
MDC_IDC_EPISODE_DTM: NORMAL
MDC_IDC_EPISODE_DTM: NORMAL
MDC_IDC_EPISODE_ID: NORMAL
MDC_IDC_EPISODE_ID: NORMAL
MDC_IDC_EPISODE_TYPE: NORMAL
MDC_IDC_EPISODE_TYPE: NORMAL
MDC_IDC_LEAD_IMPLANT_DT: NORMAL
MDC_IDC_LEAD_IMPLANT_DT: NORMAL
MDC_IDC_LEAD_LOCATION: NORMAL
MDC_IDC_LEAD_LOCATION: NORMAL
MDC_IDC_LEAD_LOCATION_DETAIL_1: NORMAL
MDC_IDC_LEAD_LOCATION_DETAIL_1: NORMAL
MDC_IDC_LEAD_MFG: NORMAL
MDC_IDC_LEAD_MFG: NORMAL
MDC_IDC_LEAD_MODEL: NORMAL
MDC_IDC_LEAD_MODEL: NORMAL
MDC_IDC_LEAD_POLARITY_TYPE: NORMAL
MDC_IDC_LEAD_POLARITY_TYPE: NORMAL
MDC_IDC_LEAD_SERIAL: NORMAL
MDC_IDC_LEAD_SERIAL: NORMAL
MDC_IDC_LEAD_SPECIAL_FUNCTION: NORMAL
MDC_IDC_LEAD_SPECIAL_FUNCTION: NORMAL
MDC_IDC_MSMT_BATTERY_DTM: NORMAL
MDC_IDC_MSMT_BATTERY_REMAINING_LONGEVITY: 54 MO
MDC_IDC_MSMT_BATTERY_REMAINING_PERCENTAGE: 86 %
MDC_IDC_MSMT_BATTERY_STATUS: NORMAL
MDC_IDC_MSMT_LEADCHNL_RA_IMPEDANCE_VALUE: 733 OHM
MDC_IDC_MSMT_LEADCHNL_RV_IMPEDANCE_VALUE: 590 OHM
MDC_IDC_MSMT_LEADCHNL_RV_PACING_THRESHOLD_AMPLITUDE: 1.3 V
MDC_IDC_MSMT_LEADCHNL_RV_PACING_THRESHOLD_PULSEWIDTH: 0.4 MS
MDC_IDC_PG_IMPLANT_DTM: NORMAL
MDC_IDC_PG_MFG: NORMAL
MDC_IDC_PG_MODEL: NORMAL
MDC_IDC_PG_SERIAL: NORMAL
MDC_IDC_PG_TYPE: NORMAL
MDC_IDC_SESS_CLINIC_NAME: NORMAL
MDC_IDC_SESS_DTM: NORMAL
MDC_IDC_SESS_TYPE: NORMAL
MDC_IDC_SET_BRADY_AT_MODE_SWITCH_RATE: 170 {BEATS}/MIN
MDC_IDC_SET_BRADY_LOWRATE: 60 {BEATS}/MIN
MDC_IDC_SET_BRADY_MAX_SENSOR_RATE: 130 {BEATS}/MIN
MDC_IDC_SET_BRADY_MODE: NORMAL
MDC_IDC_SET_LEADCHNL_RA_SENSING_ADAPTATION_MODE: NORMAL
MDC_IDC_SET_LEADCHNL_RA_SENSING_SENSITIVITY: 0.15 MV
MDC_IDC_SET_LEADCHNL_RV_PACING_AMPLITUDE: 2 V
MDC_IDC_SET_LEADCHNL_RV_PACING_CAPTURE_MODE: NORMAL
MDC_IDC_SET_LEADCHNL_RV_PACING_POLARITY: NORMAL
MDC_IDC_SET_LEADCHNL_RV_PACING_PULSEWIDTH: 0.4 MS
MDC_IDC_SET_LEADCHNL_RV_SENSING_ADAPTATION_MODE: NORMAL
MDC_IDC_SET_LEADCHNL_RV_SENSING_POLARITY: NORMAL
MDC_IDC_SET_LEADCHNL_RV_SENSING_SENSITIVITY: 2.5 MV
MDC_IDC_SET_ZONE_DETECTION_INTERVAL: 375 MS
MDC_IDC_SET_ZONE_TYPE: NORMAL
MDC_IDC_SET_ZONE_VENDOR_TYPE: NORMAL
MDC_IDC_STAT_BRADY_DTM_END: NORMAL
MDC_IDC_STAT_BRADY_DTM_START: NORMAL
MDC_IDC_STAT_BRADY_RA_PERCENT_PACED: 0 %
MDC_IDC_STAT_BRADY_RV_PERCENT_PACED: 33 %
MDC_IDC_STAT_EPISODE_RECENT_COUNT: 0
MDC_IDC_STAT_EPISODE_RECENT_COUNT: 3
MDC_IDC_STAT_EPISODE_RECENT_COUNT_DTM_END: NORMAL
MDC_IDC_STAT_EPISODE_RECENT_COUNT_DTM_START: NORMAL
MDC_IDC_STAT_EPISODE_TYPE: NORMAL
MDC_IDC_STAT_EPISODE_VENDOR_TYPE: NORMAL

## 2023-02-28 PROCEDURE — 93296 REM INTERROG EVL PM/IDS: CPT | Performed by: INTERNAL MEDICINE

## 2023-02-28 PROCEDURE — 93294 REM INTERROG EVL PM/LDLS PM: CPT | Performed by: INTERNAL MEDICINE

## 2023-03-01 NOTE — TELEPHONE ENCOUNTER
Routing refill request to provider for review/approval because:  Drug not on the [facility] refill protocol     Last Written Prescription Date:  1/12/23  Last Fill Quantity: 60,  # refills: 3   Last office visit provider:  1/14/23     Requested Prescriptions   Pending Prescriptions Disp Refills     apixaban ANTICOAGULANT (ELIQUIS ANTICOAGULANT) 2.5 MG tablet 60 tablet 3     Sig: Take 1 tablet (2.5 mg) by mouth 2 times daily       Direct Oral Anticoagulant Agents Failed - 2/28/2023  2:47 PM        Failed - Patient is 18-79 years of age        Passed - Normal Platelets on file in past 12 months     Recent Labs   Lab Test 01/16/23  0700                  Passed - Medication is active on med list        Passed - Serum creatinine less than or equal to 1.4 on file in past 12 mos     Recent Labs   Lab Test 01/16/23  0700   CR 0.97*       Ok to refill medication if creatinine is low          Passed - Weight is greater than 60 kg for the past year     Wt Readings from Last 3 Encounters:   02/20/23 63 kg (139 lb)   01/30/23 63 kg (139 lb)   01/26/23 62.1 kg (137 lb)             Passed - No active pregnancy on record        Passed - No positive pregnancy test within past 12 months        Passed - Recent (6 mo) or future (30 days) visit within the authorizing provider's specialty             Hannah Richter RN 03/01/23 2:31 PM

## 2023-03-06 ENCOUNTER — OFFICE VISIT (OUTPATIENT)
Dept: VASCULAR SURGERY | Facility: CLINIC | Age: 88
End: 2023-03-06
Attending: NURSE PRACTITIONER
Payer: MEDICARE

## 2023-03-06 VITALS
OXYGEN SATURATION: 97 % | SYSTOLIC BLOOD PRESSURE: 108 MMHG | DIASTOLIC BLOOD PRESSURE: 72 MMHG | TEMPERATURE: 98.2 F | RESPIRATION RATE: 14 BRPM | HEART RATE: 84 BPM

## 2023-03-06 DIAGNOSIS — I87.8 VENOUS STASIS: ICD-10-CM

## 2023-03-06 DIAGNOSIS — I87.309 VENOUS HYPERTENSION: ICD-10-CM

## 2023-03-06 DIAGNOSIS — R60.9 DEPENDENT EDEMA: ICD-10-CM

## 2023-03-06 DIAGNOSIS — Z79.01 CHRONIC ANTICOAGULATION: ICD-10-CM

## 2023-03-06 DIAGNOSIS — T24.202D BURN OF LEFT LEG, SECOND DEGREE, SUBSEQUENT ENCOUNTER: Primary | ICD-10-CM

## 2023-03-06 DIAGNOSIS — N18.31 STAGE 3A CHRONIC KIDNEY DISEASE (H): ICD-10-CM

## 2023-03-06 DIAGNOSIS — I87.2 VENOUS (PERIPHERAL) INSUFFICIENCY: ICD-10-CM

## 2023-03-06 PROCEDURE — 11042 DBRDMT SUBQ TIS 1ST 20SQCM/<: CPT | Performed by: NURSE PRACTITIONER

## 2023-03-06 ASSESSMENT — PAIN SCALES - GENERAL: PAINLEVEL: NO PAIN (0)
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
## 2023-03-06 NOTE — PATIENT INSTRUCTIONS
"Wound care supplies were not ordered or needed today.        Wound Care Instructions    Once per week and as needed, Cleanse your left leg  wound(s) with Dilute hibiclens 30cc in 500cc NS.    Pat Dry with non-sterile gauze    Apply Lotion to the intact skin surrounding your wound and other dry skin locations. Some good lotions include: Remedy Skin Repair Cream, Sarna, Vanicream or Cetaphil    Primary Dressing: Apply medihoney alginate into/onto the wounds; cut to fit to the size of the wound    Secondary dressing: Cover with ABD    Secure with non-sterile roll gauze (4\" x 75\" roll) and tape (1\" roll tape) as needed; avoid adhesive directly on the skin    Compression: 2 layer lite    It is not ok to get your wound wet in the bath or shower      If for some reason you are not able to get your dressing(s) changed as outlined above (due to illness, lack of supplies, lack of help) please do the following: remove old, soiled dressings; wash the wounds with saline; pat dry; apply ABD pad or other absorbant pad and secure with rolled gauze; avoid tape directly on your skin; Call the clinic as soon as possible to let us know what the current issues are in receiving wound care [phone].      SEEK MEDICAL CARE IF:  You have an increase in swelling, pain, or redness around the wound.  You have an increase in the amount of pus coming from the wound.  There is a bad smell coming from the wound.  The wound appears to be worsening/enlarging  You have a fever greater than 101.5 F      It is ok to continue current wound care treatment/products for the next 2-3 days until new wound care supplies are ordered and arrive. If longer than this please contact our office at [phone].    If you have a 2 layer or 4 layer compression wrap on these are safe to have on for ONLY 7 days. If for some reason you are not able to get the wrap(s) changed (due to illness; lack of supplies, lack of help, lack of transportation) please do the " following: unwrap the old 2 or 4 layer compression wrap; avoid using scissors as you could cut your skin and cause wounds; use tubular compression when available. Call to reschedule your home care or clinic visit appointment as soon as possible.    Please NOTE: if you are 15 minutes late to your clinic appointment you will have to be rescheduled. Please call our clinic as soon as possible if you know you will not be able to get to your appointment at [phone].    If you fail to show up to 3 scheduled clinic appointments you will be dismissed from our clinic.              We want to hear from you!  In the next few weeks, you should receive a call or email to complete a survey about your visit at [facility]. Please help us improve your appointment experience by letting us know how we did today. We strive to make your experience good and value any ways in which we could do better.      We value your input and suggestions.    Thank you for choosing the [facility]!           It is recommended that you do not get your ulcer wet when showering.  Listed below are several ways of keeping it dry when you shower.     1. Wrap it with Press and Seal plastic wrap.  It can be found in the stores where the plastic wraps or tin foil is kept.               2.  Some people take a bath and hang their leg/foot out of the tub.                        3  Put your leg in a plastic bag and tape it on.           4. You can purchase a shower cover for casts at some pharmacies and through the Internet.            5. Take a Bed Bath or wash up at the sink         High Protein Foods  Chicken  -Chicken breast, 3.5oz.-30 grams protein  -Chicken thigh-10 grams(average size)  -Drumstick-11 grams  -Wing- 6 grams  -Chicken meat, cooked, 4 oz.  Beef  -Hamburger uday, 4 oz-28 grams protein  -Steak, 6 oz-42 grams  -Most cuts of beef- 7 grams of protein per ounce  Fish  -Most fish fillets or steaks are about 22  grams of protein for 3 1/2 oz(100 grams) of cooked fish, or 6 grams per ounce  -Tuna, 6 oz can-40 grams of protein  Pork  -Pork chop, average-22 grams protein  -Pork loin or tenderloin, 4 oz.-29 grams  -Ham, 3oz serving- 19 grams  -Ground pork 3oz cooked-22 grams  -Damian, 1 slice-3 grams  -Tajik-style damian(black damian), slice-5-6 grams  Eggs and Dairy  -Egg, large-7 grams  -Milk, 1 cup-8 grams  -Cottage cheese, 1/2 cup-15 grams  -Greek yogurt, 1 cup-usually 8-12 grams, check label  -Soft cheeses (Mozzarella, Brie, Camembert)- 6 grams  -Medium cheeses(cheddar, swiss)- 7 or 8 grams per oz  -Hard cheeses(parmesan)- 10 grams per oz  Beans  -Tofu, 1/2 cup 20 grams  -Tofu, 1 oz., 2.3 grams  -Soy milk, 1 cup-6-10 grams  -Most beans(black, ochoa, lentils, etc.) about 7-10 grams protein per half cup of cooked beans  -soy beans, 1/2 cup cooked-14 grams  -Split peas, 1/2 cup cooked- 8 grams  Nuts and Seeds  -Peanut butter, 2 Tablespoons- 8 grams protein  -Almonds, 1/4 cup- 8 grams  -Peanuts, 1/4 cup-9 grams  -Cashews, 1/4 cup- 5 grams  -Pecans, 1/4 cup- 2.5 grams  -Sunflower seeds, 1/4 cup- 6 grams  -Pumpkin seeds, 1/4 cup-8 grams  -Flax seeds- 1/4 cup- 8 grams  Protein Supplements  -Ensure  -Boost  -Glucerna, if diabetic  When you have an open ulcer, your bodies protein needs are much higher, so it is recommended eat good sources of protein

## 2023-03-07 ENCOUNTER — PATIENT OUTREACH (OUTPATIENT)
Dept: CARE COORDINATION | Facility: CLINIC | Age: 88
End: 2023-03-07
Payer: MEDICARE

## 2023-03-07 NOTE — PROGRESS NOTES
"Clinic Care Coordination Contact    Community Health Worker Follow Up    Care Gaps:     Health Maintenance Due   Topic Date Due     MICROALBUMIN  Never done     ZOSTER IMMUNIZATION (2 of 3) 03/16/2011     LIPID  12/08/2022     DTAP/TDAP/TD IMMUNIZATION (3 - Td or Tdap) 03/20/2023         Care Plan:   Care Plan: Social Support     Problem: Inadequate social support     Goal: I would like to have in-home resources to assist me with my cares and other duties so I can stay in my home for as long as possible.     Start Date: 1/25/2023 Expected End Date: 4/25/2023    This Visit's Progress: 10% Recent Progress: 10%    Priority: High    Note:     Barriers: Needing additional assistance at home.   Strengths: Strong advocate for herself.   Patient expressed understanding of goal: Yes   Action steps to achieve this goal:  1. I will understand the [facility] RN Roberto Carlos will send my dar Chacko a list of agency who provide the care I am looking for at this time.   2. We will look over the resources provided to me by Roberto Carlos and will decide which one best suits my needs.   3.  We will contact our agency of choice directly.  4. I will report progress towards this goal at scheduled outreach telephone calls from my CCC team.     Discussed 3/7/23                        Intervention and Education during outreach: CHW reviewed patients upcoming appointment with Dr. Roth on 3/14/23 at 3:30PM. CHW gave patients dar Chacko contact information and encouraged her to reach out with any questions or concerns.    CHW Note:    CHW contacted patients dar Chacko to review/update CCC goal.    Ginna shared she feels the patients has been \"declining rapidly\" with an increase in confusion. Ginna shared the patient has an appointment with Dr. Roth on 3/14/23 at 3:30PM to discuss patients condition. Ginna shared after this appointment they should know whether they will be needing in home care or transitioning the patient into an assisted living facility. " Ginna will update CCC team on patients PCP appointment at next CCC outreach. CHW provided Ginna with CHW and CCC RN's contact information and encouraged her to reach out with questions or concerns.     CHW Plan: CHW will continue to support patient with goals through routine scheduled outreach. CHW will outreach in one month on 4/6/23.      Eduarda Burgess  Community Health Worker   [facility] Coord[facility]  [facility]   [email or web address]  Office: [phone]

## 2023-03-10 ENCOUNTER — TELEPHONE (OUTPATIENT)
Dept: INTERNAL MEDICINE | Facility: CLINIC | Age: 88
End: 2023-03-10
Payer: MEDICARE

## 2023-03-10 NOTE — TELEPHONE ENCOUNTER
March 10, 2023    Home health orders was received via fax for Dr. Steiner to sign.  Patient label was attached to paperwork and placed in provider's inbox to be signed.    Shefali Roland

## 2023-03-14 ENCOUNTER — OFFICE VISIT (OUTPATIENT)
Dept: INTERNAL MEDICINE | Facility: CLINIC | Age: 88
End: 2023-03-14
Payer: MEDICARE

## 2023-03-14 VITALS
RESPIRATION RATE: 14 BRPM | DIASTOLIC BLOOD PRESSURE: 64 MMHG | OXYGEN SATURATION: 95 % | TEMPERATURE: 97.8 F | SYSTOLIC BLOOD PRESSURE: 136 MMHG | HEART RATE: 75 BPM | HEIGHT: 66 IN | WEIGHT: 132.7 LBS | BODY MASS INDEX: 21.33 KG/M2

## 2023-03-14 DIAGNOSIS — N18.31 STAGE 3A CHRONIC KIDNEY DISEASE (H): ICD-10-CM

## 2023-03-14 DIAGNOSIS — K52.9 CHRONIC DIARRHEA: ICD-10-CM

## 2023-03-14 DIAGNOSIS — I48.20 CHRONIC ATRIAL FIBRILLATION (H): ICD-10-CM

## 2023-03-14 DIAGNOSIS — D64.9 NORMOCYTIC ANEMIA: ICD-10-CM

## 2023-03-14 DIAGNOSIS — E78.2 MIXED HYPERLIPIDEMIA: ICD-10-CM

## 2023-03-14 DIAGNOSIS — G31.84 MILD COGNITIVE IMPAIRMENT: Primary | ICD-10-CM

## 2023-03-14 DIAGNOSIS — F41.1 GAD (GENERALIZED ANXIETY DISORDER): ICD-10-CM

## 2023-03-14 DIAGNOSIS — T24.202D: ICD-10-CM

## 2023-03-14 PROCEDURE — 99214 OFFICE O/P EST MOD 30 MIN: CPT | Performed by: INTERNAL MEDICINE
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
## 2023-03-14 NOTE — PATIENT INSTRUCTIONS
For loose stools: Take probiotic and fiber once daily. If having more than 2 loose stools in a row can use imodium.     Drink at least 3 bottles of water.     Restart prevagen for your thinking.     Due for Tdap (tetanus booster) on March 20th. Can get this at any pharmacy.

## 2023-03-14 NOTE — TELEPHONE ENCOUNTER
March 14, 2023    Home health orders was picked up from outbox of Dr. Steiner.  Paperwork has been reviewed and is complete.  Per initial initial request, this was sent via fax to [phone].     Pam J. Behr     Patient Name: Monroe Mckinney  : 1948    MRN: [id number]                              [facility])   • Coronary artery disease of native Rebeca Hannah MD;  Location: [facility] CATH INVASIVE LOCATION;  Service: Cardiology;  Laterality: N/A;  no LV gram   • CARDIAC CATHETERIZATION N/A 12/21/2022    Procedure: Coronary angiography;  Surgeon: Rebeca Hannah MD;  Location: [facility] CATH INVASIVE LOCATION;  Service: Cardiology;  Laterality: N/A;   • CATARACT EXTRACTION Bilateral    • CHOLECYSTECTOMY WITH INTRAOPERATIVE CHOLANGIOGRAM N/A 11/4/2017    Procedure: CHOLECYSTECTOMY LAPAROSCOPIC ;  Surgeon: Micheal Desir MD;  Location: [facility] Micheal Desir MD;  Location: [facility] ENDOSCOPY;  Rudy Jerez MD;  Location: [facility] CVOR;  Merry Lopez, PT Physical Therapist               Mobility     [facility] Name 12/27/22 9313          [id number]          Gait/Stairs (Locomotion)    [place] Cheli to correct.  -MG           User Key  (Merry Lopez PT  12/27/2022

## 2023-03-16 ENCOUNTER — TELEPHONE (OUTPATIENT)
Dept: INTERNAL MEDICINE | Facility: CLINIC | Age: 88
End: 2023-03-16
Payer: MEDICARE

## 2023-03-16 DIAGNOSIS — K52.9 CHRONIC DIARRHEA: Primary | ICD-10-CM

## 2023-03-16 DIAGNOSIS — D64.9 NORMOCYTIC ANEMIA: ICD-10-CM

## 2023-03-16 PROBLEM — R79.89 ELEVATED TROPONIN: Status: RESOLVED | Noted: 2021-02-05 | Resolved: 2023-03-16

## 2023-03-16 PROBLEM — T24.202D: Status: ACTIVE | Noted: 2023-01-12

## 2023-03-16 PROBLEM — I48.91 UNSPECIFIED ATRIAL FIBRILLATION (H): Status: RESOLVED | Noted: 2020-01-21 | Resolved: 2023-03-16

## 2023-03-16 PROBLEM — G47.00 INSOMNIA, UNSPECIFIED TYPE: Status: RESOLVED | Noted: 2021-02-01 | Resolved: 2023-03-16

## 2023-03-16 PROBLEM — I95.9 HYPOTENSION, UNSPECIFIED HYPOTENSION TYPE: Status: RESOLVED | Noted: 2021-02-05 | Resolved: 2023-03-16

## 2023-03-16 PROBLEM — G31.84 MILD COGNITIVE IMPAIRMENT: Status: ACTIVE | Noted: 2023-03-16

## 2023-03-16 PROBLEM — J96.01 ACUTE RESPIRATORY FAILURE WITH HYPOXIA (H): Status: RESOLVED | Noted: 2021-02-05 | Resolved: 2023-03-16

## 2023-03-16 PROBLEM — I48.19 PERSISTENT ATRIAL FIBRILLATION (H): Status: RESOLVED | Noted: 2018-05-09 | Resolved: 2023-03-16

## 2023-03-16 PROBLEM — U07.1 2019 NOVEL CORONAVIRUS DISEASE (COVID-19): Status: RESOLVED | Noted: 2021-02-01 | Resolved: 2023-03-16

## 2023-03-16 PROBLEM — T24.202A PARTIAL THICKNESS BURN OF LEFT LOWER EXTREMITY, INITIAL ENCOUNTER: Status: RESOLVED | Noted: 2023-01-12 | Resolved: 2023-03-16

## 2023-03-16 NOTE — TELEPHONE ENCOUNTER
Yudy calling to request verbal orders for the following:    Skilled nursinx per week for 4 weeks.  Then every other week until recert period.  Wound care and education.    Medication orders for: Probiotic and Iron Folic.    Please call Yudy at [phone]. Secure VM if needed.

## 2023-03-16 NOTE — ASSESSMENT & PLAN NOTE
In February was started on lexapro. She stopped after about a week. Didn't like how it made her feel and once she had clear idea for expectation regarding healing of leg burn, wasn't so anxious anymore.

## 2023-03-16 NOTE — ASSESSMENT & PLAN NOTE
Family has noted more memory concerns recently and overall concern for her continuing to live independently. Patient, understandably, is very resistant to this.   - Will continue to address safety as I get to know her better at upcoming visits   - Continue prevagen, refill provided

## 2023-03-16 NOTE — ASSESSMENT & PLAN NOTE
Stable in the 8-10s dating back to 2018 or longer. May be related to CKD or age related BM suppression.   - continue to monitor, check CBC next time

## 2023-03-17 RX ORDER — FERROUS SULFATE 325(65) MG
325 TABLET ORAL
Qty: 30 TABLET | Refills: 4 | Status: SHIPPED | OUTPATIENT
Start: 2023-03-17 | End: 2023-03-24

## 2023-03-20 ENCOUNTER — MEDICAL CORRESPONDENCE (OUTPATIENT)
Dept: HEALTH INFORMATION MANAGEMENT | Facility: CLINIC | Age: 88
End: 2023-03-20

## 2023-03-24 ENCOUNTER — PATIENT OUTREACH (OUTPATIENT)
Dept: CARE COORDINATION | Facility: CLINIC | Age: 88
End: 2023-03-24

## 2023-03-24 ENCOUNTER — OFFICE VISIT (OUTPATIENT)
Dept: INTERNAL MEDICINE | Facility: CLINIC | Age: 88
End: 2023-03-24
Payer: MEDICARE

## 2023-03-24 VITALS
WEIGHT: 142.6 LBS | OXYGEN SATURATION: 99 % | HEIGHT: 66 IN | TEMPERATURE: 98.1 F | RESPIRATION RATE: 16 BRPM | BODY MASS INDEX: 22.92 KG/M2 | SYSTOLIC BLOOD PRESSURE: 140 MMHG | HEART RATE: 68 BPM | DIASTOLIC BLOOD PRESSURE: 72 MMHG

## 2023-03-24 DIAGNOSIS — L02.212 ABSCESS OF BACK: Primary | ICD-10-CM

## 2023-03-24 PROCEDURE — 99213 OFFICE O/P EST LOW 20 MIN: CPT | Mod: 25 | Performed by: INTERNAL MEDICINE

## 2023-03-24 PROCEDURE — 10060 I&D ABSCESS SIMPLE/SINGLE: CPT | Performed by: INTERNAL MEDICINE

## 2023-03-24 RX ORDER — DOXYCYCLINE 100 MG/1
100 CAPSULE ORAL 2 TIMES DAILY
Qty: 14 CAPSULE | Refills: 0 | Status: SHIPPED | OUTPATIENT
Start: 2023-03-24 | End: 2023-03-30

## 2023-03-24 NOTE — PATIENT INSTRUCTIONS
- Warm compress two times daily 10-15 minutes  - Drain the wound once a day  - Doxycycline 100 mg twice daily for 7 days    If it is still red around edges after 7 days, let me know and we might do antibiotics for longer

## 2023-03-24 NOTE — PROGRESS NOTES
Clinic Care Coordination Contact  Care Coordination Clinician Chart Review    Situation: Patient chart reviewed by Care Coordinator.       Background: Care Coordination Program started: 1/17/2023. Initial assessment completed and patient-centered care plan(s) were developed with participation from patient. Lead CC handed patient off to CHW for continued outreaches.       Assessment: Per chart review, patient outreach completed by CC CHW on 3/7/23.  Patient is actively working to accomplish goal(s). Patient's goal(s) appropriate and relevant at this time. Patient is due for updated Plan of Care.  Assessments will be completed annually or as needed/with change of patient status.      Care Plan: Social Support     Problem: Inadequate social support     Goal: I would like to have in-home resources to assist me with my cares and other duties so I can stay in my home for as long as possible.     Start Date: 1/25/2023 Expected End Date: 4/25/2023    This Visit's Progress: 10% Recent Progress: 10%    Priority: High    Note:     Barriers: Needing additional assistance at home.   Strengths: Strong advocate for herself.   Patient expressed understanding of goal: Yes   Action steps to achieve this goal:  1. I will understand the [facility] RN Roberto Carlos will send my daughter Ginna a list of agency who provide the care I am looking for at this time.   2. We will look over the resources provided to me by Roberto Carlos and will decide which one best suits my needs.   3.  We will contact our agency of choice directly.  4. I will report progress towards this goal at scheduled outreach telephone calls from my CCC team.     Discussed 3/7/23                           Plan/Recommendations: The patient will continue working with Care Coordination to achieve goal(s) as above. CHW will continue outreaches at minimum every 30 days and will involve Lead CC as needed or if patient is ready to move to Maintenance. Lead CC will continue to monitor CHW outreaches and  patient's progress to goal(s) every 6 weeks.     Plan of Care updated and sent to patient: Yes, via [employer]

## 2023-03-24 NOTE — ASSESSMENT & PLAN NOTE
Patient presents with purulent abscess on her back. From history sounds like prior cyst that got infected. Incision and drainage performed today. Area cleaned and bandaged after procedure. Instructions given to patient and son in law:  - Warm compress twice daily  - Drain once daily and wash with water/soap  - Start doxycycline 100 mg BID x7 days, if still slightly erythematous after 7 days let me know and we can extend antibiotic treatment  - Pain control with PRN tylenol   
Full range of motion of upper and lower extremities, no joint tenderness/swelling.

## 2023-03-24 NOTE — LETTER
[facility]  Patient Centered Plan of Care  About Me:        Patient Name:  Deloris Schneider,     Here is a copy of your Care Plan with the goal we are supporting you with at this time. Please let me know if we can help in any other way.     Thanks,     Dave Myhre, RN  CCC RN    YOB: 1930  Age:         89 year old   Bashir MRN:    [id number] Telephone Information:  Home Phone [phone]   Mobile [phone]       Address:  [address]  [place] [address] Email address:  [email or web address]      Emergency Contact(s)    Name Relationship Lgl Grd Work Phone Home Phone Mobile Phone   1. CELINE GUTIERREZ Daughter No  [phone]   2. LAURA ENRIQUEZ Daughter No  [phone]   3. YOLY MIMS Friend    [phone]           Primary language:  English     needed? No   [facility]:  [phone] op. 1  Other communication barriers:Cognitive impairment; Hearing impairment    Preferred Method of Communication:     Current living arrangement: I live alone    Mobility Status/ Medical Equipment: Independent w/Device        Health Maintenance  Health Maintenance Reviewed: Due/Overdue   Health Maintenance Due   Topic Date Due     MICROALBUMIN  Never done     ZOSTER IMMUNIZATION (2 of 3) 03/16/2011     LIPID  12/08/2022     DTAP/TDAP/TD IMMUNIZATION (3 - Td or Tdap) 03/20/2023         My Access Plan  Medical Emergency 911   Primary Clinic Line [facility] - [phone]   24 Hour Appointment Line [phone] or  [phone]) (toll-free)   24 Hour Nurse Line [phone] (toll-free)   Preferred Urgent Care [facility] [phone]     Preferred Hospital [facility]  [phone]     Preferred Pharmacy [facility] - [place], [place] - [address] [place]     Behavioral Health Crisis Line The National Suicide Prevention Lifeline at  6-298-699-0219 or Text/Call 988             My Care Team Members  Patient Care Team       Relationship Specialty Notifications Start End    Daniela Roth MD PCP - General Internal Medicine  3/14/23     Phone: [phone] Fax: [phone] [address] [facility] [address]    Kaitlin Steiner MD Assigned PCP   10/24/21     Phone: [phone] Fax: [phone]         [facility] [address]    Kristine Chan MD Assigned Heart and Vascular Provider   8/20/22     Phone: [phone] Fax: [phone] [address] [facility] [address] [facility] [address]    Eduarda Burgess MA Community Health Worker Primary Care - [facility] Admissions 1/17/23     Myhre, David J, RN Lead Care Coordinator Primary Care - [facility] Admissions 1/24/23     Haylie Coreas NP Assigned Surgical Provider   2/4/23     Phone: [phone] Fax: [phone]         [facility] [address] [facility] [address]            My Care Plans  Self Management and Treatment Plan  Care Plan  Care Plan: Social Support     Problem: Inadequate social support     Goal: I would like to have in-home resources to assist me with my cares and other duties so I can stay in my home for as long as possible.     Start Date: 1/25/2023 Expected End Date: 4/25/2023    This Visit's Progress: 10% Recent Progress: 10%    Priority: High    Note:     Barriers: Needing additional assistance at home.   Strengths: Strong advocate for herself.   Patient expressed understanding of goal: Yes   Action steps to achieve this goal:  1. I will understand the [facility] RN Roberto Carlos will send my daughter Ginna a list of agency who provide the care I am looking for at this time.   2. We will look over the resources provided to me by Roberto Carlos and will decide which one best suits my needs.   3.  We will contact our agency of choice directly.  4. I will report progress towards this goal at scheduled outreach telephone calls from my CCC team.     Discussed 3/7/23                          Action Plans on File:                       Advance Care Plans/Directives Type:   Advanced Directive - On File      My Medical and Care Information  Problem List   Patient Active Problem List   Diagnosis     Syncope, unspecified syncope type     Orthostatic hypotension     Bronchiectasis without complication (H)     Essential hypertension     Chronic atrial fibrillation (H)     Cardiac pacemaker in situ     Aortic valve stenosis, etiology of cardiac valve disease unspecified     Chronic diarrhea     Restless legs syndrome (RLS)     SHARON (generalized anxiety disorder)     Debility     Demand ischemia (H)     Pulmonary nodules     Pancreatic mass     Fatigue, unspecified type     Poor nutrition     Fall, subsequent encounter     External ear ulcer, limited to breakdown of skin (H)     Malignant neoplasm of colon, unspecified part of colon (H)     Thrombocytopenia (H)     Abnormal computed tomography of cecum and terminal ileum     Coronary atherosclerosis     Dry eye syndrome of bilateral lacrimal glands     Hemoperitoneum     Intermittent claudication (H)     Malignant neoplasm of skin     Hyperlipidemia, unspecified     Mixed hyperlipidemia     Pain, unspecified     Special screening for malignant neoplasms, colon     Vitamin D deficiency     Nonrheumatic aortic valve stenosis     Bronchiectasis with acute lower respiratory infection (H)     Supraventricular tachycardia (H)     Xerosis cutis     Insomnia     Pancreatic cyst     RLS (restless legs syndrome)     SSS (sick sinus syndrome) (H)     Cellulitis of leg, left     Second degree burn of left lower extremity excluding ankle and foot, subsequent encounter     CKD (chronic kidney disease) stage 3, GFR 30-59 ml/min (H)     Mild cognitive impairment     Normocytic anemia      Current Medications and Allergies:  See printed Medication Report.    Care Coordination Start Date: 1/17/2023   Frequency of Care Coordination: monthly     Form Last Updated:  03/24/2023

## 2023-03-24 NOTE — PROGRESS NOTES
Assessment & Plan   Problem List Items Addressed This Visit        Other    Abscess of back - Primary     Patient presents with purulent abscess on her back. From history sounds like prior cyst that got infected. Incision and drainage performed today. Area cleaned and bandaged after procedure. Instructions given to patient and son in law:  - Warm compress twice daily  - Drain once daily and wash with water/soap  - Start doxycycline 100 mg BID x7 days, if still slightly erythematous after 7 days let me know and we can extend antibiotic treatment  - Pain control with PRN tylenol          Relevant Medications    doxycycline monohydrate (MONODOX) 100 MG capsule    Other Relevant Orders    Incision and drainage (Completed)      Prescription drug management   I spent a total of 30 minutes on the day of the visit.   Time spent doing chart review, history and exam, documentation and further activities per the note    Daniela Roth MD  [facility]   Deloris Freeman is a 89 year old, presenting for the following health issues:  Mass    Additional Questions 3/14/2023   Roomed by Sakina LEONE Cma   Accompanied by Son-in-law Roberto Carlos     History of Present Illness       Reason for visit:  New primary doctor    She eats 2-3 servings of fruits and vegetables daily.She consumes 2 sweetened beverage(s) daily.She exercises with enough effort to increase her heart rate 9 or less minutes per day.  She exercises with enough effort to increase her heart rate 3 or less days per week.   She is taking medications regularly.       Concern - Large lump on upper back  Onset: 5 or 6 years   Description: about 1 inch big and then all of a sudden oozing off and on and is bigger now, has a scab on it and patient managed to get a Band-Aid on it, before bandage it would stick to shirt and cause it to ooze again, unless it sticks and pulls it doesn't hurt and when someone touches it  Intensity: very  "tender  Progression of Symptoms:  worsening  Accompanying Signs & Symptoms: N/A  Previous history of similar problem: no but it's the same one from 5-6 years ago  Precipitating factors:        Worsened by: N/A  Alleviating factors:        Improved by: N/A  Therapies tried and outcome: None    Patient states that for 5-6 years she has had a small lump on her back, about the size of a fingernail. Other doctors have seen and said not to worry about it. Then about a week or so ago started oozing and has gotten bigger and much more tender.     Today is her 89rd birthday.    Review of Systems   Constitutional, HEENT, cardiovascular, pulmonary, gi and gu systems are negative, except as otherwise noted.      Objective    BP (!) 140/72 (BP Location: Right arm, Patient Position: Sitting, Cuff Size: Adult Regular)   Pulse 68   Temp 98.1  F (36.7  C) (Oral)   Resp 16   Ht 1.67 m (5' 5.75\")   Wt 64.7 kg (142 lb 9.6 oz)   SpO2 99%   BMI 23.19 kg/m    Body mass index is 23.19 kg/m .  Physical Exam   GENERAL: healthy, elderly woman, alert and no distress  EYES: Eyes grossly normal to inspection and conjunctivae and sclerae normal  HENT: nose and mouth without ulcers or lesions  RESP: breathing comfortably and speaking in full sentences on room air with no respiratory distress or coughing  CV: warm and well perfused  ABDOMEN: soft, nontender  SKIN:       NEURO: No focal deficits, mentation intact and speech normal  PSYCH: mentation appears normal, affect normal/bright    Procedure:  Drainage of back abscess     Diagnosis:  Back Abscess    Description of procedure:  Patient given informed consent prior to proceeding.  Other options for treatment were discussed.  Area prepped with betadine.  Incision made with an 11 blade.  1 puncture site(s) done. Abscess was then drained with manual pressure. Bandage applied and aftercare instructions given.              "

## 2023-03-27 ENCOUNTER — OFFICE VISIT (OUTPATIENT)
Dept: VASCULAR SURGERY | Facility: CLINIC | Age: 88
End: 2023-03-27
Attending: NURSE PRACTITIONER
Payer: MEDICARE

## 2023-03-27 VITALS
RESPIRATION RATE: 16 BRPM | SYSTOLIC BLOOD PRESSURE: 118 MMHG | BODY MASS INDEX: 23.29 KG/M2 | WEIGHT: 143.2 LBS | DIASTOLIC BLOOD PRESSURE: 62 MMHG | HEART RATE: 64 BPM

## 2023-03-27 DIAGNOSIS — I87.8 VENOUS STASIS: ICD-10-CM

## 2023-03-27 DIAGNOSIS — Z79.01 CHRONIC ANTICOAGULATION: ICD-10-CM

## 2023-03-27 DIAGNOSIS — R60.9 DEPENDENT EDEMA: ICD-10-CM

## 2023-03-27 DIAGNOSIS — I87.2 VENOUS (PERIPHERAL) INSUFFICIENCY: ICD-10-CM

## 2023-03-27 DIAGNOSIS — T24.202D BURN OF LEFT LEG, SECOND DEGREE, SUBSEQUENT ENCOUNTER: Primary | ICD-10-CM

## 2023-03-27 DIAGNOSIS — I87.309 VENOUS HYPERTENSION: ICD-10-CM

## 2023-03-27 PROCEDURE — 11042 DBRDMT SUBQ TIS 1ST 20SQCM/<: CPT | Performed by: NURSE PRACTITIONER

## 2023-03-27 ASSESSMENT — PAIN SCALES - GENERAL: PAINLEVEL: NO PAIN (0)

## 2023-03-27 NOTE — PATIENT INSTRUCTIONS
"Wound care supplies were not ordered or needed today.        Wound Care Instructions    Once per week and as needed, Cleanse your left leg  wound(s) with Dilute hibiclens 30cc in 500cc NS.    Pat Dry with non-sterile gauze    Apply Lotion to the intact skin surrounding your wound and other dry skin locations. Some good lotions include: Remedy Skin Repair Cream, Sarna, Vanicream or Cetaphil    Primary Dressing: Apply medihoney alginate into/onto the wounds; cut to fit to the size of the wound    Secondary dressing: Cover with ABD    Secure with non-sterile roll gauze (4\" x 75\" roll) and tape (1\" roll tape) as needed; avoid adhesive directly on the skin    Compression: 2 layer lite    It is not ok to get your wound wet in the bath or shower      If for some reason you are not able to get your dressing(s) changed as outlined above (due to illness, lack of supplies, lack of help) please do the following: remove old, soiled dressings; wash the wounds with saline; pat dry; apply ABD pad or other absorbant pad and secure with rolled gauze; avoid tape directly on your skin; Call the clinic as soon as possible to let us know what the current issues are in receiving wound care [phone].      SEEK MEDICAL CARE IF:  You have an increase in swelling, pain, or redness around the wound.  You have an increase in the amount of pus coming from the wound.  There is a bad smell coming from the wound.  The wound appears to be worsening/enlarging  You have a fever greater than 101.5 F      It is ok to continue current wound care treatment/products for the next 2-3 days until new wound care supplies are ordered and arrive. If longer than this please contact our office at [phone].    If you have a 2 layer or 4 layer compression wrap on these are safe to have on for ONLY 7 days. If for some reason you are not able to get the wrap(s) changed (due to illness; lack of supplies, lack of help, lack of transportation) please do the " following: unwrap the old 2 or 4 layer compression wrap; avoid using scissors as you could cut your skin and cause wounds; use tubular compression when available. Call to reschedule your home care or clinic visit appointment as soon as possible.    Please NOTE: if you are 15 minutes late to your clinic appointment you will have to be rescheduled. Please call our clinic as soon as possible if you know you will not be able to get to your appointment at [phone].    If you fail to show up to 3 scheduled clinic appointments you will be dismissed from our clinic.              We want to hear from you!  In the next few weeks, you should receive a call or email to complete a survey about your visit at [facility]. Please help us improve your appointment experience by letting us know how we did today. We strive to make your experience good and value any ways in which we could do better.      We value your input and suggestions.    Thank you for choosing the [facility]!           It is recommended that you do not get your ulcer wet when showering.  Listed below are several ways of keeping it dry when you shower.     1. Wrap it with Press and Seal plastic wrap.  It can be found in the stores where the plastic wraps or tin foil is kept.               2.  Some people take a bath and hang their leg/foot out of the tub.                        3  Put your leg in a plastic bag and tape it on.           4. You can purchase a shower cover for casts at some pharmacies and through the Internet.            5. Take a Bed Bath or wash up at the sink         High Protein Foods  Chicken  -Chicken breast, 3.5oz.-30 grams protein  -Chicken thigh-10 grams(average size)  -Drumstick-11 grams  -Wing- 6 grams  -Chicken meat, cooked, 4 oz.  Beef  -Hamburger uday, 4 oz-28 grams protein  -Steak, 6 oz-42 grams  -Most cuts of beef- 7 grams of protein per ounce  Fish  -Most fish fillets or steaks are about 22  grams of protein for 3 1/2 oz(100 grams) of cooked fish, or 6 grams per ounce  -Tuna, 6 oz can-40 grams of protein  Pork  -Pork chop, average-22 grams protein  -Pork loin or tenderloin, 4 oz.-29 grams  -Ham, 3oz serving- 19 grams  -Ground pork 3oz cooked-22 grams  -Damian, 1 slice-3 grams  -Qatari-style damian(black damian), slice-5-6 grams  Eggs and Dairy  -Egg, large-7 grams  -Milk, 1 cup-8 grams  -Cottage cheese, 1/2 cup-15 grams  -Greek yogurt, 1 cup-usually 8-12 grams, check label  -Soft cheeses (Mozzarella, Brie, Camembert)- 6 grams  -Medium cheeses(cheddar, swiss)- 7 or 8 grams per oz  -Hard cheeses(parmesan)- 10 grams per oz  Beans  -Tofu, 1/2 cup 20 grams  -Tofu, 1 oz., 2.3 grams  -Soy milk, 1 cup-6-10 grams  -Most beans(black, ochoa, lentils, etc.) about 7-10 grams protein per half cup of cooked beans  -soy beans, 1/2 cup cooked-14 grams  -Split peas, 1/2 cup cooked- 8 grams  Nuts and Seeds  -Peanut butter, 2 Tablespoons- 8 grams protein  -Almonds, 1/4 cup- 8 grams  -Peanuts, 1/4 cup-9 grams  -Cashews, 1/4 cup- 5 grams  -Pecans, 1/4 cup- 2.5 grams  -Sunflower seeds, 1/4 cup- 6 grams  -Pumpkin seeds, 1/4 cup-8 grams  -Flax seeds- 1/4 cup- 8 grams  Protein Supplements  -Ensure  -Boost  -Glucerna, if diabetic  When you have an open ulcer, your bodies protein needs are much higher, so it is recommended eat good sources of protein
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
## 2023-03-29 ENCOUNTER — MYC MEDICAL ADVICE (OUTPATIENT)
Dept: INTERNAL MEDICINE | Facility: CLINIC | Age: 88
End: 2023-03-29
Payer: MEDICARE

## 2023-03-29 DIAGNOSIS — L02.212 ABSCESS OF BACK: ICD-10-CM

## 2023-03-30 RX ORDER — DOXYCYCLINE 100 MG/1
100 CAPSULE ORAL 2 TIMES DAILY
Qty: 14 CAPSULE | Refills: 0 | Status: SHIPPED | OUTPATIENT
Start: 2023-03-30 | End: 2023-01-01

## 2023-03-31 ENCOUNTER — OFFICE VISIT (OUTPATIENT)
Dept: CARDIOLOGY | Facility: CLINIC | Age: 88
End: 2023-03-31
Attending: INTERNAL MEDICINE
Payer: MEDICARE

## 2023-03-31 VITALS
SYSTOLIC BLOOD PRESSURE: 110 MMHG | DIASTOLIC BLOOD PRESSURE: 62 MMHG | WEIGHT: 141 LBS | BODY MASS INDEX: 22.93 KG/M2 | RESPIRATION RATE: 16 BRPM | HEART RATE: 80 BPM

## 2023-03-31 DIAGNOSIS — I49.5 SICK SINUS SYNDROME (H): ICD-10-CM

## 2023-03-31 DIAGNOSIS — I35.0 NONRHEUMATIC AORTIC VALVE STENOSIS: Primary | ICD-10-CM

## 2023-03-31 DIAGNOSIS — Z95.0 CARDIAC PACEMAKER IN SITU: Primary | ICD-10-CM

## 2023-03-31 DIAGNOSIS — I48.19 PERSISTENT ATRIAL FIBRILLATION (H): ICD-10-CM

## 2023-03-31 DIAGNOSIS — Z95.0 CARDIAC PACEMAKER IN SITU: ICD-10-CM

## 2023-03-31 PROCEDURE — 93280 PM DEVICE PROGR EVAL DUAL: CPT | Performed by: INTERNAL MEDICINE

## 2023-03-31 PROCEDURE — 99214 OFFICE O/P EST MOD 30 MIN: CPT | Performed by: INTERNAL MEDICINE

## 2023-03-31 NOTE — PROGRESS NOTES
HEART CARE ENCOUNTER CONSULTATON NOTE      [facility]  [phone]      Assessment/Recommendations   Assessment:  1.  Atrial fibrillation: Chronic and well rate controlled  2.  History of SVT  3.  Tachybradycardia status post dual-chamber pacemaker  4.  Anticoagulation: Maintained on low-dose Eliquis for anticoagulation  5.  Aortic stenosis: Moderate aortic stenosis     Plan:  1.  Repeat echocardiogram in 6 months prior to next appointment  2.  Continue metoprolol  3.  Continue Eliquis for anticoagulation          History of Present Illness/Subjective    HPI: Deloris Larson is a 89 year old female with history of tachybradycardia syndrome status post dual-chamber pacemaker 6/27/2018, SVT, chronic atrial fibrillation rate controlled, coronary artery disease status post remote PCI of RCA (angiogram 2018 nonobstructive coronary artery disease), moderate aortic stenosis who I am seeing today in follow-up.  She had a syncopal episode back in December.  For a few days she is feeling unwell and was not eating as much.  She got up and was making tea.  She passed out and spilled the hot water on herself.  She had a significant burn on her left lower extremity that is still healing.  She is being followed in wound clinic.  No complaints of chest pain, breathing difficulty.  No increased edema.  He is here today accompanied by her son-in-law.  She has 3 kids.    Echocardiogram 9/2022  Normal left ventricular size. Mild left ventricular hypertrophy. Left  ventricular systolic function is normal. The visual ejection fraction is  estimated 55 to 60%. No observed wall motion abnormality.  Normal right ventricular size and systolic function.  Pacemaker catheter in the right heart.  Mild to moderate left atrial enlargement. Mild right atrial enlargement.  Moderate mitral annular calcification.There is moderate (2+) mitral  regurgitation.  Moderate tricuspid regurgitation. Estimate of RV systolic pressure was  32 mmHg  plus right atrial pressure.  Moderate calcific aortic stenosis. Peak velocity 2.6 m/s. Mean gradient 19  mmHg. Calculated valve area 1.1 cmÂ . Mild aortic insufficiency  Compared to October 2019 the mean gradient across the aortic valve has  increased from 12 mmHg to 19 mmHg now consistent with moderate aortic  stenosis.     Physical Examination  Review of Systems   Vitals: /62 (BP Location: Left arm, Patient Position: Sitting, Cuff Size: Adult Regular)   Pulse 80   Resp 16   Wt 64 kg (141 lb)   BMI 22.93 kg/m    BMI= Body mass index is 22.93 kg/m .  Wt Readings from Last 3 Encounters:   03/31/23 64 kg (141 lb)   03/27/23 65 kg (143 lb 3.2 oz)   03/24/23 64.7 kg (142 lb 9.6 oz)       General Appearance:   no distress, normal body habitus   ENT/Mouth: membranes moist, no oral lesions or bleeding gums.      EYES:  no scleral icterus, normal conjunctivae   Neck: no carotid bruits or thyromegaly   Chest/Lungs:   lungs are clear to auscultation   Cardiovascular:   Regular. Normal first and second heart sounds with systolic murmurs,no edema bilaterally    Abdomen:  bowel sounds are present   Extremities: no cyanosis or clubbing   Skin: no xanthelasma, warm.    Neurologic: normal  bilateral, no tremors     Psychiatric: alert and oriented x3, calm        Please refer above for cardiac ROS details.        Medical History  Surgical History Family History Social History   Past Medical History:   Diagnosis Date     2019 novel coronavirus disease (COVID-19) 2/1/2021     Acute respiratory failure with hypoxia (H) 2/5/2021     Anemia      Atrial fibrillation (H)      CAD (coronary artery disease)      Chronic diarrhea      Clinical diagnosis of COVID-19     1/2021     Coronary artery disease      Former smoker      Hematoma of left iliopsoas muscle 01/19/2020    while on eliquis.     History of skin cancer      Hyperlipidemia      Hypertension      Insomnia      Lumbar spondylosis 2016     Migraine      PAD  (peripheral artery disease) (H) 10/19/2015     Paroxysmal SVT (supraventricular tachycardia) (H)     Created by Conversion      Persistent atrial fibrillation (H) 05/09/2018    New diagnosis April 16 18th and found incidentally on physical exam during yearly exam in primary clinic [id number] score of 5-new Eliquis start     Poliomyelitis      Restless legs syndrome (RLS)      RLS (restless legs syndrome) 02/28/2017     Sick sinus syndrome (H) 01/28/2020     Sigmoid diverticulosis 04/04/2007     SSS (sick sinus syndrome) (H)     S/p pacemaker     Past Surgical History:   Procedure Laterality Date     APPENDECTOMY       APPENDECTOMY       CAPSULOTOMY Bilateral 12/2015    YAG capsulotomy surgery. 12/21/15, 12/28/15     CARDIOVERSION  05/24/2018    EP Cardioversion External     CATARACT EXTRACTION, BILATERAL Bilateral 03/2012    3/15 and 3/29 by Dr. Barrett at the [facility]     CHOLECYSTECTOMY       CHOLECYSTECTOMY       COLONOSCOPY  05/17/2012     COLONOSCOPY W/ BIOPSIES  04/04/2007     COLONOSCOPY W/ POLYPECTOMY  05/07/2012    2 mm tubular adenoma in the rectum. Hemorrhoids. Diverticulosis.     CORONARY ANGIOPLASTY       CV CORONARY ANGIOGRAM N/A 06/26/2018    Procedure: Coronary Angiogram;  Surgeon: Joby Vargas MD;  Location: [facility];  Service:      EP PACEMAKER INSERT N/A 06/27/2018    Procedure: EP Pacemaker Insertion;  Surgeon: Osito Alvarez MD;  Location: [facility];  Service:      HERNIA REPAIR Right      HYSTERECTOMY       HYSTERECTOMY TOTAL ABDOMINAL       IMPLANT PACEMAKER       INGUINAL HERNIA REPAIR Right     Indirect- Dr. Pedersen     MASTECTOMY       OOPHORECTOMY       SKIN CANCER EXCISION  2015    Right leg on 10/21/15. Right arm 9/29/15     STRIP VEIN      ligation?     TUBAL LIGATION       TUBAL LIGATION       Family History   Adopted: Yes   Problem Relation Age of Onset     Pulmonary Embolism Mother 32.00     Heart Disease Father      CABG Son      Stomach Cancer  Grandson 20.00     Pulmonary Embolism Maternal Grandmother 32.00     No Known Problems Daughter      No Known Problems Daughter      CABG Son 64.00        Social History     Socioeconomic History     Marital status:      Spouse name: Not on file     Number of children: 3     Years of education: Not on file     Highest education level: Not on file   Occupational History     Not on file   Tobacco Use     Smoking status: Former     Packs/day: 1.50     Years: 25.00     Pack years: 37.50     Types: Cigarettes     Start date: 1949     Quit date: 1974     Years since quittin.2     Smokeless tobacco: Never   Vaping Use     Vaping Use: Never used   Substance and Sexual Activity     Alcohol use: Yes     Alcohol/week: 7.0 standard drinks     Drug use: No     Sexual activity: Not on file   Other Topics Concern     Not on file   Social History Narrative    She lives alone in a house. She has 3 children and 8 grandchildren and multiple great grandchildren. She is retired. 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 She does not smoke cigarettes and rarely drinks alcoho l.    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  Her [employer] had to close, due to declining numbers.    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 She has never driven a car. Does her own cooking, and ba sarika. She does not use a computer, never did.    Jayde Diaz MD 2021         Social Determinants of Health     Financial Resource Strain: Low Risk      Difficulty of Paying Living Expenses: Not hard at all   Food Insecurity: No Food Insecurity     Worried About Running Out of Food in the Last Year: Never true     Ran Out of Food in the Last Year: Never true   Transportation Needs: Unmet Transportation Needs     Lack of Transportation (Medical): No     Lack of Transportation (Non-Medical): Yes   Physical Activity: Inactive     Days of  Exercise per Week: 0 days     Minutes of Exercise per Session: 0 min   Stress: Stress Concern Present     Feeling of Stress : To some extent   Social Connections: Socially Isolated     Frequency of Communication with Friends and Family: More than three times a week     Frequency of Social Gatherings with Friends and Family: Once a week     Attends Latter day Services: Never     Active Member of Clubs or Organizations: No     Attends Club or Organization Meetings: Not on file     Marital Status:    Intimate Partner Violence: Not on file   Housing Stability: High Risk     Unable to Pay for Housing in the Last Year: Yes     Number of Places Lived in the Last Year: 1     Unstable Housing in the Last Year: No           Medications  Allergies   Current Outpatient Medications   Medication Sig Dispense Refill     acetaminophen (TYLENOL) 500 MG tablet Take 1,000 mg by mouth every 8 hours as needed       apixaban ANTICOAGULANT (ELIQUIS ANTICOAGULANT) 2.5 MG tablet Take 1 tablet (2.5 mg) by mouth 2 times daily 60 tablet 3     atorvastatin (LIPITOR) 20 MG tablet Take 1 tablet (20 mg) by mouth At Bedtime 90 tablet 0     doxycycline monohydrate (MONODOX) 100 MG capsule Take 1 capsule (100 mg) by mouth 2 times daily 14 capsule 0     loperamide (IMODIUM) 2 MG capsule Take 1 capsule (2 mg) by mouth daily as needed for diarrhea 90 capsule 0     Melatonin 5 MG CHEW Take 5 mg by mouth at bedtime as needed, may repeat once 90 tablet 3     metoprolol succinate ER (TOPROL-XL) 50 MG 24 hr tablet Take 1 tablet (50 mg) by mouth daily 90 tablet 3     Probiotic Product (PROBIOTIC ACIDOPHILUS BEADS) CAPS Take 1 capsule by mouth daily 90 capsule 3     Apoaequorin (PREVAGEN) 10 MG CAPS Take 1 capsule by mouth daily (Patient not taking: Reported on 3/31/2023) 90 capsule 3       Allergies   Allergen Reactions     Diphenhydramine           Lab Results    Chemistry/lipid CBC Cardiac Enzymes/BNP/TSH/INR   Recent Labs   Lab Test 12/08/21  1342    CHOL 154   HDL 55   LDL 86   TRIG 67     Recent Labs   Lab Test 12/08/21  1342 04/16/18  1238 02/28/17  1314   LDL 86 76 85     Recent Labs   Lab Test 01/16/23  0700      POTASSIUM 4.2   CHLORIDE 104   CO2 27   GLC 95   BUN 14.4   CR 0.97*   GFRESTIMATED 55*   RAMA 8.5     Recent Labs   Lab Test 01/16/23  0700 01/12/23  1849 12/29/22  1013   CR 0.97* 1.02* 1.09*     No results for input(s): A1C in the last 08475 hours.       Recent Labs   Lab Test 01/16/23  0700   WBC 6.5   HGB 9.7*   HCT 30.9*   MCV 95        Recent Labs   Lab Test 01/16/23  0700 01/12/23  1849 12/29/22  1013   HGB 9.7* 10.9* 11.9    Recent Labs   Lab Test 01/29/21  0532 01/29/21  0006 01/28/21  1818   TROPONINI 0.03 0.04 0.04     No results for input(s): BNP, NTBNPI, NTBNP in the last 07354 hours.  Recent Labs   Lab Test 12/08/21  1342   TSH 4.25     Recent Labs   Lab Test 01/30/21  0817 01/30/21  0000 01/29/21  0532   INR 1.23* 1.23* 1.37*        Kristine Chan MD

## 2023-03-31 NOTE — LETTER
3/31/2023    Daniela Roth MD  [address] [address]    RE: Deloris Larson       Dear Colleague,     I had the pleasure of seeing Deloris Larson in the [facility].    HEART CARE ENCOUNTER CONSULTATON NOTE      [facility]  [phone]      Assessment/Recommendations   Assessment:  1.  Atrial fibrillation: Chronic and well rate controlled  2.  History of SVT  3.  Tachybradycardia status post dual-chamber pacemaker  4.  Anticoagulation: Maintained on low-dose Eliquis for anticoagulation  5.  Aortic stenosis: Moderate aortic stenosis     Plan:  1.  Repeat echocardiogram in 6 months prior to next appointment  2.  Continue metoprolol  3.  Continue Eliquis for anticoagulation          History of Present Illness/Subjective    HPI: Deloris Larson is a 89 year old female with history of tachybradycardia syndrome status post dual-chamber pacemaker 6/27/2018, SVT, chronic atrial fibrillation rate controlled, coronary artery disease status post remote PCI of RCA (angiogram 2018 nonobstructive coronary artery disease), moderate aortic stenosis who I am seeing today in follow-up.  She had a syncopal episode back in December.  For a few days she is feeling unwell and was not eating as much.  She got up and was making tea.  She passed out and spilled the hot water on herself.  She had a significant burn on her left lower extremity that is still healing.  She is being followed in wound clinic.  No complaints of chest pain, breathing difficulty.  No increased edema.  He is here today accompanied by her son-in-law.  She has 3 kids.    Echocardiogram 9/2022  Normal left ventricular size. Mild left ventricular hypertrophy. Left  ventricular systolic function is normal. The visual ejection fraction is  estimated 55 to 60%. No observed wall motion abnormality.  Normal right ventricular size and systolic function.  Pacemaker catheter in the right heart.  Mild to  moderate left atrial enlargement. Mild right atrial enlargement.  Moderate mitral annular calcification.There is moderate (2+) mitral  regurgitation.  Moderate tricuspid regurgitation. Estimate of RV systolic pressure was 32 mmHg  plus right atrial pressure.  Moderate calcific aortic stenosis. Peak velocity 2.6 m/s. Mean gradient 19  mmHg. Calculated valve area 1.1 cmÂ . Mild aortic insufficiency  Compared to October 2019 the mean gradient across the aortic valve has  increased from 12 mmHg to 19 mmHg now consistent with moderate aortic  stenosis.     Physical Examination  Review of Systems   Vitals: /62 (BP Location: Left arm, Patient Position: Sitting, Cuff Size: Adult Regular)   Pulse 80   Resp 16   Wt 64 kg (141 lb)   BMI 22.93 kg/m    BMI= Body mass index is 22.93 kg/m .  Wt Readings from Last 3 Encounters:   03/31/23 64 kg (141 lb)   03/27/23 65 kg (143 lb 3.2 oz)   03/24/23 64.7 kg (142 lb 9.6 oz)       General Appearance:   no distress, normal body habitus   ENT/Mouth: membranes moist, no oral lesions or bleeding gums.      EYES:  no scleral icterus, normal conjunctivae   Neck: no carotid bruits or thyromegaly   Chest/Lungs:   lungs are clear to auscultation   Cardiovascular:   Regular. Normal first and second heart sounds with systolic murmurs,no edema bilaterally    Abdomen:  bowel sounds are present   Extremities: no cyanosis or clubbing   Skin: no xanthelasma, warm.    Neurologic: normal  bilateral, no tremors     Psychiatric: alert and oriented x3, calm        Please refer above for cardiac ROS details.        Medical History  Surgical History Family History Social History   Past Medical History:   Diagnosis Date    2019 novel coronavirus disease (COVID-19) 2/1/2021    Acute respiratory failure with hypoxia (H) 2/5/2021    Anemia     Atrial fibrillation (H)     CAD (coronary artery disease)     Chronic diarrhea     Clinical diagnosis of COVID-19     1/2021    Coronary artery disease      Former smoker     Hematoma of left iliopsoas muscle 01/19/2020    while on eliquis.    History of skin cancer     Hyperlipidemia     Hypertension     Insomnia     Lumbar spondylosis 2016    Migraine     PAD (peripheral artery disease) (H) 10/19/2015    Paroxysmal SVT (supraventricular tachycardia) (H)     Created by Conversion     Persistent atrial fibrillation (H) 05/09/2018    New diagnosis April 16 18th and found incidentally on physical exam during yearly exam in primary clinic [id number] score of 5-new Eliquis start    Poliomyelitis     Restless legs syndrome (RLS)     RLS (restless legs syndrome) 02/28/2017    Sick sinus syndrome (H) 01/28/2020    Sigmoid diverticulosis 04/04/2007    SSS (sick sinus syndrome) (H)     S/p pacemaker     Past Surgical History:   Procedure Laterality Date    APPENDECTOMY      APPENDECTOMY      CAPSULOTOMY Bilateral 12/2015    YAG capsulotomy surgery. 12/21/15, 12/28/15    CARDIOVERSION  05/24/2018    EP Cardioversion External    CATARACT EXTRACTION, BILATERAL Bilateral 03/2012    3/15 and 3/29 by Dr. Barrett at the [facility]    CHOLECYSTECTOMY      CHOLECYSTECTOMY      COLONOSCOPY  05/17/2012    COLONOSCOPY W/ BIOPSIES  04/04/2007    COLONOSCOPY W/ POLYPECTOMY  05/07/2012    2 mm tubular adenoma in the rectum. Hemorrhoids. Diverticulosis.    CORONARY ANGIOPLASTY      CV CORONARY ANGIOGRAM N/A 06/26/2018    Procedure: Coronary Angiogram;  Surgeon: Joby Vargas MD;  Location: [facility];  Service:     EP PACEMAKER INSERT N/A 06/27/2018    Procedure: EP Pacemaker Insertion;  Surgeon: Osito Alvarez MD;  Location: [facility];  Service:     HERNIA REPAIR Right     HYSTERECTOMY      HYSTERECTOMY TOTAL ABDOMINAL      IMPLANT PACEMAKER      INGUINAL HERNIA REPAIR Right     Indirect- Dr. Pedersen    MASTECTOMY      OOPHORECTOMY      SKIN CANCER EXCISION  2015    Right leg on 10/21/15. Right arm 9/29/15    STRIP VEIN      ligation?    TUBAL LIGATION       TUBAL LIGATION       Family History   Adopted: Yes   Problem Relation Age of Onset    Pulmonary Embolism Mother 32.00    Heart Disease Father     CABG Son     Stomach Cancer Grandson 20.00    Pulmonary Embolism Maternal Grandmother 32.00    No Known Problems Daughter     No Known Problems Daughter     CABG Son 64.00        Social History     Socioeconomic History    Marital status:      Spouse name: Not on file    Number of children: 3    Years of education: Not on file    Highest education level: Not on file   Occupational History    Not on file   Tobacco Use    Smoking status: Former     Packs/day: 1.50     Years: 25.00     Pack years: 37.50     Types: Cigarettes     Start date: 1949     Quit date: 1974     Years since quittin.2    Smokeless tobacco: Never   Vaping Use    Vaping Use: Never used   Substance and Sexual Activity    Alcohol use: Yes     Alcohol/week: 7.0 standard drinks    Drug use: No    Sexual activity: Not on file   Other Topics Concern    Not on file   Social History Narrative    She lives alone in a house. She has 3 children and 8 grandchildren and multiple great grandchildren. She is retired. 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 She does not smoke cigarettes and rarely drinks alcoho l.    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 Does her own cooking, and ba sarika. She does not use a computer, never did.    Jayde Diaz MD 2021         Social Determinants of Health     Financial Resource Strain: Low Risk     Difficulty of Paying Living Expenses: Not hard at all   Food Insecurity: No Food Insecurity    Worried About Running Out of Food in the Last Year: Never true    Ran Out of Food in the Last Year: Never true   Transportation Needs: Unmet  Transportation Needs    Lack of Transportation (Medical): No    Lack of Transportation (Non-Medical): Yes   Physical Activity: Inactive    Days of Exercise per Week: 0 days    Minutes of Exercise per Session: 0 min   Stress: Stress Concern Present    Feeling of Stress : To some extent   Social Connections: Socially Isolated    Frequency of Communication with Friends and Family: More than three times a week    Frequency of Social Gatherings with Friends and Family: Once a week    Attends [employer] Services: Never    Active Member of Clubs or Organizations: No    Attends Club or Organization Meetings: Not on file    Marital Status:    Intimate Partner Violence: Not on file   Housing Stability: High Risk    Unable to Pay for Housing in the Last Year: Yes    Number of Places Lived in the Last Year: 1    Unstable Housing in the Last Year: No           Medications  Allergies   Current Outpatient Medications   Medication Sig Dispense Refill    acetaminophen (TYLENOL) 500 MG tablet Take 1,000 mg by mouth every 8 hours as needed      apixaban ANTICOAGULANT (ELIQUIS ANTICOAGULANT) 2.5 MG tablet Take 1 tablet (2.5 mg) by mouth 2 times daily 60 tablet 3    atorvastatin (LIPITOR) 20 MG tablet Take 1 tablet (20 mg) by mouth At Bedtime 90 tablet 0    doxycycline monohydrate (MONODOX) 100 MG capsule Take 1 capsule (100 mg) by mouth 2 times daily 14 capsule 0    loperamide (IMODIUM) 2 MG capsule Take 1 capsule (2 mg) by mouth daily as needed for diarrhea 90 capsule 0    Melatonin 5 MG CHEW Take 5 mg by mouth at bedtime as needed, may repeat once 90 tablet 3    metoprolol succinate ER (TOPROL-XL) 50 MG 24 hr tablet Take 1 tablet (50 mg) by mouth daily 90 tablet 3    Probiotic Product (PROBIOTIC ACIDOPHILUS BEADS) CAPS Take 1 capsule by mouth daily 90 capsule 3    Apoaequorin (PREVAGEN) 10 MG CAPS Take 1 capsule by mouth daily (Patient not taking: Reported on 3/31/2023) 90 capsule 3       Allergies   Allergen Reactions     Diphenhydramine           Lab Results    Chemistry/lipid CBC Cardiac Enzymes/BNP/TSH/INR   Recent Labs   Lab Test 12/08/21  1342   CHOL 154   HDL 55   LDL 86   TRIG 67     Recent Labs   Lab Test 12/08/21  1342 04/16/18  1238 02/28/17  1314   LDL 86 76 85     Recent Labs   Lab Test 01/16/23  0700      POTASSIUM 4.2   CHLORIDE 104   CO2 27   GLC 95   BUN 14.4   CR 0.97*   GFRESTIMATED 55*   RAMA 8.5     Recent Labs   Lab Test 01/16/23  0700 01/12/23  1849 12/29/22  1013   CR 0.97* 1.02* 1.09*     No results for input(s): A1C in the last 15769 hours.       Recent Labs   Lab Test 01/16/23  0700   WBC 6.5   HGB 9.7*   HCT 30.9*   MCV 95        Recent Labs   Lab Test 01/16/23  0700 01/12/23  1849 12/29/22  1013   HGB 9.7* 10.9* 11.9    Recent Labs   Lab Test 01/29/21  0532 01/29/21  0006 01/28/21  1818   TROPONINI 0.03 0.04 0.04     No results for input(s): BNP, NTBNPI, NTBNP in the last 38159 hours.  Recent Labs   Lab Test 12/08/21  1342   TSH 4.25     Recent Labs   Lab Test 01/30/21  0817 01/30/21  0000 01/29/21  0532   INR 1.23* 1.23* 1.37*        Kristine Chan MD                                        Thank you for allowing me to participate in the care of your patient.      Sincerely,     Kristine Chan MD     [facility]  cc:   Kaitlin Steiner MD  [address] [place] [address]

## 2023-04-01 ENCOUNTER — MYC MEDICAL ADVICE (OUTPATIENT)
Dept: INTERNAL MEDICINE | Facility: CLINIC | Age: 88
End: 2023-04-01
Payer: MEDICARE

## 2023-04-01 DIAGNOSIS — G31.84 MILD COGNITIVE IMPAIRMENT: Primary | ICD-10-CM

## 2023-04-05 LAB
MDC_IDC_EPISODE_DTM: NORMAL
MDC_IDC_EPISODE_ID: NORMAL
MDC_IDC_EPISODE_TYPE: NORMAL
MDC_IDC_LEAD_IMPLANT_DT: NORMAL
MDC_IDC_LEAD_IMPLANT_DT: NORMAL
MDC_IDC_LEAD_LOCATION: NORMAL
MDC_IDC_LEAD_LOCATION: NORMAL
MDC_IDC_LEAD_LOCATION_DETAIL_1: NORMAL
MDC_IDC_LEAD_LOCATION_DETAIL_1: NORMAL
MDC_IDC_LEAD_MFG: NORMAL
MDC_IDC_LEAD_MFG: NORMAL
MDC_IDC_LEAD_MODEL: NORMAL
MDC_IDC_LEAD_MODEL: NORMAL
MDC_IDC_LEAD_POLARITY_TYPE: NORMAL
MDC_IDC_LEAD_POLARITY_TYPE: NORMAL
MDC_IDC_LEAD_SERIAL: NORMAL
MDC_IDC_LEAD_SERIAL: NORMAL
MDC_IDC_LEAD_SPECIAL_FUNCTION: NORMAL
MDC_IDC_LEAD_SPECIAL_FUNCTION: NORMAL
MDC_IDC_MSMT_BATTERY_DTM: NORMAL
MDC_IDC_MSMT_BATTERY_REMAINING_LONGEVITY: 54 MO
MDC_IDC_MSMT_BATTERY_REMAINING_PERCENTAGE: 87 %
MDC_IDC_MSMT_BATTERY_STATUS: NORMAL
MDC_IDC_MSMT_LEADCHNL_RA_IMPEDANCE_VALUE: 736 OHM
MDC_IDC_MSMT_LEADCHNL_RA_IMPEDANCE_VALUE: 736 OHM
MDC_IDC_MSMT_LEADCHNL_RA_PACING_THRESHOLD_AMPLITUDE: 0.8 V
MDC_IDC_MSMT_LEADCHNL_RA_PACING_THRESHOLD_PULSEWIDTH: 0.4 MS
MDC_IDC_MSMT_LEADCHNL_RV_IMPEDANCE_VALUE: 611 OHM
MDC_IDC_MSMT_LEADCHNL_RV_IMPEDANCE_VALUE: 611 OHM
MDC_IDC_MSMT_LEADCHNL_RV_PACING_THRESHOLD_AMPLITUDE: 1.1 V
MDC_IDC_MSMT_LEADCHNL_RV_PACING_THRESHOLD_AMPLITUDE: 1.1 V
MDC_IDC_MSMT_LEADCHNL_RV_PACING_THRESHOLD_PULSEWIDTH: 0.4 MS
MDC_IDC_MSMT_LEADCHNL_RV_PACING_THRESHOLD_PULSEWIDTH: 0.4 MS
MDC_IDC_MSMT_LEADCHNL_RV_SENSING_INTR_AMPL: 12.7 MV
MDC_IDC_PG_IMPLANT_DTM: NORMAL
MDC_IDC_PG_MFG: NORMAL
MDC_IDC_PG_MODEL: NORMAL
MDC_IDC_PG_SERIAL: NORMAL
MDC_IDC_PG_TYPE: NORMAL
MDC_IDC_SESS_CLINIC_NAME: NORMAL
MDC_IDC_SESS_DTM: NORMAL
MDC_IDC_SESS_TYPE: NORMAL
MDC_IDC_SET_BRADY_LOWRATE: 60 {BEATS}/MIN
MDC_IDC_SET_BRADY_MAX_SENSOR_RATE: 130 {BEATS}/MIN
MDC_IDC_SET_BRADY_MODE: NORMAL
MDC_IDC_SET_LEADCHNL_RA_SENSING_ADAPTATION_MODE: NORMAL
MDC_IDC_SET_LEADCHNL_RA_SENSING_SENSITIVITY: 0.15 MV
MDC_IDC_SET_LEADCHNL_RV_PACING_AMPLITUDE: 2 V
MDC_IDC_SET_LEADCHNL_RV_PACING_CAPTURE_MODE: NORMAL
MDC_IDC_SET_LEADCHNL_RV_PACING_POLARITY: NORMAL
MDC_IDC_SET_LEADCHNL_RV_PACING_PULSEWIDTH: 0.4 MS
MDC_IDC_SET_LEADCHNL_RV_SENSING_ADAPTATION_MODE: NORMAL
MDC_IDC_SET_LEADCHNL_RV_SENSING_POLARITY: NORMAL
MDC_IDC_SET_LEADCHNL_RV_SENSING_SENSITIVITY: 2.5 MV
MDC_IDC_SET_ZONE_DETECTION_INTERVAL: 375 MS
MDC_IDC_SET_ZONE_TYPE: NORMAL
MDC_IDC_SET_ZONE_VENDOR_TYPE: NORMAL
MDC_IDC_STAT_BRADY_DTM_END: NORMAL
MDC_IDC_STAT_BRADY_DTM_START: NORMAL
MDC_IDC_STAT_BRADY_RA_PERCENT_PACED: 0 %
MDC_IDC_STAT_BRADY_RV_PERCENT_PACED: 35 %
MDC_IDC_STAT_EPISODE_RECENT_COUNT: 0
MDC_IDC_STAT_EPISODE_RECENT_COUNT: 3
MDC_IDC_STAT_EPISODE_RECENT_COUNT_DTM_END: NORMAL
MDC_IDC_STAT_EPISODE_RECENT_COUNT_DTM_START: NORMAL
MDC_IDC_STAT_EPISODE_TYPE: NORMAL
MDC_IDC_STAT_EPISODE_VENDOR_TYPE: NORMAL

## 2023-04-06 ENCOUNTER — PATIENT OUTREACH (OUTPATIENT)
Dept: CARE COORDINATION | Facility: CLINIC | Age: 88
End: 2023-04-06
Payer: MEDICARE
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
## 2023-04-12 DIAGNOSIS — E78.5 HYPERLIPIDEMIA, UNSPECIFIED HYPERLIPIDEMIA TYPE: ICD-10-CM

## 2023-04-13 ENCOUNTER — TELEPHONE (OUTPATIENT)
Dept: INTERNAL MEDICINE | Facility: CLINIC | Age: 88
End: 2023-04-13
Payer: MEDICARE

## 2023-04-13 RX ORDER — ATORVASTATIN CALCIUM 20 MG/1
20 TABLET, FILM COATED ORAL AT BEDTIME
Qty: 90 TABLET | Refills: 11 | Status: SHIPPED | OUTPATIENT
Start: 2023-04-13 | End: 2023-01-01

## 2023-04-13 NOTE — TELEPHONE ENCOUNTER
"Routing refill request to provider for review/approval because:  Labs not current:  LDL  A break in medication    Last Written Prescription Date:  10/5/2022  Last Fill Quantity: 90,  # refills: 0   Last office visit provider:  3/24/203     Requested Prescriptions   Pending Prescriptions Disp Refills     atorvastatin (LIPITOR) 20 MG tablet [Pharmacy Med Name: ATORVASTATIN 20MG TABLET** 20 Tablet] 90 tablet 11     Sig: TAKE 1 TABLET (20 MG) BY MOUTH AT BEDTIME       Statins Protocol Failed - 4/12/2023  8:10 AM        Failed - LDL on file in past 12 months     Recent Labs   Lab Test 12/08/21  1342   LDL 86             Passed - No abnormal creatine kinase in past 12 months     Recent Labs   Lab Test 12/08/21  1342   CKT 71                Passed - Recent (12 mo) or future (30 days) visit within the authorizing provider's specialty     Patient has had an office visit with the authorizing provider or a provider within the authorizing providers department within the previous 12 mos or has a future within next 30 days. See \"Patient Info\" tab in inbasket, or \"Choose Columns\" in Meds & Orders section of the refill encounter.              Passed - Medication is active on med list        Passed - Patient is age 18 or older        Passed - No active pregnancy on record        Passed - No positive pregnancy test in past 12 months             Maribell Dave RN 04/13/23 12:34 AM  "
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
## 2023-04-14 ENCOUNTER — MEDICAL CORRESPONDENCE (OUTPATIENT)
Dept: HEALTH INFORMATION MANAGEMENT | Facility: CLINIC | Age: 88
End: 2023-04-14
Payer: MEDICARE

## 2023-04-16 NOTE — TELEPHONE ENCOUNTER
Agree to these orders. Let me know if I need to place an actual skilled nursing order or if verbal is okay.

## 2023-04-17 ENCOUNTER — OFFICE VISIT (OUTPATIENT)
Dept: VASCULAR SURGERY | Facility: CLINIC | Age: 88
End: 2023-04-17
Attending: NURSE PRACTITIONER
Payer: MEDICARE

## 2023-04-17 ENCOUNTER — MEDICAL CORRESPONDENCE (OUTPATIENT)
Dept: HEALTH INFORMATION MANAGEMENT | Facility: CLINIC | Age: 88
End: 2023-04-17

## 2023-04-17 VITALS — RESPIRATION RATE: 16 BRPM | DIASTOLIC BLOOD PRESSURE: 62 MMHG | HEART RATE: 72 BPM | SYSTOLIC BLOOD PRESSURE: 130 MMHG

## 2023-04-17 DIAGNOSIS — R60.9 DEPENDENT EDEMA: ICD-10-CM

## 2023-04-17 DIAGNOSIS — I87.2 VENOUS (PERIPHERAL) INSUFFICIENCY: ICD-10-CM

## 2023-04-17 DIAGNOSIS — T24.202D BURN OF LEFT LEG, SECOND DEGREE, SUBSEQUENT ENCOUNTER: Primary | ICD-10-CM

## 2023-04-17 DIAGNOSIS — I87.8 VENOUS STASIS: ICD-10-CM

## 2023-04-17 DIAGNOSIS — Z79.01 CHRONIC ANTICOAGULATION: ICD-10-CM

## 2023-04-17 DIAGNOSIS — N18.31 STAGE 3A CHRONIC KIDNEY DISEASE (H): ICD-10-CM

## 2023-04-17 DIAGNOSIS — I87.309 VENOUS HYPERTENSION: ICD-10-CM

## 2023-04-17 PROCEDURE — 11042 DBRDMT SUBQ TIS 1ST 20SQCM/<: CPT | Performed by: NURSE PRACTITIONER

## 2023-04-17 ASSESSMENT — PAIN SCALES - GENERAL: PAINLEVEL: NO PAIN (0)
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
## 2023-04-17 NOTE — PATIENT INSTRUCTIONS
"Wound care supplies were not ordered or needed today.    Stop medihoney dressings    Wound Care Instructions    Once per week and as needed, Cleanse your left leg  wound(s) with Dilute hibiclens 30cc in 500cc NS.    Pat Dry with non-sterile gauze    Apply Lotion to the intact skin surrounding your wound and other dry skin locations. Some good lotions include: Remedy Skin Repair Cream, Sarna, Vanicream or Cetaphil    Primary Dressing: Apply xeroform into/onto the shin and wounds; ok to use one sheet    Secondary dressing: Cover with ABD    Secure with non-sterile roll gauze (4\" x 75\" roll) and tape (1\" roll tape) as needed; avoid adhesive directly on the skin    Compression: 2 layer lite    It is not ok to get your wound wet in the bath or shower      If for some reason you are not able to get your dressing(s) changed as outlined above (due to illness, lack of supplies, lack of help) please do the following: remove old, soiled dressings; wash the wounds with saline; pat dry; apply ABD pad or other absorbant pad and secure with rolled gauze; avoid tape directly on your skin; Call the clinic as soon as possible to let us know what the current issues are in receiving wound care [phone].      SEEK MEDICAL CARE IF:  You have an increase in swelling, pain, or redness around the wound.  You have an increase in the amount of pus coming from the wound.  There is a bad smell coming from the wound.  The wound appears to be worsening/enlarging  You have a fever greater than 101.5 F      It is ok to continue current wound care treatment/products for the next 2-3 days until new wound care supplies are ordered and arrive. If longer than this please contact our office at [phone].    If you have a 2 layer or 4 layer compression wrap on these are safe to have on for ONLY 7 days. If for some reason you are not able to get the wrap(s) changed (due to illness; lack of supplies, lack of help, lack of transportation) please do " the following: unwrap the old 2 or 4 layer compression wrap; avoid using scissors as you could cut your skin and cause wounds; use tubular compression when available. Call to reschedule your home care or clinic visit appointment as soon as possible.    Please NOTE: if you are 15 minutes late to your clinic appointment you will have to be rescheduled. Please call our clinic as soon as possible if you know you will not be able to get to your appointment at [phone].    If you fail to show up to 3 scheduled clinic appointments you will be dismissed from our clinic.              We want to hear from you!  In the next few weeks, you should receive a call or email to complete a survey about your visit at [facility]. Please help us improve your appointment experience by letting us know how we did today. We strive to make your experience good and value any ways in which we could do better.      We value your input and suggestions.    Thank you for choosing the [facility]!           It is recommended that you do not get your ulcer wet when showering.  Listed below are several ways of keeping it dry when you shower.     1. Wrap it with Press and Seal plastic wrap.  It can be found in the stores where the plastic wraps or tin foil is kept.               2.  Some people take a bath and hang their leg/foot out of the tub.                        3  Put your leg in a plastic bag and tape it on.           4. You can purchase a shower cover for casts at some pharmacies and through the Internet.            5. Take a Bed Bath or wash up at the sink         High Protein Foods  Chicken  -Chicken breast, 3.5oz.-30 grams protein  -Chicken thigh-10 grams(average size)  -Drumstick-11 grams  -Wing- 6 grams  -Chicken meat, cooked, 4 oz.  Beef  -Hamburger uday, 4 oz-28 grams protein  -Steak, 6 oz-42 grams  -Most cuts of beef- 7 grams of protein per ounce  Fish  -Most fish fillets or steaks are about  22 grams of protein for 3 1/2 oz(100 grams) of cooked fish, or 6 grams per ounce  -Tuna, 6 oz can-40 grams of protein  Pork  -Pork chop, average-22 grams protein  -Pork loin or tenderloin, 4 oz.-29 grams  -Ham, 3oz serving- 19 grams  -Ground pork 3oz cooked-22 grams  -Damian, 1 slice-3 grams  -Lee-style damian(black damian), slice-5-6 grams  Eggs and Dairy  -Egg, large-7 grams  -Milk, 1 cup-8 grams  -Cottage cheese, 1/2 cup-15 grams  -Greek yogurt, 1 cup-usually 8-12 grams, check label  -Soft cheeses (Mozzarella, Brie, Camembert)- 6 grams  -Medium cheeses(cheddar, swiss)- 7 or 8 grams per oz  -Hard cheeses(parmesan)- 10 grams per oz  Beans  -Tofu, 1/2 cup 20 grams  -Tofu, 1 oz., 2.3 grams  -Soy milk, 1 cup-6-10 grams  -Most beans(black, ochoa, lentils, etc.) about 7-10 grams protein per half cup of cooked beans  -soy beans, 1/2 cup cooked-14 grams  -Split peas, 1/2 cup cooked- 8 grams  Nuts and Seeds  -Peanut butter, 2 Tablespoons- 8 grams protein  -Almonds, 1/4 cup- 8 grams  -Peanuts, 1/4 cup-9 grams  -Cashews, 1/4 cup- 5 grams  -Pecans, 1/4 cup- 2.5 grams  -Sunflower seeds, 1/4 cup- 6 grams  -Pumpkin seeds, 1/4 cup-8 grams  -Flax seeds- 1/4 cup- 8 grams  Protein Supplements  -Ensure  -Boost  -Glucerna, if diabetic  When you have an open ulcer, your bodies protein needs are much higher, so it is recommended eat good sources of protein

## 2023-04-25 ENCOUNTER — OFFICE VISIT (OUTPATIENT)
Dept: INTERNAL MEDICINE | Facility: CLINIC | Age: 88
End: 2023-04-25
Payer: MEDICARE

## 2023-04-25 VITALS
WEIGHT: 133.6 LBS | OXYGEN SATURATION: 99 % | RESPIRATION RATE: 20 BRPM | DIASTOLIC BLOOD PRESSURE: 64 MMHG | TEMPERATURE: 97 F | BODY MASS INDEX: 22.26 KG/M2 | HEIGHT: 65 IN | SYSTOLIC BLOOD PRESSURE: 128 MMHG | HEART RATE: 74 BPM

## 2023-04-25 DIAGNOSIS — L02.212 ABSCESS OF BACK: ICD-10-CM

## 2023-04-25 DIAGNOSIS — R05.2 SUBACUTE COUGH: Primary | ICD-10-CM

## 2023-04-25 DIAGNOSIS — G31.84 MILD COGNITIVE IMPAIRMENT: ICD-10-CM

## 2023-04-25 DIAGNOSIS — L98.491: ICD-10-CM

## 2023-04-25 PROCEDURE — 99213 OFFICE O/P EST LOW 20 MIN: CPT | Performed by: INTERNAL MEDICINE

## 2023-04-25 RX ORDER — FLUTICASONE PROPIONATE 50 MCG
1 SPRAY, SUSPENSION (ML) NASAL DAILY
Qty: 11.1 ML | Refills: 3 | Status: SHIPPED | OUTPATIENT
Start: 2023-04-25 | End: 2023-01-01

## 2023-04-25 RX ORDER — BENZONATATE 200 MG/1
200 CAPSULE ORAL 3 TIMES DAILY PRN
Qty: 90 CAPSULE | Refills: 3 | Status: SHIPPED | OUTPATIENT
Start: 2023-04-25 | End: 2023-01-01

## 2023-04-25 NOTE — PROGRESS NOTES
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  Discussed this with the patient who agrees to see dermatology to discuss options for removal.  -Dermatology referral placed         Relevant Orders    Adult Dermatology Referral    Mild cognitive impairment     Gi has noted more memory concerns recently and do have an overall concern for her continuing to live independently.  They are looking into assisted living options and would like official neuropsychiatric testing done to determine if she would benefit from memory care or not.  -Neuropsych referral placed  -Okay to continue Prevagen         Relevant Orders    Adult Mental Health  Referral       Respiratory    Subacute cough - Primary     Patient presents with persistent cough that started after an upper respiratory infection at the beginning of April.  Cough is somewhat better than with the initial infection but just still there.  She does not have any fever, chills, night sweats or worsening symptoms.  Her lung exam today is benign.  We will treat as a postviral cough.  Discussed with patient if she has any worsening in her symptoms, shortness of breath, fever, hemoptysis, etc. she will need to let me know and we will need to evaluate further.  -Flonase, saline nasal spray, Tessalon as needed         Relevant Medications    fluticasone (FLONASE) 50 MCG/ACT nasal spray    benzonatate (TESSALON) 200 MG capsule       Other    Abscess of back     Patient presenting with purulent abscess on her back 3/24/2023.  We did incision and drainage that day and she was treated with 14-day course of doxycycline.  On exam this is healing very well today.  They will continue with current dressing changes once a week for the next 2 to 3 weeks.  After that can transition to a  Band-Aid.             I spent a total of 29 minutes on the day of the visit.   Time spent by me doing chart review, history and exam, documentation and further activities per the note    FUTURE APPOINTMENTS:       - Follow-up visit in 3 monts    Daniela Karo Roth MD  [facility]   Deloris Freeman is a 89 year old, presenting for the following health issues:  Follow Up (Cyst on the back)        3/24/2023     1:19 PM   Additional Questions   Roomed by Sakina LEONE CMA   Accompanied by Son-in law Roberto Carlos     History of Present Illness       Reason for visit:  Wound followup    She eats 2-3 servings of fruits and vegetables daily.She consumes 1 sweetened beverage(s) daily.She exercises with enough effort to increase her heart rate 9 or less minutes per day.  She exercises with enough effort to increase her heart rate 3 or less days per week.   She is taking medications regularly.       Cough: Patient reports a cough that started after Easter.  Initially was associated with lots of nasal congestion and phlegm.  That congestion has gotten a little bit better however the cough persists.  Mainly bothers her at night.  She says she is unable to fall asleep until 2 or 3 AM because of this barking cough but then will be able to sleep until 10 or 11 AM.  Her daughter did bring her some over-the-counter Tussin which helps briefly.  She has been drinking a lot of water as well. The cough is currently productive of a light gray phlegm.  She is not using any nasal sprays or decongestants. No sob or WHITE.     R ear lesion: Reports this has been present for many years.  A while back she said she did see a to cut it off but she did not go through with this.  Bleeds intermittently.    Back Abscess: Healing very well per patient and family.  They are now changing the dressing every 3 or 4 days.    Family is looking into assisted living options for her.  She is not thrilled about this but seems  "amenable.    Review of Systems   Constitutional, HEENT, cardiovascular, pulmonary, gi and gu systems are negative, except as otherwise noted.      Objective    /64 (BP Location: Right arm, Patient Position: Sitting, Cuff Size: Adult Regular)   Pulse 74   Temp 97  F (36.1  C) (Oral)   Resp 20   Ht 1.651 m (5' 5\")   Wt 60.6 kg (133 lb 9.6 oz)   SpO2 99%   BMI 22.23 kg/m    Body mass index is 22.23 kg/m .  Physical Exam   GENERAL: healthy, alert and no distress  EYES: Eyes grossly normal to inspection and conjunctivae and sclerae normal  HENT: ~0.5cm lesion on R ear, center is ulcerated with rounded/heaped edges, ear canals and TM's normal, nose and mouth without ulcers or lesions  NECK: no adenopathy, no asymmetry, masses, or scars and thyroid normal to palpation  RESP: lungs clear to auscultation - no rales, rhonchi or wheezes  CV: regular rate and rhythm, normal S1 S2, no S3 or S4, no murmur, click or rub, no peripheral edema and peripheral pulses strong  ABDOMEN: soft, nontender, no hepatosplenomegaly, no masses and bowel sounds normal  MS: no gross musculoskeletal defects noted, no edema  SKIN: Ear lesion above. Prior abscess on back is healing well, edges of wound are pink with granulation tissue underneath, no purulent drainage  NEURO: Normal strength and tone, mentation intact and speech normal  PSYCH: mentation appears normal, affect normal/bright              "

## 2023-04-25 NOTE — PATIENT INSTRUCTIONS
For back: Change dressing once a week for 2-3 more weeks, then can switch to a bandaid overtop, change when saturated.     For cough: Use flonase once daily in both nostrils. Can do benzonatate 200 mg three times a day for cough.     You will get a call to schedule the dermatology appointment and neuropsychiatric testing.

## 2023-04-26 ENCOUNTER — PATIENT OUTREACH (OUTPATIENT)
Dept: CARE COORDINATION | Facility: CLINIC | Age: 88
End: 2023-04-26
Payer: MEDICARE

## 2023-04-26 PROBLEM — R05.2 SUBACUTE COUGH: Status: ACTIVE | Noted: 2023-04-26

## 2023-04-26 PROBLEM — C44.310 BCC (BASAL CELL CARCINOMA), FACE: Status: ACTIVE | Noted: 2023-04-26

## 2023-04-26 PROBLEM — C44.310 BCC (BASAL CELL CARCINOMA), FACE: Status: RESOLVED | Noted: 2023-04-26 | Resolved: 2023-04-26

## 2023-04-26 NOTE — ASSESSMENT & PLAN NOTE
Patient presenting with purulent abscess on her back 3/24/2023.  We did incision and drainage that day and she was treated with 14-day course of doxycycline.  On exam this is healing very well today.  They will continue with current dressing changes once a week for the next 2 to 3 weeks.  After that can transition to a Band-Aid.

## 2023-04-26 NOTE — ASSESSMENT & PLAN NOTE
Patient presents with persistent cough that started after an upper respiratory infection at the beginning of April.  Cough is somewhat better than with the initial infection but just still there.  She does not have any fever, chills, night sweats or worsening symptoms.  Her lung exam today is benign.  We will treat as a postviral cough.  Discussed with patient if she has any worsening in her symptoms, shortness of breath, fever, hemoptysis, etc. she will need to let me know and we will need to evaluate further.  -Flonase, saline nasal spray, Tessalon as needed

## 2023-04-26 NOTE — ASSESSMENT & PLAN NOTE
Patient has a lesion on her right ear that is very suspicious for a basal cell carcinoma.  Discussed this with the patient who agrees to see dermatology to discuss options for removal.  -Dermatology referral placed

## 2023-04-26 NOTE — ASSESSMENT & PLAN NOTE
Gi has noted more memory concerns recently and do have an overall concern for her continuing to live independently.  They are looking into assisted living options and would like official neuropsychiatric testing done to determine if she would benefit from memory care or not.  -Neuropsych referral placed  -Okay to continue Prevagen

## 2023-04-26 NOTE — PROGRESS NOTES
Clinic Care Coordination Contact  [facility]/Voicemail       Clinical Data: Care Coordinator Outreach  Outreach attempted x 1.  Left message on patient's daugher's voicemail with call back information and requested return call.  Plan: Care Coordinator will try to reach patient again in 3-5 business days.    David Myhre, RN  CCC RN

## 2023-05-05 ENCOUNTER — PATIENT OUTREACH (OUTPATIENT)
Dept: CARE COORDINATION | Facility: CLINIC | Age: 88
End: 2023-05-05
Payer: MEDICARE

## 2023-05-05 NOTE — LETTER
[facility] CARE COORDINATION  [facility]   May 5, 2023    Deloris Larson  [address]  [place] [address]      Dear Ginna & Deloris Freeman,    I have been attempting to reach you since our last contact. I would like to continue to work with you and provide any additional support you may need on achieving your health care related goals. I would appreciate if you would give me a call at [phone] to let me know if you would like to continue working together. I know that there are many things that can affect our ability to communicate and I hope we can continue to work together.    All of us at the [facility] are invested in your health and are here to assist you in meeting your goals.     Sincerely,    Eduarda Burgess  Community Health Worker   [facility] Care Coordination  [facility]   [email or web address]  Office: [phone]

## 2023-05-05 NOTE — PROGRESS NOTES
Clinic Care Coordination Contact  [facility]/Voicemail    Clinical Data: Care Coordinator Outreach  Outreach attempted x 2. Left message on patient's daughter Ginna's voicemail with call back information and requested return call.    Plan: Care Coordinator will send unable to contact letter with care coordinator contact information via [employer]. Care Coordinator will preform chart review in 3 weeks on 5/26/23.      Eduarda Burgess  Community Health Worker   [facility] Care Coordination  [facility]   [email or web address]  Office: [phone]

## 2023-05-08 ENCOUNTER — OFFICE VISIT (OUTPATIENT)
Dept: VASCULAR SURGERY | Facility: CLINIC | Age: 88
End: 2023-05-08
Attending: NURSE PRACTITIONER
Payer: MEDICARE

## 2023-05-08 VITALS
SYSTOLIC BLOOD PRESSURE: 118 MMHG | TEMPERATURE: 97.8 F | RESPIRATION RATE: 18 BRPM | DIASTOLIC BLOOD PRESSURE: 70 MMHG | HEART RATE: 78 BPM

## 2023-05-08 DIAGNOSIS — T24.202D BURN OF LEFT LEG, SECOND DEGREE, SUBSEQUENT ENCOUNTER: Primary | ICD-10-CM

## 2023-05-08 DIAGNOSIS — N18.31 STAGE 3A CHRONIC KIDNEY DISEASE (H): ICD-10-CM

## 2023-05-08 DIAGNOSIS — I87.2 VENOUS (PERIPHERAL) INSUFFICIENCY: ICD-10-CM

## 2023-05-08 DIAGNOSIS — I87.8 VENOUS STASIS: ICD-10-CM

## 2023-05-08 DIAGNOSIS — I87.309 VENOUS HYPERTENSION: ICD-10-CM

## 2023-05-08 DIAGNOSIS — Z79.01 CHRONIC ANTICOAGULATION: ICD-10-CM

## 2023-05-08 DIAGNOSIS — R60.9 DEPENDENT EDEMA: ICD-10-CM

## 2023-05-08 PROCEDURE — 97597 DBRDMT OPN WND 1ST 20 CM/<: CPT | Performed by: NURSE PRACTITIONER

## 2023-05-08 PROCEDURE — 97598 DBRDMT OPN WND ADDL 20CM/<: CPT | Performed by: NURSE PRACTITIONER

## 2023-05-08 ASSESSMENT — PAIN SCALES - GENERAL: PAINLEVEL: NO PAIN (0)

## 2023-05-08 NOTE — PATIENT INSTRUCTIONS
"Wound care supplies were not ordered or needed today.        Wound Care Instructions    Once per week and as needed, Cleanse your left leg  wound(s) with Dilute hibiclens 30cc in 500cc NS.    Pat Dry with non-sterile gauze    Apply Lotion to the intact skin surrounding your wound and other dry skin locations. Some good lotions include: Remedy Skin Repair Cream, Sarna, Vanicream or Cetaphil    Primary Dressing: Apply xeroform into/onto the wounds; cut to fit to the size of the wound    Secondary dressing: Cover with ABD    Secure with non-sterile roll gauze (4\" x 75\" roll) and tape (1\" roll tape) as needed; avoid adhesive directly on the skin    Compression: 2 layer lite; start at the base of the foot and go just below the knee    It is not ok to get your wound wet in the bath or shower      If for some reason you are not able to get your dressing(s) changed as outlined above (due to illness, lack of supplies, lack of help) please do the following: remove old, soiled dressings; wash the wounds with saline; pat dry; apply ABD pad or other absorbant pad and secure with rolled gauze; avoid tape directly on your skin; Call the clinic as soon as possible to let us know what the current issues are in receiving wound care [phone].      SEEK MEDICAL CARE IF:  You have an increase in swelling, pain, or redness around the wound.  You have an increase in the amount of pus coming from the wound.  There is a bad smell coming from the wound.  The wound appears to be worsening/enlarging  You have a fever greater than 101.5 F      It is ok to continue current wound care treatment/products for the next 2-3 days until new wound care supplies are ordered and arrive. If longer than this please contact our office at [phone].    If you have a 2 layer or 4 layer compression wrap on these are safe to have on for ONLY 7 days. If for some reason you are not able to get the wrap(s) changed (due to illness; lack of supplies, lack of " help, lack of transportation) please do the following: unwrap the old 2 or 4 layer compression wrap; avoid using scissors as you could cut your skin and cause wounds; use tubular compression when available. Call to reschedule your home care or clinic visit appointment as soon as possible.    Please NOTE: if you are 15 minutes late to your clinic appointment you will have to be rescheduled. Please call our clinic as soon as possible if you know you will not be able to get to your appointment at [phone].    If you fail to show up to 3 scheduled clinic appointments you will be dismissed from our clinic.              We want to hear from you!  In the next few weeks, you should receive a call or email to complete a survey about your visit at [facility]. Please help us improve your appointment experience by letting us know how we did today. We strive to make your experience good and value any ways in which we could do better.      We value your input and suggestions.    Thank you for choosing the [facility]!           It is recommended that you do not get your ulcer wet when showering.  Listed below are several ways of keeping it dry when you shower.     1. Wrap it with Press [employer] Seal plastic wrap.  It can be found in the stores where the plastic wraps or tin foil is kept.               2.  Some people take a bath and hang their leg/foot out of the tub.                        3  Put your leg in a plastic bag and tape it on.           4. You can purchase a shower cover for casts at some pharmacies and through the Internet.            5. Take a Bed Bath or wash up at the sink         High Protein Foods  Chicken  -Chicken breast, 3.5oz.-30 grams protein  -Chicken thigh-10 grams(average size)  -Drumstick-11 grams  -Wing- 6 grams  -Chicken meat, cooked, 4 oz.  Beef  -Hamburger uday, 4 oz-28 grams protein  -Steak, 6 oz-42 grams  -Most cuts of beef- 7 grams of protein per  ounce  Fish  -Most fish fillets or steaks are about 22 grams of protein for 3 1/2 oz(100 grams) of cooked fish, or 6 grams per ounce  -Tuna, 6 oz can-40 grams of protein  Pork  -Pork chop, average-22 grams protein  -Pork loin or tenderloin, 4 oz.-29 grams  -Ham, 3oz serving- 19 grams  -Ground pork 3oz cooked-22 grams  -Damian, 1 slice-3 grams  -[place]-style damian(black damian), slice-5-6 grams  Eggs and Dairy  -Egg, large-7 grams  -Milk, 1 cup-8 grams  -Cottage cheese, 1/2 cup-15 grams  -Greek yogurt, 1 cup-usually 8-12 grams, check label  -Soft cheeses (Mozzarella, Brie, Camembert)- 6 grams  -Medium cheeses(cheddar, swiss)- 7 or 8 grams per oz  -Hard cheeses(parmesan)- 10 grams per oz  Beans  -Tofu, 1/2 cup 20 grams  -Tofu, 1 oz., 2.3 grams  -Soy milk, 1 cup-6-10 grams  -Most beans(black, ochoa, lentils, etc.) about 7-10 grams protein per half cup of cooked beans  -soy beans, 1/2 cup cooked-14 grams  -Split peas, 1/2 cup cooked- 8 grams  Nuts and Seeds  -Peanut butter, 2 Tablespoons- 8 grams protein  -Almonds, 1/4 cup- 8 grams  -Peanuts, 1/4 cup-9 grams  -Cashews, 1/4 cup- 5 grams  -Pecans, 1/4 cup- 2.5 grams  -Sunflower seeds, 1/4 cup- 6 grams  -Pumpkin seeds, 1/4 cup-8 grams  -Flax seeds- 1/4 cup- 8 grams  Protein Supplements  -Ensure  -Boost  -Glucerna, if diabetic  When you have an open ulcer, your bodies protein needs are much higher, so it is recommended eat good sources of protein
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
## 2023-05-11 DIAGNOSIS — I10 HYPERTENSION, UNSPECIFIED TYPE: ICD-10-CM

## 2023-05-12 ENCOUNTER — MYC MEDICAL ADVICE (OUTPATIENT)
Dept: INTERNAL MEDICINE | Facility: CLINIC | Age: 88
End: 2023-05-12
Payer: MEDICARE

## 2023-05-12 RX ORDER — METOPROLOL SUCCINATE 50 MG/1
50 TABLET, EXTENDED RELEASE ORAL DAILY
Qty: 90 TABLET | Refills: 3 | Status: SHIPPED | OUTPATIENT
Start: 2023-05-12 | End: 2023-01-01

## 2023-05-12 NOTE — TELEPHONE ENCOUNTER
"Last Written Prescription Date:  4/8/22  Last Fill Quantity: 90,  # refills: 3   Last office visit provider:  4/25/23     Requested Prescriptions   Pending Prescriptions Disp Refills     metoprolol succinate ER (TOPROL XL) 50 MG 24 hr tablet [Pharmacy Med Name: METOPROLOL SUCC ER 50 MG ** 50 Tablet] 90 tablet 11     Sig: TAKE 1 TABLET (50 MG) BY MOUTH DAILY       Beta-Blockers Protocol Passed - 5/11/2023  5:41 PM        Passed - Blood pressure under 140/90 in past 12 months     BP Readings from Last 3 Encounters:   05/08/23 118/70   04/25/23 128/64   04/17/23 130/62                 Passed - Patient is age 6 or older        Passed - Recent (12 mo) or future (30 days) visit within the authorizing provider's specialty     Patient has had an office visit with the authorizing provider or a provider within the authorizing providers department within the previous 12 mos or has a future within next 30 days. See \"Patient Info\" tab in inbasket, or \"Choose Columns\" in Meds & Orders section of the refill encounter.              Passed - Medication is active on med list             Lyndsey Julien RN 05/12/23 3:12 PM  "

## 2023-05-15 ENCOUNTER — HOSPITAL ENCOUNTER (EMERGENCY)
Facility: HOSPITAL | Age: 88
Discharge: HOME | End: 2023-05-15
Attending: EMERGENCY MEDICINE | Admitting: EMERGENCY MEDICINE
Payer: MEDICARE

## 2023-05-15 ENCOUNTER — APPOINTMENT (OUTPATIENT)
Dept: CT IMAGING | Facility: HOSPITAL | Age: 88
End: 2023-05-15
Attending: EMERGENCY MEDICINE
Payer: MEDICARE

## 2023-05-15 VITALS
RESPIRATION RATE: 19 BRPM | HEART RATE: 95 BPM | TEMPERATURE: 99.6 F | SYSTOLIC BLOOD PRESSURE: 147 MMHG | WEIGHT: 142 LBS | OXYGEN SATURATION: 98 % | BODY MASS INDEX: 24.24 KG/M2 | HEIGHT: 64 IN | DIASTOLIC BLOOD PRESSURE: 79 MMHG

## 2023-05-15 DIAGNOSIS — R42 VERTIGO: ICD-10-CM

## 2023-05-15 DIAGNOSIS — I65.09 VERTEBRAL ARTERY STENOSIS, UNSPECIFIED LATERALITY: ICD-10-CM

## 2023-05-15 LAB
ANION GAP SERPL CALCULATED.3IONS-SCNC: 9 MMOL/L (ref 7–15)
BASOPHILS # BLD AUTO: 0.1 10E3/UL (ref 0–0.2)
BASOPHILS NFR BLD AUTO: 1 %
BUN SERPL-MCNC: 16.6 MG/DL (ref 8–23)
CALCIUM SERPL-MCNC: 9 MG/DL (ref 8.2–9.6)
CHLORIDE SERPL-SCNC: 108 MMOL/L (ref 98–107)
CREAT SERPL-MCNC: 0.91 MG/DL (ref 0.51–0.95)
DEPRECATED HCO3 PLAS-SCNC: 23 MMOL/L (ref 22–29)
EOSINOPHIL # BLD AUTO: 0.3 10E3/UL (ref 0–0.7)
EOSINOPHIL NFR BLD AUTO: 3 %
ERYTHROCYTE [DISTWIDTH] IN BLOOD BY AUTOMATED COUNT: 17.6 % (ref 10–15)
GFR SERPL CREATININE-BSD FRML MDRD: 59 ML/MIN/1.73M2
GLUCOSE SERPL-MCNC: 112 MG/DL (ref 70–99)
HCT VFR BLD AUTO: 32.6 % (ref 35–47)
HGB BLD-MCNC: 9.6 G/DL (ref 11.7–15.7)
HOLD SPECIMEN: NORMAL
HOLD SPECIMEN: NORMAL
IMM GRANULOCYTES # BLD: 0 10E3/UL
IMM GRANULOCYTES NFR BLD: 0 %
LYMPHOCYTES # BLD AUTO: 1.4 10E3/UL (ref 0.8–5.3)
LYMPHOCYTES NFR BLD AUTO: 19 %
MCH RBC QN AUTO: 24 PG (ref 26.5–33)
MCHC RBC AUTO-ENTMCNC: 29.4 G/DL (ref 31.5–36.5)
MCV RBC AUTO: 82 FL (ref 78–100)
MONOCYTES # BLD AUTO: 0.6 10E3/UL (ref 0–1.3)
MONOCYTES NFR BLD AUTO: 8 %
NEUTROPHILS # BLD AUTO: 5.3 10E3/UL (ref 1.6–8.3)
NEUTROPHILS NFR BLD AUTO: 69 %
NRBC # BLD AUTO: 0 10E3/UL
NRBC BLD AUTO-RTO: 0 /100
PLATELET # BLD AUTO: 271 10E3/UL (ref 150–450)
POTASSIUM SERPL-SCNC: 4 MMOL/L (ref 3.4–5.3)
RBC # BLD AUTO: 4 10E6/UL (ref 3.8–5.2)
SODIUM SERPL-SCNC: 140 MMOL/L (ref 136–145)
WBC # BLD AUTO: 7.6 10E3/UL (ref 4–11)

## 2023-05-15 PROCEDURE — G1010 CDSM STANSON: HCPCS

## 2023-05-15 PROCEDURE — 82310 ASSAY OF CALCIUM: CPT | Performed by: EMERGENCY MEDICINE

## 2023-05-15 PROCEDURE — 85025 COMPLETE CBC W/AUTO DIFF WBC: CPT | Performed by: EMERGENCY MEDICINE

## 2023-05-15 PROCEDURE — 70496 CT ANGIOGRAPHY HEAD: CPT | Mod: MG

## 2023-05-15 PROCEDURE — 99285 EMERGENCY DEPT VISIT HI MDM: CPT | Mod: 25

## 2023-05-15 PROCEDURE — 93005 ELECTROCARDIOGRAM TRACING: CPT | Performed by: STUDENT IN AN ORGANIZED HEALTH CARE EDUCATION/TRAINING PROGRAM

## 2023-05-15 PROCEDURE — 85025 COMPLETE CBC W/AUTO DIFF WBC: CPT | Performed by: STUDENT IN AN ORGANIZED HEALTH CARE EDUCATION/TRAINING PROGRAM

## 2023-05-15 PROCEDURE — 93005 ELECTROCARDIOGRAM TRACING: CPT | Performed by: EMERGENCY MEDICINE

## 2023-05-15 PROCEDURE — 36415 COLL VENOUS BLD VENIPUNCTURE: CPT | Performed by: STUDENT IN AN ORGANIZED HEALTH CARE EDUCATION/TRAINING PROGRAM

## 2023-05-15 PROCEDURE — 250N000011 HC RX IP 250 OP 636: Performed by: EMERGENCY MEDICINE

## 2023-05-15 RX ORDER — IOPAMIDOL 755 MG/ML
75 INJECTION, SOLUTION INTRAVASCULAR ONCE
Status: COMPLETED | OUTPATIENT
Start: 2023-05-15 | End: 2023-05-15

## 2023-05-15 RX ORDER — MECLIZINE HYDROCHLORIDE 25 MG/1
25 TABLET ORAL 3 TIMES DAILY PRN
Qty: 20 TABLET | Refills: 0 | Status: SHIPPED | OUTPATIENT
Start: 2023-05-15 | End: 2023-05-23

## 2023-05-15 RX ADMIN — IOPAMIDOL 75 ML: 755 INJECTION, SOLUTION INTRAVENOUS at 15:47

## 2023-05-15 ASSESSMENT — ENCOUNTER SYMPTOMS
HEADACHES: 0
NECK PAIN: 0
NUMBNESS: 1
SHORTNESS OF BREATH: 0
DIZZINESS: 1
DIARRHEA: 0
VOMITING: 0

## 2023-05-15 NOTE — DISCHARGE INSTRUCTIONS
Consider meclizine for dizziness.  Please consider follow-up with neurology and your doctor.  Resume your Eliquis and metoprolol.  Return to the ER with acutely worsening or new concerning symptoms.

## 2023-05-15 NOTE — ED TRIAGE NOTES
Patient reports recent mix up with medications so she has been without her BP medications x 8 days.  Patient also states that she has been feeling dizzy for that same amount of time- states worse when lying in bed.  During triage, patient with equal  strength when tested but admits that she feels like her right arm is having a more difficult time squeezing.  She also stated that she had slightly decreased sensation when touching her right arm.       Triage Assessment       Row Name 05/15/23 1223       Triage Assessment (Adult)    Airway WDL WDL       Respiratory WDL    Respiratory WDL WDL       Skin Circulation/Temperature WDL    Skin Circulation/Temperature WDL WDL       Cardiac WDL    Cardiac WDL WDL       Peripheral/Neurovascular WDL    Peripheral Neurovascular WDL WDL       Cognitive/Neuro/Behavioral WDL    Cognitive/Neuro/Behavioral WDL WDL

## 2023-05-15 NOTE — CONSULTS
"[facility]    Stroke Telephone Note    I was called by Rich Petit MD on 05/15/23 regarding patient Deloris Larson. The patient is a 89 year old female who is on eliquis for AFIB. The patient has had 8 days of recurrent vertigo spells that seem to get worse with movement and better with rest. There is no accompanying double vision, visual field cut, facial sensory loss or weakness, tongue weakness or slurred speech. In the ED, neurological exam is normal.    BP (!) 147/79   Pulse 95   Temp 99.6  F (37.6  C) (Temporal)   Resp 19   Ht 1.626 m (5' 4\")   Wt 64.4 kg (142 lb)   SpO2 98%   BMI 24.37 kg/m       Imaging Findings   Head CT no acute pathology   CTA head and neck: bilateral moderate carotid stenosis, left V3 and V4 stenosis    Impression  Recurrent positional vertigo without accompanying cranial nerve deficits or long tracts deficit. This is likely peripheral vertigo.   Patient can't get an MRI because of pacemaker.     Recommendations   Peripheral vertigo management per ED. Ok to discharge home.     My recommendations are based on the information provided over the phone by Deloris Larson's in-person providers. They are not intended to replace the clinical judgment of her in-person providers. I was not requested to personally see or examine the patient at this time.      Diego Jones MD, Msc, FABECKIE, FAAN   of Neurology  [facility]     05/15/2023 4:33 PM  To page me or covering stroke neurology team member, click here: [facility]COM  Choose \"On Call\" tab at top, then search dropdown box for \"Neurology Adult\" & press Enter, look for Neuro ICU/Stroke      "

## 2023-05-17 ENCOUNTER — MYC MEDICAL ADVICE (OUTPATIENT)
Dept: INTERNAL MEDICINE | Facility: CLINIC | Age: 88
End: 2023-05-17
Payer: MEDICARE

## 2023-05-18 ENCOUNTER — TELEPHONE (OUTPATIENT)
Dept: INTERNAL MEDICINE | Facility: CLINIC | Age: 88
End: 2023-05-18
Payer: MEDICARE

## 2023-05-18 LAB
ATRIAL RATE - MUSE: 375 BPM
DIASTOLIC BLOOD PRESSURE - MUSE: NORMAL MMHG
INTERPRETATION ECG - MUSE: NORMAL
P AXIS - MUSE: NORMAL DEGREES
PR INTERVAL - MUSE: NORMAL MS
QRS DURATION - MUSE: 92 MS
QT - MUSE: 392 MS
QTC - MUSE: 429 MS
R AXIS - MUSE: -52 DEGREES
SYSTOLIC BLOOD PRESSURE - MUSE: NORMAL MMHG
T AXIS - MUSE: 74 DEGREES
VENTRICULAR RATE- MUSE: 72 BPM

## 2023-05-18 NOTE — TELEPHONE ENCOUNTER
Verbal Orders:    Home Care   re certification     -skilled nursing for wound care  infection prevention,medication and pain management ,fall prevention     1 x a week for 4 weeks  one visit every other week for 4 weeks  1 week 1   1 prn     [facility]  [phone]  Message: Y

## 2023-05-23 ENCOUNTER — OFFICE VISIT (OUTPATIENT)
Dept: INTERNAL MEDICINE | Facility: CLINIC | Age: 88
End: 2023-05-23
Payer: MEDICARE

## 2023-05-23 VITALS
DIASTOLIC BLOOD PRESSURE: 72 MMHG | OXYGEN SATURATION: 97 % | HEIGHT: 64 IN | HEART RATE: 72 BPM | WEIGHT: 135.8 LBS | SYSTOLIC BLOOD PRESSURE: 144 MMHG | BODY MASS INDEX: 23.18 KG/M2 | TEMPERATURE: 98 F

## 2023-05-23 DIAGNOSIS — H81.13 BENIGN PAROXYSMAL POSITIONAL VERTIGO DUE TO BILATERAL VESTIBULAR DISORDER: Primary | ICD-10-CM

## 2023-05-23 DIAGNOSIS — Z23 ENCOUNTER FOR VACCINATION: ICD-10-CM

## 2023-05-23 DIAGNOSIS — I10 ESSENTIAL HYPERTENSION: ICD-10-CM

## 2023-05-23 PROCEDURE — 91312 COVID-19 BIVALENT 12+ (PFIZER): CPT | Performed by: INTERNAL MEDICINE

## 2023-05-23 PROCEDURE — 99214 OFFICE O/P EST MOD 30 MIN: CPT | Mod: 25 | Performed by: INTERNAL MEDICINE

## 2023-05-23 PROCEDURE — 0124A COVID-19 BIVALENT 12+ (PFIZER): CPT | Performed by: INTERNAL MEDICINE

## 2023-05-23 RX ORDER — MECLIZINE HYDROCHLORIDE 25 MG/1
25 TABLET ORAL 3 TIMES DAILY PRN
Qty: 30 TABLET | Refills: 3 | Status: SHIPPED | OUTPATIENT
Start: 2023-05-23

## 2023-05-23 ASSESSMENT — PAIN SCALES - GENERAL: PAINLEVEL: NO PAIN (0)

## 2023-05-23 NOTE — ASSESSMENT & PLAN NOTE
Slightly above goal today.  Given vertigo certainly do not want to lower her blood pressure too far to give another reason for dizziness.  They will monitor at home and will follow-up in July.  -Continue metoprolol XL 50 mg daily (on for afib)

## 2023-05-23 NOTE — PROGRESS NOTES
Assessment & Plan   Problem List Items Addressed This Visit        Nervous and Auditory    Benign paroxysmal positional vertigo due to bilateral vestibular disorder - Primary     Patient presents with new onset dizziness in the last 2 to 3 weeks.  She was seen in ED with negative work-up.  Her symptoms today seem most consistent with BPPV, especially given positive [place]-Hallpike maneuver on exam.  Patient lives alone, does not think should be able to do Epley maneuvers by herself.  - Referral placed for vestibular PT  - Continue PRN meclizine          Relevant Medications    meclizine (ANTIVERT) 25 MG tablet    Other Relevant Orders    Physical Therapy Referral       Circulatory    Essential hypertension     Slightly above goal today.  Given vertigo certainly do not want to lower her blood pressure too far to give another reason for dizziness.  They will monitor at home and will follow-up in July.  -Continue metoprolol XL 50 mg daily (on for afib)        Other Visit Diagnoses     Encounter for vaccination        Relevant Orders    COVID-19 BIVALENT 12+ (PFIZER) (Completed)         Prescription drug management  I spent a total of 35 minutes on the day of the visit.   Time spent by me doing chart review, history and exam, documentation and further activities per the note     MED REC REQUIRED  Post Medication Reconciliation Status:  Discharge medications reconciled, continue medications without change    FUTURE APPOINTMENTS:       - Follow-up visit in as scheduled in July    Daniela Roth MD  [facility]   Deloris Freeman is a 89 year old, presenting for the following health issues:  Hospital F/U (Vertigo )        5/23/2023     1:29 PM   Additional Questions   Roomed by Diomedes CARLTON MA     [facility]     ED/UC Followup:  Facility:  [facility] Emergency Department   Date of visit: 05/15/2023  Reason for visit: Vertigo  Current Status: Doing much  "better    Dizziness started a week prior to ED visit and was progressive. Unable to get out of chair without falling, so daughter brought her to the ED. while in the emergency room her lab work-up was fairly benign.  BMP was stable.  CBC showed stable anemia of 9.6 (Hgb was 9.7 1/2023).  She denies any signs of blood loss today.  Additionally, CTA head and neck was done, negative for any acute intracranial process, did show mild to moderate carotid bifurcation stenoses and focal high-grade stenosis of the distal left vertebral artery.  Overall she was diagnosed with vertigo and given meclizine 25 mg 3 times daily as needed.      Since then, she said she has been taking the meclizine twice a day and this is very helpful for her dizziness.  Daughter reports that she did struggle significantly with vertigo while going through menopause years ago.  Right now she describes her dizziness as room spinning.  It is worse when she bends over, lays down or rolls over in bed.  Denies feeling lightheaded or that she might pass out.    Of note, she does plan to move into an assisted living facility in June.  She is actually looking forward to this.  They were able to find a facility that is new more and only a few blocks away from where she is living now.  Have services that can be added on as she needs them.    Blood pressure slightly elevated today, they will keep track of it and bring her log to her next appointment scheduled in July for    Review of Systems   Constitutional, HEENT, cardiovascular, pulmonary, GI, , musculoskeletal, neuro, skin, endocrine and psych systems are negative, except as otherwise noted.      Objective    BP (!) 144/72 (BP Location: Right arm, Patient Position: Sitting, Cuff Size: Adult Small)   Pulse 72   Temp 98  F (36.7  C) (Oral)   Ht 1.626 m (5' 4\")   Wt 61.6 kg (135 lb 12.8 oz)   SpO2 97%   BMI 23.31 kg/m    Body mass index is 23.31 kg/m .  Physical Exam   GENERAL: healthy, alert and no " distress  EYES: Eyes grossly normal to inspection, PERRL and conjunctivae and sclerae normal  HENT: nose and mouth without ulcers or lesions  RESP: lungs clear to auscultation - no rales, rhonchi or wheezes  CV: regular rate and rhythm, normal S1 S2, no S3 or S4, no murmur, click or rub, no peripheral edema and peripheral pulses strong  ABDOMEN: soft, nontender, no hepatosplenomegaly, no masses and bowel sounds normal  MS: no gross musculoskeletal defects noted, no edema  SKIN: no suspicious lesions or rashes; wound on mid upper back is healing well   NEURO: Normal strength and tone, mentation intact and speech normal  +[place] Hallpike maneuver with reproduction of room spinning, worse to R than L  PSYCH: mentation appears normal, affect normal/bright    Admission on 05/15/2023, Discharged on 05/15/2023   Component Date Value Ref Range Status     Ventricular Rate 05/15/2023 72  BPM Final     Atrial Rate 05/15/2023 375  BPM Final     QRS Duration 05/15/2023 92  ms Final     QT 05/15/2023 392  ms Final     QTc 05/15/2023 429  ms Final     R AXIS 05/15/2023 -52  degrees Final     T Axis 05/15/2023 74  degrees Final     Interpretation ECG 05/15/2023    Final                    Value:Demand pacemaker; interpretation is based on intrinsic rhythm  Atrial fibrillation with premature ventricular or aberrantly conducted complexes  Left anterior fascicular block  Abnormal ECG  When compared with ECG of 29-DEC-2022 10:22,  Previous ECG has undetermined rhythm, needs review  QT has shortened  Confirmed by SEE ED PROVIDER NOTE FOR, ECG INTERPRETATION ([id number]),  YOON PELAEZ ([id number]) on 5/18/2023 5:02:26 AM       Sodium 05/15/2023 140  136 - 145 mmol/L Final     Potassium 05/15/2023 4.0  3.4 - 5.3 mmol/L Final     Chloride 05/15/2023 108 (H)  98 - 107 mmol/L Final     Carbon Dioxide (CO2) 05/15/2023 23  22 - 29 mmol/L Final     Anion Gap 05/15/2023 9  7 - 15 mmol/L Final     Urea Nitrogen 05/15/2023 16.6  8.0 - 23.0 mg/dL Final      Creatinine 05/15/2023 0.91  0.51 - 0.95 mg/dL Final     Calcium 05/15/2023 9.0  8.2 - 9.6 mg/dL Final     Glucose 05/15/2023 112 (H)  70 - 99 mg/dL Final     GFR Estimate 05/15/2023 59 (L)  >60 mL/min/1.73m2 Final    eGFR calculated using 2021 CKD-EPI equation.     WBC Count 05/15/2023 7.6  4.0 - 11.0 10e3/uL Final     RBC Count 05/15/2023 4.00  3.80 - 5.20 10e6/uL Final     Hemoglobin 05/15/2023 9.6 (L)  11.7 - 15.7 g/dL Final     Hematocrit 05/15/2023 32.6 (L)  35.0 - 47.0 % Final     MCV 05/15/2023 82  78 - 100 fL Final     MCH 05/15/2023 24.0 (L)  26.5 - 33.0 pg Final     MCHC 05/15/2023 29.4 (L)  31.5 - 36.5 g/dL Final     RDW 05/15/2023 17.6 (H)  10.0 - 15.0 % Final     Platelet Count 05/15/2023 271  150 - 450 10e3/uL Final     % Neutrophils 05/15/2023 69  % Final     % Lymphocytes 05/15/2023 19  % Final     % Monocytes 05/15/2023 8  % Final     % Eosinophils 05/15/2023 3  % Final     % Basophils 05/15/2023 1  % Final     % Immature Granulocytes 05/15/2023 0  % Final     NRBCs per 100 WBC 05/15/2023 0  <1 /100 Final     Absolute Neutrophils 05/15/2023 5.3  1.6 - 8.3 10e3/uL Final     Absolute Lymphocytes 05/15/2023 1.4  0.8 - 5.3 10e3/uL Final     Absolute Monocytes 05/15/2023 0.6  0.0 - 1.3 10e3/uL Final     Absolute Eosinophils 05/15/2023 0.3  0.0 - 0.7 10e3/uL Final     Absolute Basophils 05/15/2023 0.1  0.0 - 0.2 10e3/uL Final     Absolute Immature Granulocytes 05/15/2023 0.0  <=0.4 10e3/uL Final     Absolute NRBCs 05/15/2023 0.0  10e3/uL Final     Hold Specimen 05/15/2023 [facility]   Final     Hold Specimen 05/15/2023 [facility]   Final

## 2023-05-23 NOTE — ASSESSMENT & PLAN NOTE
Patient presents with new onset dizziness in the last 2 to 3 weeks.  She was seen in ED with negative work-up.  Her symptoms today seem most consistent with BPPV, especially given positive [place]-Hallpike maneuver on exam.  Patient lives alone, does not think should be able to do Epley maneuvers by herself.  - Referral placed for vestibular PT  - Continue PRN meclizine

## 2023-05-24 ENCOUNTER — MYC MEDICAL ADVICE (OUTPATIENT)
Dept: INTERNAL MEDICINE | Facility: CLINIC | Age: 88
End: 2023-05-24
Payer: MEDICARE

## 2023-05-26 ENCOUNTER — MEDICAL CORRESPONDENCE (OUTPATIENT)
Dept: HEALTH INFORMATION MANAGEMENT | Facility: CLINIC | Age: 88
End: 2023-05-26
Payer: MEDICARE

## 2023-05-26 DIAGNOSIS — Z53.9 DIAGNOSIS NOT YET DEFINED: Primary | ICD-10-CM

## 2023-05-26 PROCEDURE — G0179 MD RECERTIFICATION HHA PT: HCPCS | Performed by: INTERNAL MEDICINE

## 2023-06-02 ENCOUNTER — MEDICAL CORRESPONDENCE (OUTPATIENT)
Dept: HEALTH INFORMATION MANAGEMENT | Facility: CLINIC | Age: 88
End: 2023-06-02
Payer: MEDICARE

## 2023-06-05 ENCOUNTER — OFFICE VISIT (OUTPATIENT)
Dept: VASCULAR SURGERY | Facility: CLINIC | Age: 88
End: 2023-06-05
Attending: NURSE PRACTITIONER
Payer: MEDICARE

## 2023-06-05 VITALS
DIASTOLIC BLOOD PRESSURE: 62 MMHG | SYSTOLIC BLOOD PRESSURE: 126 MMHG | HEART RATE: 64 BPM | TEMPERATURE: 97.9 F | RESPIRATION RATE: 16 BRPM

## 2023-06-05 DIAGNOSIS — R60.9 DEPENDENT EDEMA: ICD-10-CM

## 2023-06-05 DIAGNOSIS — I87.309 VENOUS HYPERTENSION: ICD-10-CM

## 2023-06-05 DIAGNOSIS — Z79.01 CHRONIC ANTICOAGULATION: ICD-10-CM

## 2023-06-05 DIAGNOSIS — I87.2 VENOUS (PERIPHERAL) INSUFFICIENCY: ICD-10-CM

## 2023-06-05 DIAGNOSIS — T24.202D BURN OF LEFT LEG, SECOND DEGREE, SUBSEQUENT ENCOUNTER: Primary | ICD-10-CM

## 2023-06-05 PROCEDURE — G0463 HOSPITAL OUTPT CLINIC VISIT: HCPCS | Performed by: NURSE PRACTITIONER

## 2023-06-05 PROCEDURE — 99213 OFFICE O/P EST LOW 20 MIN: CPT | Performed by: NURSE PRACTITIONER

## 2023-06-05 ASSESSMENT — PAIN SCALES - GENERAL: PAINLEVEL: NO PAIN (0)

## 2023-06-05 NOTE — PATIENT INSTRUCTIONS
Your wound is now healed    No dressings    Shower normally    Apply lotion 1-2 times per day    Elevate the legs periodically throughout the day    Podiatry Clinics that offer foot and toenail care  [facility]  [facility]  [phone]    [facility] Clin[facility], [facility]  [phone]    [facility]  [place] - Dr. Sandra on Tuesdays  [phone]    [facility]  [place]  [phone]    [facility]  [facility]  [phone]    [facility]  [phone]    [facility]  [phone]    [facility]  They have several locations and have a team of registered nurses that offer diabetic foot care.  They do not bill to insurance and the average cost per visit is $37.  [facility]  [phone]    [facility]  *Nurse comes to your home for nail care.  Deloris Grant RN Foot Specialist  [phone]

## 2023-06-05 NOTE — PROGRESS NOTES
Follow up Vascular Visit       Date of Service:06/05/23      Chief Complaint: left shin ulakanksha      Pt returns to [facility] with regards to their left shin ulcer.  They arrive today with family. They are currently using xeroform to the wounds. This is being done by home care. They are using 2 layer for compression; this is noted to be wrapped incorrectly from ankle to knee. They are feeling well today. Denies fevers, chills. No shortness of breath.     Allergies:   Allergies   Allergen Reactions     Diphenhydramine        Medications:   Current Outpatient Medications:      acetaminophen (TYLENOL) 500 MG tablet, Take 1,000 mg by mouth every 8 hours as needed, Disp: , Rfl:      apixaban ANTICOAGULANT (ELIQUIS ANTICOAGULANT) 2.5 MG tablet, Take 1 tablet (2.5 mg) by mouth 2 times daily, Disp: 60 tablet, Rfl: 3     Apoaequorin (PREVAGEN) 10 MG CAPS, Take 1 capsule by mouth daily, Disp: 90 capsule, Rfl: 3     atorvastatin (LIPITOR) 20 MG tablet, TAKE 1 TABLET (20 MG) BY MOUTH AT BEDTIME, Disp: 90 tablet, Rfl: 11     benzonatate (TESSALON) 200 MG capsule, Take 1 capsule (200 mg) by mouth 3 times daily as needed for cough, Disp: 90 capsule, Rfl: 3     doxycycline monohydrate (MONODOX) 100 MG capsule, Take 1 capsule (100 mg) by mouth 2 times daily, Disp: 14 capsule, Rfl: 0     fluticasone (FLONASE) 50 MCG/ACT nasal spray, Spray 1 spray into both nostrils daily, Disp: 11.1 mL, Rfl: 3     loperamide (IMODIUM) 2 MG capsule, Take 1 capsule (2 mg) by mouth daily as needed for diarrhea, Disp: 90 capsule, Rfl: 0     meclizine (ANTIVERT) 25 MG tablet, Take 1 tablet (25 mg) by mouth 3 times daily as needed for dizziness, Disp: 30 tablet, Rfl: 3     Melatonin 5 MG CHEW, Take 5 mg by mouth at bedtime as needed, may repeat once, Disp: 90 tablet, Rfl: 3     metoprolol succinate ER (TOPROL XL) 50 MG 24 hr tablet, TAKE 1 TABLET (50 MG) BY MOUTH DAILY, Disp: 90 tablet, Rfl: 3     Probiotic Product (PROBIOTIC  ACIDOPHILUS BEADS) CAPS, Take 1 capsule by mouth daily, Disp: 90 capsule, Rfl: 3    History:   Past Medical History:   Diagnosis Date     2019 novel coronavirus disease (COVID-19) 2/1/2021     Acute respiratory failure with hypoxia (H) 2/5/2021     Anemia      Atrial fibrillation (H)      CAD (coronary artery disease)      Chronic diarrhea      Clinical diagnosis of COVID-19     1/2021     Coronary artery disease      Former smoker      Hematoma of left iliopsoas muscle 01/19/2020    while on eliquis.     History of skin cancer      Hyperlipidemia      Hypertension      Insomnia      Lumbar spondylosis 2016     Migraine      PAD (peripheral artery disease) (H) 10/19/2015     Paroxysmal SVT (supraventricular tachycardia) (H)     Created by Conversion      Persistent atrial fibrillation (H) 05/09/2018    New diagnosis April 16 18th and found incidentally on physical exam during yearly exam in primary clinic [id number] score of 5-new Eliquis start     Poliomyelitis      Restless legs syndrome (RLS)      RLS (restless legs syndrome) 02/28/2017     Sick sinus syndrome (H) 01/28/2020     Sigmoid diverticulosis 04/04/2007     SSS (sick sinus syndrome) (H)     S/p pacemaker       Physical Exam:    /62   Pulse 64   Temp 97.9  F (36.6  C)   Resp 16     General:  Patient presents to clinic in no apparent distress.  Head: normocephalic atraumatic  Psychiatric:  Alert and oriented x3.   Respiratory: unlabored breathing; no cough  Integumentary:  Skin is uniformly warm, dry and pink.    Ulcer #1 Location: left shin  Size: 0L x 0W x 0depth.  nosinus tract present, Wound base:  Healed; xeroform residue present  noundermining present. Ulcer is healed thickness. There is no drainage. Periwound: no denudement, erythema, induration, maceration or warmth.      Wound Leg (Active)   Number of days: 144       VASC Wound Left shin (Active)   Pre Size Length 5 05/08/23 1300   Pre Size Width 5 05/08/23 1300   Pre Size Depth 0.1  05/08/23 1300   Pre Total Sq cm 25 05/08/23 1300   Post Size Length 0.3 04/17/23 1300   Post Size Width 2 04/17/23 1300   Post Size Depth 0.1 04/17/23 1300   Post Total Sq cm 0.6 04/17/23 1300   Description scab 03/27/23 1400   Number of days: 126            Circumferential volume measures:          1/30/2023     8:00 AM 2/20/2023    11:00 AM 4/17/2023    12:00 PM 5/8/2023    12:00 PM   Circumferential Measures   Right just above MTP 20.9      Right Ankle 22.4      Right Widest Calf 32.5      Left - just above MTP 21.3 21.2 20.2 21   Left Ankle 23.8 22.3 22 20.8   Left Widest Calf 34.6 33.5 31.6 29.5       Labs:    I personally reviewed the following lab results today and those on care everywhere    CRP   Date Value Ref Range Status   04/08/2022 0.2 0.0-<0.8 mg/dL Final      Erythrocyte Sedimentation Rate   Date Value Ref Range Status   04/08/2022 9 0 - 20 mm/hr Final      Last Renal Panel:  Sodium   Date Value Ref Range Status   05/15/2023 140 136 - 145 mmol/L Final   02/06/2021 138 133 - 144 mmol/L Final     Potassium   Date Value Ref Range Status   05/15/2023 4.0 3.4 - 5.3 mmol/L Final   04/08/2022 4.5 3.5 - 5.0 mmol/L Final   02/06/2021 3.7 3.4 - 5.3 mmol/L Final     Chloride   Date Value Ref Range Status   05/15/2023 108 (H) 98 - 107 mmol/L Final   04/08/2022 106 98 - 107 mmol/L Final   02/06/2021 105 94 - 109 mmol/L Final     Carbon Dioxide   Date Value Ref Range Status   02/06/2021 26 20 - 32 mmol/L Final     Carbon Dioxide (CO2)   Date Value Ref Range Status   05/15/2023 23 22 - 29 mmol/L Final   04/08/2022 26 22 - 31 mmol/L Final     Anion Gap   Date Value Ref Range Status   05/15/2023 9 7 - 15 mmol/L Final   04/08/2022 13 5 - 18 mmol/L Final   02/06/2021 7 3 - 14 mmol/L Final     Glucose   Date Value Ref Range Status   05/15/2023 112 (H) 70 - 99 mg/dL Final   04/08/2022 125 70 - 125 mg/dL Final   02/06/2021 78 70 - 99 mg/dL Final     Urea Nitrogen   Date Value Ref Range Status   05/15/2023 16.6 8.0 - 23.0  mg/dL Final   04/08/2022 10 8 - 28 mg/dL Final   02/06/2021 18 7 - 30 mg/dL Final     Creatinine   Date Value Ref Range Status   05/15/2023 0.91 0.51 - 0.95 mg/dL Final   02/06/2021 0.96 0.52 - 1.04 mg/dL Final     GFR Estimate   Date Value Ref Range Status   05/15/2023 59 (L) >60 mL/min/1.73m2 Final     Comment:     eGFR calculated using 2021 CKD-EPI equation.   05/24/2021 54 (L) >60 mL/min/1.73m2 Final   02/06/2021 52 (L) >60 mL/min/[1.73_m2] Final     Comment:     Non  GFR Calc  Starting 12/18/2018, serum creatinine based estimated GFR (eGFR) will be   calculated using the Chronic Kidney Disease Epidemiology Collaboration   (CKD-EPI) equation.       Calcium   Date Value Ref Range Status   05/15/2023 9.0 8.2 - 9.6 mg/dL Final   02/06/2021 8.0 (L) 8.5 - 10.1 mg/dL Final     Albumin   Date Value Ref Range Status   12/29/2022 3.6 3.5 - 5.2 g/dL Final   04/08/2022 4.0 3.5 - 5.0 g/dL Final   02/05/2021 2.7 (L) 3.4 - 5.0 g/dL Final      Lab Results   Component Value Date    WBC 7.6 05/15/2023    WBC 4.9 02/06/2021     Lab Results   Component Value Date    RBC 4.00 05/15/2023    RBC 2.80 02/06/2021     Lab Results   Component Value Date    HGB 9.6 05/15/2023    HGB 8.4 02/06/2021     Lab Results   Component Value Date    HCT 32.6 05/15/2023    HCT 26.7 02/06/2021     No components found for: MCT  Lab Results   Component Value Date    MCV 82 05/15/2023    MCV 95 02/06/2021     Lab Results   Component Value Date    MCH 24.0 05/15/2023    MCH 30.0 02/06/2021     Lab Results   Component Value Date    MCHC 29.4 05/15/2023    MCHC 31.5 02/06/2021     Lab Results   Component Value Date    RDW 17.6 05/15/2023    RDW 13.7 02/06/2021     Lab Results   Component Value Date     05/15/2023     02/06/2021      No results found for: A1C   TSH   Date Value Ref Range Status   12/08/2021 4.25 0.30 - 5.00 uIU/mL Final   01/28/2021 2.06 0.30 - 5.00 uIU/mL Final      Lab Results   Component Value Date    VITDT  69 04/08/2022                   Impression:  Encounter Diagnoses   Name Primary?     Burn of left leg, second degree, subsequent encounter Yes     Chronic anticoagulation      Venous (peripheral) insufficiency      Venous hypertension      Dependent edema           6/5/2023 LLE         1/12/23 LLE            Are any of these ulcers new today: no    Assessment/Plan:          1. Debridement: na     2.  Ulcer treatment: ulcer treatment will include irrigation and dressings to promote autolytic debridement which will include:lotion daily; no dressings needed.    If for some reason the patient is not able to get their dressing(s) changed as outlined above (due to illness, lack of supplies, lack of help) please do the following: remove old, soiled dressings; wash the ulcers with saline; pat dry; apply ABD pad or other absorbant pad and secure with rolled gauze; avoid tape directly on your skin; patient instructed to call the clinic as soon as possible to let us know what the current issues are in receiving ulcer care. Stable              3. Edema: none; elevation. The compression wraps were applied today in clinic.     If a 2 layer or 4 layer compression wrap is being used; these are safe to have on for ONLY 7 days. If for some reason the patient is not able to get the wrap(s) changed (due to illness; lack of supplies, lack of help, lack of transportation) please do the following: unwrap the old 2 or 4 layer compression wrap; avoid using scissors as you could cut your skin and cause ulcers; use tubular compression when available. Call to reschedule your home care or clinic visit appointment as soon as possible.  Stable            4. Nutrition: focus on low sodium diet           5. Offloading: na     Patient will follow up with me in PRN weeks for reevaluation. They were instructed to call the clinic sooner with any signs or symptoms of infection or any further questions/concerns. Answered all questions.          Haylie MARLEY  Joss BENOIT, RN, CNP, CWOCN, CFCN, CLT  [facility]   [phone]        This note was electronically signed by Haylie Coreas NP

## 2023-06-08 ENCOUNTER — TELEPHONE (OUTPATIENT)
Dept: INTERNAL MEDICINE | Facility: CLINIC | Age: 88
End: 2023-06-08
Payer: MEDICARE

## 2023-06-08 NOTE — TELEPHONE ENCOUNTER
Home Health Care    Reason for call:  Verbal Orders - Delay Start of Care    Orders are needed for this patient.  Delay Start of Care approval - patient is moving and has requested to start care next week, Monday 06/12/2023    Pt Provider: Dr. Daniela Roth    Phone Number Homecare Nurse can be reached at: [phone] - Rin     Can we leave a detailed message on this number? YES  Voice mail is secure and confidential

## 2023-06-08 NOTE — TELEPHONE ENCOUNTER
Contacted Lindsey and relayed message.    Had additional questions about wound care orders. Patient saw vascular on Monday. Home care nurse requested office note for ongoing wound care.     Faxed to [phone] attn: Lindsey

## 2023-06-14 ENCOUNTER — PATIENT OUTREACH (OUTPATIENT)
Dept: CARE COORDINATION | Facility: CLINIC | Age: 88
End: 2023-06-14
Payer: MEDICARE

## 2023-06-14 ENCOUNTER — MYC MEDICAL ADVICE (OUTPATIENT)
Dept: CARDIOLOGY | Facility: CLINIC | Age: 88
End: 2023-06-14

## 2023-06-14 NOTE — PROGRESS NOTES
Clinic Care Coordination Contact  Per Chart Review, patient moving to an assisted living facility this weekend. Because patient is moving to an assisted living facility where her needs will be met by staff, she is no longer a candidate for Care Coordination. Writer will dis-enroll patient from Care Coordination.

## 2023-07-05 ENCOUNTER — ANCILLARY PROCEDURE (OUTPATIENT)
Dept: CARDIOLOGY | Facility: CLINIC | Age: 88
End: 2023-07-05
Attending: INTERNAL MEDICINE
Payer: MEDICARE

## 2023-07-05 DIAGNOSIS — I48.19 PERSISTENT ATRIAL FIBRILLATION (H): ICD-10-CM

## 2023-07-05 DIAGNOSIS — I49.5 SICK SINUS SYNDROME (H): ICD-10-CM

## 2023-07-05 DIAGNOSIS — Z95.0 CARDIAC PACEMAKER IN SITU: ICD-10-CM

## 2023-07-13 ENCOUNTER — MYC MEDICAL ADVICE (OUTPATIENT)
Dept: INTERNAL MEDICINE | Facility: CLINIC | Age: 88
End: 2023-07-13

## 2023-07-13 LAB
MDC_IDC_EPISODE_DTM: NORMAL
MDC_IDC_EPISODE_DURATION: 11 S
MDC_IDC_EPISODE_DURATION: 14 S
MDC_IDC_EPISODE_ID: NORMAL
MDC_IDC_EPISODE_TYPE: NORMAL
MDC_IDC_LEAD_IMPLANT_DT: NORMAL
MDC_IDC_LEAD_IMPLANT_DT: NORMAL
MDC_IDC_LEAD_LOCATION: NORMAL
MDC_IDC_LEAD_LOCATION: NORMAL
MDC_IDC_LEAD_LOCATION_DETAIL_1: NORMAL
MDC_IDC_LEAD_LOCATION_DETAIL_1: NORMAL
MDC_IDC_LEAD_MFG: NORMAL
MDC_IDC_LEAD_MFG: NORMAL
MDC_IDC_LEAD_MODEL: NORMAL
MDC_IDC_LEAD_MODEL: NORMAL
MDC_IDC_LEAD_POLARITY_TYPE: NORMAL
MDC_IDC_LEAD_POLARITY_TYPE: NORMAL
MDC_IDC_LEAD_SERIAL: NORMAL
MDC_IDC_LEAD_SERIAL: NORMAL
MDC_IDC_LEAD_SPECIAL_FUNCTION: NORMAL
MDC_IDC_LEAD_SPECIAL_FUNCTION: NORMAL
MDC_IDC_MSMT_BATTERY_DTM: NORMAL
MDC_IDC_MSMT_BATTERY_REMAINING_LONGEVITY: 48 MO
MDC_IDC_MSMT_BATTERY_REMAINING_PERCENTAGE: 77 %
MDC_IDC_MSMT_BATTERY_STATUS: NORMAL
MDC_IDC_MSMT_LEADCHNL_RA_IMPEDANCE_VALUE: 732 OHM
MDC_IDC_MSMT_LEADCHNL_RV_IMPEDANCE_VALUE: 610 OHM
MDC_IDC_MSMT_LEADCHNL_RV_PACING_THRESHOLD_AMPLITUDE: 1.2 V
MDC_IDC_MSMT_LEADCHNL_RV_PACING_THRESHOLD_PULSEWIDTH: 0.4 MS
MDC_IDC_PG_IMPLANT_DTM: NORMAL
MDC_IDC_PG_MFG: NORMAL
MDC_IDC_PG_MODEL: NORMAL
MDC_IDC_PG_SERIAL: NORMAL
MDC_IDC_PG_TYPE: NORMAL
MDC_IDC_SESS_CLINIC_NAME: NORMAL
MDC_IDC_SESS_DTM: NORMAL
MDC_IDC_SESS_TYPE: NORMAL
MDC_IDC_SET_BRADY_AT_MODE_SWITCH_RATE: 170 {BEATS}/MIN
MDC_IDC_SET_BRADY_LOWRATE: 60 {BEATS}/MIN
MDC_IDC_SET_BRADY_MAX_SENSOR_RATE: 130 {BEATS}/MIN
MDC_IDC_SET_BRADY_MODE: NORMAL
MDC_IDC_SET_LEADCHNL_RA_SENSING_ADAPTATION_MODE: NORMAL
MDC_IDC_SET_LEADCHNL_RA_SENSING_SENSITIVITY: 0.15 MV
MDC_IDC_SET_LEADCHNL_RV_PACING_AMPLITUDE: 2 V
MDC_IDC_SET_LEADCHNL_RV_PACING_CAPTURE_MODE: NORMAL
MDC_IDC_SET_LEADCHNL_RV_PACING_POLARITY: NORMAL
MDC_IDC_SET_LEADCHNL_RV_PACING_PULSEWIDTH: 0.4 MS
MDC_IDC_SET_LEADCHNL_RV_SENSING_ADAPTATION_MODE: NORMAL
MDC_IDC_SET_LEADCHNL_RV_SENSING_POLARITY: NORMAL
MDC_IDC_SET_LEADCHNL_RV_SENSING_SENSITIVITY: 2.5 MV
MDC_IDC_SET_ZONE_DETECTION_INTERVAL: 375 MS
MDC_IDC_SET_ZONE_TYPE: NORMAL
MDC_IDC_SET_ZONE_VENDOR_TYPE: NORMAL
MDC_IDC_STAT_BRADY_DTM_END: NORMAL
MDC_IDC_STAT_BRADY_DTM_START: NORMAL
MDC_IDC_STAT_BRADY_RA_PERCENT_PACED: 0 %
MDC_IDC_STAT_BRADY_RV_PERCENT_PACED: 30 %
MDC_IDC_STAT_EPISODE_RECENT_COUNT: 0
MDC_IDC_STAT_EPISODE_RECENT_COUNT: 2
MDC_IDC_STAT_EPISODE_RECENT_COUNT_DTM_END: NORMAL
MDC_IDC_STAT_EPISODE_RECENT_COUNT_DTM_START: NORMAL
MDC_IDC_STAT_EPISODE_TYPE: NORMAL
MDC_IDC_STAT_EPISODE_VENDOR_TYPE: NORMAL

## 2023-07-13 PROCEDURE — 93296 REM INTERROG EVL PM/IDS: CPT | Performed by: INTERNAL MEDICINE

## 2023-07-13 PROCEDURE — 93294 REM INTERROG EVL PM/LDLS PM: CPT | Performed by: INTERNAL MEDICINE

## 2023-07-13 NOTE — TELEPHONE ENCOUNTER
Can we have patient come in early next week for further evaluation. I have same day and approval required slots on Tuesday.

## 2023-07-14 ENCOUNTER — MEDICAL CORRESPONDENCE (OUTPATIENT)
Dept: HEALTH INFORMATION MANAGEMENT | Facility: CLINIC | Age: 88
End: 2023-07-14
Payer: MEDICARE

## 2023-07-14 ENCOUNTER — TELEPHONE (OUTPATIENT)
Dept: INTERNAL MEDICINE | Facility: CLINIC | Age: 88
End: 2023-07-14
Payer: MEDICARE

## 2023-07-14 NOTE — TELEPHONE ENCOUNTER
Marlene calling to request verbal orders for the following:    Skilled nursinx per week for 2 weeks.  Then 1x per week for 6 weeks. 3 PRN.  Working on wound care on the back.    Also okay to clean wound on back from week 6 with normal saline, pat dry and cover with normal gauze and tape?  Discontinue treatment when wound is healed?    Please call Marlene at [phone]. Secure VM if needed.

## 2023-07-14 NOTE — TELEPHONE ENCOUNTER
Home Health Care    Reason for call:  Verbal Orders - ReCertification    Orders are needed for this patient.  Skilled Nursing: For Monday 07/17/2023 requesting 1 SN visit to complete recertification for Medicare. Requesting call back today    Pt Provider: Dr. Daniela Roth    Phone Number Homecare Nurse can be reached at: Sabrina is leaving her cell number     Can we leave a detailed message on this number? YES voice mail is secure and confidential.

## 2023-07-14 NOTE — TELEPHONE ENCOUNTER
Relayed ok for orders. Sabrina states they will also upload photos into Epic as well so PCP can see.

## 2023-07-18 PROBLEM — C44.212 BCC (BASAL CELL CARCINOMA), EAR, RIGHT: Status: ACTIVE | Noted: 2022-04-08

## 2023-07-18 PROBLEM — C44.310 BCC (BASAL CELL CARCINOMA), FACE: Status: RESOLVED | Noted: 2023-04-26 | Resolved: 2023-01-01

## 2023-07-18 PROBLEM — J47.0 BRONCHIECTASIS WITH ACUTE LOWER RESPIRATORY INFECTION (H): Status: RESOLVED | Noted: 2021-01-28 | Resolved: 2023-01-01

## 2023-07-18 PROBLEM — R52 PAIN, UNSPECIFIED: Status: RESOLVED | Noted: 2020-01-21 | Resolved: 2023-01-01

## 2023-07-18 NOTE — ASSESSMENT & PLAN NOTE
Hgb has been 8-10 dating back to 2018, was briefly into the 11s in 2022 and now back down to 9.4 today.   - Add on iron studies to determine iron deficiency vs AoCD

## 2023-07-18 NOTE — ASSESSMENT & PLAN NOTE
BCC removed by dermatology ~6 weeks ago. Appears to be healing well on exam. Follow up scheduled with dermatology later today.

## 2023-07-18 NOTE — ASSESSMENT & PLAN NOTE
Severe coronary artery calcification noted on chest CT 1/2021. Current regimen is Atorvastatin 20 mg daily. Due for repeat lipids, ordered today.

## 2023-07-18 NOTE — ASSESSMENT & PLAN NOTE
Patient had purulent abscess that I drained in clinic 3/24/23. Healing well today, still open with very scant amount of white drainage. The area is not painful and does not have surrounding erythema or edema. Do not think there is continued infection, just needs good wound care until the defect is healed.   - Given location, patient is unable to perform wound care on her own   - Home care order placed for RN wound care and assessment

## 2023-07-18 NOTE — PROGRESS NOTES
Assessment & Plan   Problem List Items Addressed This Visit        Nervous and Auditory    BCC (basal cell carcinoma), ear, right     BCC removed by dermatology ~6 weeks ago. Appears to be healing well on exam. Follow up scheduled with dermatology later today.          Mild cognitive impairment     Continues to have some memory issues per family, which likely is the reason for L leg wound worsening again as she doesn't remember to do wound care herself after home care stopped.   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 Has been unable to get PT set up, will order this to be done with home care agency as she is home bound.          Relevant Orders    Home Care Referral       Respiratory    Bronchiectasis without complication (H)     Bronchiectasis noted on CT chest 1/2021. Is noticing more shortness of breath with exertion recently, no cough.   - They will reach out to cardiology to possibly move up her TTE given h/o moderate aortic stenosis  - If this is stable, could consider repeat chest imaging            Endocrine    Mixed hyperlipidemia     Severe coronary artery calcification noted on chest CT 1/2021. Current regimen is Atorvastatin 20 mg daily. Due for repeat lipids, ordered today.         Relevant Medications    atorvastatin (LIPITOR) 20 MG tablet       Circulatory    Essential hypertension     Well controlled on current regimen.   - Continue metoprolol XL 50 mg daily          Relevant Orders    CBC with platelets and differential (Completed)    Basic metabolic panel    Chronic atrial fibrillation (H)     Rate controlled on exam today.   - Continue metoprolol XL 50 mg daily and eliquis 2.5 mg BID         Relevant Medications    apixaban ANTICOAGULANT (ELIQUIS ANTICOAGULANT) 2.5 MG tablet    Nonrheumatic  aortic valve stenosis     TTE 9/2022 showed moderate calcific aortic stenosis. Peak velocity 2.6 m/s. Mean gradient 19  mmHg. Calculated valve area 1.1 cmÂ . Continues to have 2/6 systolic murmur on exam and is noting increasing WHITE.   - Daughter will contact cardiology to see if they will want to move up repeat TTE (currently scheduled in September)             Musculoskeletal and Integumentary    Second degree burn of left lower extremity excluding ankle and foot, subsequent encounter     Was doing very well at last wound care visit 6/5/23, however after that home care stopped and patient would forget to put lotion on and care for wound. Now is more red and weaping again. Does not appear infected but patient would benefit from home care nurse doing wound care for her.   - Home care with RN for wound assessment and care placed today          Relevant Orders    Home Care Referral       Hematologic    Normocytic anemia     Hgb has been 8-10 dating back to 2018, was briefly into the 11s in 2022 and now back down to 9.4 today.   - Add on iron studies to determine iron deficiency vs AoCD         Relevant Orders    CBC with platelets and differential (Completed)    Iron and iron binding capacity    Ferritin       Other    Abscess of back - Primary     Patient had purulent abscess that I drained in clinic 3/24/23. Healing well today, still open with very scant amount of white drainage. The area is not painful and does not have surrounding erythema or edema. Do not think there is continued infection, just needs good wound care until the defect is healed.   - Given location, patient is unable to perform wound care on her own   - Home care order placed for RN wound care and assessment         Relevant Orders    Home Care Referral   Other Visit Diagnoses     Impaired gait and mobility        Relevant Orders    Home Care Referral           Ordering of each unique test  I spent a total of 35 minutes on the day of the visit.    Time spent by me doing chart review, history and exam, documentation and further activities per the note     FUTURE APPOINTMENTS:       - Follow-up for annual visit in November or sooner as needed    Daniela Roth MD  [facility]   Deloris Freeman is a 89 year old, presenting for the following health issues:  Wound Check (6 month burn wound follow up)        7/18/2023     9:29 AM   Additional Questions   Roomed by Diomedes CARLTON MA   Accompanied by Daughter      History of Present Illness     Reason for visit:  Follow up on cyst and burn    Cyst I&D in clinic 3/24/23. Treated with doxy x14 days. Overall has been healing well, just slowly. They are still noticing occasional white/clear discharge.     L shin burn: Initial burn 12/2022 and was admitted with cellulitis 1/12/23. Last saw vascular 6/5/23, doing very well, recommended lotion daily, no dressing needed, follow up PRN. Okay to continue compression.   - After this, she was discharged from home care for the wound and with that, family noticed worsening   - In last few weeks, they have noticed increase redness    HTN: Metoprolol XL 50 and eliquis 2.5 mg BID. BP today 130/76.     Mild cognitive impairment: Neuropsych eval is scheduled for 9/5/23. Continues on Prevagen 10 mg daily. Has moved in to [facility] and really liking it.     HLD: Severe coronary artery calcification noted on chest CT 1/2021. Current regimen is Atorvastatin 20 mg daily. Due for repeat lipids.     Aortic stenosis: Last TTE 9/2022 showed moderate aortic stenosis. Repeat is scheduled 9/11/23. Daughter notes increasing WHITE, needing to stop more when walking in last few months. Continues to have some dizziness with position change, hard to tell if this is BPPV vs orthostasis.     BPPV: In clinic 5/2023, dizziness worse with position change, + Geovani Hallpike. Recommended vestibular PT, unfortunately, PT through her [facility] did not want to provide services since she was  "already getting home care for wounds. They asked home care agency and have not heard back yet.     CKD3a: Renal function stable on labs 5/15/23.     Normocytic anemia: Hgb slightly down 5/15/23 to 9.6. They have not noticed any over s/s of bleeding.     She eats 2-3 servings of fruits and vegetables daily.She consumes 2 sweetened beverage(s) daily.She exercises with enough effort to increase her heart rate 9 or less minutes per day.  She exercises with enough effort to increase her heart rate 3 or less days per week. She is missing 7 dose(s) of medications per week.    Review of Systems   Constitutional, HEENT, cardiovascular, pulmonary, GI, , musculoskeletal, neuro, skin, endocrine and psych systems are negative, except as otherwise noted.      Objective    /76 (BP Location: Right arm, Patient Position: Sitting, Cuff Size: Adult Regular)   Pulse 76   Temp 97.6  F (36.4  C) (Oral)   Ht 1.626 m (5' 4\")   Wt 64.3 kg (141 lb 12.8 oz)   SpO2 98%   BMI 24.34 kg/m    Body mass index is 24.34 kg/m .  Physical Exam   GENERAL: healthy, alert and no distress  EYES: Eyes grossly normal to inspection and conjunctivae and sclerae normal  HENT: nose and mouth without ulcers or lesions  RESP: lungs clear to auscultation - no rales, rhonchi or wheezes  CV: regular rate and rhythm, normal S1 S2, no S3 or S4, +2/6 systolic murmur, click or rub, trace b/l peripheral edema of ankles and peripheral pulses strong  ABDOMEN: soft, nontender, no hepatosplenomegaly, no masses and bowel sounds normal  MS: no gross musculoskeletal defects noted, no edema  SKIN: Back wound -        L lower leg -       - Healing surgical scan on R ear with small ulceration in the middle   NEURO: No focal deficits, mentation intact and speech normal  PSYCH: mentation appears normal, affect normal/bright    Results for orders placed or performed in visit on 07/18/23 (from the past 24 hour(s))   CBC with platelets and differential    Narrative    The " following orders were created for panel order CBC with platelets and differential.  Procedure                               Abnormality         Status                     ---------                               -----------         ------                     CBC with platelets and d...[[id number]]  Abnormal            Final result                 Please view results for these tests on the individual orders.   CBC with platelets and differential   Result Value Ref Range    WBC Count 7.4 4.0 - 11.0 10e3/uL    RBC Count 3.77 (L) 3.80 - 5.20 10e6/uL    Hemoglobin 9.4 (L) 11.7 - 15.7 g/dL    Hematocrit 31.2 (L) 35.0 - 47.0 %    MCV 83 78 - 100 fL    MCH 24.9 (L) 26.5 - 33.0 pg    MCHC 30.1 (L) 31.5 - 36.5 g/dL    RDW 16.3 (H) 10.0 - 15.0 %    Platelet Count 222 150 - 450 10e3/uL    % Neutrophils 68 %    % Lymphocytes 18 %    % Monocytes 9 %    % Eosinophils 4 %    % Basophils 1 %    % Immature Granulocytes 0 %    Absolute Neutrophils 5.0 1.6 - 8.3 10e3/uL    Absolute Lymphocytes 1.4 0.8 - 5.3 10e3/uL    Absolute Monocytes 0.6 0.0 - 1.3 10e3/uL    Absolute Eosinophils 0.3 0.0 - 0.7 10e3/uL    Absolute Basophils 0.1 0.0 - 0.2 10e3/uL    Absolute Immature Granulocytes 0.0 <=0.4 10e3/uL                 Answers for HPI/ROS submitted by the patient on 7/18/2023  What is the reason for your visit today? : follow up on cyst and burn  How many servings of fruits and vegetables do you eat daily?: 2-3  On average, how many sweetened beverages do you drink each day (Examples: soda, juice, sweet tea, etc.  Do NOT count diet or artificially sweetened beverages)?: 2  How many minutes a day do you exercise enough to make your heart beat faster?: 9 or less  How many days a week do you exercise enough to make your heart beat faster?: 3 or less  How many days per week do you miss taking your medication?: 7

## 2023-07-18 NOTE — ASSESSMENT & PLAN NOTE
Exam 5/23/23 c/w BPPV. Has been unable to get PT set up, will order this to be done with home care agency as she is home bound.

## 2023-07-18 NOTE — ASSESSMENT & PLAN NOTE
Bronchiectasis noted on CT chest 1/2021. Is noticing more shortness of breath with exertion recently, no cough.   - They will reach out to cardiology to possibly move up her TTE given h/o moderate aortic stenosis  - If this is stable, could consider repeat chest imaging

## 2023-07-18 NOTE — ASSESSMENT & PLAN NOTE
Was doing very well at last wound care visit 6/5/23, however after that home care stopped and patient would forget to put lotion on and care for wound. Now is more red and weaping again. Does not appear infected but patient would benefit from home care nurse doing wound care for her.   - Home care with RN for wound assessment and care placed today

## 2023-07-18 NOTE — ASSESSMENT & PLAN NOTE
Continues to have some memory issues per family, which likely is the reason for L leg wound worsening again as she doesn't remember to do wound care herself after home care stopped.   - [place] marroquin is scheduled in September  - Home care order placed for help with wound care

## 2023-07-18 NOTE — ASSESSMENT & PLAN NOTE
TTE 9/2022 showed moderate calcific aortic stenosis. Peak velocity 2.6 m/s. Mean gradient 19  mmHg. Calculated valve area 1.1 cmÂ . Continues to have 2/6 systolic murmur on exam and is noting increasing WHITE.   - Daughter will contact cardiology to see if they will want to move up repeat TTE (currently scheduled in September)

## 2023-07-28 NOTE — TELEPHONE ENCOUNTER
Home Health Care    Reason for call:  Verbal Orders and Update    Orders are needed for this patient.  SN: Wound is not healing - requesting to change orders to 2 visit per week for 4 weeks.  Marlene with [facility] is requesting antibiotic ointment to be prescribed to help would heal.     Reporting blister on left leg that is new.     Pt Provider: Dr. Daniela Roth    Phone Number Homecare Nurse can be reached at: [phone]    Can we leave a detailed message on this number? YES

## 2023-09-05 NOTE — PROGRESS NOTES
NEUROPSYCHOLOGY EVALUATION  [facility] [place] [facility]      NAME: Deloris Larson    YOB: 1930   AGE: 89 years old  EDU: 16  DATE OF EVALUATION: 9/5/2023    REASON FOR REFERRAL:  Ms. Larson is a 89 year-old, right-handed, White female with hypertension, hyperlipidemia, left vertebral artery stenosis, atrial fibrillation (s/p pacemaker implantation), insomnia, RLS, chronic anemia, stage III chronic kidney disease, and history of colon cancer and basal cell carcinoma. Due to concerns about cognitive decline, she was referred for this neuropsychological evaluation by her PCP, Daniela Roth MD, in order to assist with differential diagnosis and care planning.     SUMMARY OF FINDINGS: (please refer to Extended Report below for full details and comprehensive clinical history)  Results of testing indicate that Ms. Larson is of estimated average to above average premorbid intellectual functioning, and most of Ms. Larson's performances are generally commensurate with that estimate. However, she exhibits largely mild impairment on measures of attention/concentration, nonverbal learning/memory, in some aspects of verbal learning/memory, and on some tests of executive functioning (e.g., mental flexibility/set shifting and abstract reasoning).     With regard to learning/memory performances more specifically, her nonverbal learning is mildly impaired for her age, and she is unable to recall any of the figures after a 20-minute delay (0% retention rate). However, her recall benefits slightly from prompts/cues on recognition testing (her score improves to the low average range with prompts/cues). Her verbal learning/memory performances are more variable, as her scores on a story learning/memory task are considered to be within normal limits, but her scores on a word list learning/memory test are mildly impaired. Specifically, her learning efficiency of the word list is mildly impaired, and her  recall is also mildly impaired with a 40% retention rate after a 10-minute delay. Her recall of the list does NOT benefit much from prompts/cues on the recognition trial, as her score remains in the mildly impaired range. Overall, her memory profile is fairly mixed, with intact performance on one measure, an encoding deficit on another measure, and possibly a retrieval deficit (but possibly encoding) on the third measure.    All other cognitive test performances are considered to be within normal limits, including those on measures of processing speed, visuospatial/constructional skills, language abilities (including confrontation naming and verbal fluency's), and cognitive inhibition.     Emotionally, the patient endorses minimal depressive symptoms and no anxiety symptoms on self-report questionnaires. This is consistent with her reports of her mood during the clinical interview. Her son has also noticed marked improvement in her mood since moving into a senior living facility where she is much more engaged and social.    IMPRESSIONS:  Overall, these results are suggestive of mild cerebral dysfunction in the frontal lobes with possibly some early involvement of mesial temporal structures.   Given that the cognitive deficits are mild and Ms. 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  Etiology is likely multifactorial:  Cerebrovascular disease may be considered as a potential etiology given her recent CT findings (which revealed moderate-severe chronic small vessel ischemic changes) and her numerous risk factors for cerebrovascular disease (e.g., hypertension, hyperlipidemia, atrial fibrillation, chronic anemia, etc.).   Chronic anemia has been shown to be associated with frontal deficits on cognitive testing.  Significant insomnia and daytime fatigue may also exacerbate her cognitive difficulties at times in daily life. Treatment of insomnia and  chronic anemia may elicit some degree of improvement in her cognitive functioning over time.  Further work-up is needed in order to rule out treatable/reversible or other medical causes of cognitive impairment. For example, additional relevant blood work should be considered in order to rule out various vitamin deficiencies, thyroid dysfunction, etc.   While her current profile does NOT clearly support an Alzheimer's disease process at this time (given that her memory profile is quite mixed and her semantic language abilities are still intact), ongoing monitoring is recommended.     DIAGNOSTIC IMPRESSIONS:  Mild Neurocognitive Disorder, likely due to Multiple Etiologies (cerebrovascular disease, chronic anemia, insomnia)    RECOMMENDATIONS:  1) Additional relevant blood work may be considered in order to rule out other treatable/reversible causes of cognitive impairment (e.g., vitamin B12, MMA, vitamin B1, TSH, folate, etc.). This recommendation will be deferred to the patient's PCP.    2) With regard to complex daily activities, Ms. Larson no longer drives, she already receives assistance with bill paying from her daughter, and most meals are provided by her senior living facility. This all remains appropriate. She continues to manage her own medications but acknowledges that she forgets more often. This may be partly because she places her medications in a basket (instead of out in plain sight). I would strongly recommend that she start utilizing a pillbox that she keeps in a place where she will see it (e.g., on the bathroom counter, at the dining table, or next to her arm chair). Occasional oversight with her medication management is also recommended, particularly after she starts implementing the use of the pillbox (e.g., someone can double check the pillbox after she fills it for accuracy, and someone can glance at her pillbox at times throughout the week to ensure compliance). She might also set alarm  reminders to help her remember to take her medications (some pillboxes come with built-in alarms). If mistakes are still made, perhaps staff can start managing her medications for her.    3) If not already done, completion of paperwork for advance directives and assignment of healthcare and financial Power of  should be considered at this time.    4) Given her history of sleep disturbance, Ms. Larson may benefit from evaluating her current sleep hygiene behaviors and if need be, make changes to help facilitate sleep. Relaxation exercises (e.g., listening to soothing music, deep breathing, progressive muscle relaxation, etc.) might also help with sleep initiation. If she tends to have difficulty returning to sleep in the night or in falling asleep due to worrying or ruminating, strategies could be discussed with a psychotherapist. If these techniques do not improve her sleep, she may wish to consult her physician to assess for any underlying physical issues that may be impacting her sleep and to determine if a medication or a referral to Sleep Medicine is warranted.  She is encouraged to AVOID any over-the-counter sleep aids that have diphenhydramine or doxylamine as ingredients (e.g., Tylenol PM, Rapp Sleep Aid, ZzzQuil, etc.), as those can contribute to memory dysfunction given their anticholinergic properties.    5) The patient is encouraged to utilize cognitive compensatory strategies in daily life, including utilizing note pads, checklists, to-do lists, a calendar/planner, labeled alarm reminders, a pillbox, and maintaining a daily morning and nighttime routine and an organized living environment.    6) Ms. Larson is encouraged to remain physically, socially, and mentally active in order to optimize her brain health. She is also encouraged to adhere closely to her medications to manage her cerebrovascular risk factors, which will help prevent further cognitive decline.    7) Neuropsychological  "follow-up is recommended in about 12-18 months (or as clinically indicated) in order to monitor her cognitive status, help to clarify etiology, and update recommendations. The current test data can be used as a baseline to which future comparisons can be made.      FEEDBACK OF ASSESSMENT RESULTS:  Ms. Larson has requested to receive the results of this evaluation via a formal feedback appointment with me, which will be scheduled at the patient's convenience, typically within two weeks of today's date.     Thank you for allowing me to participate in Ms. Larson's care. Please contact me with any questions regarding the content of this report.        Nhung Yeboah PsyD, LP  Licensed Clinical Neuropsychologist  [facility] [address], [address]  Phone: [phone]          --------------------------------EXTENDED REPORT--------------------------------    HISTORY OF PRESENTING PROBLEM:  The following information was obtained via medical record review and by interview of the patient and her son-in-law, Nando.    Per medical records, concerns were raised about the patient's declining memory by her family during a visit with the patient's PCP (Daniela Roth MD), on 3/14/2023. At that time, the family noted that they were looking into assisted living options and wanted help determining whether they should be seeking out a memory care unit or not. At a subsequent follow up appointment with Dr. Roth on 4/26/2023, her daughter reported ongoing memory decline. The patient was subsequently referred the patient for this neuropsychological evaluation.    Today, Ms. Larson reported experiencing some decline in her memory, although she largely assumes it is age-related change as the memory loss is \"mild and does not interfere with anything.\" She stated that she can remember conversations, \"depending on how long or important it is.\" She acknowledged that it is harder for her to " "remember names, but she is unconcerned about it. She keeps a calendar, which helps. Aside from mild memory decline, she also reported that she occasionally experiences word-finding difficulty. Other cognitive concerns were denied (she reports no issues with concentration, slowed processing speed, spatial processing, or executive functioning).    Her son-in-law, Nando, reported that he and his wife have noticed a clear, yet gradual decline in her memory over the past 1-2 years. He cited examples of her occasionally repeating herself and requiring frequent reminders. The reminders \"sometimes\" help to jog her memory. He also denied having any concerns about any other cognitive domain.    With regard to the activities of daily living, Ms. Larson reported that she is fairly independent. She just moved in to an independent senior apartment about 3 months ago, and loves it there. Prior to her new apartment, she was living alone in a single-family home. She is reportedly much more social and engaged at the new apartment, and her mood has much improved. She has never had a 's license and generally relies on family and friends for transportation. She manages her own medications, although she acknowledged that she is having more trouble remembering to take them. She explained that she tries to have her apartment tidied up in case she gets visits from neighbors; subsequently, she keeps her medications in a decorative basket (out of plain sight). She does not utilize a pillbox either. Her daughter manages the bills and finances after she missed a couple of payments over the past year. She has not fallen victim to any scams. She continues to prepare some meals for herself, but she also can get up to 10 meals per month from the dining service. She goes grocery shopping about once per month. For leisure, she enjoys knitting, reading, and watching the news. She is also working to form a new knitting club at her new apartment. " She is quite active in her Confucianism as well.    MEDICAL HISTORY:  Ms. Larson's medical history is significant for hypertension, hyperlipidemia, left vertebral artery stenosis, atrial fibrillation (s/p pacemaker implantation), sick sinus syndrome, insomnia, chronic anemia, RLS, stage III chronic kidney disease, pulmonary nodules, and history of colon cancer and basal cell carcinoma. She also reported balance issues for which she utilizes a cane at home and a walker as needed. She reportedly has longstanding/lifelong depth perception issues. She is currently healing from a burn wound on her leg after she missed her cup when she was attempting to pour boiling water into it. She has a history of two COVID-19 infections from which she feels she fully recovered.     The patient reported that she struggles with sleep initiation insomnia for the past several months. She usually does not fall asleep until 1 or 2 AM as her mind keeps her awake and she experiences dry, itchy skin. Once she falls asleep, she typically stays asleep until 7 AM. She does not snore. She used to take Tylenol PM but stopped that about a week ago and has since been taking melatonin, which seems to be helping. Symptoms of REM sleep behavior disorder were denied.    The patient denied any history of significant head injuries, known seizure, stroke, heart attack, tremor, hallucinations, or microsmia/anosmia.     Past Surgical History:   Procedure Laterality Date    APPENDECTOMY      APPENDECTOMY      CAPSULOTOMY Bilateral 12/2015    YAG capsulotomy surgery. 12/21/15, 12/28/15    CARDIOVERSION  05/24/2018    EP Cardioversion External    CATARACT EXTRACTION, BILATERAL Bilateral 03/2012    3/15 and 3/29 by Dr. Barrett at the [facility]    CHOLECYSTECTOMY      CHOLECYSTECTOMY      COLONOSCOPY  05/17/2012    COLONOSCOPY W/ BIOPSIES  04/04/2007    COLONOSCOPY W/ POLYPECTOMY  05/07/2012    2 mm tubular adenoma in the rectum. Hemorrhoids. Diverticulosis.     CORONARY ANGIOPLASTY      CV CORONARY ANGIOGRAM N/A 06/26/2018    Procedure: Coronary Angiogram;  Surgeon: Joby Vargas MD;  Location: [facility];  Service:     EP PACEMAKER INSERT N/A 06/27/2018    Procedure: EP Pacemaker Insertion;  Surgeon: Osito Alvarez MD;  Location: [facility];  Service:     HERNIA REPAIR Right     HYSTERECTOMY      HYSTERECTOMY TOTAL ABDOMINAL      IMPLANT PACEMAKER      INGUINAL HERNIA REPAIR Right     Indirect- Dr. Pedersen    MASTECTOMY      OOPHORECTOMY      SKIN CANCER EXCISION  2015    Right leg on 10/21/15. Right arm 9/29/15    STRIP VEIN      ligation?    TUBAL LIGATION      TUBAL LIGATION       Diagnostic studies:  Head CT dated 5/15/2023 revealed:   INTRACRANIAL CONTENTS: No intracranial hemorrhage, extraaxial collection, or mass effect.  No CT evidence of acute infarct. Moderate to severe presumed chronic small vessel ischemic changes. Moderate generalized volume loss. No hydrocephalus. Skull base vascular calcifications. Stable calcifications in the proximal middle cerebral arteries. Anterior cerebral artery calcifications.    Current medications include (per medical record):   Current Outpatient Medications:     acetaminophen (TYLENOL) 500 MG tablet, Take 1,000 mg by mouth every 8 hours as needed, Disp: , Rfl:     apixaban ANTICOAGULANT (ELIQUIS ANTICOAGULANT) 2.5 MG tablet, Take 1 tablet (2.5 mg) by mouth 2 times daily, Disp: 180 tablet, Rfl: 3    Apoaequorin (PREVAGEN) 10 MG CAPS, Take 1 capsule by mouth daily, Disp: 90 capsule, Rfl: 3    atorvastatin (LIPITOR) 20 MG tablet, Take 1 tablet (20 mg) by mouth At Bedtime, Disp: 90 tablet, Rfl: 3    benzonatate (TESSALON) 200 MG capsule, Take 1 capsule (200 mg) by mouth 3 times daily as needed for cough, Disp: 90 capsule, Rfl: 3    fluticasone (FLONASE) 50 MCG/ACT nasal spray, Spray 1 spray into both nostrils daily, Disp: 11.1 mL, Rfl: 3    loperamide (IMODIUM) 2 MG capsule, Take 1 capsule (2 mg) by mouth  "daily as needed for diarrhea, Disp: 90 capsule, Rfl: 0    meclizine (ANTIVERT) 25 MG tablet, Take 1 tablet (25 mg) by mouth 3 times daily as needed for dizziness, Disp: 30 tablet, Rfl: 3    Melatonin 5 MG CHEW, Take 5 mg by mouth at bedtime as needed, may repeat once, Disp: 90 tablet, Rfl: 3    metoprolol succinate ER (TOPROL XL) 50 MG 24 hr tablet, TAKE 1 TABLET (50 MG) BY MOUTH DAILY, Disp: 90 tablet, Rfl: 3    Probiotic Product (PROBIOTIC ACIDOPHILUS BEADS) CAPS, Take 1 capsule by mouth daily, Disp: 90 capsule, Rfl: 3    RELEVANT FAMILY MEDICAL HISTORY:   The patient is adopted and not much is known about her biological family with regard to neurologic history. Other known family medical history is positive for the following:  Family History   Adopted: Yes   Problem Relation Age of Onset    Pulmonary Embolism Mother 32.00    Heart Disease Father     CABG Son     Stomach Cancer Grandson 20.00    Pulmonary Embolism Maternal Grandmother 32.00    No Known Problems Daughter     No Known Problems Daughter     CABG Son 64.00     PSYCHIATRIC HISTORY:  With regard to her psychiatric history, Ms. Larson endorsed a history of depressed mood for which she previously tried antidepressants. Currently, the patient described her mood as \"I'm happy.\" She has been thriving at the senior living facility. Her son-in-law agreed, stating that her mood has been noticeably improved over the past couple of months. Prior to that, the family was worried that she was depressed.     With regard to substance abuse, Ms. Larson denied any current or historical substance abuse. Currently, she reported that she typically consumes two small glasses of wine daily. Other current substance use was denied.    SOCIAL HISTORY:  Ms. Larson was born and raised in [place]. Complications with her birth were denied, as were any developmental delays. She was adopted at 6 months old. She graduated high school and earned a Bachelor's degree from [employer], with a major in Afghan and two minors in Speech and Theater. She earned mostly average grades. Significant learning difficulties or developmental attention issues were denied. 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 She retired at age 64. She was , but her spouse  in . They have 3 children together, along with 7 grandchildren and 4 great-grandchildren.     TESTS ADMINISTERED:   Wechsler Memory Scale-III (WMS-III) select subtests, Wechsler Adult Intelligence Scale-IV (WAIS-IV) select subtests, Wide Range Achievement Test-5 (WRAT-5) select subtests, Wechsler Memory Scale-IV (WMS-IV) select subtests, [place] Verbal Learning Test-Short Form (CVLT-Short), Trailmaking Test (Trails A & B), Stroop Color and Word Test, Controlled Oral Word Association Test (COWAT) and Category Fluency, [place] Naming Test-2 (BNT-2), Clock Drawing Test, [place] Card Sorting Test-1 deck (WCST-64), Generalized Anxiety Disorder-7 Assessment (SHARON-7) and Geriatric Depression Scale-Short Form (GDS-15).    MOANS norms were used for BNT, Trail Making Test A & B, COWAT & Category Fluency, Stroop    DESCRIPTIVE PERFORMANCE KEY:    Labels for tests with Normal Distributions  Score Label Standard Score %ile Rank   Exceptionally high score  > 130 > 98   Above average score 120-129 91-97   High average score 110-119 75-90   Average score  25-74   Low average score 80-89 9-24   Below average score 70-79 2-8   Exceptionally low score < 70 < 2     Labels for tests with Non-Normal Distributions  Score Label %ile Rank   Within normal expectations/ limits score (WNL) > 24   Low average score 9-24   Below average score 2-8   Exceptionally low score < 2     The following test results utilize score labels as adapted from Phill Burris, Ishaan Schaeffer, Elena Pizano,  CECE Loo, Daisy Gonzalez, Silvestre Renteria & Conference Participatnts (2020): American Academy of Clinical Neuropsychology consensus conference statement on uniform labeling of performance test scores, The Clinical Neuropsychologist, DOI: 10.1080/80865430.2020.0096883. All scores contain some measure of error; scores are reported here as they are obtained by the individual (without reference to the range of error). These are meant as labels and not interpretation of performance. While other relevant comments regarding task performance are provided below, please see the Summary, Impressions, and Diagnosis sections of this report for interpretation of the scores and the cognitive profile as a whole, including what does and does not constitute impairment for this particular individual.    BEHAVIORAL OBSERVATIONS:   Ms. Larson arrived on time and unaccompanied to today's appointment. She was appropriately dressed and groomed. She appeared alert and engaged. Occasional hearing difficulties were noted. Conversational speech was of normal rate, volume, and prosody. Occasional word-finding pauses were noted but she eventually came up with the words. Her thought process appeared linear and goal-directed. No hallucinations or delusions were apparent. Judgment and insight appeared intact. Her mood was euthymic and her affect was appropriately reactive. Rapport was easily established and eye contact was appropriate.     During the testing session, Ms. Larson was alert throughout although she reported feeling quite tired. She was pleasant and cooperative throughout the evaluation. A Pocket Talker hearing amplification device was used throughout testing. No hearing issues were noted. She understood test instructions without difficulty. No frontal signs were observed behaviorally. Ms. Larson appeared adequately motivated and engaged easily during testing. Overall, the following results  "are considered a reasonably valid estimation of her current cognitive abilities.    OPTIMAL PREMORBID INTELLECT:  Optimal premorbid intellectual abilities were estimated as falling in the average to above average range based on Ms. Larson's educational and occupational histories and performance on tasks least likely to be affected by acquired brain dysfunction.    SUMMARY OF TEST RESULTS:  ORIENTATION. Performance on a mental status exam, assessing orientation to personal and current information, resulted in a low average score. She was oriented to personal information, time, and date and was able to correctly name the current and previous presidents. However, she could not recall the current city (although she knew it was a suburb of [place]) and could not recall the name of our clinic (she said the \"[place]\" instead of [facility]).    ATTENTION/WORKING MEMORY. The patient's score on a measure sensitive to sustained auditory-verbal attention and concentration (WAIS-IV Digit Span) was classified as below average, as she was able to recite up to 4 digits forward (a low average score), up to 3 digits backward (a low average score), and up to 3 digits in sequence (a below average score).      PROCESSING SPEED. Speeded digit-symbol coding was measured as an average score (WAIS-IV Coding). On a test of complex concentration that requires speeded numeric sequencing (Trails A), the patient's score was average for completion time and without error. On the Stroop test, speeded color naming was average, and speeded word reading was low average.     LANGUAGE PROCESSING. Language comprehension appeared intact. Confrontation naming was measured as an average score (43/60; BNT-2). The patient's performance on a test of phonemic fluency resulted in a low average score (COWAT), and her semantic fluency was average (Category Fluency). Her writing sample was intact and there was no evidence of micrographia. "     VISUOSPATIAL/CONSTRUCTIONAL SKILLS.  Visuoconstruction with three-dimensional blocks was measured as an average score (WAIS-IV Block Design).  Copy of simple geometric figures was within normal limits (WMS-IV Visual Reproduction Copy). On a Clock Drawing Task, the patient was able to draw a large, well-formed [place] with equally spaced and correctly placed numbers. Her clock hands were short and did not converge together, but were differentiated by length.      LEARNING/MEMORY. On the WMS-IV Logical Memory subtest, immediate memory for two paragraph-length stories resulted in an average score. After a 20-minute delay, the patient's score on the delayed recall trial was low average with a 33% retention rate. On the recognition trial, the patient's score was classified as within normal limits.    On a 9-item verbal list-learning task (CVLT-3 Short Form), the patient acquired up to 5 words (56%) of the word list by the 4th and final learning trial (raw scores over trials = 1, 5, 4, 3). Total learning acquisition was ibelow average. Following a distractor task, the patient recalled 3 items, which was exceptionally low. After a 10-minute delay, the patient recalled 2 items, which was below average. Her recall did not benefit further from semantic cues (2 items; exceptionally low). Recognition discriminability was below average, as she recognized 7/9 items (average) and made 5 false-positive errors (exceptionally low).     On a visual memory measure (WMS-IV Visual Reproduction), immediate recall of simple geometric figures was below average, while delayed recall of the figures was below average (with a 0% retention rate). Delayed recognition of the figures was low average.     EXECUTIVE FUNCTIONS. On a test of inhibition and cognitive flexibility (Stroop), the patient's score was average for completion time and made only 1 error. On a test that requires speeded alpha-numeric sequencing/cognitive set-shifting (Trails  "B), the patient became frustrated and requested to discontinue the task (she was at item \"H\" after nearly 5 minutes and had made 3 errors at that point). Phonemic fluency was low average (COWAT). On the Clock Drawing Test, the patient was unable to accurately represent analog time.     MOOD. On the GDS-15 a self report measure of depressive symptomatology, she obtained a score of 2, placing her in the range of normal. On the SHARON-7, a self-report measure of anxiety, she obtained a score of 0,  placing her in the range of minimal anxiety.    ____________________________________________________________________________________    SERVICES PROVIDED & TIME:  On-site Office Visit Details   Verbal consent for neuropsychological testing was received following the provision of information about the nature and purpose of the evaluation, and the opportunity to ask questions. Verbal permission to route a copy of the final report to her primary care provider was also obtained.  A clinical interview/neurobehavioral status examination was conducted with the patient and documented. I thoroughly reviewed the medical record, selected the neuropsychological test battery, provided supervision to the trained examiner/technician, interpreted/integrated patient data and test results, and engaged in clinical decision making, treatment planning, report writing/preparation and provision of interactive feedback of test results on 10/3/2023 . A trained examiner/technician administered and scored the neuropsychological tests (2+ tests).  Please see below for a breakdown of time spent and the associated codes billed for these services.  Services   Time Spent  CPT Codes   Neurobehavioral Status Exam:  (e.g., clinical interview and neurobehavioral status exam, interpretation, report)   85 minutes   1 x 96116     Neuropsychological Evaluation Services:   (e.g., integration, interpretation, treatment planning, clinical decision making, feedback of " test results)   200 minutes   1 x 96132  2 x 96133   Neuropsychological Testing by Trained Examiner/Technician:  (e.g., test administration, scoring, 2+ tests administered)   115 minutes   1 x 96138  3 x 96139   For diagnostic and coding purposes, Ms. Larson has a history of hypertension, hyperlipidemia, left vertebral artery stenosis, atrial fibrillation (s/p pacemaker implantation), insomnia, RLS, anemia, stage III chronic kidney disease, and history of colon cancer and basal cell carcinoma. 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 Please note, all charges are filed at the completion of the Episode of Care and associated with the final encounter date (feedback session on 10/3/2023).

## 2023-09-05 NOTE — LETTER
9/5/2023         RE: Deloris Larson  [address]  [place] [address]        Dear Colleague,    Thank you for referring your patient, Deloris Larson, to the [facility]. Please see a copy of my visit note below.    NEUROPSYCHOLOGY EVALUATION  [facility]      NAME: Deloris Larson    YOB: 1930   AGE: 89 years old  EDU: 16  DATE OF EVALUATION: 9/5/2023    REASON FOR REFERRAL:  Ms. Larson is a 89 year-old, right-handed, White female with hypertension, hyperlipidemia, left vertebral artery stenosis, atrial fibrillation (s/p pacemaker implantation), insomnia, RLS, chronic anemia, stage III chronic kidney disease, and history of colon cancer and basal cell carcinoma. Due to concerns about cognitive decline, she was referred for this neuropsychological evaluation by her PCP, Daniela Roth MD, in order to assist with differential diagnosis and care planning.     SUMMARY OF FINDINGS: (please refer to Extended Report below for full details and comprehensive clinical history)  Results of testing indicate that Ms. Larson is of estimated average to above average premorbid intellectual functioning, and most of Ms. Larson's performances are generally commensurate with that estimate. However, she exhibits largely mild impairment on measures of attention/concentration, nonverbal learning/memory, in some aspects of verbal learning/memory, and on some tests of executive functioning (e.g., mental flexibility/set shifting and abstract reasoning).     With regard to learning/memory performances more specifically, her nonverbal learning is mildly impaired for her age, and she is unable to recall any of the figures after a 20-minute delay (0% retention rate). However, her recall benefits slightly from prompts/cues on recognition testing (her score improves to the low average range with prompts/cues). Her verbal learning/memory  performances are more variable, as her scores on a story learning/memory task are considered to be within normal limits, but her scores on a word list learning/memory test are mildly impaired. Specifically, her learning efficiency of the word list is mildly impaired, and her recall is also mildly impaired with a 40% retention rate after a 10-minute delay. Her recall of the list does NOT benefit much from prompts/cues on the recognition trial, as her score remains in the mildly impaired range. Overall, her memory profile is fairly mixed, with intact performance on one measure, an encoding deficit on another measure, and possibly a retrieval deficit (but possibly encoding) on the third measure.    All other cognitive test performances are considered to be within normal limits, including those on measures of processing speed, visuospatial/constructional skills, language abilities (including confrontation naming and verbal fluency's), and cognitive inhibition.     Emotionally, the patient endorses minimal depressive symptoms and no anxiety symptoms on self-report questionnaires. This is consistent with her reports of her mood during the clinical interview. Her son has also noticed marked improvement in her mood since moving into a senior living facility where she is much more engaged and social.    IMPRESSIONS:  Overall, these results are suggestive of mild cerebral dysfunction in the frontal lobes with possibly some early involvement of mesial temporal structures.   Given that the cognitive deficits are mild and Ms. 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  Etiology is likely multifactorial:  Cerebrovascular disease may be considered as a potential etiology given her recent CT findings (which revealed moderate-severe chronic small vessel ischemic changes) and her numerous risk factors for cerebrovascular disease (e.g., hypertension, hyperlipidemia,  atrial fibrillation, chronic anemia, etc.).   Chronic anemia has been shown to be associated with frontal deficits on cognitive testing.  Significant insomnia and daytime fatigue may also exacerbate her cognitive difficulties at times in daily life. Treatment of insomnia and chronic anemia may elicit some degree of improvement in her cognitive functioning over time.  Further work-up is needed in order to rule out treatable/reversible or other medical causes of cognitive impairment. For example, additional relevant blood work should be considered in order to rule out various vitamin deficiencies, thyroid dysfunction, etc.   While her current profile does NOT clearly support an Alzheimer's disease process at this time (given that her memory profile is quite mixed and her semantic language abilities are still intact), ongoing monitoring is recommended.     DIAGNOSTIC IMPRESSIONS:  Mild Neurocognitive Disorder, likely due to Multiple Etiologies (cerebrovascular disease, chronic anemia, insomnia)    RECOMMENDATIONS:  1) Additional relevant blood work may be considered in order to rule out other treatable/reversible causes of cognitive impairment (e.g., vitamin B12, MMA, vitamin B1, TSH, folate, etc.). This recommendation will be deferred to the patient's PCP.    2) With regard to complex daily activities, Ms. Larson no longer drives, she already receives assistance with bill paying from her daughter, and most meals are provided by her senior living facility. This all remains appropriate. She continues to manage her own medications but acknowledges that she forgets more often. This may be partly because she places her medications in a basket (instead of out in plain sight). I would strongly recommend that she start utilizing a pillbox that she keeps in a place where she will see it (e.g., on the bathroom counter, at the dining table, or next to her arm chair). Occasional oversight with her medication management is also  recommended, particularly after she starts implementing the use of the pillbox (e.g., someone can double check the pillbox after she fills it for accuracy, and someone can glance at her pillbox at times throughout the week to ensure compliance). She might also set alarm reminders to help her remember to take her medications (some pillboxes come with built-in alarms). If mistakes are still made, perhaps staff can start managing her medications for her.    3) If not already done, completion of paperwork for advance directives and assignment of healthcare and financial Power of  should be considered at this time.    4) Given her history of sleep disturbance, Ms. Larson may benefit from evaluating her current sleep hygiene behaviors and if need be, make changes to help facilitate sleep. Relaxation exercises (e.g., listening to soothing music, deep breathing, progressive muscle relaxation, etc.) might also help with sleep initiation. If she tends to have difficulty returning to sleep in the night or in falling asleep due to worrying or ruminating, strategies could be discussed with a psychotherapist. If these techniques do not improve her sleep, she may wish to consult her physician to assess for any underlying physical issues that may be impacting her sleep and to determine if a medication or a referral to Sleep Medicine is warranted.  She is encouraged to AVOID any over-the-counter sleep aids that have diphenhydramine or doxylamine as ingredients (e.g., Tylenol PM, Rapp Sleep Aid, ZzzQuil, etc.), as those can contribute to memory dysfunction given their anticholinergic properties.    5) The patient is encouraged to utilize cognitive compensatory strategies in daily life, including utilizing note pads, checklists, to-do lists, a calendar/planner, labeled alarm reminders, a pillbox, and maintaining a daily morning and nighttime routine and an organized living environment.    6) Ms. Larson is encouraged to  remain physically, socially, and mentally active in order to optimize her brain health. She is also encouraged to adhere closely to her medications to manage her cerebrovascular risk factors, which will help prevent further cognitive decline.    7) Neuropsychological follow-up is recommended in about 12-18 months (or as clinically indicated) in order to monitor her cognitive status, help to clarify etiology, and update recommendations. The current test data can be used as a baseline to which future comparisons can be made.      FEEDBACK OF ASSESSMENT RESULTS:  Ms. Larson has requested to receive the results of this evaluation via a formal feedback appointment with me, which will be scheduled at the patient's convenience, typically within two weeks of today's date.     Thank you for allowing me to participate in Ms. Larson's care. Please contact me with any questions regarding the content of this report.        Nhung Yeboah PsyD,   Licensed Clinical Neuropsychologist  [facility] [address], [address]  Phone: [phone]          --------------------------------EXTENDED REPORT--------------------------------    HISTORY OF PRESENTING PROBLEM:  The following information was obtained via medical record review and by interview of the patient and her son-in-law, Nando.    Per medical records, concerns were raised about the patient's declining memory by her family during a visit with the patient's PCP (Daniela Roth MD), on 3/14/2023. At that time, the family noted that they were looking into assisted living options and wanted help determining whether they should be seeking out a memory care unit or not. At a subsequent follow up appointment with Dr. Roth on 4/26/2023, her daughter reported ongoing memory decline. The patient was subsequently referred the patient for this neuropsychological evaluation.    Today, Ms. Larson reported experiencing some decline in  "her memory, although she largely assumes it is age-related change as the memory loss is \"mild and does not interfere with anything.\" She stated that she can remember conversations, \"depending on how long or important it is.\" She acknowledged that it is harder for her to remember names, but she is unconcerned about it. She keeps a calendar, which helps. Aside from mild memory decline, she also reported that she occasionally experiences word-finding difficulty. Other cognitive concerns were denied (she reports no issues with concentration, slowed processing speed, spatial processing, or executive functioning).    Her son-in-law, Nando, reported that he and his wife have noticed a clear, yet gradual decline in her memory over the past 1-2 years. He cited examples of her occasionally repeating herself and requiring frequent reminders. The reminders \"sometimes\" help to jog her memory. He also denied having any concerns about any other cognitive domain.    With regard to the activities of daily living, Ms. Larson reported that she is fairly independent. She just moved in to an independent senior apartment about 3 months ago, and loves it there. Prior to her new apartment, she was living alone in a single-family home. She is reportedly much more social and engaged at the new apartment, and her mood has much improved. She has never had a 's license and generally relies on family and friends for transportation. She manages her own medications, although she acknowledged that she is having more trouble remembering to take them. She explained that she tries to have her apartment tidied up in case she gets visits from neighbors; subsequently, she keeps her medications in a decorative basket (out of plain sight). She does not utilize a pillbox either. Her daughter manages the bills and finances after she missed a couple of payments over the past year. She has not fallen victim to any scams. She continues to prepare some " meals for herself, but she also can get up to 10 meals per month from the dining service. She goes grocery shopping about once per month. For leisure, she enjoys knitting, reading, and watching the news. She is also working to form a new knitting club at her new apartment. She is quite active in her [employer] as well.    MEDICAL HISTORY:  Ms. Larson's medical history is significant for hypertension, hyperlipidemia, left vertebral artery stenosis, atrial fibrillation (s/p pacemaker implantation), sick sinus syndrome, insomnia, chronic anemia, RLS, stage III chronic kidney disease, pulmonary nodules, and history of colon cancer and basal cell carcinoma. She also reported balance issues for which she utilizes a cane at home and a walker as needed. She reportedly has longstanding/lifelong depth perception issues. She is currently healing from a burn wound on her leg after she missed her cup when she was attempting to pour boiling water into it. She has a history of two COVID-19 infections from which she feels she fully recovered.     The patient reported that she struggles with sleep initiation insomnia for the past several months. She usually does not fall asleep until 1 or 2 AM as her mind keeps her awake and she experiences dry, itchy skin. Once she falls asleep, she typically stays asleep until 7 AM. She does not snore. She used to take Tylenol PM but stopped that about a week ago and has since been taking melatonin, which seems to be helping. Symptoms of REM sleep behavior disorder were denied.    The patient denied any history of significant head injuries, known seizure, stroke, heart attack, tremor, hallucinations, or microsmia/anosmia.     Past Surgical History:   Procedure Laterality Date     APPENDECTOMY       APPENDECTOMY       CAPSULOTOMY Bilateral 12/2015    YAG capsulotomy surgery. 12/21/15, 12/28/15     CARDIOVERSION  05/24/2018    EP Cardioversion External     CATARACT EXTRACTION, BILATERAL Bilateral  03/2012    3/15 and 3/29 by Dr. Barrett at the [facility]     CHOLECYSTECTOMY       CHOLECYSTECTOMY       COLONOSCOPY  05/17/2012     COLONOSCOPY W/ BIOPSIES  04/04/2007     COLONOSCOPY W/ POLYPECTOMY  05/07/2012    2 mm tubular adenoma in the rectum. Hemorrhoids. Diverticulosis.     CORONARY ANGIOPLASTY       CV CORONARY ANGIOGRAM N/A 06/26/2018    Procedure: Coronary Angiogram;  Surgeon: Joby Vargas MD;  Location: [facility] Cath Lab;  Service:      EP PACEMAKER INSERT N/A 06/27/2018    Procedure: EP Pacemaker Insertion;  Surgeon: Osito Alvarez MD;  Location: [facility] Cath Lab;  Service:      HERNIA REPAIR Right      HYSTERECTOMY       HYSTERECTOMY TOTAL ABDOMINAL       IMPLANT PACEMAKER       INGUINAL HERNIA REPAIR Right     Indirect- Dr. Pedersen     MASTECTOMY       OOPHORECTOMY       SKIN CANCER EXCISION  2015    Right leg on 10/21/15. Right arm 9/29/15     STRIP VEIN      ligation?     TUBAL LIGATION       TUBAL LIGATION       Diagnostic studies:  Head CT dated 5/15/2023 revealed:   INTRACRANIAL CONTENTS: No intracranial hemorrhage, extraaxial collection, or mass effect.  No CT evidence of acute infarct. Moderate to severe presumed chronic small vessel ischemic changes. Moderate generalized volume loss. No hydrocephalus. Skull base vascular calcifications. Stable calcifications in the proximal middle cerebral arteries. Anterior cerebral artery calcifications.    Current medications include (per medical record):   Current Outpatient Medications:      acetaminophen (TYLENOL) 500 MG tablet, Take 1,000 mg by mouth every 8 hours as needed, Disp: , Rfl:      apixaban ANTICOAGULANT (ELIQUIS ANTICOAGULANT) 2.5 MG tablet, Take 1 tablet (2.5 mg) by mouth 2 times daily, Disp: 180 tablet, Rfl: 3     Apoaequorin (PREVAGEN) 10 MG CAPS, Take 1 capsule by mouth daily, Disp: 90 capsule, Rfl: 3     atorvastatin (LIPITOR) 20 MG tablet, Take 1 tablet (20 mg) by mouth At Bedtime, Disp: 90 tablet, Rfl: 3      "benzonatate (TESSALON) 200 MG capsule, Take 1 capsule (200 mg) by mouth 3 times daily as needed for cough, Disp: 90 capsule, Rfl: 3     fluticasone (FLONASE) 50 MCG/ACT nasal spray, Spray 1 spray into both nostrils daily, Disp: 11.1 mL, Rfl: 3     loperamide (IMODIUM) 2 MG capsule, Take 1 capsule (2 mg) by mouth daily as needed for diarrhea, Disp: 90 capsule, Rfl: 0     meclizine (ANTIVERT) 25 MG tablet, Take 1 tablet (25 mg) by mouth 3 times daily as needed for dizziness, Disp: 30 tablet, Rfl: 3     Melatonin 5 MG CHEW, Take 5 mg by mouth at bedtime as needed, may repeat once, Disp: 90 tablet, Rfl: 3     metoprolol succinate ER (TOPROL XL) 50 MG 24 hr tablet, TAKE 1 TABLET (50 MG) BY MOUTH DAILY, Disp: 90 tablet, Rfl: 3     Probiotic Product (PROBIOTIC ACIDOPHILUS BEADS) CAPS, Take 1 capsule by mouth daily, Disp: 90 capsule, Rfl: 3    RELEVANT FAMILY MEDICAL HISTORY:   The patient is adopted and not much is known about her biological family with regard to neurologic history. Other known family medical history is positive for the following:  Family History   Adopted: Yes   Problem Relation Age of Onset     Pulmonary Embolism Mother 32.00     Heart Disease Father      CABG Son      Stomach Cancer Grandson 20.00     Pulmonary Embolism Maternal Grandmother 32.00     No Known Problems Daughter      No Known Problems Daughter      CABG Son 64.00     PSYCHIATRIC HISTORY:  With regard to her psychiatric history, Ms. Larson endorsed a history of depressed mood for which she previously tried antidepressants. Currently, the patient described her mood as \"I'm happy.\" She has been thriving at the senior living facility. Her son-in-law agreed, stating that her mood has been noticeably improved over the past couple of months. Prior to that, the family was worried that she was depressed.     With regard to substance abuse, Ms. Larson denied any current or historical substance abuse. Currently, she reported that she typically " consumes two small glasses of wine daily. Other current substance use was denied.    SOCIAL HISTORY:  Ms. Larson was born and raised in [place]. Complications with her birth were denied, as were any developmental delays. She was adopted at 6 months old. She graduated high school and earned a Bachelor's degree from [employer], with a major in [employer] and two minors in Speech and Theater. She earned mostly average grades. Significant learning difficulties or developmental attention issues were denied. 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 She retired at age 64. She was , but her spouse  in . They have 3 children together, along with 7 grandchildren and 4 great-grandchildren.     TESTS ADMINISTERED:   Wechsler Memory Scale-III (WMS-III) select subtests, Wechsler Adult Intelligence Scale-IV (WAIS-IV) select subtests, Wide Range Achievement Test-5 (WRAT-5) select subtests, Wechsler Memory Scale-IV (WMS-IV) select subtests, [place] Verbal Learning Test-Short Form (CVLT-Short), Trailmaking Test (Trails A & B), Stroop Color and Word Test, Controlled Oral Word Association Test (COWAT) and Category Fluency, [place] Naming Test-2 (BNT-2), Clock Drawing Test, [place] Card Sorting Test-1 deck (WCST-64), Generalized Anxiety Disorder-7 Assessment (SHARON-7) and Geriatric Depression Scale-Short Form (GDS-15).    MOANS norms were used for BNT, Trail Making Test A & B, COWAT & Category Fluency, Stroop    DESCRIPTIVE PERFORMANCE KEY:    Labels for tests with Normal Distributions  Score Label Standard Score %ile Rank   Exceptionally high score  > 130 > 98   Above average score 120-129 91-97   High average score 110-119 75-90   Average score  25-74   Low average score 80-89 9-24   Below average score 70-79 2-8   Exceptionally low score < 70 <  2     Labels for tests with Non-Normal Distributions  Score Label %ile Rank   Within normal expectations/ limits score (WNL) > 24   Low average score 9-24   Below average score 2-8   Exceptionally low score < 2     The following test results utilize score labels as adapted from Phill Burris, Ishaan Schaeffer, Elena Pizano, CECE Loo, Daisy Gonzalez, Silvestre Renteria & Conference Participatnts (2020): American Academy of Clinical Neuropsychology consensus conference statement on uniform labeling of performance test scores, The Clinical Neuropsychologist, DOI: 10.1080/95623119.2020.2293433. All scores contain some measure of error; scores are reported here as they are obtained by the individual (without reference to the range of error). These are meant as labels and not interpretation of performance. While other relevant comments regarding task performance are provided below, please see the Summary, Impressions, and Diagnosis sections of this report for interpretation of the scores and the cognitive profile as a whole, including what does and does not constitute impairment for this particular individual.    BEHAVIORAL OBSERVATIONS:   Ms. Larson arrived on time and unaccompanied to today's appointment. She was appropriately dressed and groomed. She appeared alert and engaged. Occasional hearing difficulties were noted. Conversational speech was of normal rate, volume, and prosody. Occasional word-finding pauses were noted but she eventually came up with the words. Her thought process appeared linear and goal-directed. No hallucinations or delusions were apparent. Judgment and insight appeared intact. Her mood was euthymic and her affect was appropriately reactive. Rapport was easily established and eye contact was appropriate.     During the testing session, Ms. Larson was alert throughout although she reported feeling quite tired. She was pleasant and cooperative  "throughout the evaluation. A Pocket Talker hearing amplification device was used throughout testing. No hearing issues were noted. She understood test instructions without difficulty. No frontal signs were observed behaviorally. Ms. Larson appeared adequately motivated and engaged easily during testing. Overall, the following results are considered a reasonably valid estimation of her current cognitive abilities.    OPTIMAL PREMORBID INTELLECT:  Optimal premorbid intellectual abilities were estimated as falling in the average to above average range based on Ms. Larson's educational and occupational histories and performance on tasks least likely to be affected by acquired brain dysfunction.    SUMMARY OF TEST RESULTS:  ORIENTATION. Performance on a mental status exam, assessing orientation to personal and current information, resulted in a low average score. She was oriented to personal information, time, and date and was able to correctly name the current and previous presidents. However, she could not recall the current city (although she knew it was a suburb of [place]) and could not recall the name of our clinic (she said the \"[facility]\" instead of [facility]).    ATTENTION/WORKING MEMORY. The patient's score on a measure sensitive to sustained auditory-verbal attention and concentration (WAIS-IV Digit Span) was classified as below average, as she was able to recite up to 4 digits forward (a low average score), up to 3 digits backward (a low average score), and up to 3 digits in sequence (a below average score).      PROCESSING SPEED. Speeded digit-symbol coding was measured as an average score (WAIS-IV Coding). On a test of complex concentration that requires speeded numeric sequencing (Trails A), the patient's score was average for completion time and without error. On the Stroop test, speeded color naming was average, and speeded word reading was low average.     LANGUAGE PROCESSING. Language " comprehension appeared intact. Confrontation naming was measured as an average score (43/60; BNT-2). The patient's performance on a test of phonemic fluency resulted in a low average score (COWAT), and her semantic fluency was average (Category Fluency). Her writing sample was intact and there was no evidence of micrographia.     VISUOSPATIAL/CONSTRUCTIONAL SKILLS.  Visuoconstruction with three-dimensional blocks was measured as an average score (WAIS-IV Block Design).  Copy of simple geometric figures was within normal limits (WMS-IV Visual Reproduction Copy). On a Clock Drawing Task, the patient was able to draw a large, well-formed [place] with equally spaced and correctly placed numbers. Her clock hands were short and did not converge together, but were differentiated by length.      LEARNING/MEMORY. On the WMS-IV Logical Memory subtest, immediate memory for two paragraph-length stories resulted in an average score. After a 20-minute delay, the patient's score on the delayed recall trial was low average with a 33% retention rate. On the recognition trial, the patient's score was classified as within normal limits.    On a 9-item verbal list-learning task (CVLT-3 Short Form), the patient acquired up to 5 words (56%) of the word list by the 4th and final learning trial (raw scores over trials = 1, 5, 4, 3). Total learning acquisition was ibelow average. Following a distractor task, the patient recalled 3 items, which was exceptionally low. After a 10-minute delay, the patient recalled 2 items, which was below average. Her recall did not benefit further from semantic cues (2 items; exceptionally low). Recognition discriminability was below average, as she recognized 7/9 items (average) and made 5 false-positive errors (exceptionally low).     On a visual memory measure (WMS-IV Visual Reproduction), immediate recall of simple geometric figures was below average, while delayed recall of the figures was below average  "(with a 0% retention rate). Delayed recognition of the figures was low average.     EXECUTIVE FUNCTIONS. On a test of inhibition and cognitive flexibility (Stroop), the patient's score was average for completion time and made only 1 error. On a test that requires speeded alpha-numeric sequencing/cognitive set-shifting (Trails B), the patient became frustrated and requested to discontinue the task (she was at item \"H\" after nearly 5 minutes and had made 3 errors at that point). Phonemic fluency was low average (COWAT). On the Clock Drawing Test, the patient was unable to accurately represent analog time.     MOOD. On the GDS-15 a self report measure of depressive symptomatology, she obtained a score of 2, placing her in the range of normal. On the SHARON-7, a self-report measure of anxiety, she obtained a score of 0,  placing her in the range of minimal anxiety.    ____________________________________________________________________________________    SERVICES PROVIDED & TIME:  On-site Office Visit Details   Verbal consent for neuropsychological testing was received following the provision of information about the nature and purpose of the evaluation, and the opportunity to ask questions. Verbal permission to route a copy of the final report to her primary care provider was also obtained.  A clinical interview/neurobehavioral status examination was conducted with the patient and documented. I thoroughly reviewed the medical record, selected the neuropsychological test battery, provided supervision to the trained examiner/technician, interpreted/integrated patient data and test results, and engaged in clinical decision making, treatment planning, report writing/preparation and provision of interactive feedback of test results on 10/3/2023 . A trained examiner/technician administered and scored the neuropsychological tests (2+ tests).  Please see below for a breakdown of time spent and the associated codes billed for " these services.  Services   Time Spent  CPT Codes   Neurobehavioral Status Exam:  (e.g., clinical interview and neurobehavioral status exam, interpretation, report)   85 minutes   1 x 96116     Neuropsychological Evaluation Services:   (e.g., integration, interpretation, treatment planning, clinical decision making, feedback of test results)   200 minutes   1 x 96132  2 x 96133   Neuropsychological Testing by Trained Examiner/Technician:  (e.g., test administration, scoring, 2+ tests administered)   115 minutes   1 x 96138  3 x 96139   For diagnostic and coding purposes, Ms. Larson has a history of hypertension, hyperlipidemia, left vertebral artery stenosis, atrial fibrillation (s/p pacemaker implantation), insomnia, RLS, anemia, stage III chronic kidney disease, and history of colon cancer and basal cell carcinoma. 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 Please note, all charges are filed at the completion of the Episode of Care and associated with the final encounter date (feedback session on 10/3/2023).         The patient was seen for a neuropsychological evaluation for the purposes of diagnostic clarification and treatment planning. 95 minutes of face-to-face testing were provided by this writer. An additional 20 minutes were spent scoring and compiling test results. The patient was cooperative with testing. No concerns were brought to my attention. Please see Dr. Yeboah's report for a detailed description of the charges and interpretation and integration of the findings.       Again, thank you for allowing me to participate in the care of your patient.        Sincerely,        Jesus [email or web address], LP

## 2023-09-06 NOTE — PROGRESS NOTES
The patient was seen for a neuropsychological evaluation for the purposes of diagnostic clarification and treatment planning. 95 minutes of face-to-face testing were provided by this writer. An additional 20 minutes were spent scoring and compiling test results. The patient was cooperative with testing. No concerns were brought to my attention. Please see Dr. Yeboah's report for a detailed description of the charges and interpretation and integration of the findings.

## 2023-09-19 PROBLEM — R07.89 CHEST PRESSURE: Status: ACTIVE | Noted: 2023-01-01

## 2023-09-19 PROBLEM — D64.9 ACUTE ANEMIA: Status: ACTIVE | Noted: 2023-01-01

## 2023-09-19 PROBLEM — R07.9 CHEST PAIN, UNSPECIFIED TYPE: Status: ACTIVE | Noted: 2023-01-01

## 2023-09-19 NOTE — ASSESSMENT & PLAN NOTE
Slightly above goal today.  However with her age 144/79 is reasonable.  -Continue metoprolol XL 50 mg daily

## 2023-09-19 NOTE — ASSESSMENT & PLAN NOTE
Rate controlled on exam today.  -Continue metoprolol XL 50 mg daily and Eliquis 2.5 mg twice daily

## 2023-09-19 NOTE — ASSESSMENT & PLAN NOTE
Exam today with erythema and warmth of her left shin compared to right.  Given this is worsened and is associated with some pain we will treat the with short course of cephalexin for cellulitis.  - Keflex x5 days

## 2023-09-19 NOTE — ED NOTES
Expected Patient Referral to ED  4:02 PM    Referring Clinic/Provider:  MD Castro, PCP    Reason for referral/Clinical facts:  Patient is a 89-year-old who presented to clinic today with a couple of concerns and is being referred here for anemia.  She had made an appointment today due to some redness on her leg that might be concerning for cellulitis.  She also made mention of some increasing exertional chest pain and fatigue.  Was found to have an anemia to 6.7 which is acute on chronic and progressive.  Might account for some of her symptoms.  Will need transfusion, further work-up for anemia.  Might also benefit from some expedited evaluation for the chest pain while she is here.  Probably needs to be admitted for these concerns.    Recommendations provided:  Send to ED for further evaluation        Jude Burgess MD  [facility] EMERGENCY DEPARTMENT  [address] [place] [id number]  [phone]       Jude Burgess MD  09/19/23 3742

## 2023-09-19 NOTE — ED PROVIDER NOTES
EMERGENCY DEPARTMENT ENCOUNTER      NAME: Deloris Larson  AGE: 89 year old female  YOB: 1930  MRN: [id number]  EVALUATION DATE & TIME: No admission date for patient encounter.    PCP: Daniela Roth    ED PROVIDER: Jude Burgess M.D.      Chief Complaint   Patient presents with    Abnormal Labs    Shortness of Breath         FINAL IMPRESSION:  1. Acute anemia    2. Chest pain, unspecified type          ED COURSE & MEDICAL DECISION MAKIN year old female presents to the Emergency Department for evaluation of chest pain, shortness of breath, and anemia on outpatient lab testing.  Patient referred from clinic due to anemia.  She has concerning sounding exertional chest discomfort and shortness of breath for the last few weeks which has been progressive.  Now limiting her mobility to no more than a few feet.  Hemoglobin was found to be acutely low in clinic, repeat here reveals hemoglobin of 6.6, microcytic.  Patient does not report any clear stool changes.  Rectal exam reveals brown stool in the rectal vault which is Hemoccult positive.  She is anticoagulated for chronic atrial fibrillation.  EKG shows chronic A-fib with no acute ST changes.  High-sensitivity troponin is mildly elevated.  I think she might be symptomatic from her anemia and driving the chest discomfort.  Chest x-ray also concerning for small pleural effusions and some interstitial edema.  BNP was added on and will be cautious and start with just 1 unit of PRBC transfusion and monitor respiratory status closely which is fine while at rest.  She was given some Protonix.  Discussed admitting her to a monitored bed to continue transfusion, anemia monitoring and work-up, and cardiac evaluation.  Patient was agreement with this.  Discussed case with hospitalist.    Scribe time stamps:  5:15 PM I met with patient for initial interview and encounter. We also discussed plan for treatment and diagnostic interventions.   6:30  PM discussed case with hospitalist.    At the conclusion of the encounter I discussed the results of all of the tests and the disposition. The questions were answered. The patient or family acknowledged understanding and was agreeable with the care plan.     Medical Decision Making    History:  Supplemental history from: N/A  External Record(s) reviewed: Outpatient Record: PCP visit from earlier today    Work Up:  Chart documentation includes differential considered and any EKGs or imaging independently interpreted by provider, where specified.  In additional to work up documented, I considered the following work up: Documented in chart, if applicable.    External consultation:  Discussion of management with another provider: Documented in chart, if applicable    Complicating factors:  Care impacted by chronic illness: Anticoagulated State, Cancer/Chemotherapy, Chronic Kidney Disease, Heart Disease, Hyperlipidemia, Hypertension, and Mental Health  Care affected by social determinants of health: Access to Medical Care    Disposition considerations: Admit.    MEDICATIONS GIVEN IN THE EMERGENCY:  Medications   pantoprazole (PROTONIX) IV push injection 40 mg (40 mg Intravenous $Given 9/19/23 3241)       NEW PRESCRIPTIONS STARTED AT TODAY'S ER VISIT  New Prescriptions    No medications on file     =================================================================    HPI    Patient information was obtained from: Patient    Use of : N/A     Deloris Larson is a 89 year old female with a pertinent history of CAD s/p pacemaker (on Eliquis), SVT, thrombocytopenia, CKD stage 3, HTN, HLD, colon cancer, and anxiety who presents to this ED by private car for evaluation of shortness of breath.    Per chart review, patient was seen by her PCP earlier today for ongoing chest pressure. Basic labs obtained with incidental finding of a Hgb 6.7, so she was instructed to come into the ED for further evaluation.     Patient  reports exertional shortness of breath with centralized chest tightness for the last 3 weeks. She states that she cannot walk more than a couple steps without becoming short of breath. No symptoms at rest. Also complains of an ongoing rash to her LLE after a burn, and was placed on antibiotics for a possible infection.    REVIEW OF SYSTEMS   All systems reviewed and negative except as noted in HPI.    PAST MEDICAL HISTORY:  Past Medical History:   Diagnosis Date    2019 novel coronavirus disease (COVID-19) 2/1/2021    Acute respiratory failure with hypoxia (H) 2/5/2021    Anemia     Atrial fibrillation (H)     Bronchiectasis with acute lower respiratory infection (H) 1/28/2021    CAD (coronary artery disease)     Chronic diarrhea     Clinical diagnosis of COVID-19     1/2021    Coronary artery disease     Former smoker     Hematoma of left iliopsoas muscle 01/19/2020    while on eliquis.    History of skin cancer     Hyperlipidemia     Hypertension     Insomnia     Lumbar spondylosis 2016    Migraine     PAD (peripheral artery disease) (H) 10/19/2015    Paroxysmal SVT (supraventricular tachycardia) (H)     Created by Conversion     Persistent atrial fibrillation (H) 05/09/2018    New diagnosis April 16 18th and found incidentally on physical exam during yearly exam in primary clinic [id number] score of 5-new Eliquis start    Poliomyelitis     Restless legs syndrome (RLS)     RLS (restless legs syndrome) 02/28/2017    Sick sinus syndrome (H) 01/28/2020    Sigmoid diverticulosis 04/04/2007    SSS (sick sinus syndrome) (H)     S/p pacemaker       PAST SURGICAL HISTORY:  Past Surgical History:   Procedure Laterality Date    APPENDECTOMY      APPENDECTOMY      CAPSULOTOMY Bilateral 12/2015    YAG capsulotomy surgery. 12/21/15, 12/28/15    CARDIOVERSION  05/24/2018    EP Cardioversion External    CATARACT EXTRACTION, BILATERAL Bilateral 03/2012    3/15 and 3/29 by Dr. Barrett at the [facility]    CHOLECYSTECTOMY       CHOLECYSTECTOMY      COLONOSCOPY  05/17/2012    COLONOSCOPY W/ BIOPSIES  04/04/2007    COLONOSCOPY W/ POLYPECTOMY  05/07/2012    2 mm tubular adenoma in the rectum. Hemorrhoids. Diverticulosis.    CORONARY ANGIOPLASTY      CV CORONARY ANGIOGRAM N/A 06/26/2018    Procedure: Coronary Angiogram;  Surgeon: Joby Vargas MD;  Location: [facility] Cath Lab;  Service:     EP PACEMAKER INSERT N/A 06/27/2018    Procedure: EP Pacemaker Insertion;  Surgeon: Osito Alvarez MD;  Location: [facility] Cath Lab;  Service:     HERNIA REPAIR Right     HYSTERECTOMY      HYSTERECTOMY TOTAL ABDOMINAL      IMPLANT PACEMAKER      INGUINAL HERNIA REPAIR Right     Indirect- Dr. Pedersen    MASTECTOMY      OOPHORECTOMY      SKIN CANCER EXCISION  2015    Right leg on 10/21/15. Right arm 9/29/15    STRIP VEIN      ligation?    TUBAL LIGATION      TUBAL LIGATION           CURRENT MEDICATIONS:    No current facility-administered medications for this encounter.     Current Outpatient Medications   Medication    acetaminophen (TYLENOL) 500 MG tablet    apixaban ANTICOAGULANT (ELIQUIS ANTICOAGULANT) 2.5 MG tablet    Apoaequorin (PREVAGEN) 10 MG CAPS    atorvastatin (LIPITOR) 20 MG tablet    benzonatate (TESSALON) 200 MG capsule    cephALEXin (KEFLEX) 500 MG capsule    fluticasone (FLONASE) 50 MCG/ACT nasal spray    loperamide (IMODIUM) 2 MG capsule    meclizine (ANTIVERT) 25 MG tablet    Melatonin 5 MG CHEW    metoprolol succinate ER (TOPROL XL) 50 MG 24 hr tablet    Probiotic Product (PROBIOTIC ACIDOPHILUS BEADS) CAPS         ALLERGIES:  Allergies   Allergen Reactions    Diphenhydramine        FAMILY HISTORY:  Family History   Adopted: Yes   Problem Relation Age of Onset    Pulmonary Embolism Mother 32.00    Heart Disease Father     CABG Son     Stomach Cancer Grandson 20.00    Pulmonary Embolism Maternal Grandmother 32.00    No Known Problems Daughter     No Known Problems Daughter     CABG Son 64.00       SOCIAL HISTORY:   Social  History     Socioeconomic History    Marital status:     Number of children: 3   Tobacco Use    Smoking status: Former     Packs/day: 1.50     Years: 25.00     Pack years: 37.50     Types: Cigarettes     Start date: 1949     Quit date: 1974     Years since quittin.7    Smokeless tobacco: Never   Vaping Use    Vaping Use: Never used   Substance and Sexual Activity    Alcohol use: Yes     Alcohol/week: 7.0 standard drinks of alcohol    Drug use: No   Social History Narrative    She lives alone in a house. She has 3 children and 8 grandchildren and multiple great grandchildren. She is retired. 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 She does not smoke cigarettes and rarely drinks alcoho l.    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  Her Christianity had to close, due to declining numbers.    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 She has never driven a car. Does her own cooking, and ba sarika. She does not use a computer, never did.    Jayde Diaz MD 2021         Social Determinants of Health     Financial Resource Strain: Low Risk  (2023)    Overall Financial Resource Strain (CARDIA)     Difficulty of Paying Living Expenses: Not hard at all   Food Insecurity: No Food Insecurity (2023)    Hunger Vital Sign     Worried About Running Out of Food in the Last Year: Never true     Ran Out of Food in the Last Year: Never true   Transportation Needs: Unmet Transportation Needs (2023)    PRAPARE - Transportation     Lack of Transportation (Medical): No     Lack of Transportation (Non-Medical): Yes   Physical Activity: Inactive (2023)    Exercise Vital Sign     Days of Exercise per Week: 0 days     Minutes of Exercise per Session: 0 min   Stress: Stress Concern Present (2023)    [place] [employer] - Occupational Stress Questionnaire     Feeling of Stress  ": To some extent   Social Connections: Socially Isolated (1/19/2023)    Social Connection and Isolation Panel [NHANES]     Frequency of Communication with Friends and Family: More than three times a week     Frequency of Social Gatherings with Friends and Family: Once a week     Attends Congregational Services: Never     Active Member of Clubs or Organizations: No     Marital Status:    Housing Stability: High Risk (1/19/2023)    Housing Stability Vital Sign     Unable to Pay for Housing in the Last Year: Yes     Number of Places Lived in the Last Year: 1     Unstable Housing in the Last Year: No       VITALS:  BP (!) 146/70   Pulse 71   Temp 98.1  F (36.7  C)   Resp 16   Ht 1.676 m (5' 6\")   Wt 66.7 kg (147 lb)   LMP  (LMP Unknown)   SpO2 96%   BMI 23.73 kg/m      PHYSICAL EXAM    Constitutional: Elderly female patient, laying in bed, no acute distress  HENT: Normocephalic, Atraumatic. Neck Supple.  Eyes: EOMI, Conjunctiva normal.  Respiratory: Breathing comfortably on room air. Speaks full sentences easily. Lungs clear to ascultation.  Cardiovascular: Systolic murmur is present across the precordium.  Normal heart rate.  Irregular rhythm.  Trace lower extremity edema.  Abdomen: Soft, nontender  Musculoskeletal: Good range of motion in all major joints. No major deformities noted.  Integument: Warm, Dry.  Neurologic: Alert & awake, Normal motor function, Normal sensory function, No focal deficits noted.   Psychiatric: Cooperative. Affect appropriate.     LAB:  All pertinent labs reviewed and interpreted.  Labs Ordered and Resulted from Time of ED Arrival to Time of ED Departure   BASIC METABOLIC PANEL - Abnormal       Result Value    Sodium 141      Potassium 3.8      Chloride 108 (*)     Carbon Dioxide (CO2) 23      Anion Gap 10      Urea Nitrogen 20.9      Creatinine 0.91      Calcium 9.1      Glucose 151 (*)     GFR Estimate 59 (*)    TROPONIN T, HIGH SENSITIVITY - Abnormal    Troponin T, High " Sensitivity 19 (*)    INR - Abnormal    INR 1.45 (*)    OCCULT BLOOD STOOL - Abnormal    Occult Blood Positive (*)    CBC WITH PLATELETS AND DIFFERENTIAL - Abnormal    WBC Count 7.8      RBC Count 3.05 (*)     Hemoglobin 6.6 (*)     Hematocrit 22.9 (*)     MCV 75 (*)     MCH 21.6 (*)     MCHC 28.8 (*)     RDW 16.7 (*)     Platelet Count 202      % Neutrophils 74      % Lymphocytes 16      % Monocytes 7      % Eosinophils 2      % Basophils 1      % Immature Granulocytes 0      NRBCs per 100 WBC 0      Absolute Neutrophils 5.8      Absolute Lymphocytes 1.3      Absolute Monocytes 0.5      Absolute Eosinophils 0.2      Absolute Basophils 0.0      Absolute Immature Granulocytes 0.0      Absolute NRBCs 0.0     NT PROBNP INPATIENT   TYPE AND SCREEN, ADULT    ABO/RH(D) O POS      Antibody Screen Negative      SPECIMEN EXPIRATION DATE [id number]     PREPARE RED BLOOD CELLS (UNIT)    Blood Component Type Red Blood Cells      Product Code [id number]      Unit Status Transfused      Unit Number [id number]      CROSSMATCH Compatible      CODING SYSTEM [id number]      ISSUE DATE AND TIME [id number]      UNIT ABO/RH O+      UNIT TYPE ISBT 5100     PREPARE RED BLOOD CELLS (UNIT)   TRANSFUSE RED BLOOD CELLS (UNIT)   ABO/RH TYPE AND SCREEN       RADIOLOGY:  Reviewed all pertinent imaging. Please see official radiology report.  Chest XR,  PA & LAT   Final Result   IMPRESSION: Mild cardiomegaly. Cardiac pacemaker in place. Interval development of mild interstitial edema with a lower lobe predominance. Associated small bilateral pleural effusions. No pneumothorax.          EKG:    Performed at: 16:52    Impression: Rate controlled A-fib. Nonspecific ST&T wave abnormality    Rate: 79  Rhythm: A-fib  Axis: -24  MN Interval: N/A  QRS Interval: 96  QTc Interval: 470  ST Changes: Nonspecific ST&T wave abnormality  Comparison: When compared to EKG from 5/15/23, no significant changes noted.     I have independently reviewed and  interpreted the EKG(s) documented above.    I, Elmer Ross, am serving as a scribe to document services personally performed by Dr. Jude Burgess, based on my observation and the provider's statements to me. I, Jude Burgess MD attest that Elmer Ross is acting in a scribe capacity, has observed my performance of the services and has documented them in accordance with my direction.    Jude Burgess M.D.  Emergency Medicine  [facility] EMERGENCY DEPARTMENT  [address] [place] [id number]  [phone]  Dept: [phone]       Jude Burgess MD  09/19/23 [id number]

## 2023-09-19 NOTE — ED TRIAGE NOTES
Pt had labs drawn at clinic today. Hgb 6.7.   Pt c/o shortness of breath over the past couple days.   Denies bleeding.

## 2023-09-19 NOTE — PATIENT INSTRUCTIONS
Keflex three times a day for 5 days for antibiotics for your leg. Wash every day gently.     We will get stress test for your chest pressure. I will message Dr. Chan as well.

## 2023-09-19 NOTE — ASSESSMENT & PLAN NOTE
Neuropsych testing was done on 9/5/2023.  Report is not yet available, they have follow-up to discuss scheduled later this month.

## 2023-09-19 NOTE — ASSESSMENT & PLAN NOTE
TTE 9/2022 showed moderate calcific aortic stenosis. Peak velocity 2.6 m/s. Mean gradient 19  mmHg. Calculated valve area 1.1 cm .  Murmur still present on exam today and she is having worsening chest pressure and shortness of breath.  Certainly could be attributed to worsening stenosis.  -We will try to move up TTE, if not is scheduled for 9/29

## 2023-09-19 NOTE — PROGRESS NOTES
Assessment & Plan   Problem List Items Addressed This Visit          Nervous and Auditory    Mild cognitive impairment     Neuropsych testing was done on 9/5/2023.  Report is not yet available, they have follow-up to discuss scheduled later this month.         Chest pressure - Primary     Patient presents with few weeks of chest pressure with exertion and shortness of breath with exertion.  This is concerning given her history of coronary disease and moderate aortic stenosis.  She is clinically stable in the room today.  - Nuc med stress test ordered  - TTE already scheduled 9/29, they will try to move up  - Message sent to Dr. Chan to see if she would recommend additional or alternate work-up  - BMP, CBC today -> CBC showed anemia of 6.7, which likely is causing symptoms, sent to ED  - F/up 4 weeks         Relevant Orders    NM Lexiscan stress test    CBC with platelets and differential (Completed)    Basic metabolic panel       Circulatory    Essential hypertension     Slightly above goal today.  However with her age 144/79 is reasonable.  -Continue metoprolol XL 50 mg daily         Chronic atrial fibrillation (H)     Rate controlled on exam today.  -Continue metoprolol XL 50 mg daily and Eliquis 2.5 mg twice daily         Nonrheumatic aortic valve stenosis     TTE 9/2022 showed moderate calcific aortic stenosis. Peak velocity 2.6 m/s. Mean gradient 19  mmHg. Calculated valve area 1.1 cm .  Murmur still present on exam today and she is having worsening chest pressure and shortness of breath.  Certainly could be attributed to worsening stenosis.  -We will try to move up TTE, if not is scheduled for 9/29            Musculoskeletal and Integumentary    Cellulitis of left lower extremity     Exam today with erythema and warmth of her left shin compared to right.  Given this is worsened and is associated with some pain we will treat the with short course of cephalexin for cellulitis.  - Keflex x5 days          Relevant Medications    cephALEXin (KEFLEX) 500 MG capsule       Urinary    CKD (chronic kidney disease) stage 3, GFR 30-59 ml/min (H)     Stable over the last few years.  We will repeat labs today.            Hematologic    Normocytic anemia     Worsening significant anemia of 6.7. Sent to ED for transfusion and further work up.          Relevant Orders    Iron and iron binding capacity    Ferritin      Ordering of each unique test  I spent a total of 50 minutes on the day of the visit.   Time spent by me doing chart review, history and exam, documentation and further activities per the note    FUTURE APPOINTMENTS:       - Follow-up visit in 4 weeks     Daniela Roth MD  [facility]   Deloris Freeman is a 89 year old, presenting for the following health issues:  Leg Swelling, Fatigue, Breathing Problem (Getting winded), and Chest Pain (Pressure, not necessarily pain)        9/19/2023     1:10 PM   Additional Questions   Roomed by RADHA Cagle   Accompanied by Son in law     History of Present Illness     Reason for visit:  Leg swelling and fatigue  Symptom onset:  3-7 days ago    Concern - Leg swelling  Onset: 3-4 weeks  Description: Not painful, just there.   Intensity: mild  Progression of Symptoms:  same  Accompanying Signs & Symptoms: NA  Previous history of similar problem: None  Precipitating factors:        Worsened by: Nothing  Alleviating factors:        Improved by: Raising legs in recliner.   Therapies tried and outcome: None    Leg swelling: Patient has noticed increased left lower extremity swelling over the last few weeks.  She is unsure if it has been progressively getting worse or staying the same.  Just tells me she is definitely noticed it more in the last week or so.  The leg is red as well, sometimes has weeping but usually this is after she more aggressively washes the leg.    Chest pressure / SOB: last few weeks.  Chest pressure is located in left  "lower chest.  Only notices it with exertion.  Says that when she moved to her assisted living facility she was able to walk the urias.  Now she will exit her apartment and take a few steps into the hallway and will notice the chest pressure and have to stop.  In the last few days has had shortness of breath/dyspnea on exertion present when she has the chest pressure.  All of this gets better with rest.  She is not experiencing chest discomfort or shortness of breath in the room today.    Insomnia: Was having weird dreams of Tylenol PM.  She switched back to melatonin and is sleeping much better.  She also has gotten a better routine since moving to assisted living facility, and thinks this is helped as well.    Afib: Metoprolol XL 50 and eliquis 2.5 mg BID.     HTN: Metop above. BP today 144/79.     Aortic stenosis: Last TTE 9/2022 showed moderate aortic stenosis     MCI: Did have neuropsych testing 9/5/23. Report not yet available.     HLD / CAD: Last angio 2018 non-obstructive CAD, severe coronary artery calcification noted on chest CT 1/2021. Current regimen is Atorvastatin 20 mg daily. Lipids 7/2023 LDL 88.     She eats 2-3 servings of fruits and vegetables daily.She consumes 1 sweetened beverage(s) daily.She exercises with enough effort to increase her heart rate 9 or less minutes per day.  She exercises with enough effort to increase her heart rate 3 or less days per week.   She is taking medications regularly.      Review of Systems   Constitutional:  Positive for fatigue.      Constitutional, HEENT, cardiovascular, pulmonary, GI, , musculoskeletal, neuro, skin, endocrine and psych systems are negative, except as otherwise noted above.      Objective    BP (!) 144/79 (BP Location: Right arm, Patient Position: Sitting, Cuff Size: Adult Regular)   Pulse 83   Temp 97.9  F (36.6  C) (Oral)   Resp 20   Ht 1.626 m (5' 4\")   Wt 66.4 kg (146 lb 6.4 oz)   LMP  (LMP Unknown)   SpO2 99%   BMI 25.13 kg/m    Body " mass index is 25.13 kg/m .  Physical Exam   GENERAL: healthy, alert and no distress  EYES: Eyes grossly normal to inspection, PERRL and conjunctivae and sclerae normal  HENT: nose and mouth without ulcers or lesions  RESP: lungs clear to auscultation - no rales, rhonchi or wheezes  CV: regular rate and rhythm, normal S1 S2, +3/6 systolic murmur, peripheral pulses strong  ABDOMEN: soft, nontender, no hepatosplenomegaly, no masses and bowel sounds normal  MS: no gross musculoskeletal defects noted, no edema  SKIN: L shin has circumferential erythema and warmth, tender to palpation. B/l 1+ peripheral edema to mid-shin.   NEURO: Normal strength and tone, mentation intact and speech normal  PSYCH: mentation appears normal, affect normal/bright    Results for orders placed or performed in visit on 09/19/23 (from the past 24 hour(s))   CBC with platelets and differential    Narrative    The following orders were created for panel order CBC with platelets and differential.  Procedure                               Abnormality         Status                     ---------                               -----------         ------                     CBC with platelets and d...[[id number]]  Abnormal            Final result                 Please view results for these tests on the individual orders.   CBC with platelets and differential   Result Value Ref Range    WBC Count 8.3 4.0 - 11.0 10e3/uL    RBC Count 3.12 (L) 3.80 - 5.20 10e6/uL    Hemoglobin 6.7 (LL) 11.7 - 15.7 g/dL    Hematocrit 23.6 (L) 35.0 - 47.0 %    MCV 76 (L) 78 - 100 fL    MCH 21.5 (L) 26.5 - 33.0 pg    MCHC 28.4 (L) 31.5 - 36.5 g/dL    RDW 16.6 (H) 10.0 - 15.0 %    Platelet Count 180 150 - 450 10e3/uL    % Neutrophils 74 %    % Lymphocytes 14 %    % Monocytes 9 %    % Eosinophils 2 %    % Basophils 1 %    % Immature Granulocytes 0 %    NRBCs per 100 WBC 0 <1 /100    Absolute Neutrophils 6.2 1.6 - 8.3 10e3/uL    Absolute Lymphocytes 1.1 0.8 - 5.3 10e3/uL     Absolute Monocytes 0.7 0.0 - 1.3 10e3/uL    Absolute Eosinophils 0.2 0.0 - 0.7 10e3/uL    Absolute Basophils 0.1 0.0 - 0.2 10e3/uL    Absolute Immature Granulocytes 0.0 <=0.4 10e3/uL    Absolute NRBCs 0.0 10e3/uL

## 2023-09-19 NOTE — ASSESSMENT & PLAN NOTE
Patient presents with few weeks of chest pressure with exertion and shortness of breath with exertion.  This is concerning given her history of coronary disease and moderate aortic stenosis.  She is clinically stable in the room today.  - Nuc med stress test ordered  - TTE already scheduled 9/29, they will try to move up  - Message sent to Dr. Chan to see if she would recommend additional or alternate work-up  - BMP, CBC today -> CBC showed anemia of 6.7, which likely is causing symptoms, sent to ED  - F/up 4 weeks

## 2023-09-20 NOTE — PLAN OF CARE
Goal Outcome Evaluation:      Plan of Care Reviewed With: patient    Overall Patient Progress: improvingOverall Patient Progress: improving         Pt. is alert and oriented, paced with A fib. denies pain or shortness of breath. VSS WDL.
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
## 2023-09-20 NOTE — MEDICATION SCRIBE - ADMISSION MEDICATION HISTORY
Medication Scribe Admission Medication History    Admission medication history is complete. The information provided in this note is only as accurate as the sources available at the time of the update.    Medication reconciliation/reorder completed by provider prior to medication history? No    Information Source(s): Patient via in-person    Pertinent Information: PATIENT REPORTED MED REC., CONFIRMED THAT MANY OF HER PREVIOUS MEDICATIONS WERE NO LONGER ACTIVE.    Changes made to PTA medication list:  Added: None  Deleted: IMODIUM, ACIDOPHILUS, TESSALON, KEFLEX, FLONASE,   Changed: None    Medication Affordability:  Not including over the counter (OTC) medications, was there a time in the past 3 months when you did not take your medications as prescribed because of cost?: No    Allergies reviewed with patient and updates made in EHR: yes    Medication History Completed By: Deborah Valencia 9/19/2023 7:57 PM    Prior to Admission medications    Medication Sig Last Dose Taking? Auth Provider Long Term End Date   acetaminophen (TYLENOL) 500 MG tablet Take 1,000 mg by mouth every 8 hours as needed Past Week at PRN Yes Reported, Patient     apixaban ANTICOAGULANT (ELIQUIS ANTICOAGULANT) 2.5 MG tablet Take 1 tablet (2.5 mg) by mouth 2 times daily 9/19/2023 at AM Yes Daniela Roth MD     Apoaequorin (PREVAGEN) 10 MG CAPS Take 1 capsule by mouth daily Past Week at WK Yes Daniela Roth MD     atorvastatin (LIPITOR) 20 MG tablet Take 1 tablet (20 mg) by mouth At Bedtime 9/18/2023 at PM Yes Daniela Roth MD Yes    meclizine (ANTIVERT) 25 MG tablet Take 1 tablet (25 mg) by mouth 3 times daily as needed for dizziness Past Week at PRN Yes Daniela Roth MD     Melatonin 5 MG CHEW Take 5 mg by mouth at bedtime as needed, may repeat once 9/18/2023 at PM Yes Kaitlin Steiner MD     metoprolol succinate ER (TOPROL XL) 50 MG 24 hr tablet TAKE 1 TABLET (50 MG) BY MOUTH DAILY 9/18/2023 at PM  Yes Daniela Roth MD Yes

## 2023-09-20 NOTE — TELEPHONE ENCOUNTER
----- Message from Kristine Chan MD sent at 9/19/2023  4:09 PM CDT -----  Regarding: FW: Chest pressure/WHITE  Can we get her in RAC? If I'm not available someone else    ----- Message -----  From: Daniela Roth MD  Sent: 9/19/2023   1:58 PM CDT  To: Kristine Chan MD  Subject: Chest pressure/WHITE                               Hi Dr. Chan,    I saw our mutual patient, Deloris Freeman, today. She has CAD with remote h/o PCI and most recent angio in 2018 that showed non-obstructive CAD, moderate aortic stenosis (last TTE 9/2022) and Afib. She came in today for 2-3 weeks of increasing chest pressure and shortness of breath, specifically with exertion. She is clinically stable.     I ordered a STAT nuclear stress test. They have TTE scheduled for 9/29 but are going to try to move this up if possible. I'm reaching out to see if you would recommend alternate work-up. Please let me know, I appreciate your input.     Daniela Roth

## 2023-09-20 NOTE — PLAN OF CARE
Problem: Chest Pain  Goal: Resolution of Chest Pain Symptoms  Outcome: Progressing     Problem: Anemia  Goal: Anemia Symptom Improvement  Outcome: Progressing  Intervention: Monitor and Manage Anemia  Recent Flowsheet Documentation  Taken 9/20/2023 0815 by Vera Brock RN  Safety Promotion/Fall Prevention:   activity supervised   clutter free environment maintained   nonskid shoes/slippers when out of bed   Goal Outcome Evaluation:       Patient alert and oriented x 4. Pleasant and denied any chest pain or SOB. LSC. Atrial fibrillation controlled rate . S/P PPM. Intermittent pacing spikes present. Patient has a heart murmur. Family bedside to do a Health Care Directive with the . BM today brown color and loose. Hgb 7.1. Eliquis on hold. Echo completed today. The patients home medications were sent to pharmacy since they are not to be kept in the patients room. Bed alarm on when in bed. Patient up in the chair with walker/gait belt and one assist for safety and tolerated well with no complaints. Chair alarm on when in the chair. Patient said she was tired and now back in bed. Call light in reach.

## 2023-09-20 NOTE — PROGRESS NOTES
"SPIRITUAL HEALTH SERVICES Progress Note  326    Saw pt Deloris Larson per admission request.    Patient/Family Understanding of Illness and Goals of Care -   Deloris Freeman expresses overwhelm with the transition she is experiencing needing assistance with cares she usually provided herself.  Her daughter is coming later today to discuss goals of care and what path they should take regarding her care moving forward.  She describes herself as \"sharp as a tack\" but wonders if she is noticing her senses and awareness dull with time and age.    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    Meaning, Beliefs, and Spirituality -   A former [employer], Deloris Freeman finds her rex in God supportive as she navigates her health challenges.  \"We are a people of love\" is how she describes her family    Plan of Care - I told Deloris Freeman how to request  services moving forward. Please page or consult should need or desire arise.     Kevin Arcos M.Div.  Associate   "

## 2023-09-20 NOTE — CONSULTS
[facility]SULT[facility]    Deloris Larson   [address]   [place] [address]  89 year old female  Admission Date/Time: 9/19/2023  5:26 PM    Primary Care Provider:  Daniela Roth    Requesting Physician: Loli Recinos MD      CHIEF COMPLAINT:  weakness    REASON FOR THE CONSULT: Anemia, Hb 6.6 on admission     HPI:   Deloris Larson is a 89 year old female with history of hypertension, chronic A-fib s/p pacemaker, on eliquis, CKD.  Has been experiencing shortness of breath with exertion and occasional chest heaviness for the past 1-3 weeks.  Plan to have cute on chronic anemia in ER.  Hemoglobin 6.6. Hb 7.1 after 1 unit of packed RBC transfusion.  It is consistent with microcytic iron deficient anemia.  patient denies of dark stool rectal bleeding. stool occult test was positive.  I am not sure whether this is related to her memory issues.  She was also have a mildly elevated troponin T to 19.  Work-up for possible acute coronary syndrome.     Patient was follow-up with [facility] as outpatient in 2020 to evaluate weight loss and iron deficient anemia.  Her baseline hemoglobin was around 9-10 range.  Abnormal CT scan of abdomen pelvis showed spacious findings of inflammatory versus malignancy around the cecal area.  Colonoscopy was recommended for further evaluation.  Due to her age unable to tolerate bowel prep, she never had it done.  Her last colonoscopy was in 2012 with 1 small tubular adenoma polyp removed.  Last EGD in 2016.     REVIEW OF SYSTEMS: 13 point review of systems is negative except that noted above.    PAST MEDICAL HISTORY:   Past Medical History:   Diagnosis Date    2019 novel coronavirus disease (COVID-19) 2/1/2021    Acute respiratory failure with hypoxia (H) 2/5/2021    Anemia     Atrial fibrillation (H)     Bronchiectasis with acute lower respiratory infection (H) 1/28/2021    CAD (coronary artery disease)     Chronic diarrhea     Clinical diagnosis of  COVID-19     1/2021    Coronary artery disease     Former smoker     Hematoma of left iliopsoas muscle 01/19/2020    while on eliquis.    History of skin cancer     Hyperlipidemia     Hypertension     Insomnia     Lumbar spondylosis 2016    Migraine     PAD (peripheral artery disease) (H) 10/19/2015    Paroxysmal SVT (supraventricular tachycardia) (H)     Created by Conversion     Persistent atrial fibrillation (H) 05/09/2018    New diagnosis April 16 18th and found incidentally on physical exam during yearly exam in primary clinic [id number] score of 5-new Eliquis start    Poliomyelitis     Restless legs syndrome (RLS)     RLS (restless legs syndrome) 02/28/2017    Sick sinus syndrome (H) 01/28/2020    Sigmoid diverticulosis 04/04/2007    SSS (sick sinus syndrome) (H)     S/p pacemaker       PAST SURGICAL HISTORY:   Past Surgical History:   Procedure Laterality Date    APPENDECTOMY      APPENDECTOMY      CAPSULOTOMY Bilateral 12/2015    YAG capsulotomy surgery. 12/21/15, 12/28/15    CARDIOVERSION  05/24/2018    EP Cardioversion External    CATARACT EXTRACTION, BILATERAL Bilateral 03/2012    3/15 and 3/29 by Dr. Barrett at the [facility]    CHOLECYSTECTOMY      CHOLECYSTECTOMY      COLONOSCOPY  05/17/2012    COLONOSCOPY W/ BIOPSIES  04/04/2007    COLONOSCOPY W/ POLYPECTOMY  05/07/2012    2 mm tubular adenoma in the rectum. Hemorrhoids. Diverticulosis.    CORONARY ANGIOPLASTY      CV CORONARY ANGIOGRAM N/A 06/26/2018    Procedure: Coronary Angiogram;  Surgeon: Joby Vargas MD;  Location: [facility] Lab;  Service:     EP PACEMAKER INSERT N/A 06/27/2018    Procedure: EP Pacemaker Insertion;  Surgeon: Osito Alvarez MD;  Location: [facility] Cath Lab;  Service:     HERNIA REPAIR Right     HYSTERECTOMY      HYSTERECTOMY TOTAL ABDOMINAL      IMPLANT PACEMAKER      INGUINAL HERNIA REPAIR Right     Indirect- Dr. Pedersen    MASTECTOMY      OOPHORECTOMY      SKIN CANCER EXCISION  2015    Right leg on  10/21/15. Right arm 9/29/15    STRIP VEIN      ligation?    TUBAL LIGATION      TUBAL LIGATION         FAMILY HISTORY:   Family History   Adopted: Yes   Problem Relation Age of Onset    Pulmonary Embolism Mother 32.00    Heart Disease Father     CABG Son     Stomach Cancer Grandson 20.00    Pulmonary Embolism Maternal Grandmother 32.00    No Known Problems Daughter     No Known Problems Daughter     CABG Son 64.00       SOCIAL HISTORY:   Social History     Tobacco Use    Smoking status: Former     Packs/day: 1.50     Years: 25.00     Pack years: 37.50     Types: Cigarettes     Start date: 1949     Quit date: 1974     Years since quittin.7    Smokeless tobacco: Never   Substance Use Topics    Alcohol use: Yes     Alcohol/week: 7.0 standard drinks of alcohol        MEDS:  Medications Prior to Admission   Medication Sig Dispense Refill Last Dose    acetaminophen (TYLENOL) 500 MG tablet Take 1,000 mg by mouth every 8 hours as needed   Past Week at PRN    apixaban ANTICOAGULANT (ELIQUIS ANTICOAGULANT) 2.5 MG tablet Take 1 tablet (2.5 mg) by mouth 2 times daily 180 tablet 3 2023 at AM    Apoaequorin (PREVAGEN) 10 MG CAPS Take 1 capsule by mouth daily 90 capsule 3 Past Week at WK    atorvastatin (LIPITOR) 20 MG tablet Take 1 tablet (20 mg) by mouth At Bedtime 90 tablet 3 2023 at PM    meclizine (ANTIVERT) 25 MG tablet Take 1 tablet (25 mg) by mouth 3 times daily as needed for dizziness 30 tablet 3 Past Week at PRN    Melatonin 5 MG CHEW Take 5 mg by mouth at bedtime as needed, may repeat once 90 tablet 3 2023 at PM    metoprolol succinate ER (TOPROL XL) 50 MG 24 hr tablet TAKE 1 TABLET (50 MG) BY MOUTH DAILY 90 tablet 3 2023 at PM       ALLERGIES/SENSITIVITIES: Diphenhydramine    MEDICATIONS:  No current outpatient medications on file.       PHYSICAL EXAM:  Temp:  [97.7  F (36.5  C)-98.8  F (37.1  C)] 97.7  F (36.5  C)  Pulse:  [61-83] 69  Resp:  [16-20] 18  BP: (118-156)/(56-79)  127/69  SpO2:  [93 %-99 %] 94 %  Body mass index is 23.9 kg/m .  GEN: Well developed, well nourished 89 year old in no acute distress.  HEENT: sclera anicteric, moist mucous membranes.   LYMPH: No cervical lymphadenopathy  PULM: Nonlabored breathing. Breath sounds equal.   CARDIO: Regular rate  GI: Non-distended. Soft.  Non-tender to palpation.  No guarding. No rebound tenderness.  EXT: warm, no lower extremity edema  NEURO: Alert. No focal defects.   PSYCH: Mental status appropriate, mood and affect normal.    SKIN: No rashes  MSK: No joint abnormalities    LABORATORY DATA:  CBC RESULTS:   Recent Labs   Lab Test 09/20/23  0526   WBC 6.4   RBC 3.19*   HGB 7.1*   HCT 24.1*   MCV 76*   MCH 22.3*   MCHC 29.5*   RDW 18.0*   *        CMP Results:   Recent Labs   Lab Test 09/20/23  0526      POTASSIUM 4.0   CHLORIDE 111*   CO2 24   ANIONGAP 6*   GLC 89   BUN 16.5   CR 0.92   BILITOTAL 0.8   ALKPHOS 55   ALT 9   AST 21        INR Results:   Recent Labs   Lab Test 09/19/23  1710   INR 1.45*          RELEVANT IMAGING:          ASSESSMENT:   89 year old female with history of hypertension, chronic A-fib s/p pacemaker, on eliquis, CKD.  Has been experiencing shortness of breath with exertion and occasional chest heaviness for the past 1-3 weeks.  SHe was found to have acute on chronic anemia in the ER.  Hemoglobin 6.6 on admission.  It is consistent with microcytic iron deficient anemia.  Although patient denies of overt GI bleed this might related to her memory loss.  She did tested positive for guaiac stool.  She had a previous work-up in 2020 with CT scan which was suspicious for inflammation versus malignancy around sacral area.  Unable to tolerate colonoscopy.     I called her daughter Ginna and discussed her over the phone.  Her phone number is [phone].  We discussed different options to further evaluate iron deficient anemia.  She does not think her mom can tolerate a bowel prep.  Prefer to hold off  invasive procedure including EGD/colonoscopy  for now.  She is agreeable to repeat CT scan.       PLAN:  -Continue trending troponin.   - Hold anticoagulation for now.  -Monitor H&H closely and transfuse as needed/  -Check CEA.  -Plan CT of abdomen pelvics.    Further recommendation will follow.     Approximately 50 minutes of total time was spent providing patient care, including patient evaluation, reviewing documentation/test results and .           Praneeth Guevara MD  Thank you for the opportunity to participate in the care of this patient.   Please feel free to call me with any questions or concerns.  Phone number [phone].            CC: [facility]

## 2023-09-20 NOTE — ED NOTES
Bed: JNED-20  Expected date: 9/19/23  Expected time: 8:43 PM  Means of arrival:   Comments:  Jimmy PT ED 09 M.G.
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
## 2023-09-20 NOTE — TELEPHONE ENCOUNTER
Noted patient presented to ED 9-19-23 @ 1312 - patient presently inpt at [facility] - no further action needed at this time.  mg   ROOM CHECK COMPLETE. INFANT BEING HELD BY FAMILY MEMBER. RESPIRATIONS EVEN AND
UNLABORED. NO DISTRESS NOTED. MOM DENIES ANY NEEDS AT THIS TIME.

## 2023-09-20 NOTE — CONSULTS
Care Management Initial Consult    General Information  Assessment completed with: VM-chart review, Children, Other, Patient, daughters, patient  Type of CM/SW Visit: Initial Assessment    Primary Care Provider verified and updated as needed:   yes  Readmission within the last 30 days:   no     Reason for Consult: discharge planning  Advance Care Planning:    Filled out HCD and getting notarized       Communication Assessment  Patient's communication style: spoken language (English or Bilingual)    Hearing Difficulty or Deaf: no   Wear Glasses or Blind: yes    Cognitive  Cognitive/Neuro/Behavioral: WDL                      Living Environment:   People in home:       Current living Arrangements: independent living facility      Able to return to prior arrangements: yes  Living Arrangement Comments: Can return as IL or AL    Family/Social Support:  Care provided by: self  Provides care for: no one, unable/limited ability to care for self           Description of Support System:    Pt has supportive family       Current Resources:   Patient receiving home care services:  not at this time, but they are available thru facility as AL. Pt is currently IL and would need outside home care if returning as IL     Community Resources:    Equipment currently used at home: walker, standard  Supplies currently used at home:  none reported    Employment/Financial:  Employment Status:   retired       Financial Concerns:   no needs identified     Finance Comments: No needs identified    Does the patient's insurance plan have a 3 day qualifying hospital stay waiver?  No    Lifestyle & Psychosocial Needs:  Social Determinants of Health     Food Insecurity: No Food Insecurity (1/19/2023)    Hunger Vital Sign     Worried About Running Out of Food in the Last Year: Never true     Ran Out of Food in the Last Year: Never true   Depression: Not at risk (5/23/2023)    PHQ-2     PHQ-2 Score: 0   Housing Stability: High Risk (1/19/2023)     Housing Stability Vital Sign     Unable to Pay for Housing in the Last Year: Yes     Number of Places Lived in the Last Year: 1     Unstable Housing in the Last Year: No   Tobacco Use: Medium Risk (9/19/2023)    Patient History     Smoking Tobacco Use: Former     Smokeless Tobacco Use: Never     Passive Exposure: Not on file   Financial Resource Strain: Low Risk  (1/19/2023)    Overall Financial Resource Strain (CARDIA)     Difficulty of Paying Living Expenses: Not hard at all   Alcohol Use: Not At Risk (1/19/2023)    AUDIT-C     Frequency of Alcohol Consumption: 4 or more times a week     Average Number of Drinks: 1 or 2     Frequency of Binge Drinking: Never   Transportation Needs: Unmet Transportation Needs (1/19/2023)    PRAPARE - Transportation     Lack of Transportation (Medical): No     Lack of Transportation (Non-Medical): Yes   Physical Activity: Inactive (1/19/2023)    Exercise Vital Sign     Days of Exercise per Week: 0 days     Minutes of Exercise per Session: 0 min   Interpersonal Safety: Not on file   Stress: Stress Concern Present (1/19/2023)    [employer] [place] [employer] - Occupational Stress Questionnaire     Feeling of Stress : To some extent   Social Connections: Socially Isolated (1/19/2023)    Social Connection and Isolation Panel [NHANES]     Frequency of Communication with Friends and Family: More than three times a week     Frequency of Social Gatherings with Friends and Family: Once a week     Attends Yazidism Services: Never     Active Member of Clubs or Organizations: No     Attends Club or Organization Meetings: Not on file     Marital Status:        Functional Status:  Prior to admission patient needed assistance:         Assesssment of Functional Status: At functional baseline, Daughters report IL status but feel function is borderline for needing more services    Mental Health Status:  No concerns noted          Chemical Dependency Status:            No concerns  noted    Values/Beliefs:  Spiritual, Cultural Beliefs, Druze Practices, Values that affect care:       no needs reported          Additional Information:  Assessed. Met with pt and her 2 daughters. Family reports pt is independent in her independent living apartment at [place] but that her functioning has been borderline. They are wondering what PT/OT recommendations would be for pt. They are wondering if she should be moved from Independent living status to Assisted living based on current needs. Family and pt filled out health directive together and will be notarized.     Anayeli Chase, LICSW

## 2023-09-21 NOTE — PLAN OF CARE
Problem: Anemia  Goal: Anemia Symptom Improvement  Outcome: Progressing  Intervention: Monitor and Manage Anemia  Recent Flowsheet Documentation  Taken 9/21/2023 1200 by Vera Brock RN  Safety Promotion/Fall Prevention:   activity supervised   safety round/check completed   room organization consistent   patient and family education   nonskid shoes/slippers when out of bed   lighting adjusted   clutter free environment maintained   Goal Outcome Evaluation:       Patient alert and oriented x 4 yet forgetful. Patient said she did not take morning medications. The nurse who had patient this morning signed out the medications and gave them to the patient. The daughter said her mom is forgetful. She is up in the chair and knitting. Blood transfusion completed. LSC, no c/o SOB. Daughters bedside and supportive. Resting after she ate a good lunch. On Keflex for cellulitis in left leg. Call light in reach.
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
## 2023-09-21 NOTE — PLAN OF CARE
Problem: Plan of Care - These are the overarching goals to be used throughout the patient stay.    Goal: Plan of Care Review  Description: The Plan of Care Review/Shift note should be completed every shift.  The Outcome Evaluation is a brief statement about your assessment that the patient is improving, declining, or no change.  This information will be displayed automatically on your shift note.  Outcome: Progressing     Problem: Anemia  Goal: Anemia Symptom Improvement  Outcome: Progressing  Intervention: Monitor and Manage Anemia  Recent Flowsheet Documentation  Taken 9/21/2023 1600 by Allan Mcclain RN  Safety Promotion/Fall Prevention:   activity supervised   safety round/check completed   room organization consistent   patient and family education   nonskid shoes/slippers when out of bed   lighting adjusted   clutter free environment maintained   Goal Outcome Evaluation:  Pt alert and oriented x 4, up with assist x 1 and a walker. Pt using call light appropriately. No sign and symptom of GI bleed, Hgb remained stable after transfusion. Miah pain/ discomfort. VSS on room air.

## 2023-09-21 NOTE — PROGRESS NOTES
Patient is A & O x4, HGB this am was 6.8, 1 unit of PRBC's ordered and transfusion started at 0945. No transfusion reaction noted, right iv line leaking and new iv placed by PICC team in left forearm. Patient pleasant and enjoyed her breakfast.

## 2023-09-21 NOTE — PLAN OF CARE
Goal Outcome Evaluation:         Problem: Chest Pain  Goal: Resolution of Chest Pain Symptoms  9/20/2023 1914 by Vera Brock, RN  Outcome: Progressing  9/20/2023 1228 by Vera Brock, RN  Outcome: Progressing     Patient denied Chest pain or SOB. Appetite good. CT pelvis /abdomen pending. P;ease follow up. Hgb 7.7 and on serial hgb's. Cellulitis in Left lower extremity and to start on keflex. Call light in reach.

## 2023-09-21 NOTE — PROGRESS NOTES
[facility] DIGESTIVE HEALTH PROGRESS NOTE      Subjective:      Hb 8.9 after transfusion.  Patient is comfortable.  No abdominal pain no nausea no vomiting.  No dark stool no rectal bleeding.    I reviewed CT result.  Infiltrating mass lesion arising from terminal ileum involved IC valve.       Objective:                Body mass index is 23.61 kg/m .  Vital signs in last 24 hrs;  Temp:  [97.5  F (36.4  C)-98.3  F (36.8  C)] 97.5  F (36.4  C)  Pulse:  [62-84] 67  Resp:  [18-20] 20  BP: (100-148)/(52-65) 141/65  SpO2:  [94 %-99 %] 98 %    Physical Exam:   General: Comfortable appearing. Awake.   Cardiovascular: Regular rate. No edema  Chest: Non-labored breathing. Symmetric chest rise.   Abdomen: soft, non-tender, non-distended  Neurologic: Alert, no focal defects.     Current Labs:  CMP Results:   Recent Labs   Lab 09/20/23  0526 09/19/23  1710 09/19/23  1416    141 142   POTASSIUM 4.0 3.8 4.9   CHLORIDE 111* 108* 108*   CO2 24 23 23   GLC 89 151* 112*   BUN 16.5 20.9 21.0   CR 0.92 0.91 0.97*   BILITOTAL 0.8  --   --    ALKPHOS 55  --   --    ALT 9  --   --    AST 21  --   --       CBC  Recent Labs   Lab 09/21/23  1304 09/21/23  0609 09/21/23  0036 09/20/23  1836 09/20/23  1412 09/20/23  0526 09/19/23  2322 09/19/23  1710 09/19/23  1416   WBC  --   --   --   --   --  6.4  --  7.8 8.3   RBC  --   --   --   --   --  3.19*  --  3.05* 3.12*   HGB 8.9* 6.8* 7.1* 7.3*   < > 7.1*   < > 6.6* 6.7*   HCT  --   --   --   --   --  24.1*  --  22.9* 23.6*   MCV  --   --   --   --   --  76*  --  75* 76*   RDW  --   --   --   --   --  18.0*  --  16.7* 16.6*   PLT  --   --   --   --   --  146*  --  202 180    < > = values in this interval not displayed.     INR  Recent Labs   Lab 09/19/23  1710   INR 1.45*      LIPASENo lab results found in last 7 days.    Relevant Imaging:  EXAM: CT ABDOMEN PELVIS W CONTRAST  LOCATION: [facility]  DATE: 9/20/2023     INDICATION: ELBA, previous CT showed suspicious  inflammation maligancy around cecum  COMPARISON: CT chest, abdomen and pelvis 01/28/2021  TECHNIQUE: CT scan of the abdomen and pelvis was performed following injection of IV contrast. Multiplanar reformats were obtained. Dose reduction techniques were used.  CONTRAST: isovue 370 75ml     FINDINGS:   LOWER CHEST: Small right, trace left pleural effusions with compressive atelectasis.     HEPATOBILIARY: Normal liver contours. No worrisome liver lesions. Cholecystectomy. Prominent common bile duct, likely reservoir effect.     PANCREAS: New pancreatic ductal dilation distal to the pancreatic neck (up to 6 mm). No discrete mass is identified.     SPLEEN: Unchanged scattered low-attenuation lesions, likely benign.     ADRENAL GLANDS: Unremarkable.     KIDNEYS/BLADDER: Kidneys enhance symmetrically. No solid mass or collecting system dilation. Bladder is decompressed.     BOWEL: There is an approximately 5.4 x 2.9 x 3.8 cm infiltrative mass arising from the terminal ileum with involvement of the ileocecal valve. No calcifications. No evidence of obstruction. Extensive colonic diverticulosis. No ascites or peritoneal   nodularity.     LYMPH NODES: A few prominent but nonenlarged mesenteric lymph nodes draining the bowel near the mass (series 3 image #113, #120).     VASCULATURE: The abdominal aorta is nonaneurysmal. Severe atheromatous disease.     PELVIC ORGANS: Uterus is surgically absent.     MUSCULOSKELETAL: No aggressive bone lesions.                                                                      IMPRESSION:   1. Infiltrative mass arising from the terminal ileum with involvement of the ileocecal valve. Leading consideration is invasive adenocarcinoma or other small bowel malignancy. A few prominent adjacent mesenteric lymph nodes may represent local harley   disease, however no additional metastases in the abdomen or pelvis.  2. New pancreatic ductal dilation upstream of the pancreatic neck. No discrete mass is  identified. Nonemergent evaluation with pancreatic MRI/MRCP to evaluate for underlying mass or cystic lesion is recommended.  3. Small right, trace left pleural effusions.    Assessment:       89 year old female with history of hypertension, chronic A-fib s/p pacemaker, on eliquis, CKD.  Has been experiencing shortness of breath with exertion and occasional chest heaviness for the past 1-3 weeks.  SHe was found to have acute on chronic anemia in the ER.  Hemoglobin 6.6 on admission.  It is consistent with microcytic iron deficient anemia.  Although patient denies of overt GI bleed this might related to her memory loss.  She did tested positive for guaiac stool.  She had a previous work-up in 2020 with CT scan which was suspicious for inflammation versus malignancy around sacral area.  Unable to tolerate colonoscopy.      I called her daughter Ginna and discussed her over the phone 9/20 .  Her phone number is [phone].  We discussed different options to further evaluate iron deficient anemia.  She does not think her mom can tolerate a bowel prep.  Prefer to hold off invasive procedure including EGD/colonoscopy  for now.  She is agreeable to repeat CT scan.     I discussed with patient and her daughter at bedside regarding CT findings today (sptember 21, 2023).  They want to hold off colonoscopy.  Both of them Prefer no intervention including chemo, radiation, surgery if she is diagnosed with malignancy, given her age. They prefer keep her comfortable only.     We Also discussed whether to continue holding anticoagulation.  Pros and cons was explained to the patient and her daughter.  They want to think about it and discussed with the primary service.  They may choose to continue on anticoagulation and monitor H&H closely.         PLAN:  -Hold anticoagulation for now.  -Hold colonoscopy per patient's preference.   - Monitor H&H closely and transfuse as needed/    I discussed with patient's daughter at  the bedside and  primary care service . They agreed with the plan.     GI will sign off.  Please call if any further questions.     Approximately 30 minutes of total time was spent providing patient care, including patient evaluation, reviewing documentation/test results and .           Praneeth Guevara MD  Thank you for the opportunity to participate in the care of this patient.   Please feel free to call me with any questions or concerns.  Phone number [phone].

## 2023-09-21 NOTE — PROGRESS NOTES
Care Management Follow Up    Length of Stay (days): 2    Expected Discharge Date: 09/21/2023     Concerns to be Addressed:       Patient plan of care discussed at interdisciplinary rounds: Yes    Anticipated Discharge Disposition:  1-2 days     Anticipated Discharge Services:  TCU vs [place] with Home care or [facility] for assisted living with support. Would need assessment per [place] for AL.  Anticipated Discharge DME:  not yet recommended    Patient/family educated on Medicare website which has current facility and service quality ratings:   yes  Education Provided on the Discharge Plan:  yes  Patient/Family in Agreement with the Plan: other (see comments) yes- family nervous about pt returning to independent living without PT assessment/input    Referrals Placed by CM/SW:    Private pay costs discussed: Not applicable    Additional Information:    Previously assessed with pt and family. Waiting for PT recommendations- tcu vs AL at [place] vs [place] with home care. pt had low hgb and was unable to complete PT assessment today.     Anayeli Chase, [facility]  2:38 PM   09/21/23

## 2023-09-21 NOTE — PLAN OF CARE
Pt denies pain or discomfort.  One small, soft, formed stool brown in color.   Pt steady SBA with activity OOB.  Sleeping throughout night between cares and rounding.  Hgb 6.8 this AM, one unit PRBC ordered per order    Flori Cortez RN
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
## 2023-09-22 NOTE — PROGRESS NOTES
Care Management Discharge Note    Discharge Date: 09/24/2023       Discharge Disposition:  Home    Discharge Services:  Home PT and OT    Discharge DME:      Discharge Transportation: family or friend will provide    Private pay costs discussed: Not applicable    Does the patient's insurance plan have a 3 day qualifying hospital stay waiver?  No    PAS Confirmation Code:    Patient/family educated on Medicare website which has current facility and service quality ratings:      Education Provided on the Discharge Plan:    Persons Notified of Discharge Plans: MD, RN, patient,   Patient/Family in Agreement with the Plan: other (see comments)    Handoff Referral Completed: Yes    Additional Information:  Patient discharging back to ILF with [facility] home PT and OT. Patient and granddaughter updated. Left VM for patient's daughter Ginna.      Per therapies patient would benefit from [facility] in the near future, encouraged family to have detention RN assess patient when she returns to her ILF.     ASHLEY CurtisW

## 2023-09-22 NOTE — PLAN OF CARE
"  Problem: Plan of Care - These are the overarching goals to be used throughout the patient stay.    Goal: Plan of Care Review  Description: The Plan of Care Review/Shift note should be completed every shift.  The Outcome Evaluation is a brief statement about your assessment that the patient is improving, declining, or no change.  This information will be displayed automatically on your shift note.  Outcome: Met  Goal: Patient-Specific Goal (Individualized)  Description: You can add care plan individualizations to a care plan. Examples of Individualization might be:  \"Parent requests to be called daily at 9am for status\", \"I have a hard time hearing out of my right ear\", or \"Do not touch me to wake me up as it startles me\".  Outcome: Met  Goal: Absence of Hospital-Acquired Illness or Injury  Outcome: Met  Intervention: Identify and Manage Fall Risk  Recent Flowsheet Documentation  Taken 9/22/2023 0844 by Vero López RN  Safety Promotion/Fall Prevention:   activity supervised   safety round/check completed  Intervention: Prevent and Manage VTE (Venous Thromboembolism) Risk  Recent Flowsheet Documentation  Taken 9/22/2023 0844 by Vero López RN  VTE Prevention/Management: SCDs (sequential compression devices) off  Goal: Optimal Comfort and Wellbeing  Outcome: Met  Goal: Readiness for Transition of Care  Outcome: Met     Problem: Risk for Delirium  Goal: Optimal Coping  Outcome: Met  Goal: Improved Behavioral Control  Outcome: Met  Intervention: Prevent and Manage Agitation  Recent Flowsheet Documentation  Taken 9/22/2023 0844 by Vero López RN  Environment Familiarity/Consistency: daily routine followed  Intervention: Minimize Safety Risk  Recent Flowsheet Documentation  Taken 9/22/2023 0844 by Vero López RN  Enhanced Safety Measures: other (see comments)  Goal: Improved Attention and Thought Clarity  Outcome: Met  Intervention: Maximize Cognitive Function  Recent Flowsheet " Documentation  Taken 9/22/2023 0844 by Vero López RN  Sensory Stimulation Regulation: care clustered  Reorientation Measures: clock in view  Goal: Improved Sleep  Outcome: Met     Problem: Chest Pain  Goal: Resolution of Chest Pain Symptoms  Outcome: Met     Problem: Anemia  Goal: Anemia Symptom Improvement  Outcome: Met  Intervention: Monitor and Manage Anemia  Recent Flowsheet Documentation  Taken 9/22/2023 0844 by Vero López RN  Safety Promotion/Fall Prevention:   activity supervised   safety round/check completed   Goal Outcome Evaluation:       Patient is discharging today back to independent living. Daughter to transport this evening.       Patient discharged at around 1715. Daughter was here to transport back to Independent living facility.

## 2023-09-22 NOTE — PLAN OF CARE
"Assumed care 1900 to 2330. A&O x 4. Assist x 1 with a walker. Denies pain. PRN Melatonin given for sleep. Up to toilet. Talked about patient's feelings about CT results. \"I was very surprised. I didn't expect anything like that and it really caught me off guard.\" Patient has supportive family and the community at her independent living facility. She is looking forward to the holidays and knitting gifts for her family. Call light within reach, able to make needs known. Bed alarm on for safety.    Problem: Anemia  Goal: Anemia Symptom Improvement  Intervention: Monitor and Manage Anemia  Recent Flowsheet Documentation  Taken 9/21/2023 1945 by LEYDI EDGAR  Safety Promotion/Fall Prevention:   activity supervised   nonskid shoes/slippers when out of bed       "

## 2023-09-22 NOTE — DISCHARGE INSTRUCTIONS
Home care services have been arranged for the patient.  Home Care Agency: [facility]   Home Care Phone Number: [phone]   Services: Physical and occupational therapy  Instructions: Home Care will call to arrange first visit.

## 2023-09-23 NOTE — PLAN OF CARE
Occupational Therapy Discharge Summary    Reason for therapy discharge:    Discharged to home with home therapy.    Progress towards therapy goal(s). See goals on Care Plan in [facility] electronic health record for goal details.  Goals partially met.  Barriers to achieving goals:   discharge from facility.    Therapy recommendation(s):    Continued therapy is recommended.  Rationale/Recommendations:  not at baseline for BADLs.

## 2023-09-23 NOTE — PROGRESS NOTES
Physical Therapy Discharge Summary    Reason for therapy discharge:    Discharged to home with home PT.    Progress towards therapy goal(s). See goals on Care Plan in [facility] electronic health record for goal details.  Goals partially met.  Barriers to achieving goals:   discharge from facility.    Therapy recommendation(s):    Further recommended therapy is related to documented deficits, and is necessary to maximize functional independence in order for patient to return to prior level of function.      Haylie Fatima, AMBAR 9/23/2023

## 2023-09-26 NOTE — TELEPHONE ENCOUNTER
Orders    start of care    Ongoing for PT  1x a week for 4 weeks  every other week for 4 weeks     OT and skilled nursing evaluation     [facility]  [phone]  Message: Y

## 2023-09-26 NOTE — TELEPHONE ENCOUNTER
Home Care is calling regarding an established patient with  [facility].        9/26/2023    12:49 PM   Home Care Information   Date of Home Care episode start 9/26/2023   Current following provider [facility]     Requesting orders from: Daniela Roth  Provider is following patient: Yes  Is this a 60-day recertification request?  No    Orders Requested    Skilled Nursing  Request for initial evaluation and treatment (one time)     Physical Therapy  Request for initial certification (first set of orders)   Frequency: 1x a week for 4 weeks then every other week for 4 weeks     Occupational Therapy  Request for initial evaluation and treatment (one time)       Information was gathered and will be sent to provider for review.  RN will contact Home Care with information after provider reviews
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
## 2023-10-03 NOTE — PROGRESS NOTES
NEUROPSYCHOLOGY FEEDBACK VISIT  [facility]      Visit Summary  The purpose of today s appointment was to provide feedback regarding Ms. Larson's recent neuropsychological consult completed on 9/5/2023. Her son-in-law (Nando) joined us for today's visit. We began the session by discussing her experience during the evaluation. I provided Ms. Larson with detailed feedback regarding her performance on cognitive testing and her pattern of cognitive strengths and weaknesses.  I discussed my overall impressions and recommendations and provided the opportunity for Ms. Larson to ask any questions that she had about the evaluation. At the end of the session, she indicated that she understood the results and that I had answered all of her questions.       Nhung Yeboah PsyD,   Licensed Clinical Neuropsychologist  [address], [address]  [place] [address]  Phone: [phone]        Office Visit Details  Type of service:  Office Visit  Start Time: 2:38 PM  End Time: 3:40 PM  Location:  [facility]    For diagnostic and coding purposes, Ms. Larson was referred for an evaluation of Mild Neurocognitive Disorder. As this is the final date for this Episode of Care (initiated on 9/5/2023) all charges for the entire Episode of Care will be filed today. Please see the 9/5/2023 evaluation for a detailed description of codes and services, including services provided today.      In brief:   1 x [id number]  1 x [phone]  2 x 96133  1 x 96138  3 x [phone]

## 2023-10-03 NOTE — PATIENT INSTRUCTIONS
SUMMARY OF FINDINGS:   Results of testing indicate that Ms. Larson is of estimated average to above average premorbid intellectual functioning, and most of Ms. Larson's performances are generally commensurate with that estimate. However, she exhibits largely mild impairment on measures of attention/concentration, nonverbal learning/memory, in some aspects of verbal learning/memory, and on some tests of executive functioning (e.g., mental flexibility/set shifting and abstract reasoning).     With regard to learning/memory performances more specifically, her nonverbal learning is mildly impaired for her age, and she is unable to recall any of the figures after a 20-minute delay (0% retention rate). However, her recall benefits slightly from prompts/cues on recognition testing (her score improves to the low average range with prompts/cues). Her verbal learning/memory performances are more variable, as her scores on a story learning/memory task are considered to be within normal limits, but her scores on a word list learning/memory test are mildly impaired. Specifically, her learning efficiency of the word list is mildly impaired, and her recall is also mildly impaired with a 40% retention rate after a 10-minute delay. Her recall of the list does NOT benefit much from prompts/cues on the recognition trial, as her score remains in the mildly impaired range. Overall, her memory profile is fairly mixed, with intact performance on one measure, an encoding deficit on another measure, and possibly a retrieval deficit (but possibly encoding) on the third measure.    All other cognitive test performances are considered to be within normal limits, including those on measures of processing speed, visuospatial/constructional skills, language abilities (including confrontation naming and verbal fluency's), and cognitive inhibition.     Emotionally, the patient endorses minimal depressive symptoms and no anxiety symptoms on  self-report questionnaires. This is consistent with her reports of her mood during the clinical interview. Her son has also noticed marked improvement in her mood since moving into a senior living facility where she is much more engaged and social.    IMPRESSIONS:  Overall, these results are suggestive of mild cerebral dysfunction in the frontal lobes with possibly some early involvement of mesial temporal structures.   Given that the cognitive deficits are mild and Ms. 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  Etiology is likely multifactorial:  Cerebrovascular disease may be considered as a potential etiology given her recent CT findings (which revealed moderate-severe chronic small vessel ischemic changes) and her numerous risk factors for cerebrovascular disease (e.g., hypertension, hyperlipidemia, atrial fibrillation, chronic anemia, etc.).   Chronic anemia has been shown to be associated with frontal deficits on cognitive testing.  Significant insomnia and daytime fatigue may also exacerbate her cognitive difficulties at times in daily life. Treatment of insomnia and chronic anemia may elicit some degree of improvement in her cognitive functioning over time.  Further work-up is needed in order to rule out treatable/reversible or other medical causes of cognitive impairment. For example, additional relevant blood work should be considered in order to rule out various vitamin deficiencies, thyroid dysfunction, etc.   While her current profile does NOT clearly support an Alzheimer's disease process at this time (given that her memory profile is quite mixed and her semantic language abilities are still intact), ongoing monitoring is recommended.     DIAGNOSTIC IMPRESSIONS:  Mild Neurocognitive Disorder, likely due to Multiple Etiologies (cerebrovascular disease, chronic anemia, insomnia)    RECOMMENDATIONS:  1) Additional relevant blood work  may be considered in order to rule out other treatable/reversible causes of cognitive impairment (e.g., vitamin B12, MMA, vitamin B1, TSH, folate, etc.). This recommendation will be deferred to the patient's PCP.    2) With regard to complex daily activities, Ms. Larson no longer drives, she already receives assistance with bill paying from her daughter, and most meals are provided by her senior living facility. This all remains appropriate. She continues to manage her own medications but acknowledges that she forgets more often. This may be partly because she places her medications in a basket (instead of out in plain sight). I would strongly recommend that she start utilizing a pillbox that she keeps in a place where she will see it (e.g., on the bathroom counter, at the dining table, or next to her arm chair). Occasional oversight with her medication management is also recommended, particularly after she starts implementing the use of the pillbox (e.g., someone can double check the pillbox after she fills it for accuracy, and someone can glance at her pillbox at times throughout the week to ensure compliance). She might also set alarm reminders to help her remember to take her medications (some pillboxes come with built-in alarms). If mistakes are still made, perhaps staff can start managing her medications for her.    3) If not already done, completion of paperwork for advance directives and assignment of healthcare and financial Power of  should be considered at this time.    4) Given her history of sleep disturbance, Ms. Larson may benefit from evaluating her current sleep hygiene behaviors and if need be, make changes to help facilitate sleep. Relaxation exercises (e.g., listening to soothing music, deep breathing, progressive muscle relaxation, etc.) might also help with sleep initiation. If she tends to have difficulty returning to sleep in the night or in falling asleep due to worrying or  ruminating, strategies could be discussed with a psychotherapist. If these techniques do not improve her sleep, she may wish to consult her physician to assess for any underlying physical issues that may be impacting her sleep and to determine if a medication or a referral to Sleep Medicine is warranted.  She is encouraged to AVOID any over-the-counter sleep aids that have diphenhydramine or doxylamine as ingredients (e.g., Tylenol PM, Rapp Sleep Aid, ZzzQuil, etc.), as those can contribute to memory dysfunction given their anticholinergic properties.    5) The patient is encouraged to utilize cognitive compensatory strategies in daily life, including utilizing note pads, checklists, to-do lists, a calendar/planner, labeled alarm reminders, a pillbox, and maintaining a daily morning and nighttime routine and an organized living environment.    6) Ms. Larson is encouraged to remain physically, socially, and mentally active in order to optimize her brain health. She is also encouraged to adhere closely to her medications to manage her cerebrovascular risk factors, which will help prevent further cognitive decline.    7) Neuropsychological follow-up is recommended in about 12-18 months (or as clinically indicated) in order to monitor her cognitive status, help to clarify etiology, and update recommendations. The current test data can be used as a baseline to which future comparisons can be made.      Thank you for allowing me to participate in your care. Please contact me with any questions regarding the content of this report.        Nhung Yeboah PsyD, LP  Licensed Clinical Neuropsychologist  [facility] [address], [place] [address]  Phone: [phone]        Cognitive Strategies  Take notes! Keep a small notepad with you in your purse/pocket/bag and write things down that you want to remember. You might also keep a notepad in a convenient location in your home (e.g.,  next to your telephone or calendar). Taking your notes in a note pad (instead of on multiple post-it notes) might help you stay organized, and you will also remember where to go to look for the notes that you took.  Create checklists, grocery lists, and to-do lists. Cross things off as you finish them.  Use a calendar or planner and get in the habit of checking it daily. Make it a part of your morning and/or nighttime routine to remind yourself of upcoming events, activities, or appointments. Cross the days off as they pass so that you can quickly glance at the calendar to know what day it is and what you have going on.  Set alarm reminders. For example, you can set an alarm to remind you to take your medications, turn off the oven, turn off the sprinkler outside, or to start getting ready for your appointment. If you use a smart phone, often times you can label the alarm that goes off so that you know what the alarm is for when it chimes.  Use a pillbox to follow your medication regimen. A pillbox acts as a nice visual cue to remind us to take our medications. If you have trouble remembering whether or not you have already taken your medications today, a pillbox can be extremely helpful to help with this issue.  Use a GPS when driving to help you stay on route.  When having an important conversation or completing an important task, reduce as many distractions in the environment as you can. For example, turn off the TV or the music in the background. Turn off or silence your cell phone. Clear your work area of everything that you do not need for the task at hand.  Establish set locations for certain items. For example, if you keep your keys on a hook by your door, you will always know where to go to look for them.   Maintain a daily routine, especially for morning and nighttime tasks.    Sleep Hygiene    What is Sleep Hygiene?  'Sleep hygiene' is the term used to describe good sleep habits. Considerable research has  gone into developing a set of guidelines and tips which are designed to enhance good sleeping, and there is much evidence to suggest that these strategies can provide long-term solutions to sleep difficulties.     Sleep Hygiene Tips:  1) Get regular. One of the best ways to train your body to sleep well is to go to bed and get up at more or less the same time every day, even on weekends and days off! This regular rhythm will make you feel better and will give your body something to work from.    2) Sleep when sleepy. Only try to sleep when you actually feel tired or sleepy, rather than spending too much time awake in bed.    3) Get up & try again. If you haven't been able to get to sleep after about 20 minutes or more, get up and do something calming or boring until you feel sleepy, then return to bed and try again. Sit quietly on the couch with the lights off (bright light will tell your brain that it is time to wake up), or read something boring like the phone book. Avoid doing anything that is too stimulating or interesting, as this will wake you up even more.    4) Avoid caffeine & nicotine. It is best to avoid consuming any caffeine (in coffee, tea, cola drinks, chocolate, and some medications) or nicotine (cigarettes) for at least 4-6 hours before going to bed. These substances act as stimulants and interfere with the ability to fall asleep.    5) Avoid alcohol. It is also best to avoid alcohol for at least 4-6 hours before going to bed. Many people believe that alcohol is relaxing and helps them to get to sleep at first, but it actually interrupts the quality of sleep.    6) Bed is for sleeping. Try not to use your bed for anything other than sleeping and sex, so that your body comes to associate bed with sleep. If you use bed as a place to watch TV, eat, read, work on your laptop, pay bills, and other things, your body will not learn this connection.    7) No naps. It is best to avoid taking naps during the  day, to make sure that you are tired at bedtime. If you can't make it through the day without a nap, make sure it is for less than an hour and before 3pm.    8) Sleep rituals. You can develop your own rituals of things to remind your body that it is time to sleep - some people find it useful to do relaxing stretches or breathing exercises for 15 minutes before bed each night, or sit calmly with a cup of caffeine-free tea.    9) Bath time. Having a hot bath 1-2 hours before bedtime can be useful, as it will raise your body temperature, causing you to feel sleepy as your body temperature drops again. Research shows that sleepiness is associated with a drop in body temperature.    10) No clock-watching. Many people who struggle with sleep tend to watch the clock too much. Frequently checking the clock during the night can wake you up (especially if you turn on the light to read the time) and reinforces negative thoughts such as  Oh no, look how late it is, I'll never get to sleep  or  it's so early, I have only slept for 5 hours, this is terrible.     11) Use a sleep diary. This worksheet can be a useful way of making sure you have the right facts about your sleep, rather than making assumptions. Because a diary involves watching the clock (see point 10) it is a good idea to only use it for two weeks to get an idea of what is going and then perhaps two months down the track to see how you are progressing.    12) Exercise. Regular exercise is a good idea to help with good sleep, but try not to do strenuous exercise in the 4 hours before bedtime. Morning walks are a great way to start the day feeling refreshed!    13) Eat right. A healthy, balanced diet will help you to sleep well, but timing is important. Some people find that a very empty stomach at bedtime is distracting, so it can be useful to have a light snack, but a heavy meal soon before bed can also interrupt sleep. Some people recommend a warm glass of milk,  which contains tryptophan, which acts as a natural sleep inducer.    14) The right space. It is very important that your bed and bedroom are quiet and comfortable for sleeping. A cooler room with enough blankets to stay warm is best, and make sure you have curtains or an eye mask to block out early morning light and earplugs if there is noise outside your room.    15) Keep daytime routine the same. Even if you have a bad night sleep and are tired it is important that you try to keep your daytime activities the same as you had planned. That is, don't avoid activities because you feel tired. This can reinforce the insomnia.    16) Take time to plan during the day. Do your thoughts keep you up all night? It is common for people to plan out the next day or run through their mental to-do list when they lay down to sleep. Instead of keeping your mind active in this way at night, take time to plan out the next day or week in the morning or afternoon and write down your plan/to-do list in a notebook or calendar. That way, when your thoughts drift to planning at night, you can put your mind at ease more quickly by reminding yourself that everything is already planned out.      Relaxation Techniques    Read about or Search on YouTube and follow along with the videos:  Diaphragmatic (deep) Breathing  Progressive Muscle Relaxation  Guided Imagery/Visualization  Meditation  Mindfulness activities    Relaxation Apps (5-10 minute guided relaxation audios/videos):  Calm  Head Space  Virtual Hope Box    Other Relaxing Activities:  Get some fresh air  Exercise/go for a walk  Take a warm bath  Adult coloring books  Relaxing music  Other activities you enjoy...

## 2023-10-03 NOTE — TELEPHONE ENCOUNTER
Home Care is calling regarding an established patient with  [facility].        9/26/2023    12:49 PM   Home Care Information   Date of Home Care episode start 9/26/2023   Current following provider [facility]     Requesting orders from: Daniela Roth  Provider is following patient: Yes  Is this a 60-day recertification request?  No    Orders Requested    Skilled Nursing  Request for initial certification (first set of orders)   Frequency:  1x/wk for 4 wks  then 1x/ other wk for 4 wks  3 PRNs    Information was gathered and will be sent to provider for review.  RN will contact Home Care with information after provider review.  Confirmed ok to leave a detailed message with call back.  Contact information confirmed and updated as needed.    Christiana Kothari RN

## 2023-10-03 NOTE — LETTER
10/3/2023         RE: Deloris Larson  [address]  [place] [address]        Dear Colleague,    Thank you for referring your patient, Deloris Larson, to the [facility] [place] Please see a copy of my visit note below.    NEUROPSYCHOLOGY FEEDBACK VISIT  [facility]      Visit Summary  The purpose of today s appointment was to provide feedback regarding Ms. Larson's recent neuropsychological consult completed on 9/5/2023. Her son-in-law (Nando) joined us for today's visit. We began the session by discussing her experience during the evaluation. I provided Ms. Larson with detailed feedback regarding her performance on cognitive testing and her pattern of cognitive strengths and weaknesses.  I discussed my overall impressions and recommendations and provided the opportunity for Ms. Larson to ask any questions that she had about the evaluation. At the end of the session, she indicated that she understood the results and that I had answered all of her questions.       Nhung Yeboah PsyD,   Licensed Clinical Neuropsychologist  [address], [address]  [place] [address]  Phone: [phone]        Office Visit Details  Type of service:  Office Visit  Start Time: 2:38 PM  End Time: 3:40 PM  Location:  [facility]    For diagnostic and coding purposes, Ms. Larson was referred for an evaluation of Mild Neurocognitive Disorder. As this is the final date for this Episode of Care (initiated on 9/5/2023) all charges for the entire Episode of Care will be filed today. Please see the 9/5/2023 evaluation for a detailed description of codes and services, including services provided today.      In brief:   1 x 96116  1 x 96132  2 x 96133  1 x 96138  3 x 96139      Again, thank you for allowing me to participate in the care of your patient.         Sincerely,        Jesus [email or web address], LP

## 2023-10-03 NOTE — TELEPHONE ENCOUNTER
Home Health Care    Reason for call:  Verbal Orders    Orders are needed for this patient.  Skilled Nursin visit per week for 4 weeks, followed by 1 visit every other week for 4 weeks also requesting 3 PRN's     Pt Provider: Dr. Roth    Phone Number Homecare Nurse can be reached at: [phone]    Can we leave a detailed message on this number? YES

## 2023-10-04 NOTE — TELEPHONE ENCOUNTER
Home Health Care    Reason for call:  Verbal Orders     Orders are needed for this patient.  OT: 1 visit per week for 2 weeks    Pt Provider: Dr. Daniela Roth      Phone Number Homecare Nurse can be reached at: [phone]    Can we leave a detailed message on this number? YES

## 2023-10-04 NOTE — TELEPHONE ENCOUNTER
Home Care is calling regarding an established patient with  [facility].        9/26/2023    12:49 PM   Home Care Information   Date of Home Care episode start 9/26/2023   Current following provider [facility]     Requesting orders from: Daniela Roth  Provider is following patient: Yes  Is this a 60-day recertification request?  No    Orders Requested    Occupational Therapy  Request for initial certification (first set of orders)   Frequency:  1x/wk for 2 wks       Information was gathered and will be sent to provider for review.  RN will contact Home Care with information after provider review.  Confirmed ok to leave a detailed message with call back.  Contact information confirmed and updated as needed.    Christiana Kothari RN

## 2023-10-09 NOTE — TELEPHONE ENCOUNTER
Home Care is calling regarding an established patient with  [facility].        9/26/2023    12:49 PM   Home Care Information   Date of Home Care episode start 9/26/2023   Current following provider [facility]     Requesting orders from: Daniela Roth  Provider is following patient: Yes  Is this a 60-day recertification request?  No    Orders Requested    Physical Therapy  Request for discontinuation of care   Goals have not been met/not progressing.  Barriers to care:  per patient          Information was gathered and will be sent to provider for review.  RN will contact Home Care with information after provider review.  Confirmed ok to leave a detailed message with call back.  Contact information confirmed and updated as needed.    Christiana Kothari RN

## 2023-10-09 NOTE — TELEPHONE ENCOUNTER
Home Health Care    Reason for call:  Verbal Orders     Orders are needed for this patient.  PT: Patient has requested to discharge from PT.  Feels that it is too much to tolerate right now.  Patient will continue OT and SN    Pt Provider: Dr. Daniela Roth    Phone Number Homecare Nurse can be reached at: [phone]    Can we leave a detailed message on this number? YES

## 2023-10-16 PROBLEM — D50.0 IRON DEFICIENCY ANEMIA DUE TO CHRONIC BLOOD LOSS: Status: ACTIVE | Noted: 2023-01-01

## 2023-10-16 PROBLEM — C18.0 MALIGNANT NEOPLASM OF CECUM (H): Status: ACTIVE | Noted: 2022-04-08

## 2023-10-16 NOTE — PROGRESS NOTES
Assessment & Plan   Problem List Items Addressed This Visit          Digestive    Malignant neoplasm of cecum (H)     CT during 9/2023 admission 5x3x4 cm mass at terminal ileum/ileocecal valve, likely culprit for ELBA. Patient and family have declined colonoscopy for tissue diagnosis as they would not pursue any surgical or chemotherapy anyway and they are still happy with that plan today.   - Will need to monitor CBCs at least monthly   - Discussed that hospice would be appropriate at any time, they aren't quite ready for this today             Endocrine    Mixed hyperlipidemia     Severe coronary artery calcification noted on chest CT 1/2021. LDL 88 (7/2023). Current regimen is Atorvastatin 20 mg daily.   - Continue atorvastatin            Circulatory    Essential hypertension     Well controlled on current regimen.   - Continue metoprolol XL 50 mg daily         Chronic atrial fibrillation (H)     Rate controlled on exam today. EKG during admission does show rate controlled Afib.   -Continue metoprolol XL 50 mg daily for rate control   -We had long discussion about continuing eliquis or not given her untreated colon cancer. With elevated CHADSVASC and chronic Afib, risk of stroke is very high if we would stop. If blood loss is slow, we can try to ameliorate this with intermittent iron or red cell transufions.   - They opt to continue eliquis 2.5 mg BID            Urinary    CKD (chronic kidney disease) stage 3, GFR 30-59 ml/min (H) - Primary     Renal function stable in the hospital.            Hematologic    Thrombocytopenia (H24)     Slight dip in platelets on admission.   - Repeat CBC today         Iron deficiency anemia due to chronic blood loss     Acute anemia 9/2023 most likely in s/o cecal mass. Did receive 1U pRBC and 2 doses IV iron during admission. Has been continuing oral iron since discharge.   - Repeat CBC  - Continue oral iron          Relevant Orders    CBC with platelets and differential        Other    Mild cognitive impairment     Neuropsych testing 9/2023 showed mild neurocognitive disorder. Folate and MMA normal 11/2022.   - Continue current level of care         Relevant Orders    TSH with free T4 reflex    Vitamin B12     Other Visit Diagnoses       Encounter for vaccination        Relevant Orders    INFLUENZA VACCINE 65+ (FLUZONE HD) (Completed)             Ordering of each unique test  Prescription drug management  I spent a total of 37 minutes on the day of the visit.   Time spent by me doing chart review, history and exam, documentation and further activities per the note   MED REC REQUIRED  Post Medication Reconciliation Status:  Discharge medications reconciled, continue medications without change  FUTURE APPOINTMENTS:       - Follow-up visit in 2 months     Daniela Roth MD  [facility]   Deloris Freeman is a 89 year old, presenting for the following health issues:  Follow Up (Blood loss while at the ER, 2 transfusions ), Results (CT scan), Hospital F/U (Chest Pain, Acute Anemia), and Abdominal Pain (After she eats in the mornings)        10/16/2023     2:44 PM   Additional Questions   Roomed by RADHA Cagle   Accompanied by Son in law       History of Present Illness       Vascular Disease:  She presents for follow up of vascular disease.     She never takes nitroglycerin. She is not taking daily aspirin.    Reason for visit:  Follow up on internal bleeding and cat scan    She eats 2-3 servings of fruits and vegetables daily.She consumes 1 sweetened beverage(s) daily.She exercises with enough effort to increase her heart rate 9 or less minutes per day.  She exercises with enough effort to increase her heart rate 3 or less days per week.   She is taking medications regularly.     Hospital Follow-up Visit:    Hospital/Nursing Home/IP Rehab Facility: [facility]  Date of Admission: 9/19/2023  Date of Discharge:  9/22/2023  Reason(s) for Admission: Chest pain, acute anemia    Was your hospitalization related to COVID-19? No   Problems taking medications regularly:  None  Medication changes since discharge: START: ferrous sulfate (FEROSUL) 325 (65 Fe) MG tablet,    Problems adhering to non-medication therapy:  None    Summary of hospitalization:  [facility] discharge summary reviewed  Diagnostic Tests/Treatments reviewed.  Follow up needed: Labs today  Other Healthcare Providers Involved in Patient s Care:         Homecare  Update since discharge: improved.       Plan of care communicated with patient and family           Deloris Freeman was admitted after our last visit from 9/19-22 after Hgb was found to be 6.6 on work-up for exertional chest tightness, dyspnea and fatigue. Work-up in the hospital showed a 5x3x4 cm mass at terminal ileum/ileocecal valve. This was discussed with patient and family and they declined colonoscopy for tissue diagnosis as they would not plan to pursue treatment regardless of diagnosis. She was transfused 1U pRBC and received 2 doses of IV iron. She has been taking oral iron since discharge.     She tells me today she is feeling better than before she went to the hospital but in the last week or so it is taking her longer to get going every day. No ongoing chest pain. She is having abdominal discomfort with morning mahad that gets better with BM. No similar pain when eating at other times of the day. Also has had a dry cough that is intermittent, this is not bothering her too much.     She is at peace with diagnosis of cancer. Continues to be very happy with move to senior living facility.     Review of Systems   Constitutional, HEENT, cardiovascular, pulmonary, gi and gu systems are negative, except as otherwise noted.      Objective    /69 (BP Location: Right arm, Patient Position: Sitting, Cuff Size: Adult Regular)   Pulse 71   Temp 98.2  F (36.8  C) (Oral)   Resp 20   Ht 1.676  "m (5' 6\")   Wt 63.4 kg (139 lb 12.8 oz)   LMP  (LMP Unknown)   SpO2 98%   BMI 22.56 kg/m    Body mass index is 22.56 kg/m .  Physical Exam   GENERAL: healthy, alert and no distress  EYES: Eyes grossly normal to inspection, and conjunctivae and sclerae normal, mucous membranes are pale   HENT: nose and mouth without ulcers or lesions  RESP: lungs clear to auscultation - no rales, rhonchi or wheezes  CV: regular rate and rhythm, normal S1 S2, no S3 or S4, no murmur, click or rub, no peripheral edema and peripheral pulses strong  ABDOMEN: soft, nontender  MS: no gross musculoskeletal defects noted, no edema  SKIN: no suspicious lesions or rashes, some redness on L shin, improved from last visit   NEURO: Normal strength and tone, mentation intact and speech normal  PSYCH: mentation appears normal, affect normal/bright    Results for orders placed or performed in visit on 10/16/23 (from the past 24 hour(s))   CBC with platelets and differential    Narrative    The following orders were created for panel order CBC with platelets and differential.  Procedure                               Abnormality         Status                     ---------                               -----------         ------                     CBC with platelets and d...[[id number]]                      In process                   Please view results for these tests on the individual orders.                   "

## 2023-10-17 NOTE — ASSESSMENT & PLAN NOTE
Rate controlled on exam today. EKG during admission does show rate controlled Afib.   -Continue metoprolol XL 50 mg daily for rate control   -We had long discussion about continuing eliquis or not given her untreated colon cancer. With elevated CHADSVASC and chronic Afib, risk of stroke is very high if we would stop. If blood loss is slow, we can try to ameliorate this with intermittent iron or red cell transufions.   - They opt to continue eliquis 2.5 mg BID

## 2023-10-17 NOTE — ASSESSMENT & PLAN NOTE
Severe coronary artery calcification noted on chest CT 1/2021. LDL 88 (7/2023). Current regimen is Atorvastatin 20 mg daily.   - Continue atorvastatin

## 2023-10-17 NOTE — ASSESSMENT & PLAN NOTE
Neuropsych testing 9/2023 showed mild neurocognitive disorder. Folate and MMA normal 11/2022.   - Continue current level of care

## 2023-10-17 NOTE — ASSESSMENT & PLAN NOTE
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 Patient and family have declined colonoscopy for tissue diagnosis as they would not pursue any surgical or chemotherapy anyway and they are still happy with that plan today.   - Will need to monitor CBCs at least monthly   - Discussed that hospice would be appropriate at any time, they aren't quite ready for this today

## 2023-10-17 NOTE — ASSESSMENT & PLAN NOTE
Acute anemia 9/2023 most likely in s/o cecal mass. Did receive 1U pRBC and 2 doses IV iron during admission. Has been continuing oral iron since discharge.   - Repeat CBC  - Continue oral iron

## 2023-10-26 PROBLEM — U07.1 COVID-19 VIRUS INFECTION: Status: ACTIVE | Noted: 2023-01-01

## 2023-10-26 PROBLEM — I50.9 NEW ONSET OF CONGESTIVE HEART FAILURE (H): Status: ACTIVE | Noted: 2023-01-01

## 2023-10-26 NOTE — CONSULTS
"Care Management Initial Consult    General Information  Assessment completed with: Patient, Children, Deloris Freeman via phone, Ginna daughter via phone  Type of CM/SW Visit: Initial Assessment    Primary Care Provider verified and updated as needed: Yes   Readmission within the last 30 days: no previous admission in last 30 days (just over 30 days)      Reason for Consult: discharge planning  Advance Care Planning: Advance Care Planning Reviewed: no concerns identified          Communication Assessment  Patient's communication style: spoken language (English or Bilingual)                           Living Environment:   People in home: alone     Current living Arrangements: apartment, independent living facility ([facility])      Able to return to prior arrangements: other (see comments) (may need TCU or Assisted Living)  Living Arrangement Comments: Per daughter Ginna, \"Assisted Living has been recommended by her doctors and the staff at the hospital last visit. Also was assessed by the staff at the faciltiy and they say she needs and qualifies for Assisted Living. Family all also agrees she needs Assisted Living. When she is not sick she can be independent, but as soon as she is sick she needs Assisted Living. She is the one refusing Assisted Living. She doesn't want to spend more money. She wants to keep what she can to be able to give to family after her death.\"    Family/Social Support:  Care provided by: self  Provides care for: no one, unable/limited ability to care for self  Marital Status:   Children          Description of Support System: Supportive, Involved    Support Assessment: Adequate family and caregiver support, Adequate social supports, Patient communicates needs well met    Current Resources:   Patient receiving home care services: Yes  Skilled Home Care Services: Skilled Nursing (\"[facility] RN once a week. We really like the RN who is seeing her because " "Deloris Freeman likes her so if we can stay with the same RN it is so helpful. The PT and OT ended. She refused to do PT\".)  Community Resources: Home Care  Equipment currently used at home: walker, rolling, cane, straight, grab bar, tub/shower, grab bar, toilet (\"Mostly uses FWW, but also has the cane if needed. Has a walk in shower.\")  Supplies currently used at home: Nutritional Supplements, Other (\"reading glasses. We have an alarm box for her medications, but she cannot figure out how to use it. So she struggles to be compliant with her medications.\")    Employment/Financial:  Employment Status: retired        Financial Concerns:     Referral to Financial Worker: No       Does the patient's insurance plan have a 3 day qualifying hospital stay waiver?  Yes     Which insurance plan 3 day waiver is available? [employer]    Will the waiver be used for post-acute placement? Undetermined at this time    Lifestyle & Psychosocial Needs:  Social Determinants of Health     Food Insecurity: Low Risk  (10/16/2023)    Food Insecurity     Within the past 12 months, did you worry that your food would run out before you got money to buy more?: No     Within the past 12 months, did the food you bought just not last and you didn t have money to get more?: No   Depression: Not at risk (5/23/2023)    PHQ-2     PHQ-2 Score: 0   Housing Stability: Low Risk  (10/16/2023)    Housing Stability     Do you have housing? : Yes     Are you worried about losing your housing?: No   Tobacco Use: Medium Risk (10/16/2023)    Patient History     Smoking Tobacco Use: Former     Smokeless Tobacco Use: Never     Passive Exposure: Not on file   Financial Resource Strain: Low Risk  (10/16/2023)    Financial Resource Strain     Within the past 12 months, have you or your family members you live with been unable to get utilities (heat, electricity) when it was really needed?: No   Alcohol Use: Not At Risk (1/19/2023)    AUDIT-C     Frequency of Alcohol " "Consumption: 4 or more times a week     Average Number of Drinks: 1 or 2     Frequency of Binge Drinking: Never   Transportation Needs: Low Risk  (10/16/2023)    Transportation Needs     Within the past 12 months, has lack of transportation kept you from medical appointments, getting your medicines, non-medical meetings or appointments, work, or from getting things that you need?: No   Physical Activity: Inactive (1/19/2023)    Exercise Vital Sign     Days of Exercise per Week: 0 days     Minutes of Exercise per Session: 0 min   Interpersonal Safety: Not on file   Stress: Stress Concern Present (1/19/2023)    [employer] - Occupational Stress Questionnaire     Feeling of Stress : To some extent   Social Connections: Socially Isolated (1/19/2023)    Social Connection and Isolation Panel [NHANES]     Frequency of Communication with Friends and Family: More than three times a week     Frequency of Social Gatherings with Friends and Family: Once a week     Attends Anabaptism Services: Never     Active Member of Clubs or Organizations: No     Attends Club or Organization Meetings: Not on file     Marital Status:        Functional Status:  Prior to admission patient needed assistance:   Dependent ADLs:: Ambulation-walker, Independent  Dependent IADLs:: Cleaning, Laundry, Meal Preparation, Shopping, Transportation (\"family helps\")       Mental Health Status:          Chemical Dependency Status:                Values/Beliefs:  Spiritual, Cultural Beliefs, Anabaptism Practices, Values that affect care:                 Additional Information:  Deloris Freeman was found to be COVID +.     She lives at [facility] alone. Per daughter Ginna, \"Assisted Living has been recommended by her doctors and the staff at the hospital last visit. Also was assessed by the staff at the faciltiy and they say she needs and qualifies for Assisted Living. Family all also agrees " "she needs Assisted Living. When she is not sick she can be independent, but as soon as she is sick she needs Assisted Living. She is the one refusing Assisted Living. She doesn't want to spend more money. She wants to keep what she can to be able to give to family after her death. I don't know what it is going to take for her to agree to Assisted Living.\"     She is independent with ADLs and family helps with some IADLs. Per daughter, \"now that she is sick with COVID she cannot care for her own ADLs, but normally is independent with them. Family helps with housekeeping, laundry, shopping, and transportation\". \"She mostly uses her FWW, but also has the cane if needed.\"     She has help from [facility] RN weekly and need to add medication assistance; she refuses Home Care PT. Per daughter, \"We really like the RN who is seeing her because Deloris Freeman likes her so if we can stay with the same RN it is so helpful. The PT and OT ended. She refused to do PT. We have an alarm box for her medications, but she cannot figure out how to use it. So she struggles to be compliant with her medications. It would be nice if the Home Care RN could add on doing some medication set up for her.\"     Unknown discharge needs at this time.    Daughter to transport at discharge.    CM to follow for medical progression of care, discharge recommendations, and final discharge plan.    Shanda Morrow RN    "

## 2023-10-26 NOTE — MEDICATION SCRIBE - ADMISSION MEDICATION HISTORY
Medication Scribe Admission Medication History    Admission medication history is complete. The information provided in this note is only as accurate as the sources available at the time of the update.    Information Source(s): Patient, Family member, and Caregiver via in-person    Pertinent Information: NONE    Changes made to PTA medication list:  Added: None  Deleted: PREVAGEN  Changed: None    Medication Affordability:  Not including over the counter (OTC) medications, was there a time in the past 3 months when you did not take your medications as prescribed because of cost?: No    Allergies reviewed with patient and updates made in EHR: yes    Medication History Completed By: Deborah Valencia 10/25/2023 11:33 PM    PTA Med List   Medication Sig Last Dose    acetaminophen (TYLENOL) 500 MG tablet Take 1,000 mg by mouth every 8 hours as needed Past Month at PRN    apixaban ANTICOAGULANT (ELIQUIS ANTICOAGULANT) 2.5 MG tablet Take 1 tablet (2.5 mg) by mouth 2 times daily Past Week at PM    atorvastatin (LIPITOR) 20 MG tablet Take 1 tablet (20 mg) by mouth At Bedtime 10/24/2023 at PM    ferrous sulfate (FEROSUL) 325 (65 Fe) MG tablet Take 1 tablet (325 mg) by mouth daily (with breakfast) 10/25/2023 at AM    meclizine (ANTIVERT) 25 MG tablet Take 1 tablet (25 mg) by mouth 3 times daily as needed for dizziness Past Month at PRN    Melatonin 5 MG CHEW Take 5 mg by mouth at bedtime as needed, may repeat once 10/24/2023 at PM    metoprolol succinate ER (TOPROL XL) 50 MG 24 hr tablet TAKE 1 TABLET (50 MG) BY MOUTH DAILY 10/25/2023 at AM

## 2023-10-26 NOTE — ED PROVIDER NOTES
EMERGENCY DEPARTMENT ENCOUNTER      NAME: Deloris Larson  AGE: 89 year old female  YOB: 1930  MRN: [id number]  EVALUATION DATE & TIME: 10/25/2023 10:08 PM    PCP: Daniela Roth    ED PROVIDER: Ziggy Boykin D.O.    Chief Complaint   Patient presents with    Generalized Weakness       FINAL IMPRESSION:  1. COVID-19 virus infection    2. New onset of congestive heart failure (H)        ED COURSE & MEDICAL DECISION MAKING:    10:41 PM I met with the patient to gather history and to perform my initial exam. I discussed the plan for care while in the Emergency Department.  1:31 AM Rechecked the patient.  2:27 AM Spoke to hospitalist, Dr. Cuellar who accepts patient for admission.         Pertinent Labs & Imaging studies reviewed. (See chart for details)  89 year old female presents to the Emergency Department for evaluation of increasing shortness of breath, weakness, with known COVID-19 diagnosis.  Initial concern was for the potential for cardiac insult secondary to the COVID-19 based on her symptoms.  Additionally she did have a fall, without known if she hit her head.  CT imaging the head and neck were unremarkable chest x-ray does not show any evidence of acute process.  She is already known to be COVID-positive, which would be the likely cause of the patient's shortness of breath and weakness.  However her troponin was mildly elevated, and of greater concern was a significant elevation in BNP without known previous diagnosis of CHF.  Therefore at this time, plan is for admission for COVID and potential new onset CHF.  Of note the patient does have oxygen saturations around 91% on room air at rest, but drops into the mid upper 80s with ambulation.  Discussed with the hospitalist who agreed to the admission.    Medical Decision Making    History:  Supplemental history from: EMS  External Record(s) reviewed: Inpatient Record: [facility]'s admission from last month    Work Up:  Chart  documentation includes differential considered and any EKGs or imaging independently interpreted by provider, where specified.  In additional to work up documented, I considered the following work up: Documented in chart, if applicable.    External consultation:  Discussion of management with another provider: Hospitalist    Complicating factors:  Care impacted by chronic illness: Anticoagulated State, Cancer/Chemotherapy, Chronic Kidney Disease, Hyperlipidemia, Hypertension, and Peripheral Vascular Disease  Care affected by social determinants of health: Access to Medical Care    Disposition considerations: Admit.    At the conclusion of the encounter I discussed the results of all of the tests and the disposition. The questions were answered. The patient or family acknowledged understanding and was agreeable with the care plan.        HPI    Patient information was obtained from: Patient, EMS    Use of : N/A      Deloris Larson is a 89 year old female who presents to this ED by EMS for evaluation of generalized weakness.    Per Chart Review: Patient was admitted to this hospital from 9/19 - 9/22/23 for anemia, exertional chest tightness, dyspnea, fatigue.  Transfused 1 unit of blood.  CT showed [id number] mass at terminal ileum/ileocecal valve.  Declined colonoscopy for tissue diagnosis as patient and family were not interested in any surgical intervention or chemo regardless of diagnosis.  Discharged in stable condition back to independent living with family and home care services.    Per EMS, patient arrives from independent living for complaints of ongoing generalized weakness and fatigue for the last week.  More acutely, however, notes a productive cough that began a few nights ago.  Patient was febrile, 88% on room air that improved with 2 L nasal cannula.  Refused IV.  Patient COVID-positive.     Per patient, patient complains of severe lightheadedness, and did have a near syncopal event in which  she fell to the ground. Unclear if head injury, or LOC. At present, endorses some fatigue, shortness of breath, nausea with a little emesis, and body aches. No abdominal pain or chest pain.    REVIEW OF SYSTEMS  Constitutional: Endorses weakness and fever.  HENT:  Denies sore throat, ear pain, congestion  Respiratory: Endorses cough (productive) and SOB  Cardiovascular:  No CP, palpitations  GI: Endorses nausea and vomiting. Denies abdominal pain or diarrhea  Musculoskeletal:  Endorses myalgia (body aches)  Neurologic:  Endorses lightheadedness with near syncopal episode    All other systems negative unless noted in HPI.    PAST MEDICAL HISTORY:  Past Medical History:   Diagnosis Date    2019 novel coronavirus disease (COVID-19) 2/1/2021    Acute respiratory failure with hypoxia (H) 2/5/2021    Anemia     Atrial fibrillation (H)     Bronchiectasis with acute lower respiratory infection (H) 1/28/2021    CAD (coronary artery disease)     Chronic diarrhea     Clinical diagnosis of COVID-19     1/2021    Coronary artery disease     Former smoker     Hematoma of left iliopsoas muscle 01/19/2020    while on eliquis.    History of skin cancer     Hyperlipidemia     Hypertension     Insomnia     Lumbar spondylosis 2016    Migraine     PAD (peripheral artery disease) (H24) 10/19/2015    Paroxysmal SVT (supraventricular tachycardia)     Created by Conversion     Persistent atrial fibrillation (H) 05/09/2018    New diagnosis April 16 18th and found incidentally on physical exam during yearly exam in primary clinic [id number] score of 5-new Eliquis start    Poliomyelitis     Restless legs syndrome (RLS)     RLS (restless legs syndrome) 02/28/2017    Sick sinus syndrome (H) 01/28/2020    Sigmoid diverticulosis 04/04/2007    SSS (sick sinus syndrome) (H)     S/p pacemaker       PAST SURGICAL HISTORY:  Past Surgical History:   Procedure Laterality Date    APPENDECTOMY      APPENDECTOMY      CAPSULOTOMY Bilateral 12/2015    YAG  capsulotomy surgery. 12/21/15, 12/28/15    CARDIOVERSION  05/24/2018    EP Cardioversion External    CATARACT EXTRACTION, BILATERAL Bilateral 03/2012    3/15 and 3/29 by Dr. Barrett at the [facility]    CHOLECYSTECTOMY      CHOLECYSTECTOMY      COLONOSCOPY  05/17/2012    COLONOSCOPY W/ BIOPSIES  04/04/2007    COLONOSCOPY W/ POLYPECTOMY  05/07/2012    2 mm tubular adenoma in the rectum. Hemorrhoids. Diverticulosis.    CORONARY ANGIOPLASTY      CV CORONARY ANGIOGRAM N/A 06/26/2018    Procedure: Coronary Angiogram;  Surgeon: Joby Vargas MD;  Location: [facility] Cath Lab;  Service:     EP PACEMAKER INSERT N/A 06/27/2018    Procedure: EP Pacemaker Insertion;  Surgeon: Osito Alvarez MD;  Location: [facility] Cath Lab;  Service:     HERNIA REPAIR Right     HYSTERECTOMY      HYSTERECTOMY TOTAL ABDOMINAL      IMPLANT PACEMAKER      INGUINAL HERNIA REPAIR Right     Indirect- Dr. Pedersen    MASTECTOMY      OOPHORECTOMY      SKIN CANCER EXCISION  2015    Right leg on 10/21/15. Right arm 9/29/15    STRIP VEIN      ligation?    TUBAL LIGATION      TUBAL LIGATION         CURRENT MEDICATIONS:    Current Facility-Administered Medications   Medication    acetaminophen (TYLENOL) tablet 650 mg    Or    acetaminophen (TYLENOL) Suppository 650 mg    apixaban ANTICOAGULANT (ELIQUIS) tablet 2.5 mg    atorvastatin (LIPITOR) tablet 20 mg    dexAMETHasone (DECADRON) tablet 6 mg    ferrous sulfate (FEROSUL) tablet 325 mg    melatonin tablet 1 mg    metoprolol succinate ER (TOPROL XL) 24 hr tablet 50 mg     Current Outpatient Medications   Medication    acetaminophen (TYLENOL) 500 MG tablet    apixaban ANTICOAGULANT (ELIQUIS ANTICOAGULANT) 2.5 MG tablet    atorvastatin (LIPITOR) 20 MG tablet    ferrous sulfate (FEROSUL) 325 (65 Fe) MG tablet    meclizine (ANTIVERT) 25 MG tablet    Melatonin 5 MG CHEW    metoprolol succinate ER (TOPROL XL) 50 MG 24 hr tablet         ALLERGIES:  Allergies   Allergen Reactions    Diphenhydramine         FAMILY HISTORY:  Family History   Adopted: Yes   Problem Relation Age of Onset    Pulmonary Embolism Mother 32.00    Heart Disease Father     CABG Son     Stomach Cancer Grandson 20.00    Pulmonary Embolism Maternal Grandmother 32.00    No Known Problems Daughter     No Known Problems Daughter     CABG Son 64.00       SOCIAL HISTORY:  Social History     Socioeconomic History    Marital status:     Number of children: 3   Tobacco Use    Smoking status: Former     Packs/day: 1.50     Years: 25.00     Additional pack years: 0.00     Total pack years: 37.50     Types: Cigarettes     Start date: 1949     Quit date: 1974     Years since quittin.8    Smokeless tobacco: Never   Vaping Use    Vaping Use: Never used   Substance and Sexual Activity    Alcohol use: Yes     Alcohol/week: 7.0 standard drinks of alcohol    Drug use: No   Social History Narrative    She lives alone in a house. She has 3 children and 8 grandchildren and multiple great grandchildren. She is retired. 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 She does not smoke cigarettes and rarely drinks alcoho l.    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  Her Buddhist had to close, due to declining numbers.    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 She has never driven a car. Does her own cooking, and ba PAK. She does not use a computer, never did.    Jayde Diaz MD 2021         Social Determinants of Health     Financial Resource Strain: Low Risk  (10/16/2023)    Financial Resource Strain     Within the past 12 months, have you or your family members you live with been unable to get utilities (heat, electricity) when it was really needed?: No   Food Insecurity: Low Risk  (10/16/2023)    Food Insecurity     Within the past 12 months, did you worry that your food would run out before you got money to buy more?: No     Within the  past 12 months, did the food you bought just not last and you didn t have money to get more?: No   Transportation Needs: Low Risk  (10/16/2023)    Transportation Needs     Within the past 12 months, has lack of transportation kept you from medical appointments, getting your medicines, non-medical meetings or appointments, work, or from getting things that you need?: No   Physical Activity: Inactive (1/19/2023)    Exercise Vital Sign     Days of Exercise per Week: 0 days     Minutes of Exercise per Session: 0 min   Stress: Stress Concern Present (1/19/2023)    [facility] of Occupational Health - Occupational Stress Questionnaire     Feeling of Stress : To some extent   Social Connections: Socially Isolated (1/19/2023)    Social Connection and Isolation Panel [NHANES]     Frequency of Communication with Friends and Family: More than three times a week     Frequency of Social Gatherings with Friends and Family: Once a week     Attends Tenriism Services: Never     Active Member of Clubs or Organizations: No     Marital Status:    Housing Stability: Low Risk  (10/16/2023)    Housing Stability     Do you have housing? : Yes     Are you worried about losing your housing?: No       VITALS:  Patient Vitals for the past 24 hrs:   BP Temp Temp src Pulse Resp SpO2   10/26/23 0100 128/59 -- -- 76 -- 93 %   10/25/23 2345 118/62 -- -- 83 24 94 %   10/25/23 2215 (!) 162/72 -- -- 87 25 92 %   10/25/23 2212 -- 100.2  F (37.9  C) Oral -- 18 --   10/25/23 2211 (!) 149/72 -- -- 84 -- 93 %       PHYSICAL EXAM    VITAL SIGNS: /59   Pulse 76   Temp 100.2  F (37.9  C) (Oral)   Resp 24   LMP  (LMP Unknown)   SpO2 93%   General Appearance: Well-appearing, well-nourished, no acute distress  Head:  Normocephalic, without obvious abnormality, atraumatic  Eyes:  PERRL, conjunctiva/corneas clear, EOM's intact,  ENT:  Lips, mucosa, and tongue normal, membranes are moist without pallor  Neck:  Normal ROM, symmetrical,  trachea midline    Cardio:  Borderline tachycardiac, regular rhythm, 2/6 murmur, rub or gallop, 2+ pulses symmetric in all extremities  Pulm:  Clear to auscultation bilaterally, respirations unlabored,  Abdomen:  Soft, non-tender, no rebound or guarding.  Musculoskeletal: Full ROM, no edema, no cyanosis, good ROM of major joints  Integument:  Warm, Dry, No erythema, No rash.    Neurologic:  Alert & oriented.  No focal deficits appreciated.  Ambulatory.  Psychiatric:  Affect normal, Judgment normal, Mood normal.      LABS  Results for orders placed or performed during the hospital encounter of 10/25/23 (from the past 24 hour(s))   [place] Draw    Narrative    The following orders were created for panel order [place] Draw.  Procedure                               Abnormality         Status                     ---------                               -----------         ------                     Extra Blue Top Tube[[id number]]                              Final result               Extra Red Top Tube[[id number]]                               Final result               Extra Green Top (Lithium...[[id number]]                      Final result               Extra Purple Top Tube[[id number]]                            Final result                 Please view results for these tests on the individual orders.   Extra Blue Top Tube   Result Value Ref Range    Hold Specimen JIC    Extra Red Top Tube   Result Value Ref Range    Hold Specimen JIC    Extra Green Top (Lithium Heparin) Tube   Result Value Ref Range    Hold Specimen JIC    Extra Purple Top Tube   Result Value Ref Range    Hold Specimen JIC    CBC with platelets + differential    Narrative    The following orders were created for panel order CBC with platelets + differential.  Procedure                               Abnormality         Status                     ---------                               -----------         ------                     CBC with platelets and  dSavanna..[[id number]]  Abnormal            Final result                 Please view results for these tests on the individual orders.   Basic metabolic panel   Result Value Ref Range    Sodium 140 135 - 145 mmol/L    Potassium 4.6 3.4 - 5.3 mmol/L    Chloride 103 98 - 107 mmol/L    Carbon Dioxide (CO2) 25 22 - 29 mmol/L    Anion Gap 12 7 - 15 mmol/L    Urea Nitrogen 10.4 8.0 - 23.0 mg/dL    Creatinine 0.94 0.51 - 0.95 mg/dL    GFR Estimate 56 (L) >60 mL/min/1.73m2    Calcium 8.9 8.2 - 9.6 mg/dL    Glucose 141 (H) 70 - 99 mg/dL   Troponin T, High Sensitivity (now)   Result Value Ref Range    Troponin T, High Sensitivity 18 (H) <=14 ng/L   N terminal pro BNP outpatient   Result Value Ref Range    N Terminal Pro BNP Outpatient 4,367 (H) 0 - 1,800 pg/mL   CBC with platelets and differential   Result Value Ref Range    WBC Count 10.4 4.0 - 11.0 10e3/uL    RBC Count 4.23 3.80 - 5.20 10e6/uL    Hemoglobin 10.3 (L) 11.7 - 15.7 g/dL    Hematocrit 34.2 (L) 35.0 - 47.0 %    MCV 81 78 - 100 fL    MCH 24.3 (L) 26.5 - 33.0 pg    MCHC 30.1 (L) 31.5 - 36.5 g/dL    RDW 22.2 (H) 10.0 - 15.0 %    Platelet Count 157 150 - 450 10e3/uL    % Neutrophils 88 %    % Lymphocytes 5 %    % Monocytes 6 %    % Eosinophils 0 %    % Basophils 0 %    % Immature Granulocytes 1 %    NRBCs per 100 WBC 0 <1 /100    Absolute Neutrophils 9.2 (H) 1.6 - 8.3 10e3/uL    Absolute Lymphocytes 0.5 (L) 0.8 - 5.3 10e3/uL    Absolute Monocytes 0.7 0.0 - 1.3 10e3/uL    Absolute Eosinophils 0.0 0.0 - 0.7 10e3/uL    Absolute Basophils 0.0 0.0 - 0.2 10e3/uL    Absolute Immature Granulocytes 0.1 <=0.4 10e3/uL    Absolute NRBCs 0.0 10e3/uL   [place] Draw    Narrative    The following orders were created for panel order [place] Draw.  Procedure                               Abnormality         Status                     ---------                               -----------         ------                     Extra Blood Culture Bottle[[id number]]                       Final result                Extra Green Top (Lithium...[[id number]]                      Final result                 Please view results for these tests on the individual orders.   Extra Blood Culture Bottle   Result Value Ref Range    Hold Specimen JIC    Extra Green Top (Lithium Heparin) ON ICE   Result Value Ref Range    Hold Specimen JIC    Head CT w/o contrast    Narrative    EXAM: CT HEAD W/O CONTRAST, CT CERVICAL SPINE W/O CONTRAST  LOCATION: [facility]  DATE: 10/25/2023    INDICATION: Trauma, fall.  COMPARISON: CTA head and neck 05/15/2023 and CT cervical spine 01/28/2021.  TECHNIQUE:   1) Routine CT Head without IV contrast. Multiplanar reformats. Dose reduction techniques were used.  2) Routine CT Cervical Spine without IV contrast. Multiplanar reformats. Dose reduction techniques were used.    FINDINGS:   HEAD CT:   INTRACRANIAL CONTENTS: No intracranial hemorrhage, extraaxial collection, or mass effect. No CT evidence of acute infarct. Mild volume loss and moderate burden presumed chronic small vessel ischemia are stable.     VISUALIZED ORBITS/SINUSES/MASTOIDS: No intraorbital abnormality. No paranasal sinus mucosal disease. No middle ear or mastoid effusion.    BONES/SOFT TISSUES: No acute abnormality.    CERVICAL SPINE CT:   VERTEBRA: 2 mm degenerative anterolisthesis C5 on C6. Alignment is otherwise normal. No acute cervical spine fracture or posttraumatic subluxation. Marked loss of disc space height at C4-C5 and C6-C7. Moderate to marked multilevel facet arthropathy.     CANAL/FORAMINA: No canal narrowing. Marked left C3-C4, moderate bilateral C4-C5 and left C5-C6 degenerative foraminal narrowing.    PARASPINAL: No extraspinal abnormality. Visualized lung fields are clear.      Impression    IMPRESSION:  HEAD CT:  1.  No acute intracranial abnormality or significant change compared to the prior study.    CERVICAL SPINE CT:  1.  No acute cervical spine fracture.   Cervical spine CT w/o  contrast    Narrative    EXAM: CT HEAD W/O CONTRAST, CT CERVICAL SPINE W/O CONTRAST  LOCATION: [facility]  DATE: 10/25/2023    INDICATION: Trauma, fall.  COMPARISON: CTA head and neck 05/15/2023 and CT cervical spine 01/28/2021.  TECHNIQUE:   1) Routine CT Head without IV contrast. Multiplanar reformats. Dose reduction techniques were used.  2) Routine CT Cervical Spine without IV contrast. Multiplanar reformats. Dose reduction techniques were used.    FINDINGS:   HEAD CT:   INTRACRANIAL CONTENTS: No intracranial hemorrhage, extraaxial collection, or mass effect. No CT evidence of acute infarct. Mild volume loss and moderate burden presumed chronic small vessel ischemia are stable.     VISUALIZED ORBITS/SINUSES/MASTOIDS: No intraorbital abnormality. No paranasal sinus mucosal disease. No middle ear or mastoid effusion.    BONES/SOFT TISSUES: No acute abnormality.    CERVICAL SPINE CT:   VERTEBRA: 2 mm degenerative anterolisthesis C5 on C6. Alignment is otherwise normal. No acute cervical spine fracture or posttraumatic subluxation. Marked loss of disc space height at C4-C5 and C6-C7. Moderate to marked multilevel facet arthropathy.     CANAL/FORAMINA: No canal narrowing. Marked left C3-C4, moderate bilateral C4-C5 and left C5-C6 degenerative foraminal narrowing.    PARASPINAL: No extraspinal abnormality. Visualized lung fields are clear.      Impression    IMPRESSION:  HEAD CT:  1.  No acute intracranial abnormality or significant change compared to the prior study.    CERVICAL SPINE CT:  1.  No acute cervical spine fracture.   XR Chest Port 1 View    Narrative    EXAM: XR CHEST PORT 1 VIEW  LOCATION: [facility]  DATE: 10/25/2023    INDICATION: Cough, COVID positive  COMPARISON: 09/19/2023.    FINDINGS: Left subclavian cardiac device. There is no pneumothorax. The heart size is normal. The thoracic aorta is calcified. The lungs are clear.      Impression     IMPRESSION: No acute abnormality.   Troponin T, High Sensitivity (now)   Result Value Ref Range    Troponin T, High Sensitivity 18 (H) <=14 ng/L   Creatinine   Result Value Ref Range    Creatinine 0.99 (H) 0.51 - 0.95 mg/dL    GFR Estimate 53 (L) >60 mL/min/1.73m2   CRP inflammation   Result Value Ref Range    CRP Inflammation 23.20 (H) <5.00 mg/L         RADIOLOGY  XR Chest Port 1 View   Final Result   IMPRESSION: No acute abnormality.      Cervical spine CT w/o contrast   Final Result   IMPRESSION:   HEAD CT:   1.  No acute intracranial abnormality or significant change compared to the prior study.      CERVICAL SPINE CT:   1.  No acute cervical spine fracture.      Head CT w/o contrast   Final Result   IMPRESSION:   HEAD CT:   1.  No acute intracranial abnormality or significant change compared to the prior study.      CERVICAL SPINE CT:   1.  No acute cervical spine fracture.             EKG:    Rate: 84 bpm  Rhythm: Atrial flutter with variable block  Axis: Normal  Interval: Normal  Conduction: Normal  QRS: Normal  ST: Normal  T-wave: Normal  QT: Not prolonged  Comparison EKG: no significant change compared to 19 Sep 2023  Impression:  No acute ischemic change     I have independently reviewed and interpreted today's EKG, pending Cardiologist read    MEDICATIONS GIVEN IN THE EMERGENCY:  Medications   melatonin tablet 1 mg (has no administration in time range)   acetaminophen (TYLENOL) tablet 650 mg (has no administration in time range)     Or   acetaminophen (TYLENOL) Suppository 650 mg (has no administration in time range)   dexAMETHasone (DECADRON) tablet 6 mg (6 mg Oral $Given 10/26/23 0321)   apixaban ANTICOAGULANT (ELIQUIS) tablet 2.5 mg (has no administration in time range)   atorvastatin (LIPITOR) tablet 20 mg (has no administration in time range)   ferrous sulfate (FEROSUL) tablet 325 mg (has no administration in time range)   metoprolol succinate ER (TOPROL XL) 24 hr tablet 50 mg (has no administration  in time range)   furosemide (LASIX) injection 20 mg (20 mg Intravenous $Given 10/26/23 0318)       NEW PRESCRIPTIONS STARTED AT TODAY'S ER VISIT  New Prescriptions    No medications on file      I, Elmer Ross, am serving as a scribe to document services personally performed by Ziggy Boykin D.O., based on my observations and the provider's statements to me.  I, Ziggy Boykin D.O., attest that Elmer Ross is acting in a scribe capacity, has observed my performance of the services and has documented them in accordance with my direction.    Ziggy Boykin D.O.  Emergency Medicine  [facility] EMERGENCY DEPARTMENT  [address] [place] [address] [id number] [phone]  Dept: [phone]       Ziggy Boykin,   10/26/23 0337

## 2023-10-26 NOTE — H&P
[facility]    History and Physical - Hospitalist Service       Date of Admission:  10/25/2023    Assessment & Plan      Deloris Larson is a 89F presents with generalised weakness/malaise; pmhx includes CKD3a, mild cognitive deficit, Fe deficiency anaemia, colon CA (conservative management elected), HTN/HLD, atrial fibrillation (apixaban), HFpEF (60%, 9/23); admitted for COVID19 pneumonia.    #COVID pneumonia  -pulse oximetry  -CRP pending  -CMP trend  -oxygen sats to >90%  -dexamethasone 6mg PO every day  -NO remdesivir per timeline onset  -lasix 20mg IV once      #heart failure, EF 60%  NOT in active heart failure. TTE last on 9/19/2023  -Lasix 20mg IV ONCE  -toprol 50mg PO every day    #HLD  -atorvastatin 20mg PO every day  -May consider discontinuation given limited data in this age population, non-treatment plan of colon cancer    #HTN  -Toprol 50mg PO every day  -Lasix 20mg IV ONCE    #atrial fibrillation  -apixaban 2.5mg PO BID    #Fe deficiency anaemia  -CBC trend  -Fe 325mg PO every day    #colon cancer  Outpatient visits indicate patient and family preference to no further investigation/treatment. Not actively ready for hospice referral, though this has been discussed already in outpatient settings.          Diet: Combination Diet Regular Diet Adult    DVT Prophylaxis: DOAC  Car Catheter: Not present  Lines: None     Cardiac Monitoring: ACTIVE order. Indication: Acute decompensated heart failure (48 hours)  Code Status: No CPR- Do NOT Intubate      Clinically Significant Risk Factors Present on Admission               # Drug Induced Coagulation Defect: home medication list includes an anticoagulant medication    # Hypertension: Noted on problem list  # Chronic heart failure with preserved ejection fraction: heart failure noted on problem list and last echo with EF >50%          # Pacemaker present       Disposition Plan      Expected Discharge Date: 10/28/2023                   Daren Cuellar MD  Hospitalist Service  [facility]  Securely message with [employer] (more info)  Text page via [employer] Paging/Directory     ______________________________________________________________________    Chief Complaint   Generalised weakness    History is obtained from the patient    History of Present Illness   Deloris Larson is a 89F presents with generalised weakness/malaise; pmhx includes CKD3a, mild cognitive deficit, Fe deficiency anaemia, colon CA (conservative management elected), HTN/HLD, atrial fibrillation (apixaban); admitted for COVID19 pneumonia.    Was in usual state of health up until approximately 4 days ago when she started to feel progressive generalized weakness.  No acute headache, vision changes, chest pain, dyspnea, abdominal pain, dysuria.  Has had cough for the past 4 days, but otherwise feels that she has been relatively asymptomatic except for the weakness.      Past Medical History    Past Medical History:   Diagnosis Date    2019 novel coronavirus disease (COVID-19) 2/1/2021    Acute respiratory failure with hypoxia (H) 2/5/2021    Anemia     Atrial fibrillation (H)     Bronchiectasis with acute lower respiratory infection (H) 1/28/2021    CAD (coronary artery disease)     Chronic diarrhea     Clinical diagnosis of COVID-19     1/2021    Coronary artery disease     Former smoker     Hematoma of left iliopsoas muscle 01/19/2020    while on eliquis.    History of skin cancer     Hyperlipidemia     Hypertension     Insomnia     Lumbar spondylosis 2016    Migraine     PAD (peripheral artery disease) (H24) 10/19/2015    Paroxysmal SVT (supraventricular tachycardia)     Created by Conversion     Persistent atrial fibrillation (H) 05/09/2018    New diagnosis April 16 18th and found incidentally on physical exam during yearly exam in primary clinic [id number] score of 5-new Eliquis start    Poliomyelitis     Restless legs syndrome (RLS)     RLS  (restless legs syndrome) 02/28/2017    Sick sinus syndrome (H) 01/28/2020    Sigmoid diverticulosis 04/04/2007    SSS (sick sinus syndrome) (H)     S/p pacemaker       Past Surgical History   Past Surgical History:   Procedure Laterality Date    APPENDECTOMY      APPENDECTOMY      CAPSULOTOMY Bilateral 12/2015    YAG capsulotomy surgery. 12/21/15, 12/28/15    CARDIOVERSION  05/24/2018    EP Cardioversion External    CATARACT EXTRACTION, BILATERAL Bilateral 03/2012    3/15 and 3/29 by Dr. aBrrett at the [facility]    CHOLECYSTECTOMY      CHOLECYSTECTOMY      COLONOSCOPY  05/17/2012    COLONOSCOPY W/ BIOPSIES  04/04/2007    COLONOSCOPY W/ POLYPECTOMY  05/07/2012    2 mm tubular adenoma in the rectum. Hemorrhoids. Diverticulosis.    CORONARY ANGIOPLASTY      CV CORONARY ANGIOGRAM N/A 06/26/2018    Procedure: Coronary Angiogram;  Surgeon: Joby Vargas MD;  Location: [facility] Lab;  Service:     EP PACEMAKER INSERT N/A 06/27/2018    Procedure: EP Pacemaker Insertion;  Surgeon: Osito Alvarez MD;  Location: [facility] Lab;  Service:     HERNIA REPAIR Right     HYSTERECTOMY      HYSTERECTOMY TOTAL ABDOMINAL      IMPLANT PACEMAKER      INGUINAL HERNIA REPAIR Right     Indirect- Dr. Pedersen    MASTECTOMY      OOPHORECTOMY      SKIN CANCER EXCISION  2015    Right leg on 10/21/15. Right arm 9/29/15    STRIP VEIN      ligation?    TUBAL LIGATION      TUBAL LIGATION         Prior to Admission Medications   Prior to Admission Medications   Prescriptions Last Dose Informant Patient Reported? Taking?   Melatonin 5 MG CHEW 10/24/2023 at PM Self, Son, Daughter No Yes   Sig: Take 5 mg by mouth at bedtime as needed, may repeat once   acetaminophen (TYLENOL) 500 MG tablet Past Month at PRN Self, Son, Daughter Yes Yes   Sig: Take 1,000 mg by mouth every 8 hours as needed   apixaban ANTICOAGULANT (ELIQUIS ANTICOAGULANT) 2.5 MG tablet Past Week at PM Self, Son, Daughter No Yes   Sig: Take 1 tablet (2.5 mg) by mouth  2 times daily   atorvastatin (LIPITOR) 20 MG tablet 10/24/2023 at PM Self, Son, Daughter No Yes   Sig: Take 1 tablet (20 mg) by mouth At Bedtime   ferrous sulfate (FEROSUL) 325 (65 Fe) MG tablet 10/25/2023 at AM Self, Son, Daughter No Yes   Sig: Take 1 tablet (325 mg) by mouth daily (with breakfast)   meclizine (ANTIVERT) 25 MG tablet Past Month at PRN Self, Son, Daughter No Yes   Sig: Take 1 tablet (25 mg) by mouth 3 times daily as needed for dizziness   metoprolol succinate ER (TOPROL XL) 50 MG 24 hr tablet 10/25/2023 at AM Self, Son, Daughter No Yes   Sig: TAKE 1 TABLET (50 MG) BY MOUTH DAILY      Facility-Administered Medications: None        Review of Systems    The 10 point Review of Systems is negative other than noted in the HPI or here.      Physical Exam   Vital Signs: Temp: 100.2  F (37.9  C) Temp src: Oral BP: 128/59 Pulse: 76   Resp: 24 SpO2: 93 % O2 Device: None (Room air)    Weight: 0 lbs 0 oz    Constitutional: awake, alert, cooperative, no apparent distress, and appears stated age  Respiratory: CTAB  Cardiovascular: RRR, 3/6 systolic murmur, NO peripheral edema  GI: soft, nontender, nondistended  Musculoskeletal: shoulder/elbow flexion/extension 5/5 bilaterally and symmetric  Neurologic: AOx4, mild distractability, no focal deficits.    Medical Decision Making       45 MINUTES SPENT BY ME on the date of service doing chart review, history, exam, documentation & further activities per the note.  MANAGEMENT DISCUSSED with the following over the past 24 hours: patient   NOTE(S)/MEDICAL RECORDS REVIEWED over the past 24 hours: outpatient FM, discharge summary  Tests ORDERED & REVIEWED in the past 24 hours:  - See lab/imaging results included in the data section of the note      Data     I have personally reviewed the following data over the past 24 hrs:    10.4  \   10.3 (L)   / 157     140 103 10.4 /  141 (H)   4.6 25 0.99 (H) \     Trop: 18 (H) BNP: 4,367 (H)     Procal: N/A CRP: 23.20 (H) Lactic  Acid: N/A         Imaging results reviewed over the past 24 hrs:   Recent Results (from the past 24 hour(s))   Head CT w/o contrast    Narrative    EXAM: CT HEAD W/O CONTRAST, CT CERVICAL SPINE W/O CONTRAST  LOCATION: [facility]  DATE: 10/25/2023    INDICATION: Trauma, fall.  COMPARISON: CTA head and neck 05/15/2023 and CT cervical spine 01/28/2021.  TECHNIQUE:   1) Routine CT Head without IV contrast. Multiplanar reformats. Dose reduction techniques were used.  2) Routine CT Cervical Spine without IV contrast. Multiplanar reformats. Dose reduction techniques were used.    FINDINGS:   HEAD CT:   INTRACRANIAL CONTENTS: No intracranial hemorrhage, extraaxial collection, or mass effect. No CT evidence of acute infarct. Mild volume loss and moderate burden presumed chronic small vessel ischemia are stable.     VISUALIZED ORBITS/SINUSES/MASTOIDS: No intraorbital abnormality. No paranasal sinus mucosal disease. No middle ear or mastoid effusion.    BONES/SOFT TISSUES: No acute abnormality.    CERVICAL SPINE CT:   VERTEBRA: 2 mm degenerative anterolisthesis C5 on C6. Alignment is otherwise normal. No acute cervical spine fracture or posttraumatic subluxation. Marked loss of disc space height at C4-C5 and C6-C7. Moderate to marked multilevel facet arthropathy.     CANAL/FORAMINA: No canal narrowing. Marked left C3-C4, moderate bilateral C4-C5 and left C5-C6 degenerative foraminal narrowing.    PARASPINAL: No extraspinal abnormality. Visualized lung fields are clear.      Impression    IMPRESSION:  HEAD CT:  1.  No acute intracranial abnormality or significant change compared to the prior study.    CERVICAL SPINE CT:  1.  No acute cervical spine fracture.   Cervical spine CT w/o contrast    Narrative    EXAM: CT HEAD W/O CONTRAST, CT CERVICAL SPINE W/O CONTRAST  LOCATION: [facility]  DATE: 10/25/2023    INDICATION: Trauma, fall.  COMPARISON: CTA head and neck 05/15/2023 and  CT cervical spine 01/28/2021.  TECHNIQUE:   1) Routine CT Head without IV contrast. Multiplanar reformats. Dose reduction techniques were used.  2) Routine CT Cervical Spine without IV contrast. Multiplanar reformats. Dose reduction techniques were used.    FINDINGS:   HEAD CT:   INTRACRANIAL CONTENTS: No intracranial hemorrhage, extraaxial collection, or mass effect. No CT evidence of acute infarct. Mild volume loss and moderate burden presumed chronic small vessel ischemia are stable.     VISUALIZED ORBITS/SINUSES/MASTOIDS: No intraorbital abnormality. No paranasal sinus mucosal disease. No middle ear or mastoid effusion.    BONES/SOFT TISSUES: No acute abnormality.    CERVICAL SPINE CT:   VERTEBRA: 2 mm degenerative anterolisthesis C5 on C6. Alignment is otherwise normal. No acute cervical spine fracture or posttraumatic subluxation. Marked loss of disc space height at C4-C5 and C6-C7. Moderate to marked multilevel facet arthropathy.     CANAL/FORAMINA: No canal narrowing. Marked left C3-C4, moderate bilateral C4-C5 and left C5-C6 degenerative foraminal narrowing.    PARASPINAL: No extraspinal abnormality. Visualized lung fields are clear.      Impression    IMPRESSION:  HEAD CT:  1.  No acute intracranial abnormality or significant change compared to the prior study.    CERVICAL SPINE CT:  1.  No acute cervical spine fracture.   XR Chest Port 1 View    Narrative    EXAM: XR CHEST PORT 1 VIEW  LOCATION: [facility]  DATE: 10/25/2023    INDICATION: Cough, COVID positive  COMPARISON: 09/19/2023.    FINDINGS: Left subclavian cardiac device. There is no pneumothorax. The heart size is normal. The thoracic aorta is calcified. The lungs are clear.      Impression    IMPRESSION: No acute abnormality.

## 2023-10-26 NOTE — PROGRESS NOTES
10/26/23 1921   Appointment Info   Signing Clinician's Name / Credentials (PT) Jie Donaldson PT   Living Environment   People in Home alone   Current Living Arrangements independent living facility  (4th floor)   Home Accessibility no concerns   Transportation Anticipated family or friend will provide   Self-Care   Usual Activity Tolerance good   Current Activity Tolerance moderate   Regular Exercise Yes   Activity/Exercise Type walking  (walks in the halls)   Equipment Currently Used at Home walker, rolling;cane, straight   Fall history within last six months yes   Number of times patient has fallen within last six months 1   Activity/Exercise/Self-Care Comment Pt said she is indep with bed mobility, transfers and gait.  She uses the fWW now.  Pt is A with driving and cleaning.   General Information   Onset of Illness/Injury or Date of Surgery 10/25/23   Referring Physician Daren Cuellar   Patient/Family Therapy Goals Statement (PT) none stated.   Pertinent History of Current Problem (include personal factors and/or comorbidities that impact the POC) Pt was admitted with CHF. Pt does have COVID   Existing Precautions/Restrictions fall  (hand off to nursing.  Special precautions.)   Weight-Bearing Status - LLE weight-bearing as tolerated   Weight-Bearing Status - RLE weight-bearing as tolerated   Cognition   Orientation Status (Cognition) oriented x 4;disoriented to   Pain Assessment   Patient Currently in Pain No   Posture    Posture Comments Some cues for psture.   Range of Motion (ROM)   Range of Motion ROM is WNL   ROM Comment Bilat LEs.  Pt does get LE cramps.  Not new per the pt.   Strength (Manual Muscle Testing)   Strength Comments Pt was able to WB bilat LEs for mobility.   Bed Mobility   Comment, (Bed Mobility) Supine>sit  with min A x 1   Transfers   Comment, (Transfers) Sit<>stand with FWW with min A x 1.   Gait/Stairs (Locomotion)   [place] Level (Gait) verbal cues;minimum assist (75% patient  effort)   Assistive Device (Gait) walker, front-wheeled   Distance in Feet (Gait) 5' forward and backward   Pattern (Gait) swing-through   Deviations/Abnormal Patterns (Gait) gait speed decreased;scott decreased  (unsteady with backing up with the walker with PT to assist to correct.)   Balance   Balance Comments Min A x 1 with FWW   Sensory Examination   Sensory Perception WNL   Sensory Perception Comments Bilat LEs   Clinical Impression   Criteria for Skilled Therapeutic Intervention Yes, treatment indicated   PT Diagnosis (PT) Impaired functional mobility   Influenced by the following impairments dec bal, dec strength, dec endurance.  Pt is not at her PLOF.   Functional limitations due to impairments bed mobility, transfers, gait ,   Clinical Presentation (PT Evaluation Complexity) stable   Clinical Presentation Rationale Pt presents medically diagnosed.   Clinical Decision Making (Complexity) moderate complexity   Planned Therapy Interventions (PT) balance training;bed mobility training;gait training;home exercise program;strengthening;transfer training   Risk & Benefits of therapy have been explained evaluation/treatment results reviewed;care plan/treatment goals reviewed;risks/benefits reviewed;patient;participants voiced agreement with care plan   PT Total Evaluation Time   PT Eval, Moderate Complexity Minutes (12319) 13   Physical Therapy Goals   PT Frequency Daily   PT Predicted Duration/Target Date for Goal Attainment 11/02/23   PT Goals Bed Mobility;Transfers;Gait;PT Goal 1   PT: Bed Mobility Supervision/stand-by assist;Supine to/from sit;Rolling   PT: Transfers Supervision/stand-by assist;Bed to/from chair;Sit to/from stand;Assistive device   PT: Gait Supervision/stand-by assist;Rolling walker;100 feet   PT: Goal 1 Pt to willow bilat LE ex x 10 to 20 reps to increase strength for mobility.   Interventions   Interventions Quick Adds Gait Training;Therapeutic Activity;Therapeutic Procedure   Therapeutic  Activity   Therapeutic Activities: dynamic activities to improve functional performance Minutes (92684) 13   Treatment Detail/Skilled Intervention Supine>sit with min A x 1 with cues for technique.  Pt did use the rail.  Sit<>stand with min Ax  1 x 2 reps  with FWW with cues for hand placement and walker safety.  Commode transfer with min A x 1.  Pt ambulated 3' x2. Si>supine with SBA with cues.   Gait Training   Treatment Detail/Skilled Intervention Pt was unsteady with backing up with the FWW   Distance in Feet 5' forward and backward  (O2 3L on mask.  HR and O2 SATs stable)   [place] Level (Gait Training) minimum assist (75% patient effort)   Physical Assistance Level (Gait Training) 1 person assist;verbal cues   Weight Bearing (Gait Training) weight-bearing as tolerated   Assistive Device (Gait Training) rolling walker  (FWW)   Pattern Analysis (Gait Training) swing-through gait   Gait Analysis Deviations decreased scott   PT Discharge Planning   PT Plan gait with FWW, bed mobiity, transfers.   PT Discharge Recommendation (DC Rec) Transitional Care Facility   PT Rationale for DC Rec Pt does have dec bal with mobility and should have assist with walking and mobility yet.  Pt said she could not manage at home yet.   PT Brief overview of current status PT eval, bed mobility with min A x 1, transfers with fWW with min A x 1 and ambulated with fWW with min A x 1.  O2 SATs stable.   Total Session Time   Timed Code Treatment Minutes 13   Total Session Time (sum of timed and untimed services) 26

## 2023-10-26 NOTE — ED TRIAGE NOTES
"EMS report:  Feeling weak and lethargic for the past week.  Developed a cough (now productive).  Tested COVID positive.  Temperature was 100.8 and 88% on RA.  Improved with 2 LPM O2 NC.  Refused an IV.  Transported from senior independent living.  History of a. Fib, HTN, stomach cancer.  Has pacemaker.    Patient reports having productive sputum. C/o dizziness and weird dreams, denies pain.  Usually independent with walker and cane.  Has not noticed increased difficulty breathing but is not sure.     Addendum:  Family arrived and reported possible fall.  Patient stayed in bed as long as possible today because of dizziness.  Got up to bathroom and \"fell on the toilet.\"  Patient pulled the red cord and staff found her on the floor.  Patient initially did not recall the incident and still does not recall it well.  Does not believe she hit her head.  Per family she usually has significant short memory loss.     Triage Assessment (Adult)       Row Name 10/25/23 5546          Triage Assessment    Airway WDL WDL     Additional Documentation Breath Sounds (Group)        Respiratory WDL    Respiratory WDL X;cough     Cough Frequency frequent     Cough Type productive;congested        Skin Circulation/Temperature WDL    Skin Circulation/Temperature WDL X        Cognitive/Neuro/Behavioral WDL    Cognitive/Neuro/Behavioral WDL WDL                     "

## 2023-10-26 NOTE — ED NOTES
Bed: JNED-24  Expected date: 10/25/23  Expected time:   Means of arrival:   Comments:  [place] [employer];89 year of female; cough; COVID +

## 2023-10-26 NOTE — PROGRESS NOTES
10/26/23 0730   Appointment Info   Signing Clinician's Name / Credentials (OT) Patty Palacio OTR/L OTD   Living Environment   People in Home alone   Current Living Arrangements independent living facility   Home Accessibility no concerns   Living Environment Comments walk in shower with shower chair with two grab bars, grab bars by toilet   Self-Care   Equipment Currently Used at Home walker, standard;cane, straight  (mostly uses FWW)   Fall history within last six months yes   Number of times patient has fallen within last six months 1   Activity/Exercise/Self-Care Comment Typically ind with all ADLs, gets assist from daughters/son in-law for groceries and cleaning   General Information   Onset of Illness/Injury or Date of Surgery 10/25/23   Referring Physician Jose Goldsmith MD   Patient/Family Therapy Goal Statement (OT) To not fall, to return home   Additional Occupational Profile Info/Pertinent History of Current Problem Deloris Larson is a 89F presents with generalised weakness/malaise; pmhx includes CKD3a, mild cognitive deficit, Fe deficiency anaemia, colon CA (conservative management elected), HTN/HLD, atrial fibrillation (apixaban), HFpEF (60%, 9/23); admitted for COVID19 pneumonia.   Existing Precautions/Restrictions fall   Limitations/Impairments safety/cognitive;hearing   Cognitive Status Examination   Orientation Status orientation to person, place and time   Follows Commands WFL   Cognitive Status Comments Per chart - pt slightly confused regarding time of fall, chart indicated mild cognitive impairment - pt demos good fxl cognition throughout assessment, continue to monitor throughout admission   Visual Perception   Visual Impairment/Limitations corrective lenses for reading   Posture   Posture forward head position   Range of Motion Comprehensive   General Range of Motion no range of motion deficits identified   Strength Comprehensive (MMT)   Comment, General Manual Muscle Testing (MMT)  Assessment Min generalized weakness   Bed Mobility   Bed Mobility supine-sit   Supine-Sit [place] (Bed Mobility) minimum assist (75% patient effort)   Assistive Device (Bed Mobility) bed rails   Comment (Bed Mobility) from gurney   Transfers   Transfers sit-stand transfer;toilet transfer   Sit-Stand Transfer   Sit-Stand [place] (Transfers) contact guard   Assistive Device (Sit-Stand Transfers) walker, front-wheeled   Sit/Stand Transfer Comments From higher gurney surface   Toilet Transfer   [place] Level (Toilet Transfer) contact guard   Assistive Device (Toilet Transfer) commode chair   Activities of Daily Living   BADL Assessment/Intervention lower body dressing;toileting   Lower Body Dressing Assessment/Training   [place] Level (Lower Body Dressing) verbal cues;contact guard assist   Toileting   Assistive Devices (Toileting) commode chair   [place] Level (Toileting) contact guard assist   Clinical Impression   Criteria for Skilled Therapeutic Interventions Met (OT) Yes, treatment indicated   OT Diagnosis Decreased ind with ADLs and safety   Influenced by the following impairments COVID   OT Problem List-Impairments impacting ADL activity tolerance impaired;strength   Assessment of Occupational Performance 1-3 Performance Deficits   Identified Performance Deficits dressing, toileting, activity tolerance   Planned Therapy Interventions (OT) ADL retraining;IADL retraining;cognition;progressive activity/exercise   Clinical Decision Making Complexity (OT) problem focused assessment/low complexity   Risk & Benefits of therapy have been explained evaluation/treatment results reviewed;care plan/treatment goals reviewed;risks/benefits reviewed;current/potential barriers reviewed;patient   OT Total Evaluation Time   OT Eval, Low Complexity Minutes (59841) 10   OT Goals   Therapy Frequency (OT) Daily   OT Predicted Duration/Target Date for Goal Attainment 11/02/23   OT Goals Lower Body  Dressing;Hygiene/Grooming;Toilet Transfer/Toileting;Aerobic Activity   OT: Hygiene/Grooming modified independent;while standing   OT: Lower Body Dressing Modified independent   OT: Toilet Transfer/Toileting Modified independent;toilet transfer;cleaning and garment management   OT: Perform aerobic activity with stable cardiovascular response intermittent activity;10 minutes   Interventions   Interventions Quick Adds Self-Care/Home Management   Self-Care/Home Management   Self-Care/Home Mgmt/ADL, Compensatory, Meal Prep Minutes (30920) 10   Symptoms Noted During/After Treatment (Meal Preparation/Planning Training) dizziness   Treatment Detail/Skilled Intervention Evaluation completed, treatment initiated. Fxl mob within room d/t covid precautions CGA w/ FWW, min reports of dizziness. Tolerated standing at EOB ~ 3 minutes SBA, reported decreased dizziness. CGA for scooting/readjusting at EOB, min A to return to supine d/t bed height. Able to use UE to boost self in bed with cueing. Call light in reach, all needs met.   OT Discharge Planning   OT Plan G/h standing, dressing, toileting, UE exer/aerobic activity, monitor cog   OT Discharge Recommendation (DC Rec) home with home care occupational therapy;home with assist   OT Rationale for DC Rec Pt has availability for assist from family who lives near by - reports she may be able to transition to [facility] within IL[facility]. If level of assist is unavailable, may require TCU stay. Would benefit from home OT to enhance safety and ind with ADLs in home setting   OT Brief overview of current status CGA fxl mob   Total Session Time   Timed Code Treatment Minutes 10   Total Session Time (sum of timed and untimed services) 20

## 2023-10-26 NOTE — PROGRESS NOTES
[facility] [place]    Medicine Progress Note - Hospitalist Service    Date of Admission:  10/25/2023    Assessment & Plan      Deloris Larson is a 89F presents with generalised weakness/malaise; pmhx includes CKD3a, mild cognitive deficit, Fe deficiency anaemia, colon CA (conservative management elected), HTN/HLD, atrial fibrillation (apixaban), HFpEF (60%, 9/23); admitted for COVID19 pneumonia (positive prior to admission)    #COVID-19 pneumonia  #Acute hypoxemic respiratory failure  Presented with 4 days of cough and progressive weakness. Reportedly positive for COVID previously. Requiring 2-3L O2 NC.  - Continue dexamethasone 6mg PO daily  - Start remdesivir x5 days    #HFpEF  Not in active heart failure. TTE last on 9/19/2023  -Continue Toprol 50mg PO every day  - S/p Lasix 20mg IV once on admission    #HLD  -atorvastatin 20mg PO every day  -May consider discontinuation given limited data in this age population, non-treatment plan of colon cancer    #HTN  -Toprol 50mg PO every day    #Atrial fibrillation  -apixaban 2.5mg PO BID    #Fe deficiency anaemia  -CBC trend  -Fe 325mg PO every day    #Colon cancer  Outpatient visits indicate patient and family preference to no further investigation/treatment. Not actively ready for hospice referral, though this has been discussed already in outpatient settings.    #Possible fall at home  History is unclear but CT head and c-spine negative for fracture on admission.          Diet: Combination Diet Regular Diet Adult    DVT Prophylaxis: DOAC  Car Catheter: Not present  Lines: None     Cardiac Monitoring: ACTIVE order. Indication: Acute decompensated heart failure (48 hours)  Code Status: No CPR- Do NOT Intubate      Clinically Significant Risk Factors Present on Admission               # Drug Induced Coagulation Defect: home medication list includes an anticoagulant medication    # Hypertension: Noted on problem list  # Acute heart failure with  preserved ejection fraction: heart failure noted on problem list, last echo with EF >50%, and receiving IV diuretics          # Pacemaker present       Disposition Plan      Expected Discharge Date: 10/28/2023                    TEJAS WAN MD  Hospitalist Service  [facility]  Securely message with [employer] (more info)  Text page via [employer] Paging/Directory   ______________________________________________________________________    Interval History   Patient reports feeling a little confused, but overall doing okay.    Physical Exam   Vital Signs: Temp: 97.9  F (36.6  C) Temp src: Oral BP: 92/56 Pulse: 76   Resp: 20 SpO2: 94 % O2 Device: Oxymask Oxygen Delivery: 3 LPM  Weight: 0 lbs 0 oz    General Appearance: NAD, well appearing  Respiratory: Bibaslar crackles  Cardiovascular: RRR. No m/r/g  GI: Soft. NT/ND  Skin: No rashes on etremities  Other: A&Ox4, but slow to come to answers    Medical Decision Making       45 MINUTES SPENT BY ME on the date of service doing chart review, history, exam, documentation & further activities per the note.      Data     I have personally reviewed the following data over the past 24 hrs:    9.3  \   10.6 (L)   / 150     138 102 11.1 /  136 (H)   3.7 25 0.96 (H) \     ALT: 7 AST: 21 AP: 67 TBILI: 0.8   ALB: 3.7 TOT PROTEIN: 6.3 (L) LIPASE: N/A     Trop: 18 (H) BNP: 4,367 (H)     Procal: N/A CRP: 23.20 (H) Lactic Acid: N/A         Imaging results reviewed over the past 24 hrs:   Recent Results (from the past 24 hour(s))   Head CT w/o contrast    Narrative    EXAM: CT HEAD W/O CONTRAST, CT CERVICAL SPINE W/O CONTRAST  LOCATION: [facility]  DATE: 10/25/2023    INDICATION: Trauma, fall.  COMPARISON: CTA head and neck 05/15/2023 and CT cervical spine 01/28/2021.  TECHNIQUE:   1) Routine CT Head without IV contrast. Multiplanar reformats. Dose reduction techniques were used.  2) Routine CT Cervical Spine without IV contrast. Multiplanar  reformats. Dose reduction techniques were used.    FINDINGS:   HEAD CT:   INTRACRANIAL CONTENTS: No intracranial hemorrhage, extraaxial collection, or mass effect. No CT evidence of acute infarct. Mild volume loss and moderate burden presumed chronic small vessel ischemia are stable.     VISUALIZED ORBITS/SINUSES/MASTOIDS: No intraorbital abnormality. No paranasal sinus mucosal disease. No middle ear or mastoid effusion.    BONES/SOFT TISSUES: No acute abnormality.    CERVICAL SPINE CT:   VERTEBRA: 2 mm degenerative anterolisthesis C5 on C6. Alignment is otherwise normal. No acute cervical spine fracture or posttraumatic subluxation. Marked loss of disc space height at C4-C5 and C6-C7. Moderate to marked multilevel facet arthropathy.     CANAL/FORAMINA: No canal narrowing. Marked left C3-C4, moderate bilateral C4-C5 and left C5-C6 degenerative foraminal narrowing.    PARASPINAL: No extraspinal abnormality. Visualized lung fields are clear.      Impression    IMPRESSION:  HEAD CT:  1.  No acute intracranial abnormality or significant change compared to the prior study.    CERVICAL SPINE CT:  1.  No acute cervical spine fracture.   Cervical spine CT w/o contrast    Narrative    EXAM: CT HEAD W/O CONTRAST, CT CERVICAL SPINE W/O CONTRAST  LOCATION: [facility]  DATE: 10/25/2023    INDICATION: Trauma, fall.  COMPARISON: CTA head and neck 05/15/2023 and CT cervical spine 01/28/2021.  TECHNIQUE:   1) Routine CT Head without IV contrast. Multiplanar reformats. Dose reduction techniques were used.  2) Routine CT Cervical Spine without IV contrast. Multiplanar reformats. Dose reduction techniques were used.    FINDINGS:   HEAD CT:   INTRACRANIAL CONTENTS: No intracranial hemorrhage, extraaxial collection, or mass effect. No CT evidence of acute infarct. Mild volume loss and moderate burden presumed chronic small vessel ischemia are stable.     VISUALIZED ORBITS/SINUSES/MASTOIDS: No intraorbital  abnormality. No paranasal sinus mucosal disease. No middle ear or mastoid effusion.    BONES/SOFT TISSUES: No acute abnormality.    CERVICAL SPINE CT:   VERTEBRA: 2 mm degenerative anterolisthesis C5 on C6. Alignment is otherwise normal. No acute cervical spine fracture or posttraumatic subluxation. Marked loss of disc space height at C4-C5 and C6-C7. Moderate to marked multilevel facet arthropathy.     CANAL/FORAMINA: No canal narrowing. Marked left C3-C4, moderate bilateral C4-C5 and left C5-C6 degenerative foraminal narrowing.    PARASPINAL: No extraspinal abnormality. Visualized lung fields are clear.      Impression    IMPRESSION:  HEAD CT:  1.  No acute intracranial abnormality or significant change compared to the prior study.    CERVICAL SPINE CT:  1.  No acute cervical spine fracture.   XR Chest Port 1 View    Narrative    EXAM: XR CHEST PORT 1 VIEW  LOCATION: [facility]  DATE: 10/25/2023    INDICATION: Cough, COVID positive  COMPARISON: 09/19/2023.    FINDINGS: Left subclavian cardiac device. There is no pneumothorax. The heart size is normal. The thoracic aorta is calcified. The lungs are clear.      Impression    IMPRESSION: No acute abnormality.

## 2023-10-27 NOTE — TELEPHONE ENCOUNTER
General Call      Reason for Call:  Outstanding Order    What are your questions or concerns:  Following up on Outstanding Order for invoice [id number] faxed 10/11/23 to     Dariana can be reached at  ext [phone]    Date of last appointment with provider: 10/16/2023

## 2023-10-27 NOTE — PROGRESS NOTES
Notified provider Pt alarmed for run of V tach, with wide QRS complex. Difficult to tell if these are paced beats or PVCs/ v tach. Very few pacer spikes showing up. Pt asymptomatic. MD acknowledged, no change to plan

## 2023-10-27 NOTE — PROGRESS NOTES
[facility]    Medicine Progress Note - Hospitalist Service    Date of Admission:  10/25/2023    Assessment & Plan    Deloris Larson is a 89F presents with generalised weakness/malaise; pmhx includes CKD3a, mild cognitive deficit, Fe deficiency anaemia, colon CA (conservative management elected), HTN/HLD, atrial fibrillation (apixaban), HFpEF (60%, 9/23); admitted for COVID19 pneumonia (positive prior to admission)    #COVID-19 pneumonia  #Acute hypoxemic respiratory failure  Presented with 4 days of cough and progressive weakness. Reportedly positive for COVID previously. Requiring 2-3L O2 NC initially, now on room air this afternoon.  - Continue dexamethasone 6mg PO daily  - Start remdesivir x5 days  - If no clinical change, will be ready for discharge to TCU tomorrow  - Updated daughter, Ginna    #HFpEF  Not in active heart failure. TTE last on 9/19/2023  -Continue Toprol 50mg PO every day  -S/p Lasix 20mg IV once on admission    #HLD  -atorvastatin 20mg PO every day  -May consider discontinuation given limited data in this age population, non-treatment plan of colon cancer    #HTN  -Toprol 50mg PO every day    #Atrial fibrillation  -apixaban 2.5mg PO BID    #Fe deficiency anaemia  -CBC trend  -Fe 325mg PO every day    #Colon cancer  Outpatient visits indicate patient and family preference to no further investigation/treatment. Not actively ready for hospice referral, though this has been discussed already in outpatient settings.    #Possible fall at home  History is unclear but CT head and c-spine negative for fracture on admission.          Diet: Combination Diet Regular Diet Adult  Room Service    DVT Prophylaxis: DOAC  Car Catheter: Not present  Lines: None     Cardiac Monitoring: ACTIVE order. Indication: Acute decompensated heart failure (48 hours)  Code Status: No CPR- Do NOT Intubate      Clinically Significant Risk Factors                  # Hypertension: Noted on problem  list  # Acute heart failure with preserved ejection fraction: heart failure noted on problem list, last echo with EF >50%, and receiving IV diuretics            # Pacemaker present       Disposition Plan      Expected Discharge Date: 10/28/2023        Discharge Comments: TCU needed            TEJAS WAN MD  Hospitalist Service  [facility]  Securely message with [employer] (more info)  Text page via Reply[employer] Paging/Directory   ______________________________________________________________________    Interval History   Patient is feeling much better today. Feels her breathing has improved and is feeing ready for TCU.    Physical Exam   Vital Signs: Temp: 98.2  F (36.8  C) Temp src: Oral BP: 112/57 Pulse: 78   Resp: 18 SpO2: 94 % O2 Device: None (Room air)    Weight: 132 lbs 11.47 oz    General Appearance: NAD, well appearing  Respiratory: Bibaslar crackles  Cardiovascular: RRR. No m/r/g  GI: Soft. NT/ND  Skin: No rashes on etremities  Other: A&Ox4, but slow to come to answers    Medical Decision Making       35 MINUTES SPENT BY ME on the date of service doing chart review, history, exam, documentation & further activities per the note.      Data     I have personally reviewed the following data over the past 24 hrs:    8.6  \   9.9 (L)   / 160     139 105 19.6 /  234 (H)   3.4 26 0.98 (H) \     ALT: 7 AST: 15 AP: 63 TBILI: 0.4   ALB: 3.5 TOT PROTEIN: 5.8 (L) LIPASE: N/A       Imaging results reviewed over the past 24 hrs:   No results found for this or any previous visit (from the past 24 hour(s)).

## 2023-10-27 NOTE — PLAN OF CARE
Goal Outcome Evaluation:       Pt alert and oriented, but poor short term memory. She is requiring 2L O2 nasal cannula while awake and 3L oxymask while sleeping to maintain SpO2. HR is afib with PVCs. Treating COVID with decadron and remdesivir. Pt up with 1 assist. Pleasant and cooperative with staff. Fine crackles in bilateral lung bases. Infrequent nonproductive cough. Reports feeling tired.

## 2023-10-27 NOTE — PLAN OF CARE
Goal Outcome Evaluation:      Plan of Care Reviewed With: patient    Overall Patient Progress: improvingOverall Patient Progress: improving     Pt. Stating she is feeling better, though still has occasional cough or spells of coughing, mostly non-productive. Was dizzy when she sat up prior to walking to BR, and this passed after about a minute. VSS. Lungs clear to auscultation. O2 sat 93% on room air. Continue to monitor.

## 2023-10-27 NOTE — PLAN OF CARE
Problem: Adult Inpatient Plan of Care  Goal: Plan of Care Review  Description: The Plan of Care Review/Shift note should be completed every shift.  The Outcome Evaluation is a brief statement about your assessment that the patient is improving, declining, or no change.  This information will be displayed automatically on your shift  note.  Outcome: Progressing     Problem: Infection  Goal: Absence of Infection Signs and Symptoms  Outcome: Progressing   Goal Outcome Evaluation:       The patient c/o slight dizziness when she stood up to go to the bathroom with gait belt and walker and one assist. She said she needs to learn to get up slower and still needs to use the walker. Patient did not c/o pain. Inspiratory crackles in the bases.Does Incentive spirometer up to 2000 ml's. Needed instruction and than able to demonstrate after direction given. Atrial fibrillation with PVC's . K 3.4. On remdesivir for covid. Listing of TCUs given to the patient.  involved in cares. Call light in reach.

## 2023-10-27 NOTE — PLAN OF CARE
Problem: Infection  Goal: Absence of Infection Signs and Symptoms  Outcome: Progressing   Goal Outcome Evaluation:       Pt arrived to the unit around 2130 this evening. Pt is AOX4 and denies pain and SOB. Lower lobes have fine crackles. Pt sating mid-high 90's on 2.5L O2 NC. VSS. Tele paced a fib, HR 70-80. Pt ambulating to bathroom and can make her needs known. Special precautions initiated.

## 2023-10-27 NOTE — PROGRESS NOTES
Care Management Follow Up    Length of Stay (days): 1    Expected Discharge Date: 10/28/2023     Concerns to be Addressed: discharge planning     Patient plan of care discussed at interdisciplinary rounds: Yes    Anticipated Discharge Disposition:       Anticipated Discharge Services:    Anticipated Discharge DME:      Patient/family educated on Medicare website which has current facility and service quality ratings:    Education Provided on the Discharge Plan:    Patient/Family in Agreement with the Plan:      Referrals Placed by CM/SW:    Private pay costs discussed: transportation costs    Additional Information:  CM called patient on room phone to discuss discharge planning. Therapy rec is TCU, patient is agreeable and asked that CM call her daughter Ginna to discuss facility preferences.     CM called daughter Ginna and emailed copy of TCU list. She asked that referrabl be sent to [facility]), referral sent. She will review  list for additional choices. Anticipate patient will be ready for discharge tomorrow.     Of note, patient is medicare primary and has only been admitted for 1 day. Patient does have Medicare ACO [place].     1:59 PM  [facility] declined as they have no beds until Wednesday.     ASHLEY CurtisW

## 2023-10-28 NOTE — PLAN OF CARE
Problem: Adult Inpatient Plan of Care  Goal: Plan of Care Review  Description: The Plan of Care Review/Shift note should be completed every shift.  The Outcome Evaluation is a brief statement about your assessment that the patient is improving, declining, or no change.  This information will be displayed automatically on your shift  note.  Outcome: Progressing     Problem: Infection  Goal: Absence of Infection Signs and Symptoms  Outcome: Progressing   Goal Outcome Evaluation:         Patient alert and oriented x 3 she did not remember the date today. Patient denied any pain. Lungs sound better today and does the incentive spirometer up to 1500 ml's. X 6. Atrial fibrillation with intermittent V-pacer spikes. Appetite good. Walks to the bathroom with one assist walker and gait belt. Patient with SOB with exertion relieved with rest.  Covid +. Dry cough intermittent. Bed alarm on. The plan is to go to TCU when able. Call light in reach.

## 2023-10-28 NOTE — PROGRESS NOTES
Care Management Follow Up    Length of Stay (days): 2    Expected Discharge Date: 10/29/2023     Concerns to be Addressed: discharge planning     Patient plan of care discussed at interdisciplinary rounds: Yes    Anticipated Discharge Disposition:  TCU     Anticipated Discharge Services:  per TCU  Anticipated Discharge DME:  per TCU    Patient/family educated on Medicare website which has current facility and service quality ratings:  yes  Education Provided on the Discharge Plan:  yes  Patient/Family in Agreement with the Plan:  yes    Referrals Placed by CM/SW:  TCU  Private pay costs discussed: transportation costs    Additional Information:  Therapy recommendation is TCU.    Patient's daughter, Ginna, called CM and provided additional choices.   Daughter is interested in: [facility], [facility] ([facility] - faxed under communications), [facility], and [facility].  CM faxed referral to TCUs of choice.     [facility] declined as they have no beds until Wednesday.     Ginna called CM.  Ginna reports she discussed with patient the option of adding on Assisted Living services to patient's care at patient's [facility].  Patient is now in agreement to add on the extra services.  Ginna is going to call Monday when admissions is in to initiate this process.    CM will call Senior Living Facility Monday to see if they can accept patient to [facility] with needing A1 and being COVID+.    We will continue searching for TCU.    CM will follow up with TCU admissions to see if bed is available this weekend.     Ofelia Stephens RN

## 2023-10-28 NOTE — PLAN OF CARE
Goal Outcome Evaluation:      Plan of Care Reviewed With: patient    Overall Patient Progress: improvingOverall Patient Progress: improving     Pt. Slept well overnight. Still states feels fatigued, only coughing occasionally, non-productive. Denied dizziness or shortness of breath, VSS. Maintaining O2 sat on room air. Continue to monitor.

## 2023-10-28 NOTE — PROGRESS NOTES
[facility]    Medicine Progress Note - Hospitalist Service    Date of Admission:  10/25/2023    Assessment & Plan    Deloris Larson is a 89F presents with generalised weakness/malaise; pmhx includes CKD3a, mild cognitive deficit, Fe deficiency anaemia, colon CA (conservative management elected), HTN/HLD, atrial fibrillation (apixaban), HFpEF (60%, 9/23); admitted for COVID19 pneumonia (positive prior to admission).    #COVID-19 pneumonia  #Acute hypoxemic respiratory failure  Presented with 4 days of cough and progressive weakness. Reportedly positive for COVID previously. Requiring 2-3L O2 NC initially, now on room air.  - Continue dexamethasone 6mg PO daily, planning to give on discharge to complete 10 day course  - Continue remdesivir x5 days  - Ready for discharge awaiting TCU bed    #HFpEF  Not in active heart failure. TTE last on 9/19/2023  -Continue Toprol 50mg PO every day  -S/p Lasix 20mg IV once on admission    #HLD  -atorvastatin 20mg PO every day  -May consider discontinuation given limited data in this age population, non-treatment plan of colon cancer    #HTN  -Toprol 50mg PO every day    #Atrial fibrillation  -apixaban 2.5mg PO BID    #Fe deficiency anaemia  -CBC trend  -Fe 325mg PO every day    #Colon cancer  Outpatient visits indicate patient and family preference to no further investigation/treatment. Not actively ready for hospice referral, though this has been discussed already in outpatient settings.    #Possible fall at home  History is unclear but CT head and c-spine negative for fracture on admission.          Diet: Combination Diet Regular Diet Adult  Room Service  Diet    DVT Prophylaxis: DOAC  Car Catheter: Not present  Lines: None     Cardiac Monitoring: None  Code Status: No CPR- Do NOT Intubate      Clinically Significant Risk Factors                  # Hypertension: Noted on problem list  # Acute heart failure with preserved ejection fraction: heart failure  noted on problem list, last echo with EF >50%, and receiving IV diuretics            # Pacemaker present       Disposition Plan      Expected Discharge Date: 10/29/2023        Discharge Comments: TCU needed            TEJAS WAN MD  Hospitalist Service  [facility]  Securely message with [employer] (more info)  Text page via [employer] Paging/Directory   ______________________________________________________________________    Interval History   Continues to feel well today. Looking forward to TCU. Very grateful for her care.    Physical Exam   Vital Signs: Temp: 97.6  F (36.4  C) Temp src: Oral BP: 139/68 Pulse: 77   Resp: 20 SpO2: 97 % O2 Device: None (Room air)    Weight: 135 lbs 1.6 oz    General Appearance: NAD, well appearing  Respiratory: Bibaslar crackles  Cardiovascular: RRR. No m/r/g  GI: Soft. NT/ND  Skin: No rashes on etremities  Other: A&Ox4, but slow to come to answers    Medical Decision Making       45 MINUTES SPENT BY ME on the date of service doing chart review, history, exam, documentation & further activities per the note.      Data         Imaging results reviewed over the past 24 hrs:   No results found for this or any previous visit (from the past 24 hour(s)).

## 2023-10-28 NOTE — PLAN OF CARE
Goal Outcome Evaluation:    Pt Aox4, calm and cooperative with cares, pleasant affect. Inspiratory crackles still present, with intermittent non-productive cough; sats WNL on RA. Pt denies SOB, CP. Heart rhythm rate controlled Afib w/ occasional PVC. IV access to right AC lost; new IV placed in left forearm. Nursing staff will continue to monitor.

## 2023-10-29 NOTE — PLAN OF CARE
Problem: Adult Inpatient Plan of Care  Goal: Plan of Care Review  Description: The Plan of Care Review/Shift note should be completed every shift.  The Outcome Evaluation is a brief statement about your assessment that the patient is improving, declining, or no change.  This information will be displayed automatically on your shift  note.  Outcome: Progressing     Problem: Infection  Goal: Absence of Infection Signs and Symptoms  Outcome: Progressing   Goal Outcome Evaluation:       The patient is alert and oriented yet forgetful of the date. Explained all cares and medications. She is very pleasant and cooperative. Lungs sound clear and O2 sats 95% RA. Discharge plan for the patient to go to a TCU.  working on a TCU with the daughter and patient. Patient remains on Eliquis for atrial fibrillation. Patient med surgical status . Appetite good. Up to the bathroom with walker /gait belt and one stand by assist. Call light in  reach. Covid + and on Special Precautions.

## 2023-10-29 NOTE — PLAN OF CARE
Goal Outcome Evaluation:    Pt Aox4 with intermittent confusing requiring redirection, calm and cooperative with cares, pleasant affect. Lung sounds clear and cough is resolved. Tele discontinued, continuous pulse oximetry ongoing. Sats WNL on RA, VSS. Pt denies CP, SOB. IV remdesivir infused w/o incident. Special precaution isolation ongoing. Pt up to BR with assist of one. Gait steady while using FWW. Nursing staff will continue to monitor.

## 2023-10-29 NOTE — PROGRESS NOTES
[facility]    Medicine Progress Note - Hospitalist Service    Date of Admission:  10/25/2023    Assessment & Plan   89-year-old female presented with generalized weakness and malaise admitted with COVID-19 pneumonia.    #COVID-19 pneumonia  #Acute hypoxic respiratory failure, resolved  Weaned off oxygen  Continued on remdesivir and dexamethasone  Awaiting TCU placement    #Chronic HFpEF  No signs of decompensation  S/p 20 mg IV Lasix x1  Continue home metoprolol    #Paroxysmal atrial fibrillation  Has been rate controlled  Beta-blocker, Eliquis    #Hypertension  Monitor    #Iron deficiency anemia  #History of known colon cancer  Outpatient visits indicate no further investigation or treatment per patient and family wishes though not quite ready for hospice  Continue iron supplement      VTE ppx: DOAC            Diet: Combination Diet Regular Diet Adult  Room Service  Diet    Car Catheter: Not present  Lines: None     Cardiac Monitoring: None  Code Status: No CPR- Do NOT Intubate      Clinically Significant Risk Factors                  # Hypertension: Noted on problem list  # Chronic heart failure with preserved ejection fraction: heart failure noted on problem list and last echo with EF >50%            # Pacemaker present       Disposition Plan      Expected Discharge Date: 10/30/2023        Discharge Comments: TCU needed            Tyron Mtz DO  Hospitalist Service  [facility]  Securely message with [employer] (more info)  Text page via [employer] Paging/Directory   ______________________________________________________________________    Interval History   No acute events overnight.  Remains afebrile.  No shortness of breath.    Physical Exam   Vital Signs: Temp: 97.7  F (36.5  C) Temp src: Oral BP: (!) 142/71 Pulse: 61   Resp: 18 SpO2: 95 % O2 Device: None (Room air)    Weight: 135 lbs 2.27 oz    General Appearance:  No acute distress  Respiratory: Clear to  auscultation bilaterally  Cardiovascular: Regular rate and rhythm  Extremities: No peripheral edema or cyanosis    Medical Decision Making             Data

## 2023-10-29 NOTE — PLAN OF CARE
Goal Outcome Evaluation:      Plan of Care Reviewed With: patient    Overall Patient Progress: improvingOverall Patient Progress: improving     Pt. Coughing only rarely now, lungs clear to auscultation. Plan for  discharge soon to TCU. VSS, continue to monitor.

## 2023-10-30 NOTE — PROGRESS NOTES
[facility]    Medicine Progress Note - Hospitalist Service    Date of Admission:  10/25/2023    Assessment & Plan   89-year-old female presented with generalized weakness and malaise admitted with COVID-19 pneumonia.    #COVID-19 pneumonia  #Acute hypoxic respiratory failure, resolved  Weaned off oxygen  Continued on remdesivir and dexamethasone  Awaiting TCU placement    #Chronic HFpEF  No signs of decompensation  S/p 20 mg IV Lasix x1  Continue home metoprolol    #Paroxysmal atrial fibrillation  Has been rate controlled  Beta-blocker, Eliquis    #Hypertension  Monitor    #Iron deficiency anemia  #History of known colon cancer  Outpatient visits indicate no further investigation or treatment per patient and family wishes though not quite ready for hospice  Continue iron supplement      VTE ppx: DOAC            Diet: Combination Diet Regular Diet Adult  Room Service  Diet    Car Catheter: Not present  Lines: None     Cardiac Monitoring: None  Code Status: No CPR- Do NOT Intubate      Clinically Significant Risk Factors                  # Hypertension: Noted on problem list  # Chronic heart failure with preserved ejection fraction: heart failure noted on problem list and last echo with EF >50%            # Pacemaker present       Disposition Plan      Expected Discharge Date: 10/31/2023        Discharge Comments: TCU needed            Tyron Mtz DO  Hospitalist Service  [facility]  Securely message with [employer] (more info)  Text page via [employer] Paging/Directory   ______________________________________________________________________    Interval History   No acute events overnight    Physical Exam   Vital Signs: Temp: 97  F (36.1  C) Temp src: Oral BP: (!) 140/68 Pulse: 68   Resp: 18 SpO2: 99 % O2 Device: None (Room air)    Weight: 135 lbs 2.27 oz    General: No acute distress  Respiratory: No tachypnea or accessory muscle use  Neuro: Alert and oriented x3, normal  speech  Extremities: No peripheral edema or cyanosis    Medical Decision Making             Data

## 2023-10-30 NOTE — PLAN OF CARE
Goal Outcome Evaluation:    Pt Aox4 with intermittent confusing requiring redirection, calm and cooperative with cares, pleasant affect. Lung sounds clear, very infrequent nonproductive cough, new c/o nasal/sinus drainage. Sats WNL on RA. Pt hypertensive this afternoon; normotensive this evening without intervention. Pt denies CP, SOB. IV remdesivir infused w/o incident. Special precaution isolation ongoing. Pt up to BR with SBA. Gait steady while using FWW. Nursing staff will continue to monitor.

## 2023-10-30 NOTE — PLAN OF CARE
Goal Outcome Evaluation:      Plan of Care Reviewed With: patient    Overall Patient Progress: improvingOverall Patient Progress: improving     Pt. Slept well overnight. Denied pain, nausea, or dyspnea. Only has cough rarely now.VSS. Up with SBA to BR. Continue to monitor.

## 2023-10-30 NOTE — PROGRESS NOTES
"Care Management Follow Up    Length of Stay (days): 4    Expected Discharge Date: 10/31/2023     Concerns to be Addressed: Care progression - discharge planning     Patient plan of care discussed at interdisciplinary rounds: Yes    Anticipated Discharge Disposition: Transitional Care     Anticipated Discharge Services: TCU  Anticipated Discharge DME: None    Patient/family educated on Medicare website which has current facility and service quality ratings: yes  Education Provided on the Discharge Plan: Yes per team  Patient/Family in Agreement with the Plan:  Yes    Referrals Placed by CM/SW: Post Acute Facilities  Private pay costs discussed: Not applicable    Additional Information:  Called [facility] [phone] and the fax is incorrect. Correct fax is [phone] and faxed the referral.    Called patient's daughter, Ginna, and requested more TCUs and referrals sent.    Social Hx: \"COVID +. Lives alone at [facility]. Independent with ADLs and family helps with some IADLs. AC RN weekly and need to add medication assistance; she had refused Home Care PT, now pt is willing to add on [facility] services at her assisted (Daughter is going to call to get [facility] services added). Transport TBD.\"    RNCM to follow for medical progression, recommendations, and final discharge plan.     Tonya Avalos RN     "

## 2023-10-31 NOTE — PROGRESS NOTES
"Care Management Follow Up    Length of Stay (days): 5    Expected Discharge Date: 10/31/2023     Concerns to be Addressed: Care progression - discharge planning     Patient plan of care discussed at interdisciplinary rounds: Yes    Anticipated Discharge Disposition: Transitional Care     Anticipated Discharge Services: TCU  Anticipated Discharge DME: None    Patient/family educated on Medicare website which has current facility and service quality ratings: yes  Education Provided on the Discharge Plan: Yes per team  Patient/Family in Agreement with the Plan:  Yes    Referrals Placed by CM/SW: Post Acute Facilities  Private pay costs discussed: Not applicable    Additional Information:  Rec'd message from Nereida at [facility] - declined, no bed for the next few days.    Rec'd message from Florian at [facility]. Patient is covid+ on 10/25. Patient will need to be 10 days out and be covid recover, then can resend the referral to be re-screen.    Rec'd message from Anni [phone] ext [phone] at [facility] - we have LTC only. No TCU or rehab   Per provider, Dr. Mtz, waiting for placement.    Social Hx: \"COVID +. Lives alone at [facility]. Independent with ADLs and family helps with some IADLs. AC RN weekly and need to add medication assistance; she had refused Home Care PT, now pt is willing to add on [facility] services at her [facility] (Daughter is going to call to get [facility] services added). Transport TBD.\"    RNCM to follow for medical progression, recommendations, and final discharge plan.     Tonya Avalos RN     10:06 AM Vero from [place] called. She would have an available bed at [facility] 11/1 for patient.    Called patient's daughter, Ginna. Ginna agreed and will transport. Informed patient via phone call.  Called [facility], patient to be there between 11:00 AM - 12:00 PM.   Ginna confirmed and address given for [facility].  "

## 2023-10-31 NOTE — PLAN OF CARE
Problem: Infection  Goal: Absence of Infection Signs and Symptoms  Description: COVID  Outcome: Progressing  Intervention: Prevent or Manage Infection  Recent Flowsheet Documentation  Taken 10/31/2023 0800 by Claudia Dalton RN  Isolation Precautions: special precautions maintained   Goal Outcome Evaluation:    Patient is feeling much better. Just waiting for TCU placement.   /74 & 103/57  SBA w/FWW.  Med Surg, RA

## 2023-10-31 NOTE — PLAN OF CARE
Problem: Adult Inpatient Plan of Care  Goal: Absence of Hospital-Acquired Illness or Injury  Intervention: Identify and Manage Fall Risk  Recent Flowsheet Documentation  Taken 10/30/2023 2351 by Kristine Moreno RN  Safety Promotion/Fall Prevention:   activity supervised   clutter free environment maintained   lighting adjusted   mobility aid in reach   nonskid shoes/slippers when out of bed   patient and family education   room near nurse's station   safety round/check completed  Intervention: Prevent Skin Injury  Recent Flowsheet Documentation  Taken 10/30/2023 2351 by Kristine Moreno RN  Body Position: position changed independently  Intervention: Prevent and Manage VTE (Venous Thromboembolism) Risk  Recent Flowsheet Documentation  Taken 10/30/2023 2351 by Kristine Moreno RN  VTE Prevention/Management: SCDs (sequential compression devices) off     Problem: Adult Inpatient Plan of Care  Goal: Absence of Hospital-Acquired Illness or Injury  Intervention: Prevent Skin Injury  Recent Flowsheet Documentation  Taken 10/30/2023 2351 by Kristine Moreno RN  Body Position: position changed independently     Problem: Risk for Delirium  Goal: Improved Behavioral Control  Intervention: Prevent and Manage Agitation  Recent Flowsheet Documentation  Taken 10/30/2023 2351 by Kristine Moreno RN  Environment Familiarity/Consistency: daily routine followed  Intervention: Minimize Safety Risk  Recent Flowsheet Documentation  Taken 10/30/2023 2351 by Kristine Moreno RN  Communication Enhancement Strategies: call light answered in person  Enhanced Safety Measures: room near unit station     Problem: Risk for Delirium  Goal: Improved Behavioral Control  Intervention: Minimize Safety Risk  Recent Flowsheet Documentation  Taken 10/30/2023 2351 by Kristine Moreno RN  Communication Enhancement Strategies: call light answered in person  Enhanced Safety Measures: room near unit station     Problem: Risk for  Delirium  Goal: Improved Attention and Thought Clarity   Intervention: Maximize Cognitive Function  Recent Flowsheet Documentation  Taken 10/30/2023 2351 by Kristine Moreno, RN  Sensory Stimulation Regulation: care clustered  Reorientation Measures:   clock in view   calendar in view   Goal Outcome Evaluation:       Patient is A&OX4, on Remdesiver & Decadron. SBA with walker and gait belt. Plan is TCU when bed available.

## 2023-10-31 NOTE — PROGRESS NOTES
[facility]    Medicine Progress Note - Hospitalist Service    Date of Admission:  10/25/2023    Assessment & Plan   89-year-old female presented with generalized weakness and malaise admitted with COVID-19 pneumonia.    #COVID-19 pneumonia  #Acute hypoxic respiratory failure, resolved  Weaned off oxygen  Day # 5 remdesivir and dexamethasone  Awaiting TCU placement    #Chronic HFpEF  No signs of decompensation  S/p 20 mg IV Lasix x1  Continue home metoprolol    #Paroxysmal atrial fibrillation  Has been rate controlled  Beta-blocker, Eliquis    #Hypertension  Monitor    #Iron deficiency anemia  #History of known colon cancer  Outpatient visits indicate no further investigation or treatment per patient and family wishes though not quite ready for hospice  Continue iron supplement      VTE ppx: DOAC            Diet: Combination Diet Regular Diet Adult  Room Service  Diet    Car Catheter: Not present  Lines: None     Cardiac Monitoring: None  Code Status: No CPR- Do NOT Intubate      Clinically Significant Risk Factors                  # Hypertension: Noted on problem list  # Chronic heart failure with preserved ejection fraction: heart failure noted on problem list and last echo with EF >50%            # Pacemaker present       Disposition Plan      Expected Discharge Date: 11/1/2023        Discharge Comments: TCU acceptance for 11/1            Tyron Mtz DO  Hospitalist Service  [facility]  Securely message with [employer] (more info)  Text page via [employer] Paging/Directory   ______________________________________________________________________    Interval History   No acute events overnight    Physical Exam   Vital Signs: Temp: 98  F (36.7  C) Temp src: Oral BP: (!) 145/74 Pulse: 65   Resp: 20 SpO2: 95 % O2 Device: None (Room air)    Weight: 135 lbs 2.27 oz  General: No acute distress  Respiratory: No tachypnea or accessory muscle use  Neuro: Alert and oriented,  normal speech  Extremities: No peripheral edema or cyanosis    Medical Decision Making             Data

## 2023-10-31 NOTE — PROGRESS NOTES
Care Management Follow Up    Length of Stay (days): 5    Expected Discharge Date: 10/31/2023     Concerns to be Addressed: IV Remdesivir; discharge planning (TCU for COVID+ patient)  Patient plan of care discussed at interdisciplinary rounds: Yes    Anticipated Discharge Disposition:  Transitional care (TCU) is recommended for continued therapy and skilled nursing.     Anticipated Discharge Services:  Continued therapy, skilled nursing and medical management.   Anticipated Discharge DME:  Per therapy (if indicated).    Patient/family educated on Medicare website which has current facility and service quality ratings:  yes  Education Provided on the Discharge Plan:  yes  Patient/Family in Agreement with the Plan:  yes    Referrals Placed by CM/SW:  Post-Acute Care facilities   Private pay costs discussed: transportation costs    Additional Information:  Therapy recommendation is TCU.  Patient's daughter, Ginna, is interested in: [facility] TCU ([facility] - faxed under communications), [facility], and [facility].  CM faxed referral to TCUs of choice.     [facility] declined for now as they have no beds until Wednesday.   Ginna also reported that she discussed with patient the option of adding on Assisted Living services to patient's care at patient's Senior Living Apartment.  Patient is now in agreement to add on the extra services.   Uncertain if patient's Senior Living Facility can accept patient to [facility] with needing A1 and being COVID+.    We will continue searching for TCU.    Phone number for [facility] is: [phone].    Nhung Wasserman RN

## 2023-10-31 NOTE — CARE PLAN
Pt is AOX4 and denies pain this shift. Pt is medsurg. She has been ambulating to the bathroom. Pt had no complaints this sift. VSS. Plan to discharge to TCU.

## 2023-11-01 NOTE — PLAN OF CARE
Problem: Infection  Goal: Absence of Infection Signs and Symptoms  Description: COVID  Outcome: Progressing  Intervention: Prevent or Manage Infection  Recent Flowsheet Documentation  Taken 11/1/2023 0050 by Asia Ram RN  Isolation Precautions: special precautions maintained     Goal Outcome Evaluation:         Lungs clear to diminished. On room air. Finished her remdesiver. On oral dexamethasone. Anticipated discharge today. Daughter to transport.

## 2023-11-01 NOTE — PLAN OF CARE
Occupational Therapy Discharge Summary    Reason for therapy discharge:    Discharged to transitional care facility.    Progress towards therapy goal(s). See goals on Care Plan in [facility] electronic health record for goal details.  Goals partially met.  Barriers to achieving goals:   discharge from facility.    Therapy recommendation(s):    Continued therapy is recommended.  Rationale/Recommendations:  to improve ADL independence.

## 2023-11-01 NOTE — PROGRESS NOTES
Physical Therapy Discharge Summary    Reason for therapy discharge:    Discharged to transitional care facility.    Progress towards therapy goal(s). See goals on Care Plan in [facility] electronic health record for goal details.  Goals partially met.  Barriers to achieving goals:   discharge from facility.    Therapy recommendation(s):    Continued therapy is recommended.  Rationale/Recommendations:  To improve strength and endurance.

## 2023-11-01 NOTE — PROGRESS NOTES
Care Management Discharge Note    Discharge Date: 11/01/2023       Discharge Disposition: [facility]    Discharge Services: [facility]    Discharge DME: None    Discharge Transportation: family or friend will provide    Private pay costs discussed: Not applicable    Does the patient's insurance plan have a 3 day qualifying hospital stay waiver?  Yes     Which insurance plan 3 day waiver is available? [employer]    Will the waiver be used for post-acute placement? Yes    PAS Confirmation Code: [id number]  Patient/family educated on Medicare website which has current facility and service quality ratings: yes    Education Provided on the Discharge Plan: Yes per team  Persons Notified of Discharge Plans: Patient per team  Patient/Family in Agreement with the Plan: yes    Handoff Referral Completed: Yes    Additional Information:  Per provider, Dr. Diaz, does patient have placement?   Informed Dr. Diaz patient accepted to [facility]  He will put in the discharge orders    Patient discharge to [facility]. The daughter, Ginna, will transport.    Updated the daughter, Ginna Vero at [facility]  Bedside nurse, LUIS Avalos RN

## 2023-11-01 NOTE — PLAN OF CARE
Patient discharge to TCU. Patient's daughter received her discharge instructions and discharge packet. Patient has all her belongings. Patient's daughter will provide transport.

## 2023-11-01 NOTE — DISCHARGE SUMMARY
[facility]  Hospitalist Discharge Summary      Date of Admission:  10/25/2023  Date of Discharge:  11/1/2023  Discharging Provider: Reji Diaz MD  Discharge Service: Hospitalist Service    Discharge Diagnoses   Acute COVID-pneumonia  Acute respiratory failure with hypoxia, resolved   Chronic CHF, discharged dysfunction  Paroxysmal A-fib  Hypertension.   Weakness and deconditioned  Iron deficiency anemia  History of colon cancer    Clinically Significant Risk Factors          Follow-ups Needed After Discharge   Follow-up Appointments     Follow Up and recommended labs and tests      Follow up with primary care provider in 7 days.  No follow up labs or   test are needed.            Unresulted Labs Ordered in the Past 30 Days of this Admission       No orders found from 9/25/2023 to 10/26/2023.        These results will be followed up by PCP    Discharge Disposition   Discharged to nursing home  Condition at discharge: Stable    Hospital Course   89-year-old female presented with past medical history of hypertension who presented to ER for evaluation of generalized weakness and malaise admitted with COVID-19 pneumonia.  Please refer to H&P for details     COVID-19 pneumonia  Acute hypoxic respiratory failure, resolved  Weaned off oxygen  Finished 5 days course of remdesivir   Continue dexamethasone to finish total 10-day course  Anticoagulation with Eliquis     Chronic HFpEF  No signs of decompensation  Continue home metoprolol     Paroxysmal atrial fibrillation  Has been rate controlled with metoprolol  Anticoagulation with Eliquis     Hypertension  -Stable blood pressure on metoprolol  -Continue to monitor blood pressure at TCU     Iron deficiency anemia  History of known colon cancer  Outpatient visits indicate no further investigation or treatment per patient and family wishes though not quite ready for hospice  Continue iron supplement    Discussed with patient, nursing staff and  discharge planner    Consultations This Hospital Stay   PHYSICAL THERAPY ADULT IP CONSULT  OCCUPATIONAL THERAPY ADULT IP CONSULT  CARE MANAGEMENT / SOCIAL WORK IP CONSULT  PHYSICAL THERAPY ADULT IP CONSULT  OCCUPATIONAL THERAPY ADULT IP CONSULT    Code Status   No CPR- Do NOT Intubate    Time Spent on this Encounter   I, Reji Diaz MD, personally saw the patient today and spent greater than 30 minutes discharging this patient.       Reji Diaz MD  [facility]  [address] [place] [id number]  Phone: [phone]  Fax: [phone]  ______________________________________________________________________    Physical Exam   Vital Signs: Temp: 97.7  F (36.5  C) Temp src: Oral BP: 137/76 Pulse: 60   Resp: 18 SpO2: 96 % O2 Device: None (Room air)    Weight: 138 lbs 8 oz  General Appearance: Sleeping comfortable in bed in no acute respiratory distress  Respiratory: Normal breath sounds, no wheeze.  Cardiovascular: Normal heart sound, soft systolic murmur in aortic area.  GI: Soft, no tenderness, normal bowel sounds.  Skin: Dry, warm, no rash.  Other: Grossly intact, no focal deficit.       Primary Care Physician   Daniela Roth    Discharge Orders      Primary Care - Care Coordination Referral      General info for SNF    Admitted for COVID pneumonia and briefly required O2, but improved symptomatically quickly. Continuing steroids to complete 10 day course given age making her higher risk, but looking great at this point.    Length of Stay Estimate: Short Term Care: Estimated # of Days <30  Condition at Discharge: Improving  Level of care:skilled   Rehabilitation Potential: Excellent  Admission H&P remains valid and up-to-date: Yes  Recent Chemotherapy: N/A  Use Nursing Home Standing Orders: Yes     Follow Up and recommended labs and tests    Follow up with primary care provider in 7 days.  No follow up labs or test are needed.     Reason for your hospital  stay    You were admitted for COVID-19 pneumonia. You required oxygen initially, but symptomatically improved quickly. You were discharged to a TCU to work on your strength. Please continue the steroid for the next week as prescribed to reduce your risk of developing a worse infection.     Activity - Up with nursing assistance     Physical Therapy Adult Consult    Evaluate and treat as clinically indicated.    Reason:  Per IP PT, bed mobility/transfers     Occupational Therapy Adult Consult    Evaluate and treat as clinically indicated.    Reason:  Per IP OT, dressing, toileting     Airborne and Contact Isolation     Fall precautions     Diet    Follow this diet upon discharge: Orders Placed This Encounter      Room Service      Combination Diet Regular Diet Adult       Significant Results and Procedures   Most Recent 3 CBC's:  Recent Labs   Lab Test 11/01/23  0536 10/29/23  0431 10/27/23  0450 10/26/23  0717 10/25/23  2230   WBC  --   --  8.6 9.3 10.4   HGB  --   --  9.9* 10.6* 10.3*   MCV  --   --  80 79 81    182 160 150 157     Most Recent 3 BMP's:  Recent Labs   Lab Test 11/01/23  0536 10/30/23  1241 10/29/23  0431 10/27/23  0905 10/27/23  0450     --  141  --  139   POTASSIUM 4.3  --  3.9  --  3.4   CHLORIDE 109*  --  108*  --  105   CO2 27  --  26  --  26   BUN 26.0*  --  23.8*  --  19.6   CR 0.95  --  0.90  --  0.98*   ANIONGAP 7  --  7  --  8   RAMA 8.7  --  8.7  --  9.1   GLC 97 158* 89   < > 93    < > = values in this interval not displayed.   ,   Results for orders placed or performed during the hospital encounter of 10/25/23   XR Chest Port 1 View    Narrative    EXAM: XR CHEST PORT 1 VIEW  LOCATION: [facility]  DATE: 10/25/2023    INDICATION: Cough, COVID positive  COMPARISON: 09/19/2023.    FINDINGS: Left subclavian cardiac device. There is no pneumothorax. The heart size is normal. The thoracic aorta is calcified. The lungs are clear.      Impression     IMPRESSION: No acute abnormality.   Head CT w/o contrast    Narrative    EXAM: CT HEAD W/O CONTRAST, CT CERVICAL SPINE W/O CONTRAST  LOCATION: [facility]  DATE: 10/25/2023    INDICATION: Trauma, fall.  COMPARISON: CTA head and neck 05/15/2023 and CT cervical spine 01/28/2021.  TECHNIQUE:   1) Routine CT Head without IV contrast. Multiplanar reformats. Dose reduction techniques were used.  2) Routine CT Cervical Spine without IV contrast. Multiplanar reformats. Dose reduction techniques were used.    FINDINGS:   HEAD CT:   INTRACRANIAL CONTENTS: No intracranial hemorrhage, extraaxial collection, or mass effect. No CT evidence of acute infarct. Mild volume loss and moderate burden presumed chronic small vessel ischemia are stable.     VISUALIZED ORBITS/SINUSES/MASTOIDS: No intraorbital abnormality. No paranasal sinus mucosal disease. No middle ear or mastoid effusion.    BONES/SOFT TISSUES: No acute abnormality.    CERVICAL SPINE CT:   VERTEBRA: 2 mm degenerative anterolisthesis C5 on C6. Alignment is otherwise normal. No acute cervical spine fracture or posttraumatic subluxation. Marked loss of disc space height at C4-C5 and C6-C7. Moderate to marked multilevel facet arthropathy.     CANAL/FORAMINA: No canal narrowing. Marked left C3-C4, moderate bilateral C4-C5 and left C5-C6 degenerative foraminal narrowing.    PARASPINAL: No extraspinal abnormality. Visualized lung fields are clear.      Impression    IMPRESSION:  HEAD CT:  1.  No acute intracranial abnormality or significant change compared to the prior study.    CERVICAL SPINE CT:  1.  No acute cervical spine fracture.   Cervical spine CT w/o contrast    Narrative    EXAM: CT HEAD W/O CONTRAST, CT CERVICAL SPINE W/O CONTRAST  LOCATION: [facility]  DATE: 10/25/2023    INDICATION: Trauma, fall.  COMPARISON: CTA head and neck 05/15/2023 and CT cervical spine 01/28/2021.  TECHNIQUE:   1) Routine CT Head without IV  contrast. Multiplanar reformats. Dose reduction techniques were used.  2) Routine CT Cervical Spine without IV contrast. Multiplanar reformats. Dose reduction techniques were used.    FINDINGS:   HEAD CT:   INTRACRANIAL CONTENTS: No intracranial hemorrhage, extraaxial collection, or mass effect. No CT evidence of acute infarct. Mild volume loss and moderate burden presumed chronic small vessel ischemia are stable.     VISUALIZED ORBITS/SINUSES/MASTOIDS: No intraorbital abnormality. No paranasal sinus mucosal disease. No middle ear or mastoid effusion.    BONES/SOFT TISSUES: No acute abnormality.    CERVICAL SPINE CT:   VERTEBRA: 2 mm degenerative anterolisthesis C5 on C6. Alignment is otherwise normal. No acute cervical spine fracture or posttraumatic subluxation. Marked loss of disc space height at C4-C5 and C6-C7. Moderate to marked multilevel facet arthropathy.     CANAL/FORAMINA: No canal narrowing. Marked left C3-C4, moderate bilateral C4-C5 and left C5-C6 degenerative foraminal narrowing.    PARASPINAL: No extraspinal abnormality. Visualized lung fields are clear.      Impression    IMPRESSION:  HEAD CT:  1.  No acute intracranial abnormality or significant change compared to the prior study.    CERVICAL SPINE CT:  1.  No acute cervical spine fracture.       Discharge Medications   Discharge Medication List as of 11/1/2023 10:56 AM        START taking these medications    Details   dexAMETHasone (DECADRON) 6 MG tablet Take 1 tablet (6 mg) by mouth daily for 7 days, No Print Out           CONTINUE these medications which have NOT CHANGED    Details   acetaminophen (TYLENOL) 500 MG tablet Take 1,000 mg by mouth every 8 hours as needed, Historical      apixaban ANTICOAGULANT (ELIQUIS ANTICOAGULANT) 2.5 MG tablet Take 1 tablet (2.5 mg) by mouth 2 times daily, Disp-180 tablet, R-3, E-Prescribe      atorvastatin (LIPITOR) 20 MG tablet Take 1 tablet (20 mg) by mouth At Bedtime, Disp-90 tablet, R-3, E-Prescribe       ferrous sulfate (FEROSUL) 325 (65 Fe) MG tablet Take 1 tablet (325 mg) by mouth daily (with breakfast), Disp-30 tablet, R-3, E-Prescribe      meclizine (ANTIVERT) 25 MG tablet Take 1 tablet (25 mg) by mouth 3 times daily as needed for dizziness, Disp-30 tablet, R-3, E-Prescribe      Melatonin 5 MG CHEW Take 5 mg by mouth at bedtime as needed, may repeat once, Disp-90 tablet, R-3, E-Prescribe      metoprolol succinate ER (TOPROL XL) 50 MG 24 hr tablet TAKE 1 TABLET (50 MG) BY MOUTH DAILY, Disp-90 tablet, R-3, E-Prescribe           Allergies   Allergies   Allergen Reactions    Diphenhydramine

## 2023-11-01 NOTE — PLAN OF CARE
Problem: Adult Inpatient Plan of Care  Goal: Absence of Hospital-Acquired Illness or Injury  Intervention: Identify and Manage Fall Risk  Recent Flowsheet Documentation  Taken 11/1/2023 0988 by Danyel Gordon RN  Safety Promotion/Fall Prevention: activity supervised  Pt is alert oriented.  Vitally stable. Denied pain. Lungs sound clear/diminished bases. Pt is on room air.  Patient is discharged to TCU, awaiting for transport.

## 2023-11-01 NOTE — PLAN OF CARE
Problem: Comorbidity Management  Goal: Maintenance of Heart Failure Symptom Control  Outcome: Progressing  Intervention: Maintain Heart Failure Management  Recent Flowsheet Documentation  Taken 10/31/2023 1648 by Vale Francis RN  Medication Review/Management: medications reviewed     Problem: Infection  Goal: Absence of Infection Signs and Symptoms  Description: COVID  Outcome: Progressing  Intervention: Prevent or Manage Infection  Recent Flowsheet Documentation  Taken 10/31/2023 1648 by Vale Francis RN  Isolation Precautions: special precautions maintained   Goal Outcome Evaluation:       Pt is AOX4, but forgetful. Pt is medsurg. VSS. Droplet precautions maintained. Pt ambulating to bathroom. Plan to discharge to TCU tomorrow.

## 2023-11-02 NOTE — LETTER
[facility]       [facility]            Please give to facility      From:   Magda Mccormick [facility]  Care Coordinator   [facility]   P: [phone]  [email or web address]        Patient Name:  Deloris Larson     YOB: 1930   Admit date:   11-1-2023        Information Needed:  Please contact me when the patient will discharge (or if they will move to long term care)- include the discharge date, disposition, and main diagnosis       If the patient is discharged with home care services, please provide the name of the agency    Also- Please inform me if a care conference is being held.     Phone or Email with information                              Thank you

## 2023-11-02 NOTE — PROGRESS NOTES
Clinic Care Coordination Contact  Care Coordination Transition Communication    Clinical Data: Patient was hospitalized at          [facility] Hospitalist Discharge Summary       Date of Admission:  10/25/2023  Date of Discharge:  11/1/2023     Discharge Diagnoses  Acute COVID-pneumonia  Acute respiratory failure with hypoxia, resolved   Chronic CHF, discharged dysfunction  Paroxysmal A-fib  Hypertension.   Weakness and deconditioned  Iron deficiency anemia  History of colon cancer          Assessment: Patient has transitioned to TCU/ARU for short term rehabilitation:    Facility Name: [facility]  Transition Communication:  Notified facility of Primary Care- Care Coordination support via Epic fax.    Plan: Care Coordinator will await notification from facility staff informing of patient's discharge plans/needs. Care Coordinator will review chart and outreach to facility staff every 4 weeks and as needed.     Magda Mccormick,   [facility]  [phone]

## 2023-11-10 NOTE — TELEPHONE ENCOUNTER
Received admit forms for pt from [facility]. PCP is not in clinic. Placed forms on Dr. Ramires desk for review and signature, if appropriate.     Roberto Carols Raines Jr., CMA on 11/10/2023 at 10:03 AM

## 2023-11-20 NOTE — PROGRESS NOTES
Clinic Care Coordination Contact  Care Team Conversations    Patient is still at the [facility], very happy there. Feeling ok. She stated she does not believe she is going to get discharged anytime soon. TCU [facility] was called and patient has discharged from there to [facility]. Attempted to confirm plan that patient has moved the [facility] permanently. Called daughter but was not able to reach daughter via phone. Number rang then busy signal.     Message from daughter that patient is now living in [place].      Plan- closed to care coordination.   Shahrzad Pat RN, BSN, PHN  Casual RN Care Coordinator  [facility] Primary Care [facility]  (Covering for Lead RN Care Coordinator)

## 2023-11-30 NOTE — TELEPHONE ENCOUNTER
Received additional forms from [employer]. They are needing Latest H&P.       Sent latest office notes.       Roberto Carlos Raines Jr., CMA on 11/30/2023 at 12:34 PM  '

## 2023-11-30 NOTE — TELEPHONE ENCOUNTER
FAX [facility] Refill Request    atorvastatin (LIPITOR) 20 MG tablet  - RX is at [facility] requesting RX is sent to [facility]    metoprolol succinate ER (TOPROL XL) 50 MG 24 hr tablet       Last Visit with PCP = 10/16/23

## 2023-12-04 NOTE — PATIENT INSTRUCTIONS
Goal for water intake is 40-64 ounces of water daily.     Drink protein shake daily.     Lotion leg twice daily.

## 2023-12-04 NOTE — PROGRESS NOTES
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 Patient and family have declined colonoscopy for tissue diagnosis as they would not pursue any surgical or chemotherapy anyway and they are still happy with that plan today.  Deloris Freeman has been feeling more fatigued, suspect continued blood loss from the mass that may be causing further anemia.  - Repeat CBC today does show drop in hemoglobin to 8.5 from 9.9 (10/26/23)  - Iron counts also decreased from prior   - Will order IV iron infusions to help combat some of this blood loss  - Will need to strongly reconsider continuing her anticoagulation if she continues to have active blood loss leading to anemia, shortness of breath and more fatigue versus full transition to hospice care  - Family will consider all of these options and we will discuss further at her next visit as well    Additionally, because of this cancer and resultant fatigue and shortness of breath, this patient has a mobility limitation that significantly impairs ability to participate in activities of daily living within the home. These imitations cannot be resolved with a cane or a walker. The patient's home provides adequate space to maneuver the manual wheelchair. The use of a manual wheelchair will significantly improve this patient's ability to complete her activities of daily living. The patient is safely able to use a manual wheelchair. The patient has not expressed an unwillingness to use the manual wheelchair. For this reason I do believe that a wheelchair is indicated.         Relevant Orders    Comprehensive metabolic panel (Completed)    Wheelchair Order for DME - ONLY FOR DME       Circulatory    Essential hypertension     Well controlled on current regimen.   - Continue metoprolol XL 50 mg daily         Chronic  atrial fibrillation (H)     Rate controlled on exam today.  EKG during recent admission for COVID again shows rate controlled atrial fibrillation.  Additionally, TTE in September showed severe left atrial enlargement.  All of this together, would make it much more high risk in regard to stroke risk if we were to stop her Eliquis because of slow bleeding from her known colon cancer.  -For now, continue metoprolol XL 50 mg daily and Eliquis 2.5 mg BID  -I have asked the family to get together to further discuss this decision to continue or stop her Eliquis, we will discuss further at next visit as well         Nonrheumatic aortic valve stenosis     Repeat TTE 9/29/23 showed moderate aortic stenosis with mild increase in the mean gradient across the aortic valve   - Continue annual cardiology follow-up            Urinary    CKD (chronic kidney disease) stage 3, GFR 30-59 ml/min (H)     Renal function stable on labs today.         Relevant Orders    Comprehensive metabolic panel (Completed)       Hematologic    Iron deficiency anemia     As per malignant mass at the cecum.  Anemia is worse today.  We will be proceeding with IV iron infusions.  Will have her continue her oral iron supplement as well.  -Will repeat CBC and iron studies 6 to 8 weeks after iron infusions         Relevant Orders    Ferritin (Completed)    Iron and iron binding capacity (Completed)    CBC with platelets (Completed)    Infusion Appointment Request     Other Visit Diagnoses       Chronic right ear pain        Relevant Medications    fluticasone (FLONASE) 50 MCG/ACT nasal spray    Other Relevant Orders    Adult ENT  Referral             Ordering of each unique test  Prescription drug management     FUTURE APPOINTMENTS:       - Follow-up visit in 2-3 months    Daniela Roth MD  [facility]   Deloris Freeman is a 89 year old, presenting for the following health issues:  Follow Up (Cancer and  Bleeding issues) and Chronic Disease Management (F/U)        12/4/2023     3:14 PM   Additional Questions   Roomed by RADHA Cagle   Accompanied by Son in law     History of Present Illness     Reason for visit:  Cancer and bleeding issues followup    Deloris Freeman presents with her son-in-law today for general follow-up.  Since her last visit, she unfortunately was admitted to the hospital with COVID 10/25-11/1.  She initially had an oxygen requirement during that admission but was able to be weaned off prior to discharge.  Since then, she feels she is doing fairly well.  She continues to live on her own in her assisted living apartment.  However, she does feel like she been more fatigued for the last month.  Has had a little bit more shortness of breath with exertion.  She does continue to take her iron supplement.    Her son-in-law says today that she has been bringing up the topic of hospice more, and they are wanting to discuss this a little bit further today.In talking with son-in-law, Roberto Carlos, it sounds like the family feels like Deloris Freeman is doing pretty well and not quite ready for the transition to hospice care.     They had follow-up with Dr. Chan this morning.  Everything from a cardiac standpoint seems stable.  Recommended to continue metoprolol for rate control of atrial fibrillation and low-dose Eliquis for anticoagulation.    Additionally, Deloris Freeman did want to discuss right ear pain.  She tells me this has been ongoing for at least 30 years. Only notices with pressure to the ear, like when  she is lying down. Saw derm last week for follow-up after the BCC was removed from that ear, nothing on the skin was seen to explain symptoms, they recommended perhaps seeing ENT for further evaluation.     She eats 2-3 servings of fruits and vegetables daily.She consumes 2 sweetened beverage(s) daily.She exercises with enough effort to increase her heart rate 9 or less minutes per day.  She exercises with enough  "effort to increase her heart rate 3 or less days per week.   She is taking medications regularly.       Review of Systems   Constitutional, HEENT, cardiovascular, pulmonary, gi and gu systems are negative, except as otherwise noted.      Objective    /77 (BP Location: Right arm, Patient Position: Sitting, Cuff Size: Adult Regular)   Pulse 67   Temp 98  F (36.7  C) (Oral)   Resp 20   Ht 1.626 m (5' 4\")   Wt 64.5 kg (142 lb 4.8 oz)   LMP  (LMP Unknown)   SpO2 97%   BMI 24.43 kg/m    Body mass index is 24.43 kg/m .  Physical Exam   GENERAL: healthy, alert and no distress  EYES: Eyes grossly normal to inspection, PERRL and conjunctivae and sclerae normal  HENT: ear canals and TM's normal, nose and mouth without ulcers or lesions  RESP: lungs clear to auscultation - no rales, rhonchi or wheezes  CV: Irregularly irregular rate and rhythm, normal S1 S2, no S3 or S4, no murmur, click or rub, mild b/l peripheral edema and peripheral pulses strong  ABDOMEN: soft, nontender, no hepatosplenomegaly, no masses and bowel sounds normal  MS: no gross musculoskeletal defects noted  SKIN: no suspicious lesions or rashes on exposed skin  NEURO: Normal strength and tone, mentation intact and speech normal  PSYCH: mentation appears normal, affect normal/bright    Results for orders placed or performed in visit on 12/04/23   Ferritin     Status: Normal   Result Value Ref Range    Ferritin 25 11 - 328 ng/mL   Iron and iron binding capacity     Status: Abnormal   Result Value Ref Range    Iron 14 (L) 37 - 145 ug/dL    Iron Binding Capacity 298 240 - 430 ug/dL    Iron Sat Index 5 (L) 15 - 46 %   CBC with platelets     Status: Abnormal   Result Value Ref Range    WBC Count 6.5 4.0 - 11.0 10e3/uL    RBC Count 3.26 (L) 3.80 - 5.20 10e6/uL    Hemoglobin 8.5 (L) 11.7 - 15.7 g/dL    Hematocrit 28.1 (L) 35.0 - 47.0 %    MCV 86 78 - 100 fL    MCH 26.1 (L) 26.5 - 33.0 pg    MCHC 30.2 (L) 31.5 - 36.5 g/dL    RDW 17.5 (H) 10.0 - 15.0 %    " Platelet Count 281 150 - 450 10e3/uL   Comprehensive metabolic panel     Status: Abnormal   Result Value Ref Range    Sodium 145 135 - 145 mmol/L    Potassium 4.2 3.4 - 5.3 mmol/L    Carbon Dioxide (CO2) 25 22 - 29 mmol/L    Anion Gap 10 7 - 15 mmol/L    Urea Nitrogen 11.4 8.0 - 23.0 mg/dL    Creatinine 0.88 0.51 - 0.95 mg/dL    GFR Estimate 61 >60 mL/min/1.73m2    Calcium 8.7 8.2 - 9.6 mg/dL    Chloride 110 (H) 98 - 107 mmol/L    Glucose 97 70 - 99 mg/dL    Alkaline Phosphatase 74 40 - 150 U/L    AST 21 0 - 45 U/L    ALT 10 0 - 50 U/L    Protein Total 5.9 (L) 6.4 - 8.3 g/dL    Albumin 3.7 3.5 - 5.2 g/dL    Bilirubin Total 0.5 <=1.2 mg/dL   Results for orders placed or performed in visit on 12/04/23   Cardiac Device Check - In Clinic     Status: None   Result Value Ref Range    Date Time Interrogation Session [id number]     Implantable Pulse Generator  [employer]     Implantable Pulse Generator Model L311 ACCOLADE MRI     Implantable Pulse Generator Serial Number [id number]     Type Interrogation Session In Clinic     Clinic Name [facility]     Implantable Pulse Generator Type Pacemaker     Implantable Pulse Generator Implant Date 20180627     Implantable Lead  [employer]     Implantable Lead Model 7740 Ingevity MRI     Implantable Lead Serial Number [id number]     Implantable Lead Implant Date 20180627     Implantable Lead Polarity Type Bipolar Lead     Implantable Lead Location Detail 1 SEPTUM     Implantable Lead Special Function 52 cm     Implantable Lead Location Right Ventricle     Implantable Lead Connection Status Connected     Implantable Lead  [employer]     Implantable Lead Model 7741 Ingevity MRI     Implantable Lead Serial Number [id number]     Implantable Lead Implant Date 20180627     Implantable Lead Polarity Type Bipolar Lead     Implantable Lead Location Detail 1 APPENDAGE     Implantable Lead Special Function 45 cm     Implantable  Lead Location Right Atrium     Implantable Lead Connection Status Connected     Chester Setting Mode (NBG Code) VVIR     Chester Setting Lower Rate Limit 60 [beats]/min    Chester Setting Maximum Sensor Rate 130 [beats]/min    Lead Channel Setting Sensing Sensitivity 0.15 mV    Lead Channel Setting Sensing Adaptation Mode Fixed     Lead Channel Setting Sensing Polarity Bipolar     Lead Channel Setting Sensing Sensitivity 2.5 mV    Lead Channel Setting Sensing Adaptation Mode Fixed     Lead Channel Setting Pacing Polarity Bipolar     Lead Channel Setting Pacing Pulse Width 0.4 ms    Lead Channel Setting Pacing Amplitude 2.0 V    Lead Channel Setting Pacing Capture Mode Monitor     Zone Setting Type Category VT     Zone Setting Vendor Type Category VT     Zone Setting Status Monitor     Zone Setting Detection Interval 375 ms    Lead Channel Status Check Lead     Lead Channel Impedance Value 600 ohm    Lead Channel Pacing Threshold Amplitude 1.4 V    Lead Channel Pacing Threshold Pulse Width 0.4 ms    Battery Date Time of Measurements 20231204081500     Battery Status Middle of Service     Battery Remaining Longevity 42 mo    Battery Remaining Percentage 72 %    Chester Statistic Date Time Start 20230331000000     Chester Statistic Date Time End 20231204000000     Chester Statistic RA Percent Paced 0 %    Chester Statistic RV Percent Paced 33 %    Episode Statistic Recent Count 0     Episode Statistic Type Category Other     Episode Statistic Recent Count 2     Episode Statistic Type Category VT     Episode Statistic Vendor Type Category NSVT     Episode Statistic Recent Count 0     Episode Statistic Type Category VT     Episode Statistic Vendor Type Category VT     Episode Statistic Recent Date Time Start 20230331000000     Episode Statistic Recent Date Time End 20231204000000     Episode Statistic Recent Date Time Start 20230331000000     Episode Statistic Recent Date Time End 20231204000000     Episode Statistic Recent Date Time  Start [id number]     Episode Statistic Recent Date Time End 20231204000000     Narrative    Encounter Type: Patient seen in clinic for annual device evaluation and   iterative programming followed by appt with Dr Chan. Patient uses a   walker is here with son in law Collier  Device: [employer] Accolade (S) Pacemaker  Pacing %/Programmed:  33% / VVIR  bpm  Lead(s): RA off, RV stable  Battery longevity: Estimates 3.5 years remaining  Presenting rhythm: VS  bpm  Underlying rhythm: Afib w/ Vrates  bpm  Heart rates: , primarily  bpm  Atrial High rates: Chronic Afib, VR >= 120 bpm ~2% of the time  Anticoagulant: Eliquis  Ventricular High rates: 2 NSVT episodes both on 4/23/23, EGM suggests   AF/RVR.  Comments: Normal device function.  Resp sensor changed from Active to   Sedentary  Plan: Next remot milton 3/7/2024, reminder letter to patient and Nando- he   texted her daughter and said her home transmitter is in her living room,   they will move it to her bedroom and plug it in today. Patient recently   moved to a Sr Living apt and said she enjoys it. Elmira Rowland, Device RN    Device follow up for the entirety of having the Pacemaker, based on best   practices determined by Heart R[employer] Society and in Compliance with   Medicare guidelines. Continue remote device monitoring per patient plan.  I have reviewed and interpreted the device interrogation, settings,   programming, and encounter summary. The device is functioning within   normal device parameters. I agree with the current findings, assessment   and plan.

## 2023-12-04 NOTE — PROGRESS NOTES
HEART CARE ENCOUNTER CONSULTATON NOTE      [facility]  [phone]      Assessment/Recommendations   Assessment:  1.  Atrial fibrillation: Chronic and well rate controlled  2.  History of SVT  3.  Tachybradycardia status post dual-chamber pacemaker  4.  Anticoagulation: Maintained on low-dose Eliquis for anticoagulation  5.  Aortic stenosis: Moderate aortic stenosis  6.  Coronary artery disease with history of PCI to RCA     Plan:  1.  Echocardiogram in 1 year prior to next appointment  2.  Continue metoprolol  3.  Continue Eliquis for anticoagulation  4.  Follow-up in 1 year         History of Present Illness/Subjective    HPI: Deloris Larson is a 89 year old female with history of tachybradycardia syndrome status post dual-chamber pacemaker 6/27/2018, SVT, chronic atrial fibrillation rate controlled, coronary artery disease status post remote PCI of RCA (angiogram 2018 nonobstructive coronary artery disease), moderate aortic stenosis who I am seeing today in follow-up.  She is here today accompanied by her son.  She has been doing well.  She is active for her age.  She has not noted any problems with worsening shortness of breath, chest pain, palpitations.  Device check today looks great without significant events.  Atrial fibrillation is well rate controlled.         Physical Examination  Review of Systems   Vitals: /60 (BP Location: Left arm, Patient Position: Sitting, Cuff Size: Adult Regular)   Pulse 80   Resp 16   Wt 64.4 kg (142 lb)   LMP  (LMP Unknown)   BMI 24.37 kg/m    BMI= Body mass index is 24.37 kg/m .  Wt Readings from Last 3 Encounters:   12/04/23 64.4 kg (142 lb)   11/01/23 62.8 kg (138 lb 8 oz)   10/16/23 63.4 kg (139 lb 12.8 oz)       General Appearance:   no distress, normal body habitus   ENT/Mouth: membranes moist, no oral lesions or bleeding gums.      EYES:  no scleral icterus, normal conjunctivae   Neck: no carotid bruits or thyromegaly   Chest/Lungs:    lungs are clear to auscultation   Cardiovascular:   irregular. Normal first and second heart sounds with no murmur mild edema bilaterally    Abdomen:  bowel sounds are present   Extremities: no cyanosis or clubbing   Skin: no xanthelasma, warm.    Neurologic: normal  bilateral, no tremors     Psychiatric: alert and oriented x3, calm        Please refer above for cardiac ROS details.        Medical History  Surgical History Family History Social History   Past Medical History:   Diagnosis Date    2019 novel coronavirus disease (COVID-19) 2/1/2021    Acute respiratory failure with hypoxia (H) 2/5/2021    Anemia     Atrial fibrillation (H)     Bronchiectasis with acute lower respiratory infection (H) 1/28/2021    CAD (coronary artery disease)     Chronic diarrhea     Clinical diagnosis of COVID-19     1/2021    Coronary artery disease     Former smoker     Hematoma of left iliopsoas muscle 01/19/2020    while on eliquis.    History of skin cancer     Hyperlipidemia     Hypertension     Insomnia     Lumbar spondylosis 2016    Migraine     PAD (peripheral artery disease) (H24) 10/19/2015    Paroxysmal SVT (supraventricular tachycardia)     Created by Conversion     Persistent atrial fibrillation (H) 05/09/2018    New diagnosis April 16 18th and found incidentally on physical exam during yearly exam in primary clinic [id number] score of 5-new Eliquis start    Poliomyelitis     Restless legs syndrome (RLS)     RLS (restless legs syndrome) 02/28/2017    Sick sinus syndrome (H) 01/28/2020    Sigmoid diverticulosis 04/04/2007    SSS (sick sinus syndrome) (H)     S/p pacemaker     Past Surgical History:   Procedure Laterality Date    APPENDECTOMY      APPENDECTOMY      CAPSULOTOMY Bilateral 12/2015    YAG capsulotomy surgery. 12/21/15, 12/28/15    CARDIOVERSION  05/24/2018    EP Cardioversion External    CATARACT EXTRACTION, BILATERAL Bilateral 03/2012    3/15 and 3/29 by Dr. Barrett at the [facility]    CHOLECYSTECTOMY       CHOLECYSTECTOMY      COLONOSCOPY  2012    COLONOSCOPY W/ BIOPSIES  2007    COLONOSCOPY W/ POLYPECTOMY  2012    2 mm tubular adenoma in the rectum. Hemorrhoids. Diverticulosis.    CORONARY ANGIOPLASTY      CV CORONARY ANGIOGRAM N/A 2018    Procedure: Coronary Angiogram;  Surgeon: Joby Vargas MD;  Location: [facility];  Service:     EP PACEMAKER INSERT N/A 2018    Procedure: EP Pacemaker Insertion;  Surgeon: Osito Alvarez MD;  Location: [facility];  Service:     HERNIA REPAIR Right     HYSTERECTOMY      HYSTERECTOMY TOTAL ABDOMINAL      IMPLANT PACEMAKER      INGUINAL HERNIA REPAIR Right     Indirect- Dr. Pedersen    MASTECTOMY      OOPHORECTOMY      SKIN CANCER EXCISION  2015    Right leg on 10/21/15. Right arm 9/29/15    STRIP VEIN      ligation?    TUBAL LIGATION      TUBAL LIGATION       Family History   Adopted: Yes   Problem Relation Age of Onset    Pulmonary Embolism Mother 32.00    Heart Disease Father     CABG Son     Stomach Cancer Grandson 20.00    Pulmonary Embolism Maternal Grandmother 32.00    No Known Problems Daughter     No Known Problems Daughter     CABG Son 64.00        Social History     Socioeconomic History    Marital status:      Spouse name: Not on file    Number of children: 3    Years of education: Not on file    Highest education level: Not on file   Occupational History    Not on file   Tobacco Use    Smoking status: Former     Packs/day: 1.50     Years: 25.00     Additional pack years: 0.00     Total pack years: 37.50     Types: Cigarettes     Start date: 1949     Quit date: 1974     Years since quittin.9    Smokeless tobacco: Never   Vaping Use    Vaping Use: Never used   Substance and Sexual Activity    Alcohol use: Yes     Alcohol/week: 7.0 standard drinks of alcohol    Drug use: No    Sexual activity: Not on file   Other Topics Concern    Not on file   Social History Narrative    She lives alone in a  house. She has 3 children and 8 grandchildren and multiple great grandchildren. She is retired. 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 She does not smoke cigarettes and rarely drinks alcoho l.    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  Her [employer] had to close, due to declining numbers.    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 She has never driven a car. Does her own cooking, and ba sarika. She does not use a computer, never did.    Jayde Diaz MD 5/24/2021         Social Determinants of Health     Financial Resource Strain: Low Risk  (10/16/2023)    Financial Resource Strain     Within the past 12 months, have you or your family members you live with been unable to get utilities (heat, electricity) when it was really needed?: No   Food Insecurity: Low Risk  (10/16/2023)    Food Insecurity     Within the past 12 months, did you worry that your food would run out before you got money to buy more?: No     Within the past 12 months, did the food you bought just not last and you didn t have money to get more?: No   Transportation Needs: Low Risk  (10/16/2023)    Transportation Needs     Within the past 12 months, has lack of transportation kept you from medical appointments, getting your medicines, non-medical meetings or appointments, work, or from getting things that you need?: No   Physical Activity: Inactive (1/19/2023)    Exercise Vital Sign     Days of Exercise per Week: 0 days     Minutes of Exercise per Session: 0 min   Stress: Stress Concern Present (1/19/2023)    [employer] - Occupational Stress Questionnaire     Feeling of Stress : To some extent   Social Connections: Socially Isolated (1/19/2023)    Social Connection and Isolation Panel [NHANES]     Frequency of Communication with Friends and Family: More than three times a week     Frequency of Social Gatherings with  Friends and Family: Once a week     Attends [employer] Services: Never     Active Member of Clubs or Organizations: No     Attends Club or Organization Meetings: Not on file     Marital Status:    Interpersonal Safety: Not on file   Housing Stability: Low Risk  (10/16/2023)    Housing Stability     Do you have housing? : Yes     Are you worried about losing your housing?: No           Medications  Allergies   Current Outpatient Medications   Medication Sig Dispense Refill    acetaminophen (TYLENOL) 500 MG tablet Take 1,000 mg by mouth every 8 hours as needed      apixaban ANTICOAGULANT (ELIQUIS ANTICOAGULANT) 2.5 MG tablet Take 1 tablet (2.5 mg) by mouth 2 times daily 180 tablet 3    atorvastatin (LIPITOR) 20 MG tablet Take 1 tablet (20 mg) by mouth at bedtime 90 tablet 3    cyanocobalamin (VITAMIN B-12) 500 MCG tablet Take 500 mcg by mouth daily      ferrous sulfate (FEROSUL) 325 (65 Fe) MG tablet Take 1 tablet (325 mg) by mouth daily (with breakfast) 30 tablet 3    meclizine (ANTIVERT) 25 MG tablet Take 1 tablet (25 mg) by mouth 3 times daily as needed for dizziness 30 tablet 3    Melatonin 5 MG CHEW Take 5 mg by mouth at bedtime as needed, may repeat once 90 tablet 3    metoprolol succinate ER (TOPROL XL) 50 MG 24 hr tablet Take 1 tablet (50 mg) by mouth daily 90 tablet 3    dexAMETHasone (DECADRON) 6 MG tablet Take 1 tablet (6 mg) by mouth daily for 7 days         Allergies   Allergen Reactions    Diphenhydramine           Lab Results    Chemistry/lipid CBC Cardiac Enzymes/BNP/TSH/INR   Recent Labs   Lab Test 07/18/23  1042   CHOL 154   HDL 46*   LDL 88   TRIG 102     Recent Labs   Lab Test 07/18/23  1042 12/08/21  1342 04/16/18  1238   LDL 88 86 76     Recent Labs   Lab Test 11/01/23  0536      POTASSIUM 4.3   CHLORIDE 109*   CO2 27   GLC 97   BUN 26.0*   CR 0.95   GFRESTIMATED 56*   RAMA 8.7     Recent Labs   Lab Test 11/01/23  0536 10/29/23  0431 10/27/23  0450   CR 0.95 0.90 0.98*     No results  "for input(s): \"A1C\" in the last 09991 hours.       Recent Labs   Lab Test 11/01/23  0536 10/29/23  0431 10/27/23  0450   WBC  --   --  8.6   HGB  --   --  9.9*   HCT  --   --  32.3*   MCV  --   --  80      < > 160    < > = values in this interval not displayed.     Recent Labs   Lab Test 10/27/23  0450 10/26/23  0717 10/25/23  2230   HGB 9.9* 10.6* 10.3*    Recent Labs   Lab Test 01/29/21  0532 01/29/21  0006 01/28/21  1818   TROPONINI 0.03 0.04 0.04     Recent Labs   Lab Test 10/25/23  2230 09/19/23  1710   NTBNPI  --  3,285*   NTBNP 4,367*  --      Recent Labs   Lab Test 10/16/23  1609   TSH 4.28*     Recent Labs   Lab Test 09/19/23  1710 01/30/21  0817 01/30/21  0000   INR 1.45* 1.23* 1.23*        Kristine Chan MD                                      "

## 2023-12-04 NOTE — LETTER
12/4/2023    Daniela Roth MD  [address] [facility] [address]    RE: Deloris Larson       Dear Colleague,     I had the pleasure of seeing Deloris Larson in the [facility].    HEART CARE ENCOUNTER CONSULTATON NOTE      [facility]  [phone]      Assessment/Recommendations   Assessment:  1.  Atrial fibrillation: Chronic and well rate controlled  2.  History of SVT  3.  Tachybradycardia status post dual-chamber pacemaker  4.  Anticoagulation: Maintained on low-dose Eliquis for anticoagulation  5.  Aortic stenosis: Moderate aortic stenosis  6.  Coronary artery disease with history of PCI to RCA     Plan:  1.  Echocardiogram in 1 year prior to next appointment  2.  Continue metoprolol  3.  Continue Eliquis for anticoagulation  4.  Follow-up in 1 year         History of Present Illness/Subjective    HPI: Deloris Larson is a 89 year old female with history of tachybradycardia syndrome status post dual-chamber pacemaker 6/27/2018, SVT, chronic atrial fibrillation rate controlled, coronary artery disease status post remote PCI of RCA (angiogram 2018 nonobstructive coronary artery disease), moderate aortic stenosis who I am seeing today in follow-up.  She is here today accompanied by her son.  She has been doing well.  She is active for her age.  She has not noted any problems with worsening shortness of breath, chest pain, palpitations.  Device check today looks great without significant events.  Atrial fibrillation is well rate controlled.         Physical Examination  Review of Systems   Vitals: /60 (BP Location: Left arm, Patient Position: Sitting, Cuff Size: Adult Regular)   Pulse 80   Resp 16   Wt 64.4 kg (142 lb)   LMP  (LMP Unknown)   BMI 24.37 kg/m    BMI= Body mass index is 24.37 kg/m .  Wt Readings from Last 3 Encounters:   12/04/23 64.4 kg (142 lb)   11/01/23 62.8 kg (138 lb 8 oz)   10/16/23 63.4 kg (139 lb 12.8 oz)       General  Appearance:   no distress, normal body habitus   ENT/Mouth: membranes moist, no oral lesions or bleeding gums.      EYES:  no scleral icterus, normal conjunctivae   Neck: no carotid bruits or thyromegaly   Chest/Lungs:   lungs are clear to auscultation   Cardiovascular:   irregular. Normal first and second heart sounds with no murmur mild edema bilaterally    Abdomen:  bowel sounds are present   Extremities: no cyanosis or clubbing   Skin: no xanthelasma, warm.    Neurologic: normal  bilateral, no tremors     Psychiatric: alert and oriented x3, calm        Please refer above for cardiac ROS details.        Medical History  Surgical History Family History Social History   Past Medical History:   Diagnosis Date    2019 novel coronavirus disease (COVID-19) 2/1/2021    Acute respiratory failure with hypoxia (H) 2/5/2021    Anemia     Atrial fibrillation (H)     Bronchiectasis with acute lower respiratory infection (H) 1/28/2021    CAD (coronary artery disease)     Chronic diarrhea     Clinical diagnosis of COVID-19     1/2021    Coronary artery disease     Former smoker     Hematoma of left iliopsoas muscle 01/19/2020    while on eliquis.    History of skin cancer     Hyperlipidemia     Hypertension     Insomnia     Lumbar spondylosis 2016    Migraine     PAD (peripheral artery disease) (H24) 10/19/2015    Paroxysmal SVT (supraventricular tachycardia)     Created by Conversion     Persistent atrial fibrillation (H) 05/09/2018    New diagnosis April 16 18th and found incidentally on physical exam during yearly exam in primary clinic [id number] score of 5-new Eliquis start    Poliomyelitis     Restless legs syndrome (RLS)     RLS (restless legs syndrome) 02/28/2017    Sick sinus syndrome (H) 01/28/2020    Sigmoid diverticulosis 04/04/2007    SSS (sick sinus syndrome) (H)     S/p pacemaker     Past Surgical History:   Procedure Laterality Date    APPENDECTOMY      APPENDECTOMY      CAPSULOTOMY Bilateral 12/2015    YAG  capsulotomy surgery. 12/21/15, 12/28/15    CARDIOVERSION  2018    EP Cardioversion External    CATARACT EXTRACTION, BILATERAL Bilateral 2012    3/15 and 3/29 by Dr. Barrett at the [facility]    CHOLECYSTECTOMY      CHOLECYSTECTOMY      COLONOSCOPY  2012    COLONOSCOPY W/ BIOPSIES  2007    COLONOSCOPY W/ POLYPECTOMY  2012    2 mm tubular adenoma in the rectum. Hemorrhoids. Diverticulosis.    CORONARY ANGIOPLASTY      CV CORONARY ANGIOGRAM N/A 2018    Procedure: Coronary Angiogram;  Surgeon: Joby Vargas MD;  Location: [facility];  Service:     EP PACEMAKER INSERT N/A 2018    Procedure: EP Pacemaker Insertion;  Surgeon: Osito Alvarez MD;  Location: [facility];  Service:     HERNIA REPAIR Right     HYSTERECTOMY      HYSTERECTOMY TOTAL ABDOMINAL      IMPLANT PACEMAKER      INGUINAL HERNIA REPAIR Right     Indirect- Dr. Pedersen    MASTECTOMY      OOPHORECTOMY      SKIN CANCER EXCISION      Right leg on 10/21/15. Right arm 9/29/15    STRIP VEIN      ligation?    TUBAL LIGATION      TUBAL LIGATION       Family History   Adopted: Yes   Problem Relation Age of Onset    Pulmonary Embolism Mother 32.00    Heart Disease Father     CABG Son     Stomach Cancer Grandson 20.00    Pulmonary Embolism Maternal Grandmother 32.00    No Known Problems Daughter     No Known Problems Daughter     CABG Son 64.00        Social History     Socioeconomic History    Marital status:      Spouse name: Not on file    Number of children: 3    Years of education: Not on file    Highest education level: Not on file   Occupational History    Not on file   Tobacco Use    Smoking status: Former     Packs/day: 1.50     Years: 25.00     Additional pack years: 0.00     Total pack years: 37.50     Types: Cigarettes     Start date: 1949     Quit date: 1974     Years since quittin.9    Smokeless tobacco: Never   Vaping Use    Vaping Use: Never used   Substance and  Sexual Activity    Alcohol use: Yes     Alcohol/week: 7.0 standard drinks of alcohol    Drug use: No    Sexual activity: Not on file   Other Topics Concern    Not on file   Social History Narrative    She lives alone in a house. She has 3 children and 8 grandchildren and multiple great grandchildren. She is retired. 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 She does not smoke cigarettes and rarely drinks alcoho l.    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  Her [employer] had to close, due to declining numbers.    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 She has never driven a car. Does her own cooking, and ba sarika. She does not use a computer, never did.    Jayde Diaz MD 5/24/2021         Social Determinants of Health     Financial Resource Strain: Low Risk  (10/16/2023)    Financial Resource Strain     Within the past 12 months, have you or your family members you live with been unable to get utilities (heat, electricity) when it was really needed?: No   Food Insecurity: Low Risk  (10/16/2023)    Food Insecurity     Within the past 12 months, did you worry that your food would run out before you got money to buy more?: No     Within the past 12 months, did the food you bought just not last and you didn t have money to get more?: No   Transportation Needs: Low Risk  (10/16/2023)    Transportation Needs     Within the past 12 months, has lack of transportation kept you from medical appointments, getting your medicines, non-medical meetings or appointments, work, or from getting things that you need?: No   Physical Activity: Inactive (1/19/2023)    Exercise Vital Sign     Days of Exercise per Week: 0 days     Minutes of Exercise per Session: 0 min   Stress: Stress Concern Present (1/19/2023)    [employer] [place] [employer] - Occupational Stress Questionnaire     Feeling of Stress : To some extent    Social Connections: Socially Isolated (1/19/2023)    Social Connection and Isolation Panel [NHANES]     Frequency of Communication with Friends and Family: More than three times a week     Frequency of Social Gatherings with Friends and Family: Once a week     Attends Advent Services: Never     Active Member of Clubs or Organizations: No     Attends Club or Organization Meetings: Not on file     Marital Status:    Interpersonal Safety: Not on file   Housing Stability: Low Risk  (10/16/2023)    Housing Stability     Do you have housing? : Yes     Are you worried about losing your housing?: No           Medications  Allergies   Current Outpatient Medications   Medication Sig Dispense Refill    acetaminophen (TYLENOL) 500 MG tablet Take 1,000 mg by mouth every 8 hours as needed      apixaban ANTICOAGULANT (ELIQUIS ANTICOAGULANT) 2.5 MG tablet Take 1 tablet (2.5 mg) by mouth 2 times daily 180 tablet 3    atorvastatin (LIPITOR) 20 MG tablet Take 1 tablet (20 mg) by mouth at bedtime 90 tablet 3    cyanocobalamin (VITAMIN B-12) 500 MCG tablet Take 500 mcg by mouth daily      ferrous sulfate (FEROSUL) 325 (65 Fe) MG tablet Take 1 tablet (325 mg) by mouth daily (with breakfast) 30 tablet 3    meclizine (ANTIVERT) 25 MG tablet Take 1 tablet (25 mg) by mouth 3 times daily as needed for dizziness 30 tablet 3    Melatonin 5 MG CHEW Take 5 mg by mouth at bedtime as needed, may repeat once 90 tablet 3    metoprolol succinate ER (TOPROL XL) 50 MG 24 hr tablet Take 1 tablet (50 mg) by mouth daily 90 tablet 3    dexAMETHasone (DECADRON) 6 MG tablet Take 1 tablet (6 mg) by mouth daily for 7 days         Allergies   Allergen Reactions    Diphenhydramine           Lab Results    Chemistry/lipid CBC Cardiac Enzymes/BNP/TSH/INR   Recent Labs   Lab Test 07/18/23  1042   CHOL 154   HDL 46*   LDL 88   TRIG 102     Recent Labs   Lab Test 07/18/23  1042 12/08/21  1342 04/16/18  1238   LDL 88 86 76     Recent Labs   Lab Test  "11/01/23  0536      POTASSIUM 4.3   CHLORIDE 109*   CO2 27   GLC 97   BUN 26.0*   CR 0.95   GFRESTIMATED 56*   RAMA 8.7     Recent Labs   Lab Test 11/01/23  0536 10/29/23  0431 10/27/23  0450   CR 0.95 0.90 0.98*     No results for input(s): \"A1C\" in the last 06630 hours.       Recent Labs   Lab Test 11/01/23  0536 10/29/23  0431 10/27/23  0450   WBC  --   --  8.6   HGB  --   --  9.9*   HCT  --   --  32.3*   MCV  --   --  80      < > 160    < > = values in this interval not displayed.     Recent Labs   Lab Test 10/27/23  0450 10/26/23  0717 10/25/23  2230   HGB 9.9* 10.6* 10.3*    Recent Labs   Lab Test 01/29/21  0532 01/29/21  0006 01/28/21  1818   TROPONINI 0.03 0.04 0.04     Recent Labs   Lab Test 10/25/23  2230 09/19/23  1710   NTBNPI  --  3,285*   NTBNP 4,367*  --      Recent Labs   Lab Test 10/16/23  1609   TSH 4.28*     Recent Labs   Lab Test 09/19/23  1710 01/30/21  0817 01/30/21  0000   INR 1.45* 1.23* 1.23*        Kristine Chan MD    Thank you for allowing me to participate in the care of your patient.      Sincerely,     Kristine Chan MD   [facility]  cc:   Kristine Chan MD  [address]  [place] [address]      "

## 2023-12-07 PROBLEM — D50.9 IRON DEFICIENCY ANEMIA: Status: ACTIVE | Noted: 2023-01-01

## 2023-12-07 PROBLEM — I50.9 NEW ONSET OF CONGESTIVE HEART FAILURE (H): Status: RESOLVED | Noted: 2023-01-01 | Resolved: 2023-01-01

## 2023-12-07 PROBLEM — R05.2 SUBACUTE COUGH: Status: RESOLVED | Noted: 2023-04-26 | Resolved: 2023-01-01

## 2023-12-07 PROBLEM — D64.9 NORMOCYTIC ANEMIA: Status: RESOLVED | Noted: 2023-03-16 | Resolved: 2023-01-01

## 2023-12-07 PROBLEM — G25.81 RESTLESS LEGS SYNDROME (RLS): Status: RESOLVED | Noted: 2021-02-01 | Resolved: 2023-01-01

## 2023-12-07 PROBLEM — G25.81 RLS (RESTLESS LEGS SYNDROME): Status: RESOLVED | Noted: 2017-02-28 | Resolved: 2023-01-01

## 2023-12-07 NOTE — ASSESSMENT & PLAN NOTE
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 Patient and family have declined colonoscopy for tissue diagnosis as they would not pursue any surgical or chemotherapy anyway and they are still happy with that plan today.  Deloris Freeman has been feeling more fatigued, suspect continued blood loss from the mass that may be causing further anemia.  - Repeat CBC today does show drop in hemoglobin to 8.5 from 9.9 (10/26/23)  - Iron counts also decreased from prior   - Will order IV iron infusions to help combat some of this blood loss  - Will need to strongly reconsider continuing her anticoagulation if she continues to have active blood loss leading to anemia, shortness of breath and more fatigue versus full transition to hospice care  - Family will consider all of these options and we will discuss further at her next visit as well    Additionally, because of this cancer and resultant fatigue and shortness of breath, this patient has a mobility limitation that significantly impairs ability to participate in activities of daily living within the home. These imitations cannot be resolved with a cane or a walker. The patient's home provides adequate space to maneuver the manual wheelchair. The use of a manual wheelchair will significantly improve this patient's ability to complete her activities of daily living. The patient is safely able to use a manual wheelchair. The patient has not expressed an unwillingness to use the manual wheelchair. For this reason I do believe that a wheelchair is indicated.

## 2023-12-07 NOTE — ASSESSMENT & PLAN NOTE
Renal function stable on labs today.   Doxepin Pregnancy And Lactation Text: This medication is Pregnancy Category C and it isn't known if it is safe during pregnancy. It is also excreted in breast milk and breast feeding isn't recommended.

## 2023-12-07 NOTE — ASSESSMENT & PLAN NOTE
Repeat TTE 9/29/23 showed moderate aortic stenosis with mild increase in the mean gradient across the aortic valve   - Continue annual cardiology follow-up

## 2023-12-07 NOTE — ASSESSMENT & PLAN NOTE
As per malignant mass at the cecum.  Anemia is worse today.  We will be proceeding with IV iron infusions.  Will have her continue her oral iron supplement as well.  -Will repeat CBC and iron studies 6 to 8 weeks after iron infusions

## 2023-12-07 NOTE — ASSESSMENT & PLAN NOTE
Rate controlled on exam today.  EKG during recent admission for COVID again shows rate controlled atrial fibrillation.  Additionally, TTE in September showed severe left atrial enlargement.  All of this together, would make it much more high risk in regard to stroke risk if we were to stop her Eliquis because of slow bleeding from her known colon cancer.  -For now, continue metoprolol XL 50 mg daily and Eliquis 2.5 mg BID  -I have asked the family to get together to further discuss this decision to continue or stop her Eliquis, we will discuss further at next visit as well

## 2023-12-18 PROBLEM — I73.9 PAD (PERIPHERAL ARTERY DISEASE) (H): Status: ACTIVE | Noted: 2023-01-01

## 2023-12-18 PROBLEM — M20.41 HAMMER TOES OF BOTH FEET: Status: ACTIVE | Noted: 2023-01-01

## 2023-12-18 PROBLEM — B35.1 ONYCHOMYCOSIS: Status: ACTIVE | Noted: 2023-01-01

## 2023-12-18 PROBLEM — M20.42 HAMMER TOES OF BOTH FEET: Status: ACTIVE | Noted: 2023-01-01

## 2023-12-18 NOTE — PATIENT INSTRUCTIONS
Podiatry Clinics that offer foot and toenail care  [facility]  [facility]  [phone]    Foot and Ankle Clinics, [facility]  [phone]    [facility]  [place] - Dr. Sandra on Tuesdays  [phone]    [facility]  [place]  [phone]    [facility]  [facility]  [phone]    [facility]  [phone]    [facility]  [phone]      [facility]  *Nurse comes to your home for nail care.  Deloris Grant RN Foot Specialist  [phone]

## 2023-12-18 NOTE — PROGRESS NOTES
FOOT AND ANKLE SURGERY/PODIATRY CONSULT NOTE         ASSESSMENT:   Onychomycosis  PAD  Hammertoes      TREATMENT:  -I discussed with the patient that she does have long toenails on both feet.  Otherwise her feet are in good condition without signs of skin irritation or skin breakdown.    -Patient has agreed to sign the ABN form for nail trimming today.    -I debrided nails 1 through 10 in length and thickness.    -Patient has been referred to local routine foot care providers and I recommend nail trimming every 10 weeks.    -Patient's questions invited and answered.  She was encouraged to call my office with any further questions or concerns.     30 minutes spent on the day of encounter doing chart review, history and exam, documentation, and further activities as noted.     Catarino Herring DPM  [facility]/Foot & Ankle Surgery      HPI: I was asked to see Deloris Larson today for painful nails on both feet.  Patient presents today with her son-in-law complaining of long nails.  She would like a nail trim today.  She does live in assisted living type facility and has not scheduled with the in-house podiatrist for nail trimming in the past but will likely do this going forward.    Past Medical History:   Diagnosis Date    2019 novel coronavirus disease (COVID-19) 02/01/2021    Acute respiratory failure with hypoxia (H) 02/05/2021    Anemia     Atrial fibrillation (H)     Bronchiectasis with acute lower respiratory infection (H) 01/28/2021    CAD (coronary artery disease)     Chronic diarrhea     Clinical diagnosis of COVID-19     1/2021    Coronary artery disease     Former smoker     Hematoma of left iliopsoas muscle 01/19/2020    while on eliquis.    History of skin cancer     Hyperlipidemia     Hypertension     Insomnia     Lumbar spondylosis 2016    Migraine     New onset of congestive heart failure (H)     PAD (peripheral artery disease) (H24) 10/19/2015    Paroxysmal SVT (supraventricular  tachycardia)     Created by Conversion     Persistent atrial fibrillation (H) 2018    New diagnosis  and found incidentally on physical exam during yearly exam in primary clinic [id number] score of 5-new Eliquis start    Poliomyelitis     Restless legs syndrome (RLS)     RLS (restless legs syndrome) 2017    Sick sinus syndrome (H) 2020    Sigmoid diverticulosis 2007    SSS (sick sinus syndrome) (H)     S/p pacemaker    Subacute cough          Social History     Socioeconomic History    Marital status:      Spouse name: Not on file    Number of children: 3    Years of education: Not on file    Highest education level: Not on file   Occupational History    Not on file   Tobacco Use    Smoking status: Former     Packs/day: 1.50     Years: 25.00     Additional pack years: 0.00     Total pack years: 37.50     Types: Cigarettes     Start date: 1949     Quit date: 1974     Years since quittin.9    Smokeless tobacco: Never   Vaping Use    Vaping Use: Never used   Substance and Sexual Activity    Alcohol use: Yes     Alcohol/week: 7.0 standard drinks of alcohol    Drug use: No    Sexual activity: Not on file   Other Topics Concern    Not on file   Social History Narrative    She lives alone in a house. She has 3 children and 8 grandchildren and multiple great grandchildren. She is retired. 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 She does not smoke cigarettes and rarely drinks alcoho l.    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 Does her own cooking, and ba sarika. She does not use a computer, never did.    Jayde Daiz MD 2021         Social Determinants of Health     Financial Resource Strain: Low Risk  (2023)    Financial  Resource Strain     Within the past 12 months, have you or your family members you live with been unable to get utilities (heat, electricity) when it was really needed?: No   Food Insecurity: Low Risk  (12/4/2023)    Food Insecurity     Within the past 12 months, did you worry that your food would run out before you got money to buy more?: No     Within the past 12 months, did the food you bought just not last and you didn t have money to get more?: No   Transportation Needs: Low Risk  (12/4/2023)    Transportation Needs     Within the past 12 months, has lack of transportation kept you from medical appointments, getting your medicines, non-medical meetings or appointments, work, or from getting things that you need?: No   Physical Activity: Inactive (1/19/2023)    Exercise Vital Sign     Days of Exercise per Week: 0 days     Minutes of Exercise per Session: 0 min   Stress: Stress Concern Present (1/19/2023)    [place] [place] [employer] - Occupational Stress Questionnaire     Feeling of Stress : To some extent   Social Connections: Socially Isolated (1/19/2023)    Social Connection and Isolation Panel [NHANES]     Frequency of Communication with Friends and Family: More than three times a week     Frequency of Social Gatherings with Friends and Family: Once a week     Attends Jainism Services: Never     Active Member of Clubs or Organizations: No     Attends Club or Organization Meetings: Not on file     Marital Status:    Interpersonal Safety: Not on file   Housing Stability: Low Risk  (12/4/2023)    Housing Stability     Do you have housing? : Yes     Are you worried about losing your housing?: No            Allergies   Allergen Reactions    Diphenhydramine          MEDICATIONS:   Current Outpatient Medications   Medication    acetaminophen (TYLENOL) 500 MG tablet    apixaban ANTICOAGULANT (ELIQUIS ANTICOAGULANT) 2.5 MG tablet    atorvastatin (LIPITOR) 20 MG tablet    cyanocobalamin  "(VITAMIN B-12) 500 MCG tablet    ferrous sulfate (FEROSUL) 325 (65 Fe) MG tablet    fluticasone (FLONASE) 50 MCG/ACT nasal spray    meclizine (ANTIVERT) 25 MG tablet    Melatonin 5 MG CHEW    metoprolol succinate ER (TOPROL XL) 50 MG 24 hr tablet    dexAMETHasone (DECADRON) 6 MG tablet     No current facility-administered medications for this visit.        Family History   Adopted: Yes   Problem Relation Age of Onset    Pulmonary Embolism Mother 32.00    Heart Disease Father     CABG Son     Stomach Cancer Grandson 20.00    Pulmonary Embolism Maternal Grandmother 32.00    No Known Problems Daughter     No Known Problems Daughter     CABG Son 64.00          Review of Systems - 10 point Review of Systems is negative except for painful nails which is noted in HPI.    OBJECTIVE:  Appearance: alert, well appearing, and in no distress.    VITAL SIGNS: Pulse 100   Ht 1.626 m (5' 4\")   Wt 64.4 kg (142 lb)   LMP  (LMP Unknown)   SpO2 93%   BMI 24.37 kg/m        General appearance: Patient is alert and fully cooperative with history & exam.  No sign of distress is noted during the visit.     Psychiatric: Affect is pleasant & appropriate.  Patient appears motivated to improve health.     Respiratory: Breathing is regular & unlabored while sitting.     HEENT: Hearing is intact to spoken word.  Speech is clear.  No gross evidence of visual impairment that would impact ambulation.      Vascular: Tibialis posterior non-palpable.   Dermatologic: Nails 1-10 elongated, thick, yellow with subungal debris. Trophic changes noted including diminished hair growth and shiny skin.  Neurologic: All epicritic and proprioceptive sensations are grossly intact bilateral.  Musculoskeletal: Contracted digits bilateral feet.        "

## 2023-12-18 NOTE — LETTER
12/18/2023         RE: Deloris Larson  [address]  [place] [address]        Dear Colleague,    Thank you for referring your patient, Deloris Larson, to the [facility]. Please see a copy of my visit note below.          FOOT AND ANKLE SURGERY/PODIATRY CONSULT NOTE         ASSESSMENT:   Onychomycosis  PAD  Hammertoes      TREATMENT:  -I discussed with the patient that she does have long toenails on both feet.  Otherwise her feet are in good condition without signs of skin irritation or skin breakdown.    -Patient has agreed to sign the ABN form for nail trimming today.    -I debrided nails 1 through 10 in length and thickness.    -Patient has been referred to local routine foot care providers and I recommend nail trimming every 10 weeks.    -Patient's questions invited and answered.  She was encouraged to call my office with any further questions or concerns.     30 minutes spent on the day of encounter doing chart review, history and exam, documentation, and further activities as noted.     Catarino Herring DPM  [facility] Podiatry/Foot & Ankle Surgery      HPI: I was asked to see Deloris Larson today for painful nails on both feet.  Patient presents today with her son-in-law complaining of long nails.  She would like a nail trim today.  She does live in assisted living type facility and has not scheduled with the in-house podiatrist for nail trimming in the past but will likely do this going forward.    Past Medical History:   Diagnosis Date     2019 novel coronavirus disease (COVID-19) 02/01/2021     Acute respiratory failure with hypoxia (H) 02/05/2021     Anemia      Atrial fibrillation (H)      Bronchiectasis with acute lower respiratory infection (H) 01/28/2021     CAD (coronary artery disease)      Chronic diarrhea      Clinical diagnosis of COVID-19     1/2021     Coronary artery disease      Former smoker      Hematoma of left iliopsoas muscle 01/19/2020     while on eliquis.     History of skin cancer      Hyperlipidemia      Hypertension      Insomnia      Lumbar spondylosis 2016     Migraine      New onset of congestive heart failure (H)      PAD (peripheral artery disease) (H24) 10/19/2015     Paroxysmal SVT (supraventricular tachycardia)     Created by Conversion      Persistent atrial fibrillation (H) 2018    New diagnosis  and found incidentally on physical exam during yearly exam in primary clinic [id number] score of 5-new Eliquis start     Poliomyelitis      Restless legs syndrome (RLS)      RLS (restless legs syndrome) 2017     Sick sinus syndrome (H) 2020     Sigmoid diverticulosis 2007     SSS (sick sinus syndrome) (H)     S/p pacemaker     Subacute cough          Social History     Socioeconomic History     Marital status:      Spouse name: Not on file     Number of children: 3     Years of education: Not on file     Highest education level: Not on file   Occupational History     Not on file   Tobacco Use     Smoking status: Former     Packs/day: 1.50     Years: 25.00     Additional pack years: 0.00     Total pack years: 37.50     Types: Cigarettes     Start date: 1949     Quit date: 1974     Years since quittin.9     Smokeless tobacco: Never   Vaping Use     Vaping Use: Never used   Substance and Sexual Activity     Alcohol use: Yes     Alcohol/week: 7.0 standard drinks of alcohol     Drug use: No     Sexual activity: Not on file   Other Topics Concern     Not on file   Social History Narrative    She lives alone in a house. She has 3 children and 8 grandchildren and multiple great grandchildren. She is retired. 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 She does not smoke cigarettes and rarely drinks alcoho l.    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  Her Confucianist had to close, due to declining numbers.    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 She has never driven a car. Does her own cooking, and ba sarika. She does not use a computer, never did.    Jayde Diaz MD 5/24/2021         Social Determinants of Health     Financial Resource Strain: Low Risk  (12/4/2023)    Financial Resource Strain      Within the past 12 months, have you or your family members you live with been unable to get utilities (heat, electricity) when it was really needed?: No   Food Insecurity: Low Risk  (12/4/2023)    Food Insecurity      Within the past 12 months, did you worry that your food would run out before you got money to buy more?: No      Within the past 12 months, did the food you bought just not last and you didn t have money to get more?: No   Transportation Needs: Low Risk  (12/4/2023)    Transportation Needs      Within the past 12 months, has lack of transportation kept you from medical appointments, getting your medicines, non-medical meetings or appointments, work, or from getting things that you need?: No   Physical Activity: Inactive (1/19/2023)    Exercise Vital Sign      Days of Exercise per Week: 0 days      Minutes of Exercise per Session: 0 min   Stress: Stress Concern Present (1/19/2023)    [place] [employer] - Occupational Stress Questionnaire      Feeling of Stress : To some extent   Social Connections: Socially Isolated (1/19/2023)    Social Connection and Isolation Panel [NHANES]      Frequency of Communication with Friends and Family: More than three times a week      Frequency of Social Gatherings with Friends and Family: Once a week      Attends Hindu Services: Never      Active Member of Clubs or Organizations: No      Attends Club or Organization Meetings: Not on file      Marital Status:    Interpersonal Safety: Not on file   Housing Stability: Low Risk  (12/4/2023)    Housing Stability      Do you have housing? : Yes      Are you worried about losing your  "housing?: No            Allergies   Allergen Reactions     Diphenhydramine          MEDICATIONS:   Current Outpatient Medications   Medication     acetaminophen (TYLENOL) 500 MG tablet     apixaban ANTICOAGULANT (ELIQUIS ANTICOAGULANT) 2.5 MG tablet     atorvastatin (LIPITOR) 20 MG tablet     cyanocobalamin (VITAMIN B-12) 500 MCG tablet     ferrous sulfate (FEROSUL) 325 (65 Fe) MG tablet     fluticasone (FLONASE) 50 MCG/ACT nasal spray     meclizine (ANTIVERT) 25 MG tablet     Melatonin 5 MG CHEW     metoprolol succinate ER (TOPROL XL) 50 MG 24 hr tablet     dexAMETHasone (DECADRON) 6 MG tablet     No current facility-administered medications for this visit.        Family History   Adopted: Yes   Problem Relation Age of Onset     Pulmonary Embolism Mother 32.00     Heart Disease Father      CABG Son      Stomach Cancer Grandson 20.00     Pulmonary Embolism Maternal Grandmother 32.00     No Known Problems Daughter      No Known Problems Daughter      CABG Son 64.00          Review of Systems - 10 point Review of Systems is negative except for painful nails which is noted in HPI.    OBJECTIVE:  Appearance: alert, well appearing, and in no distress.    VITAL SIGNS: Pulse 100   Ht 1.626 m (5' 4\")   Wt 64.4 kg (142 lb)   LMP  (LMP Unknown)   SpO2 93%   BMI 24.37 kg/m        General appearance: Patient is alert and fully cooperative with history & exam.  No sign of distress is noted during the visit.     Psychiatric: Affect is pleasant & appropriate.  Patient appears motivated to improve health.     Respiratory: Breathing is regular & unlabored while sitting.     HEENT: Hearing is intact to spoken word.  Speech is clear.  No gross evidence of visual impairment that would impact ambulation.      Vascular: Tibialis posterior non-palpable.   Dermatologic: Nails 1-10 elongated, thick, yellow with subungal debris. Trophic changes noted including diminished hair growth and shiny skin.  Neurologic: All epicritic and " proprioceptive sensations are grossly intact bilateral.  Musculoskeletal: Contracted digits bilateral feet.          Again, thank you for allowing me to participate in the care of your patient.        Sincerely,        Catarino Herring DPM

## 2023-12-20 NOTE — TELEPHONE ENCOUNTER
FUTURE VISIT INFORMATION      FUTURE VISIT INFORMATION:  Date: 2/20/24  Time: 11:20 AM  Location: [facility]  REFERRAL INFORMATION:  Referring provider:    Referring providers clinic:  [facility] INTERNAL MEDICINE.  Reason for visit/diagnosis:  Chronic right ear pain. Referred by Daniela Roth MD in [facility] INTERNAL MEDICINE. Appt per pt. Recs in [facility]. CSC confirmed.     RECORDS REQUESTED FROM      Clinic name Comments Records Status Imaging Status   [facility] INTERNAL MEDICINE. 12/4/23 referral/ OV notes- Daniela Roth MD  [facility] Imaging CT head 10/25/23 [facility] PACS   [facility] ENT & Audiology 9/9/21 OV notes- Yasmine Benítez MD   9/9/21 audiogram [facility]

## 2023-12-31 NOTE — TELEPHONE ENCOUNTER
ETHAN Balderas from [facility] admitting Deloris Freeman to hospice.  A statement/documentation is needed from pcp within 24 hours indicating patient likely has 6 mos or less if her cancer runs the typical/expected course.  I transferred Sherrie to the answering service to ask to speak with the provider on call for pcp Dr Roth.     Shonna CARLTON RN [facility]

## 2024-01-01 ENCOUNTER — TELEPHONE (OUTPATIENT)
Dept: INTERNAL MEDICINE | Facility: CLINIC | Age: 89
End: 2024-01-01
Payer: COMMERCIAL

## 2024-01-01 ENCOUNTER — PRE VISIT (OUTPATIENT)
Dept: OTOLARYNGOLOGY | Facility: CLINIC | Age: 89
End: 2024-01-01

## 2024-01-01 ENCOUNTER — TELEPHONE (OUTPATIENT)
Dept: CARDIOLOGY | Facility: CLINIC | Age: 89
End: 2024-01-01
Payer: COMMERCIAL

## 2024-01-01 ENCOUNTER — ANCILLARY PROCEDURE (OUTPATIENT)
Dept: CARDIOLOGY | Facility: CLINIC | Age: 89
End: 2024-01-01
Attending: INTERNAL MEDICINE
Payer: MEDICARE

## 2024-01-01 DIAGNOSIS — R06.09 DOE (DYSPNEA ON EXERTION): ICD-10-CM

## 2024-01-01 DIAGNOSIS — I48.19 PERSISTENT ATRIAL FIBRILLATION (H): ICD-10-CM

## 2024-01-01 DIAGNOSIS — Z95.0 CARDIAC PACEMAKER IN SITU: ICD-10-CM

## 2024-01-01 DIAGNOSIS — I47.29 NSVT (NONSUSTAINED VENTRICULAR TACHYCARDIA) (H): Primary | ICD-10-CM

## 2024-01-01 DIAGNOSIS — I49.5 SICK SINUS SYNDROME (H): ICD-10-CM

## 2024-01-01 DIAGNOSIS — Z95.0 PACEMAKER: ICD-10-CM

## 2024-01-01 LAB
MDC_IDC_EPISODE_DTM: NORMAL
MDC_IDC_EPISODE_DURATION: 16 S
MDC_IDC_EPISODE_DURATION: 18 S
MDC_IDC_EPISODE_ID: NORMAL
MDC_IDC_EPISODE_TYPE: NORMAL
MDC_IDC_EPISODE_TYPE_INDUCED: NO
MDC_IDC_EPISODE_TYPE_INDUCED: NO
MDC_IDC_LEAD_CONNECTION_STATUS: NORMAL
MDC_IDC_LEAD_CONNECTION_STATUS: NORMAL
MDC_IDC_LEAD_IMPLANT_DT: NORMAL
MDC_IDC_LEAD_IMPLANT_DT: NORMAL
MDC_IDC_LEAD_LOCATION: NORMAL
MDC_IDC_LEAD_LOCATION: NORMAL
MDC_IDC_LEAD_LOCATION_DETAIL_1: NORMAL
MDC_IDC_LEAD_LOCATION_DETAIL_1: NORMAL
MDC_IDC_LEAD_MFG: NORMAL
MDC_IDC_LEAD_MFG: NORMAL
MDC_IDC_LEAD_MODEL: NORMAL
MDC_IDC_LEAD_MODEL: NORMAL
MDC_IDC_LEAD_POLARITY_TYPE: NORMAL
MDC_IDC_LEAD_POLARITY_TYPE: NORMAL
MDC_IDC_LEAD_SERIAL: NORMAL
MDC_IDC_LEAD_SERIAL: NORMAL
MDC_IDC_LEAD_SPECIAL_FUNCTION: NORMAL
MDC_IDC_LEAD_SPECIAL_FUNCTION: NORMAL
MDC_IDC_MSMT_BATTERY_DTM: NORMAL
MDC_IDC_MSMT_BATTERY_REMAINING_LONGEVITY: 42 MO
MDC_IDC_MSMT_BATTERY_REMAINING_PERCENTAGE: 64 %
MDC_IDC_MSMT_BATTERY_STATUS: NORMAL
MDC_IDC_MSMT_LEADCHNL_RA_IMPEDANCE_VALUE: 720 OHM
MDC_IDC_MSMT_LEADCHNL_RV_IMPEDANCE_VALUE: 619 OHM
MDC_IDC_MSMT_LEADCHNL_RV_PACING_THRESHOLD_AMPLITUDE: 1.2 V
MDC_IDC_MSMT_LEADCHNL_RV_PACING_THRESHOLD_PULSEWIDTH: 0.4 MS
MDC_IDC_PG_IMPLANT_DTM: NORMAL
MDC_IDC_PG_MFG: NORMAL
MDC_IDC_PG_MODEL: NORMAL
MDC_IDC_PG_SERIAL: NORMAL
MDC_IDC_PG_TYPE: NORMAL
MDC_IDC_SESS_CLINIC_NAME: NORMAL
MDC_IDC_SESS_DTM: NORMAL
MDC_IDC_SESS_TYPE: NORMAL
MDC_IDC_SET_BRADY_AT_MODE_SWITCH_RATE: 170 {BEATS}/MIN
MDC_IDC_SET_BRADY_LOWRATE: 60 {BEATS}/MIN
MDC_IDC_SET_BRADY_MAX_SENSOR_RATE: 130 {BEATS}/MIN
MDC_IDC_SET_BRADY_MODE: NORMAL
MDC_IDC_SET_LEADCHNL_RA_SENSING_ADAPTATION_MODE: NORMAL
MDC_IDC_SET_LEADCHNL_RA_SENSING_SENSITIVITY: 0.15 MV
MDC_IDC_SET_LEADCHNL_RV_PACING_AMPLITUDE: 2 V
MDC_IDC_SET_LEADCHNL_RV_PACING_CAPTURE_MODE: NORMAL
MDC_IDC_SET_LEADCHNL_RV_PACING_POLARITY: NORMAL
MDC_IDC_SET_LEADCHNL_RV_PACING_PULSEWIDTH: 0.4 MS
MDC_IDC_SET_LEADCHNL_RV_SENSING_ADAPTATION_MODE: NORMAL
MDC_IDC_SET_LEADCHNL_RV_SENSING_POLARITY: NORMAL
MDC_IDC_SET_LEADCHNL_RV_SENSING_SENSITIVITY: 2.5 MV
MDC_IDC_SET_ZONE_DETECTION_INTERVAL: 375 MS
MDC_IDC_SET_ZONE_STATUS: NORMAL
MDC_IDC_SET_ZONE_TYPE: NORMAL
MDC_IDC_SET_ZONE_VENDOR_TYPE: NORMAL
MDC_IDC_STAT_BRADY_DTM_END: NORMAL
MDC_IDC_STAT_BRADY_DTM_START: NORMAL
MDC_IDC_STAT_BRADY_RA_PERCENT_PACED: 0 %
MDC_IDC_STAT_BRADY_RV_PERCENT_PACED: 21 %
MDC_IDC_STAT_EPISODE_RECENT_COUNT: 0
MDC_IDC_STAT_EPISODE_RECENT_COUNT: 0
MDC_IDC_STAT_EPISODE_RECENT_COUNT: 2
MDC_IDC_STAT_EPISODE_RECENT_COUNT_DTM_END: NORMAL
MDC_IDC_STAT_EPISODE_RECENT_COUNT_DTM_START: NORMAL
MDC_IDC_STAT_EPISODE_TYPE: NORMAL
MDC_IDC_STAT_EPISODE_VENDOR_TYPE: NORMAL
MDC_IDC_STAT_EPISODE_VENDOR_TYPE: NORMAL

## 2024-01-01 PROCEDURE — 93294 REM INTERROG EVL PM/LDLS PM: CPT | Mod: GV | Performed by: INTERNAL MEDICINE

## 2024-01-01 PROCEDURE — 93296 REM INTERROG EVL PM/IDS: CPT | Mod: GV | Performed by: INTERNAL MEDICINE

## 2024-03-18 NOTE — TELEPHONE ENCOUNTER
Encounter Type: Routine remote pacemaker transmission.   Device: BSCI Accolade.  Pacing % /Programmed:  21% at VVIR 60 bpm.   Lead(s): Stable.   Battery longevity: 3yrs, 6mo estimated.   Presenting: Ventricular sensing 70-95 bpm.   Atrial high rates: n/a.  Anticoagulant: Eliquis.   Ventricular High rates: since 12/4/23; two ventricular high rate episodes, unable to see onset of episode V-27, possible NSVT vs RVR due to change in morphology. Episode V-26 appears to be RVR.   Comments: Normal device function.   Plan: next remote check scheduled for 6/24/24. Routed to device RN for review. CECE Carey, Device Specialist  ADD: Transmission reviewed. Ventricular high rate episode on 2/5/2024 at 0010 shows fairly regular VS beats suggestive of 30 beats of NSVT at 165bpm. See note in Epic. TORI Burnham RN       Results were reviewed with patient. Patient denies any symptoms associated with episode of NSVT on 2/5/2024. She states that she sleeps hard and takes a sleeping pill and would have been asleep at that time. She states that she has been feeling fine. She confirms that she takes Metoprolol 50mg nightly. Her most recent EF was 60% by echo 9/2023. Routed to Dr. Chan for clinical management of NSVT.

## 2024-03-22 NOTE — TELEPHONE ENCOUNTER
Left a detailed message outlining recommendation below. Order placed and message to schedulers to arrange. Direct line provided for call back. -mjc       ======================================================================================      All Conversations  (Newest Message First)  March 22, 2024  Kristine Chan MD   to Eduardo Burnham RN Gorshe, Maureen, RN CL    3/22/24 12:51 PM  Long run of NSVT 11 seconds.  Recommend Lexiscan nuclear stress test to assess for ischemia.  Last evaluation with coronary angiogram 2018 which showed moderate LAD disease.  Let me know if any questions or concerns.  March 18, 2024  Eduardo Burnham, RN   to Kristine Chan MD   Y      3/18/24  3:40 PM  Hi Dr. Chan    There was a 11 second episode of NSVT incidentally noted on patient's routine remote pacemaker check. She was not symptomatic. EF 60% by echo (9/2023). She is currently taking Metoprolol Succinate 50mg nightly. Any new recommendations?    Thanks!  Eduardo Thomas,

## 2024-03-22 NOTE — TELEPHONE ENCOUNTER
"Called back patient and went over rationale for stress test. She verbalized understanding but does not recall the conversation with device team earlier this week. Apologized for any confusion and reiterated what was discussed. She notes that her \"memory is not what is used to be.\"    She is agreeable to stress test and was transferred to schedulers to arrange. -[facility]  "

## 2024-07-29 NOTE — PATIENT INSTRUCTIONS
He was reevaluated by Dr. Delfina Lang on 7/29/2024 and needs reevaluation in November 2024.     Patient Education     Understanding Chronic Venous Insufficiency  Problems with the veins in the legs may lead to chronic venous insufficiency (CVI). CVI means that there is a long-term problem with the veins not being able to pump blood back to your heart. When this happens, blood stays in the legs and causes swelling and aching.   Two problems that may lead to chronic venous insufficiency are:    Damaged valves. Valves keep blood flowing from the legs through the blood vessels and back to the heart. When the valves are damaged, blood does not flow as well.     Deep vein thrombosis (DVT).  Blood clots may form in the deep veins of the legs. This may cause pain, redness, and swelling in the legs. It may also block the flow of blood back to the heart. Seek medical care right away if you have these symptoms.  A blood clot in the leg can also break off and travel to the lungs. This is called  pulmonary embolism (PE).  In the lungs, the clot can cut off the flow of blood. This may cause chest pain, trouble breathing, sweating, a fast heartbeat, coughing (may cough up blood), and fainting. This is a medical emergency and may cause death. Call 911 if you have these symptoms.  CVI can t be cured, but you can control leg swelling to reduce the likelihood of sores (ulcers).  Recognizing the symptoms  Be aware of the following:    If you stand or sit with your feet down for long periods, your legs may ache or feel heavy.    Swollen ankles are possibly the most common symptom of CVI.    As swelling increases, the skin over your ankles may show red spots or a brownish tinge. The skin may feel leathery or scaly, and may start to itch.    If swelling is not controlled, an ulcer (open wound) may form.  What you can do  Reduce your risk of developing ulcers by doing the following:    Increase blood flow back to your heart by elevating your legs, exercising daily, and wearing elastic stockings.    Boost blood flow in your legs by  losing extra weight.    If you must stand or sit in one place for a period of time, keep your blood moving by wiggling your toes, shifting your body position, and rising up on the balls of your feet.    [employer] last reviewed this educational content on 10/1/2019    7206-1216 [employer]. All rights reserved. This information is not intended as a substitute for professional medical care. Always follow your healthcare professional's instructions.

## 2025-02-06 NOTE — PROGRESS NOTES
Remote device check.  Please see link for full device report.     This document is complete and the patient is ready for discharge.

## 2025-02-18 NOTE — PROGRESS NOTES
Clinic Care Coordination Contact  Community Health Worker Initial Outreach    CHW Initial Information Gathering:  Referral Source: PCP  Preferred Hospital: [facility]  [phone]  Current living arrangement:: I live in a private home with family  Type of residence:: Private home - stairs  Informal Support system:: Children  No PCP office visit in Past Year: No  Transportation means:: Family  CHW Additional Questions  MyChart active?: No  Patient agreeable to assistance with activating MyChart?: No    Patient accepts CC: Yes. Patient scheduled for assessment with CCC RN on 10/9/2020 at 11 am. Patient noted desire to discuss Navigating care for recent diagnosis. .       Clinic Care Coordination Contact  [facility]/Voicemail       Clinical Data: Care Coordinator Outreach  Outreach attempted x 1.  Left message on patient's voicemail with call back information and requested return call.  Plan:  Care Coordinator will try to reach patient again in 1-2 business days.         Mom  requests to establish care in [place]    Informed patient can be seen until 21 years old and of [facility] vaccination policy.    Requested medical records be mailed, faxed, or dropped off prior to first appointment.   Mom aware to drop off at least immunizations prior to appointment.    Completed patient registration and verified patients insurance.   Informed to present a copy of pt's insurance card at time of visit.     Dr. Cervantes requested as PCP.    Scheduled patient's first appointment as requested.     Future Appointments   Date Time Provider Department [place]   6/6/2025  8:00 AM Cristela Cervantes MD [id number]